# Patient Record
Sex: FEMALE | Race: WHITE | NOT HISPANIC OR LATINO | Employment: OTHER | URBAN - METROPOLITAN AREA
[De-identification: names, ages, dates, MRNs, and addresses within clinical notes are randomized per-mention and may not be internally consistent; named-entity substitution may affect disease eponyms.]

---

## 2017-08-29 ENCOUNTER — OFFICE VISIT (OUTPATIENT)
Dept: DERMATOLOGY | Facility: CLINIC | Age: 77
End: 2017-08-29
Payer: MEDICARE

## 2017-08-29 VITALS — WEIGHT: 90 LBS | HEIGHT: 62 IN | BODY MASS INDEX: 16.56 KG/M2

## 2017-08-29 DIAGNOSIS — L57.0 ACTINIC KERATOSES: ICD-10-CM

## 2017-08-29 DIAGNOSIS — L81.4 SOLAR LENTIGO: ICD-10-CM

## 2017-08-29 DIAGNOSIS — H01.139 EYELID DERMATITIS, ECZEMATOUS, UNSPECIFIED LATERALITY: ICD-10-CM

## 2017-08-29 DIAGNOSIS — D48.9 NEOPLASM OF UNCERTAIN BEHAVIOR: Primary | ICD-10-CM

## 2017-08-29 DIAGNOSIS — L82.1 SEBORRHEIC KERATOSES: ICD-10-CM

## 2017-08-29 PROCEDURE — 17000 DESTRUCT PREMALG LESION: CPT | Mod: S$GLB,,, | Performed by: DERMATOLOGY

## 2017-08-29 PROCEDURE — 88305 TISSUE EXAM BY PATHOLOGIST: CPT | Performed by: PATHOLOGY

## 2017-08-29 PROCEDURE — 1159F MED LIST DOCD IN RCRD: CPT | Mod: S$GLB,,, | Performed by: DERMATOLOGY

## 2017-08-29 PROCEDURE — 3008F BODY MASS INDEX DOCD: CPT | Mod: S$GLB,,, | Performed by: DERMATOLOGY

## 2017-08-29 PROCEDURE — 99999 PR PBB SHADOW E&M-NEW PATIENT-LVL III: CPT | Mod: PBBFAC,,, | Performed by: DERMATOLOGY

## 2017-08-29 PROCEDURE — 1126F AMNT PAIN NOTED NONE PRSNT: CPT | Mod: S$GLB,,, | Performed by: DERMATOLOGY

## 2017-08-29 PROCEDURE — 99201 PR OFFICE/OUTPT VISIT,NEW,LEVL I: CPT | Mod: 25,S$GLB,, | Performed by: DERMATOLOGY

## 2017-08-29 PROCEDURE — 11100 PR BIOPSY OF SKIN LESION: CPT | Mod: 59,S$GLB,, | Performed by: DERMATOLOGY

## 2017-08-29 RX ORDER — AMLODIPINE AND BENAZEPRIL HYDROCHLORIDE 5; 10 MG/1; MG/1
1 CAPSULE ORAL DAILY
Status: ON HOLD | COMMUNITY
Start: 2017-08-16 | End: 2022-04-06 | Stop reason: SDUPTHER

## 2017-08-29 RX ORDER — OXYCODONE AND ACETAMINOPHEN 10; 325 MG/1; MG/1
1 TABLET ORAL EVERY 6 HOURS PRN
Status: ON HOLD | COMMUNITY
Start: 2017-08-04 | End: 2022-11-21 | Stop reason: HOSPADM

## 2017-08-29 RX ORDER — METOPROLOL SUCCINATE 50 MG/1
50 TABLET, EXTENDED RELEASE ORAL 2 TIMES DAILY
COMMUNITY
Start: 2017-08-21

## 2017-08-29 RX ORDER — SIMVASTATIN 10 MG/1
20 TABLET, FILM COATED ORAL NIGHTLY
COMMUNITY
Start: 2017-07-06

## 2017-08-29 NOTE — PROGRESS NOTES
Subjective:       Patient ID:  Carmen Wilkinson is a 77 y.o. female who presents for   Chief Complaint   Patient presents with    Lesion     R cheek    Dry Skin     both eyebrows     Initial visit  Lesion on R cheek treated with cryo in the past, comes and goes with pink and scaliness, not healing x 4 years  H/o PDT to B arms  No h/o skin cancer  Denies intense sun exposure          Lesion  - Initial  Affected locations: right cheek  Duration: 4 years  Signs / symptoms: crusting, dryness and scaling  Severity: mild  Timing: constant  Aggravated by: nothing  Treatments tried: Crytotherapy in the past.    Dry Skin  - Initial  Affected locations: L and R eyebrows.  Duration: 4 months  Signs / symptoms: dryness (flaky)  Severity: mild  Timing: constant  Aggravated by: nothing  Relieving factors/Treatments tried: nothing        Review of Systems   Constitutional: Negative for fever, chills, weight loss, weight gain, fatigue, night sweats and malaise.   Skin: Positive for dry skin, daily sunscreen use (makeup), activity-related sunscreen use and wears hat.   Hematologic/Lymphatic: Bruises/bleeds easily (brusies easily).        Objective:    Physical Exam   Constitutional: She appears well-developed and well-nourished. No distress.   Neurological: She is alert and oriented to person, place, and time. She is not disoriented.   Psychiatric: She has a normal mood and affect.   Skin:   Areas Examined (abnormalities noted in diagram):   Head / Face Inspection Performed                       Diagram Legend     Erythematous scaling macule/papule c/w actinic keratosis       Vascular papule c/w angioma      Pigmented verrucoid papule/plaque c/w seborrheic keratosis      Yellow umbilicated papule c/w sebaceous hyperplasia      Irregularly shaped tan macule c/w lentigo     1-2 mm smooth white papules consistent with Milia      Movable subcutaneous cyst with punctum c/w epidermal inclusion cyst      Subcutaneous movable cyst  c/w pilar cyst      Firm pink to brown papule c/w dermatofibroma      Pedunculated fleshy papule(s) c/w skin tag(s)      Evenly pigmented macule c/w junctional nevus     Mildly variegated pigmented, slightly irregular-bordered macule c/w mildly atypical nevus      Flesh colored to evenly pigmented papule c/w intradermal nevus       Pink pearly papule/plaque c/w basal cell carcinoma      Erythematous hyperkeratotic cursted plaque c/w SCC      Surgical scar with no sign of skin cancer recurrence      Open and closed comedones      Inflammatory papules and pustules      Verrucoid papule consistent consistent with wart     Erythematous eczematous patches and plaques     Dystrophic onycholytic nail with subungual debris c/w onychomycosis     Umbilicated papule    Erythematous-base heme-crusted tan verrucoid plaque consistent with inflamed seborrheic keratosis     Erythematous Silvery Scaling Plaque c/w Psoriasis     See annotation          Assessment / Plan:      Pathology Orders:      Normal Orders This Visit    Tissue Specimen To Pathology, Dermatology     Questions:    Directional Terms:  Other(comment)    Clinical information:  Hyperkeratotic pink plaque, SCCIS vs other    Specific Site:  R medial cheek        Neoplasm of uncertain behavior  -     Tissue Specimen To Pathology, Dermatology  Shave biopsy procedure note:    Shave biopsy performed after verbal consent including risk of infection, scar, recurrence, need for additional treatment of site. Area prepped with alcohol, anesthetized with approximately 1.0cc of 1% lidocaine with epinephrine. Lesional tissue shaved with razor blade. Hemostasis achieved with application of aluminum chloride followed by hyfrecation. No complications. Dressing applied. Wound care explained.    Actinic keratoses  Cryosurgery Procedure Note    Verbal consent from the patient is obtained and the patient is aware of the precancerous quality and need for treatment of these lesions. Liquid  nitrogen cryosurgery is applied to the 1 actinic keratoses, as detailed in the physical exam, to produce a freeze injury. The patient is aware that blisters may form and is instructed on wound care with gentle cleansing and use of vaseline ointment to keep moist until healed. The patient is supplied a handout on cryosurgery and is instructed to call if lesions do not completely resolve.    Solar lentigo  This is a benign hyperpigmented sun induced lesion. Daily sun protection will reduce the number of new lesions. Treatment of these benign lesions are considered cosmetic.    Eyelid dermatitis, eczematous, unspecified laterality, mild, medial, upper lids  Hydrocortisone 1% cream OTC BID short term  Frequent moisturizer (vanicream lite lotion)  Denies use of eye makeup    Seborrheic keratoses  These are benign inherited growths without a malignant potential. Reassurance given to patient. No treatment is necessary.     Patient instructed in importance in daily sun protection of at least spf 30. Sun avoidance and topical protection discussed.   Recommend Elta MD (physician office) COTZ sensitive (available online) for daily use on face and neck.  Patient encouraged to wear hat for all outdoor exposure.   Also discussed sun protective clothing.             Return in about 6 months (around 2/28/2018).

## 2017-08-29 NOTE — PATIENT INSTRUCTIONS
Shave Biopsy Wound Care    Your doctor has performed a shave biopsy today.  A band aid and vaseline ointment has been placed over the site.  This should remain in place for 24 hours.  It is recommended that you keep the area dry for the first 24 hours.  After 24 hours, you may remove the band aid and wash the area with warm soap and water and apply Vaseline jelly.  Many patients prefer to use Neosporin or Bacitracin ointment.  This is acceptable; however, know that you can develop an allergy to this medication even if you have used it safely for years.  It is important to keep the area moist.  Letting it dry out and get air slows healing time, and will worsen the scar.  Band aid is optional after first 24 hours.      If you notice increasing redness, tenderness, pain, or yellow drainage at the biopsy site, please notify your doctor.  These are signs of an infection.    If your biopsy site is bleeding, apply firm pressure for 15 minutes straight.  Repeat for another 15 minutes, if it is still bleeding.   If the surgical site continues to bleed, then please contact your doctor.       [facility]  [facility]  [id number] [place] [id number]  Dept: [phone]

## 2017-09-05 DIAGNOSIS — D04.30 SQUAMOUS CELL CARCINOMA IN SITU OF SKIN OF FACE: Primary | ICD-10-CM

## 2017-09-05 RX ORDER — IMIQUIMOD 12.5 MG/.25G
CREAM TOPICAL
Qty: 24 PACKET | Refills: 0 | Status: SHIPPED | OUTPATIENT
Start: 2017-09-05 | End: 2018-04-27

## 2017-09-11 ENCOUNTER — TELEPHONE (OUTPATIENT)
Dept: DERMATOLOGY | Facility: CLINIC | Age: 77
End: 2017-09-11

## 2017-09-11 NOTE — TELEPHONE ENCOUNTER
----- Message from Keren Solorzano sent at 9/11/2017 12:48 PM CDT -----  Contact: Patient  Patient called to speak with Marian about instructions on a cream. She can be contacted at [phone].    Thanks,  Keren

## 2017-10-11 ENCOUNTER — TELEPHONE (OUTPATIENT)
Dept: DERMATOLOGY | Facility: CLINIC | Age: 77
End: 2017-10-11

## 2017-10-11 NOTE — TELEPHONE ENCOUNTER
----- Message from She Bliss sent at 10/9/2017  2:19 PM CDT -----  Contact: patient  Patient call to advise that face is peeling. Need to know what to do? Please call back at [phone]. Thanks,

## 2017-10-11 NOTE — TELEPHONE ENCOUNTER
Spoke to pt. Pt started imquimod cream 5 weeks ago on right cheek. Pt states her cheek is red but not painful at all, states her skin is peeling all over her body. Informed pt the cream should only be effective locally. Advised to moisturize head to toe. Verbalized understanding. Scheduled for f/u 11/13/17.

## 2017-11-13 ENCOUNTER — OFFICE VISIT (OUTPATIENT)
Dept: DERMATOLOGY | Facility: CLINIC | Age: 77
End: 2017-11-13
Payer: MEDICARE

## 2017-11-13 VITALS — BODY MASS INDEX: 16.56 KG/M2 | WEIGHT: 90 LBS | HEIGHT: 62 IN

## 2017-11-13 DIAGNOSIS — D04.30 SQUAMOUS CELL CARCINOMA IN SITU OF SKIN OF FACE: Primary | ICD-10-CM

## 2017-11-13 PROCEDURE — 99999 PR PBB SHADOW E&M-EST. PATIENT-LVL II: CPT | Mod: PBBFAC,,, | Performed by: DERMATOLOGY

## 2017-11-13 PROCEDURE — 99213 OFFICE O/P EST LOW 20 MIN: CPT | Mod: S$GLB,,, | Performed by: DERMATOLOGY

## 2017-11-13 NOTE — PROGRESS NOTES
Subjective:       Patient ID:  Carmen Wilkinson is a 77 y.o. female who presents for   Chief Complaint   Patient presents with    Squamous Cell Carcinoma     SSIC, efudex follow up      Patient last seen 8/29/17 with new SCCIS R cheek  Last applied 3 weeks ago  Robust reaction at the site, stayed pink since then    Notes Recorded by Neda Flores MD on 9/5/2017 at 8:09 AM CDT  Sent imiquimod instead of efudex; feel higher cure rate. Please call patient to explain packets management   I recommend treating with imiquimod 5 times per week for 4-6 weeks with follow up at 3 months to ensure clinical resolution.   The Medication comes in little packets, cut a small opening to the top of the packet and squeeze one drop on the red scaly area at bedtime. Place packet in a ziplock bag and store in refrigerator for next day use. Wash hands thoroughly and avoid transfer to your eyes. Expect redness and irritation at the site, starting on day 2-3 and worsening as you continue to treat. A robust reaction predicts a good outcome. However if the reaction is too severe, you may take a few days off.   Medication ordered. Please call us if you have any question.  Thank you!   Dr Flores      From IV 8/29: Lesion on R cheek treated with cryo in the past, comes and goes with pink and scaliness, not healing x 4 years  H/o PDT to B arms  No h/o skin cancer  Denies intense sun exposure      Squamous Cell Carcinoma  - Follow-up  Diagnosis: Squamous cell carcinoma in situ.  Symptom course: improving  Currently using: aldara cream.  Affected locations: right cheek  Signs / symptoms: redness  Severity: mild        Review of Systems   Constitutional: Negative for fever, chills, weight loss, weight gain, fatigue, night sweats and malaise.   Skin: Positive for daily sunscreen use, activity-related sunscreen use and wears hat.   Hematologic/Lymphatic: Bruises/bleeds easily.        Objective:    Physical Exam   Skin:                  Diagram Legend     Erythematous scaling macule/papule c/w actinic keratosis       Vascular papule c/w angioma      Pigmented verrucoid papule/plaque c/w seborrheic keratosis      Yellow umbilicated papule c/w sebaceous hyperplasia      Irregularly shaped tan macule c/w lentigo     1-2 mm smooth white papules consistent with Milia      Movable subcutaneous cyst with punctum c/w epidermal inclusion cyst      Subcutaneous movable cyst c/w pilar cyst      Firm pink to brown papule c/w dermatofibroma      Pedunculated fleshy papule(s) c/w skin tag(s)      Evenly pigmented macule c/w junctional nevus     Mildly variegated pigmented, slightly irregular-bordered macule c/w mildly atypical nevus      Flesh colored to evenly pigmented papule c/w intradermal nevus       Pink pearly papule/plaque c/w basal cell carcinoma      Erythematous hyperkeratotic cursted plaque c/w SCC      Surgical scar with no sign of skin cancer recurrence      Open and closed comedones      Inflammatory papules and pustules      Verrucoid papule consistent consistent with wart     Erythematous eczematous patches and plaques     Dystrophic onycholytic nail with subungual debris c/w onychomycosis     Umbilicated papule    Erythematous-base heme-crusted tan verrucoid plaque consistent with inflamed seborrheic keratosis     Erythematous Silvery Scaling Plaque c/w Psoriasis     See annotation              Assessment / Plan:        Squamous cell carcinoma in situ of skin of face    bx 9/29/17,   S/p imiquimod 5times weekly x 6 weeks, residual erythema, atropic scar at biopsy site  No deep component appreciated  Discussed repeat imiquimod course vs Mohs referral  Will reach out to Dr Suarez for input           Return in about 2 months (around 1/13/2018).

## 2017-11-13 NOTE — Clinical Note
Delroy Farrar, would you please provide guidance? This patient had large SCCIS R medial cheek, cannot ruleout deeper component.Seen today in FU after 6 weeks imiquimod 5xweekly, robust reaction, completed 3 weeks ago. Marked residual erythema, no deep component appreciated. Discussed repeat imiquimod with FU biopsy vs referral to you for evaluation/Mohs. WOuld you please look at pics of pre and post and give me your take? Thank you Neda

## 2017-11-17 ENCOUNTER — TELEPHONE (OUTPATIENT)
Dept: DERMATOLOGY | Facility: CLINIC | Age: 77
End: 2017-11-17

## 2017-11-17 NOTE — TELEPHONE ENCOUNTER
Spoke to pt. Informed Dr Suarez advised to start 2nd round of imiquimod treatment and f/u in 1 month. Verbalized understanding. Scheduled f/u 1/26/2018.

## 2018-01-26 ENCOUNTER — OFFICE VISIT (OUTPATIENT)
Dept: DERMATOLOGY | Facility: CLINIC | Age: 78
End: 2018-01-26
Payer: MEDICARE

## 2018-01-26 VITALS — BODY MASS INDEX: 16.56 KG/M2 | HEIGHT: 62 IN | WEIGHT: 90 LBS

## 2018-01-26 DIAGNOSIS — L81.4 SOLAR LENTIGO: ICD-10-CM

## 2018-01-26 DIAGNOSIS — D04.9 SQUAMOUS CELL CARCINOMA IN SITU (SCCIS) OF SKIN: Primary | ICD-10-CM

## 2018-01-26 PROCEDURE — 99213 OFFICE O/P EST LOW 20 MIN: CPT | Mod: S$GLB,,, | Performed by: DERMATOLOGY

## 2018-01-26 PROCEDURE — 99999 PR PBB SHADOW E&M-EST. PATIENT-LVL II: CPT | Mod: PBBFAC,,, | Performed by: DERMATOLOGY

## 2018-01-26 NOTE — PROGRESS NOTES
Subjective:       Patient ID:  Carmen Wilkinson is a 78 y.o. female who presents for   Chief Complaint   Patient presents with    Spot     follow up post aldara use right cheek, stopped use 12-29-17.      Patient with SCCIS R medial cheek, bx 8/2017  discussed Mohs vs topical tx  Treated with imiquimod x 2 courses, last 11/2- to 12/29 2018  No pain, no scaling      Spot  - Follow-up  Symptom course: improving  Affected locations: currently clear  Signs / symptoms: asymptomatic  Severity: mild        Review of Systems   Constitutional: Positive for weight loss (addressed by PCP (work up negative, including CXR), smoker). Negative for chills and night sweats.   Skin: Positive for daily sunscreen use and activity-related sunscreen use. Negative for wears hat.        Objective:    Physical Exam   Skin:   Areas Examined (abnormalities noted in diagram):   Head / Face Inspection Performed  RUE Inspected  LUE Inspection Performed  Nails and Digits Inspection Performed              Diagram Legend     Erythematous scaling macule/papule c/w actinic keratosis       Vascular papule c/w angioma      Pigmented verrucoid papule/plaque c/w seborrheic keratosis      Yellow umbilicated papule c/w sebaceous hyperplasia      Irregularly shaped tan macule c/w lentigo     1-2 mm smooth white papules consistent with Milia      Movable subcutaneous cyst with punctum c/w epidermal inclusion cyst      Subcutaneous movable cyst c/w pilar cyst      Firm pink to brown papule c/w dermatofibroma      Pedunculated fleshy papule(s) c/w skin tag(s)      Evenly pigmented macule c/w junctional nevus     Mildly variegated pigmented, slightly irregular-bordered macule c/w mildly atypical nevus      Flesh colored to evenly pigmented papule c/w intradermal nevus       Pink pearly papule/plaque c/w basal cell carcinoma      Erythematous hyperkeratotic cursted plaque c/w SCC      Surgical scar with no sign of skin cancer recurrence      Open and  closed comedones      Inflammatory papules and pustules      Verrucoid papule consistent consistent with wart     Erythematous eczematous patches and plaques     Dystrophic onycholytic nail with subungual debris c/w onychomycosis     Umbilicated papule    Erythematous-base heme-crusted tan verrucoid plaque consistent with inflamed seborrheic keratosis     Erythematous Silvery Scaling Plaque c/w Psoriasis     See annotation      Assessment / Plan:        Squamous cell carcinoma in situ (SCCIS) of skin    R medial cheek, s/p imiquimod x total of 2 months treatment  Mild residual erythema, no tenderness, scaling, subcutaneous component      Solar lentigo  This is a benign hyperpigmented sun induced lesion. Daily sun protection will reduce the number of new lesions. Treatment of these benign lesions are considered cosmetic.    Patient instructed in importance in daily sun protection of at least spf 30. Sun avoidance and topical protection discussed.   Recommend Elta MD (physician office) COTZ sensitive (available online) for daily use on face and neck.  Patient encouraged to wear hat for all outdoor exposure.   Also discussed sun protective clothing.             Follow-up in about 3 months (around 4/26/2018).

## 2018-04-27 ENCOUNTER — OFFICE VISIT (OUTPATIENT)
Dept: DERMATOLOGY | Facility: CLINIC | Age: 78
End: 2018-04-27
Payer: MEDICARE

## 2018-04-27 DIAGNOSIS — D18.01 CHERRY ANGIOMA: ICD-10-CM

## 2018-04-27 DIAGNOSIS — L82.1 SEBORRHEIC KERATOSES: ICD-10-CM

## 2018-04-27 DIAGNOSIS — L81.4 SOLAR LENTIGO: ICD-10-CM

## 2018-04-27 DIAGNOSIS — Z85.828 HISTORY OF SKIN CANCER: ICD-10-CM

## 2018-04-27 DIAGNOSIS — L57.0 ACTINIC KERATOSES: Primary | ICD-10-CM

## 2018-04-27 PROCEDURE — 99999 PR PBB SHADOW E&M-EST. PATIENT-LVL III: CPT | Mod: PBBFAC,,, | Performed by: DERMATOLOGY

## 2018-04-27 PROCEDURE — 17003 DESTRUCT PREMALG LES 2-14: CPT | Mod: S$GLB,,, | Performed by: DERMATOLOGY

## 2018-04-27 PROCEDURE — 17000 DESTRUCT PREMALG LESION: CPT | Mod: S$GLB,,, | Performed by: DERMATOLOGY

## 2018-04-27 PROCEDURE — 99213 OFFICE O/P EST LOW 20 MIN: CPT | Mod: 25,S$GLB,, | Performed by: DERMATOLOGY

## 2018-04-27 NOTE — PROGRESS NOTES
Subjective:       Patient ID:  Carmen Wilkinson is a 78 y.o. female who presents for   Chief Complaint   Patient presents with    Skin Check     TBSE    Spot     L cheek     Patient last seen 01/2018    H/o  SCCIS R medial cheek, bx 8/2017  discussed Mohs vs topical tx  Treated with imiquimod x 2 courses, last 11/2- to 12/29 2018  No pain, no scaling, clinically resolved at last visit  This is a high risk patient here to check for the development of new lesions.            Spot  - Initial  Affected locations: left cheek  Duration: months.  Signs / symptoms: dryness  Severity: mild  Timing: constant  Aggravated by: nothing  Relieving factors/Treatments tried: nothing        Review of Systems   Constitutional: Negative for fever, chills, weight loss, weight gain, fatigue, night sweats and malaise.   Skin: Positive for daily sunscreen use, activity-related sunscreen use and wears hat.   Hematologic/Lymphatic: Does not bruise/bleed easily.        Objective:    Physical Exam   Constitutional: She appears well-developed and well-nourished. No distress.   Neurological: She is alert and oriented to person, place, and time. She is not disoriented.   Psychiatric: She has a normal mood and affect.   Skin:   Areas Examined (abnormalities noted in diagram):   Scalp / Hair Palpated and Inspected  Head / Face Inspection Performed  Neck Inspection Performed  Chest / Axilla Inspection Performed  Abdomen Inspection Performed  Genitals / Buttocks / Groin Inspection Performed  Back Inspection Performed  RUE Inspected  LUE Inspection Performed  RLE Inspected  LLE Inspection Performed  Nails and Digits Inspection Performed                       Diagram Legend     Erythematous scaling macule/papule c/w actinic keratosis       Vascular papule c/w angioma      Pigmented verrucoid papule/plaque c/w seborrheic keratosis      Yellow umbilicated papule c/w sebaceous hyperplasia      Irregularly shaped tan macule c/w lentigo     1-2 mm  smooth white papules consistent with Milia      Movable subcutaneous cyst with punctum c/w epidermal inclusion cyst      Subcutaneous movable cyst c/w pilar cyst      Firm pink to brown papule c/w dermatofibroma      Pedunculated fleshy papule(s) c/w skin tag(s)      Evenly pigmented macule c/w junctional nevus     Mildly variegated pigmented, slightly irregular-bordered macule c/w mildly atypical nevus      Flesh colored to evenly pigmented papule c/w intradermal nevus       Pink pearly papule/plaque c/w basal cell carcinoma      Erythematous hyperkeratotic cursted plaque c/w SCC      Surgical scar with no sign of skin cancer recurrence      Open and closed comedones      Inflammatory papules and pustules      Verrucoid papule consistent consistent with wart     Erythematous eczematous patches and plaques     Dystrophic onycholytic nail with subungual debris c/w onychomycosis     Umbilicated papule    Erythematous-base heme-crusted tan verrucoid plaque consistent with inflamed seborrheic keratosis     Erythematous Silvery Scaling Plaque c/w Psoriasis     See annotation      Assessment / Plan:        Actinic keratoses  Cryosurgery Procedure Note    Verbal consent from the patient is obtained and the patient is aware of the precancerous quality and need for treatment of these lesions. Liquid nitrogen cryosurgery is applied to the 2 actinic keratoses, as detailed in the physical exam, to produce a freeze injury. The patient is aware that blisters may form and is instructed on wound care with gentle cleansing and use of vaseline ointment to keep moist until healed. The patient is supplied a handout on cryosurgery and is instructed to call if lesions do not completely resolve.    History of skin cancer  Area of previous NMSC (R medial cheek) examined. Site well healed with no signs of recurrence.  Total body skin examination performed today including at least 12 points as noted in physical examination. No lesions  suspicious for malignancy noted.    Seborrheic keratoses  These are benign inherited growths without a malignant potential. Reassurance given to patient. No treatment is necessary.     Solar lentigo  This is a benign hyperpigmented sun induced lesion. Daily sun protection will reduce the number of new lesions. Treatment of these benign lesions are considered cosmetic.    Cherry angioma  This is a benign vascular lesion. Reassurance given. No treatment required.     Patient instructed in importance in daily sun protection of at least spf 30. Sun avoidance and topical protection discussed.   Recommend Elta MD (physician office) COTZ sensitive (available online) for daily use on face and neck.  Patient encouraged to wear hat for all outdoor exposure.   Also discussed sun protective clothing.               No Follow-up on file.

## 2018-04-27 NOTE — PATIENT INSTRUCTIONS

## 2018-10-26 ENCOUNTER — OFFICE VISIT (OUTPATIENT)
Dept: DERMATOLOGY | Facility: CLINIC | Age: 78
End: 2018-10-26
Payer: MEDICARE

## 2018-10-26 DIAGNOSIS — D18.01 CHERRY ANGIOMA: ICD-10-CM

## 2018-10-26 DIAGNOSIS — Z85.828 HISTORY OF SKIN CANCER: Primary | ICD-10-CM

## 2018-10-26 DIAGNOSIS — L82.1 SEBORRHEIC KERATOSES: ICD-10-CM

## 2018-10-26 DIAGNOSIS — L81.4 SOLAR LENTIGO: ICD-10-CM

## 2018-10-26 PROCEDURE — 1101F PT FALLS ASSESS-DOCD LE1/YR: CPT | Mod: CPTII,,, | Performed by: DERMATOLOGY

## 2018-10-26 PROCEDURE — 99212 OFFICE O/P EST SF 10 MIN: CPT | Mod: PBBFAC,PO | Performed by: DERMATOLOGY

## 2018-10-26 PROCEDURE — 99999 PR PBB SHADOW E&M-EST. PATIENT-LVL II: CPT | Mod: PBBFAC,,, | Performed by: DERMATOLOGY

## 2018-10-26 PROCEDURE — 99213 OFFICE O/P EST LOW 20 MIN: CPT | Mod: S$PBB,,, | Performed by: DERMATOLOGY

## 2018-10-26 RX ORDER — ASPIRIN 81 MG/1
81 TABLET ORAL DAILY
COMMUNITY

## 2018-10-26 NOTE — PROGRESS NOTES
Subjective:       Patient ID:  Carmen Wilkinson is a 78 y.o. female who presents for   Chief Complaint   Patient presents with    Skin Check     UBSE     Patient last seen 04/2018    H/o  SCCIS R medial cheek, bx 8/2017  discussed Mohs vs topical tx  Treated with imiquimod x 2 courses, last 11/2- to 12/29 2018  No pain, no scaling, clinically resolved at last visit  This is a high risk patient here to check for the development of new lesions.          Review of Systems   Constitutional: Negative for fever, chills and fatigue.   Skin: Positive for activity-related sunscreen use and wears hat. Negative for daily sunscreen use.   Hematologic/Lymphatic: Bruises/bleeds easily.        Objective:    Physical Exam   Constitutional: She appears well-developed and well-nourished. No distress.   Neurological: She is alert and oriented to person, place, and time. She is not disoriented.   Psychiatric: She has a normal mood and affect.   Skin:   Areas Examined (abnormalities noted in diagram):   Scalp / Hair Palpated and Inspected  Head / Face Inspection Performed  Neck Inspection Performed  Chest / Axilla Inspection Performed  Abdomen Inspection Performed  Back Inspection Performed  RUE Inspected  LUE Inspection Performed  Nails and Digits Inspection Performed                       Diagram Legend     Erythematous scaling macule/papule c/w actinic keratosis       Vascular papule c/w angioma      Pigmented verrucoid papule/plaque c/w seborrheic keratosis      Yellow umbilicated papule c/w sebaceous hyperplasia      Irregularly shaped tan macule c/w lentigo     1-2 mm smooth white papules consistent with Milia      Movable subcutaneous cyst with punctum c/w epidermal inclusion cyst      Subcutaneous movable cyst c/w pilar cyst      Firm pink to brown papule c/w dermatofibroma      Pedunculated fleshy papule(s) c/w skin tag(s)      Evenly pigmented macule c/w junctional nevus     Mildly variegated pigmented, slightly  irregular-bordered macule c/w mildly atypical nevus      Flesh colored to evenly pigmented papule c/w intradermal nevus       Pink pearly papule/plaque c/w basal cell carcinoma      Erythematous hyperkeratotic cursted plaque c/w SCC      Surgical scar with no sign of skin cancer recurrence      Open and closed comedones      Inflammatory papules and pustules      Verrucoid papule consistent consistent with wart     Erythematous eczematous patches and plaques     Dystrophic onycholytic nail with subungual debris c/w onychomycosis     Umbilicated papule    Erythematous-base heme-crusted tan verrucoid plaque consistent with inflamed seborrheic keratosis     Erythematous Silvery Scaling Plaque c/w Psoriasis     See annotation      Assessment / Plan:        History of skin cancer  Area of previous NMSC (R medial cheek SCCIS) examined. Site well healed with no signs of recurrence.  Upper body skin examination performed today including at least 9 points as noted in physical examination. No lesions suspicious for malignancy noted.    Seborrheic keratoses  These are benign inherited growths without a malignant potential. Reassurance given to patient. No treatment is necessary.    - stable and chronic    Solar lentigo  This is a benign hyperpigmented sun induced lesion. Daily sun protection will reduce the number of new lesions. Treatment of these benign lesions are considered cosmetic.   - stable and chronic    Cherry angioma  This is a benign vascular lesion. Reassurance given. No treatment required.    - stable and chronic    Patient instructed in importance in daily sun protection of at least spf 30. Mineral sunscreen ingredients preferred (Zinc +/- Titanium).   Recommend Elta MD for daily use on face and neck.  Patient encouraged to wear hat for all outdoor exposure.   Also discussed sun avoidance and use of protective clothing.               Follow-up in about 1 year (around 10/26/2019).

## 2019-10-28 ENCOUNTER — OFFICE VISIT (OUTPATIENT)
Dept: DERMATOLOGY | Facility: CLINIC | Age: 79
End: 2019-10-28
Payer: MEDICARE

## 2019-10-28 VITALS — BODY MASS INDEX: 16.56 KG/M2 | WEIGHT: 90 LBS | HEIGHT: 62 IN

## 2019-10-28 DIAGNOSIS — D18.01 CHERRY ANGIOMA: ICD-10-CM

## 2019-10-28 DIAGNOSIS — L81.4 SOLAR LENTIGO: ICD-10-CM

## 2019-10-28 DIAGNOSIS — Z85.828 HISTORY OF NONMELANOMA SKIN CANCER: Primary | ICD-10-CM

## 2019-10-28 DIAGNOSIS — L82.1 SEBORRHEIC KERATOSES: ICD-10-CM

## 2019-10-28 PROCEDURE — 99214 OFFICE O/P EST MOD 30 MIN: CPT | Mod: S$GLB,,, | Performed by: DERMATOLOGY

## 2019-10-28 PROCEDURE — 99214 PR OFFICE/OUTPT VISIT, EST, LEVL IV, 30-39 MIN: ICD-10-PCS | Mod: S$GLB,,, | Performed by: DERMATOLOGY

## 2019-10-28 PROCEDURE — 99999 PR PBB SHADOW E&M-EST. PATIENT-LVL II: CPT | Mod: PBBFAC,,, | Performed by: DERMATOLOGY

## 2019-10-28 PROCEDURE — 99999 PR PBB SHADOW E&M-EST. PATIENT-LVL II: ICD-10-PCS | Mod: PBBFAC,,, | Performed by: DERMATOLOGY

## 2019-10-28 PROCEDURE — 1101F PT FALLS ASSESS-DOCD LE1/YR: CPT | Mod: CPTII,S$GLB,, | Performed by: DERMATOLOGY

## 2019-10-28 PROCEDURE — 1101F PR PT FALLS ASSESS DOC 0-1 FALLS W/OUT INJ PAST YR: ICD-10-PCS | Mod: CPTII,S$GLB,, | Performed by: DERMATOLOGY

## 2019-10-28 RX ORDER — HYDROCODONE BITARTRATE AND ACETAMINOPHEN 10; 325 MG/1; MG/1
TABLET ORAL
COMMUNITY
End: 2021-06-29

## 2019-10-28 NOTE — PROGRESS NOTES
Subjective:       Patient ID:  Carmen Wilkinson is a 79 y.o. female who presents for   Chief Complaint   Patient presents with    Skin Check     TBSE    Spot     Upper back, 1 year, rough, no tx     Pt last seen 10-26-18 for UBSE, benign lesions    Here today for TBSE and spot to upper back for 1 year.  Rough, no tx.    H/o  SCCIS R medial cheek, bx 8/2017; discussed Mohs vs topical tx Treated with imiquimod x 2 courses, last 11/2- to 12/29 2018    Interim nerve stim device for back pain        Review of Systems   Constitutional: Negative for fever, chills and fatigue.   Skin: Positive for daily sunscreen use. Negative for activity-related sunscreen use and wears hat.   Hematologic/Lymphatic: Does not bruise/bleed easily.        Objective:    Physical Exam   Constitutional: She appears well-developed and well-nourished. No distress.   Neurological: She is alert and oriented to person, place, and time. She is not disoriented.   Psychiatric: She has a normal mood and affect.   Skin:   Areas Examined (abnormalities noted in diagram):   Scalp / Hair Palpated and Inspected  Head / Face Inspection Performed  Neck Inspection Performed  Chest / Axilla Inspection Performed  Abdomen Inspection Performed  Back Inspection Performed  RUE Inspected  LUE Inspection Performed  Nails and Digits Inspection Performed                       Diagram Legend     Erythematous scaling macule/papule c/w actinic keratosis       Vascular papule c/w angioma      Pigmented verrucoid papule/plaque c/w seborrheic keratosis      Yellow umbilicated papule c/w sebaceous hyperplasia      Irregularly shaped tan macule c/w lentigo     1-2 mm smooth white papules consistent with Milia      Movable subcutaneous cyst with punctum c/w epidermal inclusion cyst      Subcutaneous movable cyst c/w pilar cyst      Firm pink to brown papule c/w dermatofibroma      Pedunculated fleshy papule(s) c/w skin tag(s)      Evenly pigmented macule c/w junctional  nevus     Mildly variegated pigmented, slightly irregular-bordered macule c/w mildly atypical nevus      Flesh colored to evenly pigmented papule c/w intradermal nevus       Pink pearly papule/plaque c/w basal cell carcinoma      Erythematous hyperkeratotic cursted plaque c/w SCC      Surgical scar with no sign of skin cancer recurrence      Open and closed comedones      Inflammatory papules and pustules      Verrucoid papule consistent consistent with wart     Erythematous eczematous patches and plaques     Dystrophic onycholytic nail with subungual debris c/w onychomycosis     Umbilicated papule    Erythematous-base heme-crusted tan verrucoid plaque consistent with inflamed seborrheic keratosis     Erythematous Silvery Scaling Plaque c/w Psoriasis     See annotation      Assessment / Plan:        History of nonmelanoma skin cancer  Area of previous SCCIS (R cheek) examined. Site well healed with no signs of recurrence.    Total body skin examination performed today including at least 12 points as noted in physical examination. No lesions suspicious for malignancy noted.    Seborrheic keratoses  These are benign inherited growths without a malignant potential. Reassurance given to patient. No treatment is necessary.     Solar lentigo  This is a benign hyperpigmented sun induced lesion. Daily sun protection will reduce the number of new lesions. Treatment of these benign lesions are considered cosmetic.    Cherry angioma  This is a benign vascular lesion. Reassurance given. No treatment required.     Patient instructed in importance in daily sun protection of at least spf 30. Mineral sunscreen ingredients preferred (Zinc +/- Titanium).   Recommend Elta MD for daily use on face and neck.  Patient encouraged to wear hat for all outdoor exposure.   Also discussed sun avoidance and use of protective clothing.             Follow up in about 1 year (around 10/28/2020), or if symptoms worsen or fail to improve.

## 2020-03-16 DIAGNOSIS — M54.16 RADICULOPATHY OF LUMBAR REGION: Primary | ICD-10-CM

## 2020-05-27 ENCOUNTER — HOSPITAL ENCOUNTER (OUTPATIENT)
Dept: RADIOLOGY | Facility: HOSPITAL | Age: 80
Discharge: HOME | End: 2020-05-27
Attending: PHYSICAL MEDICINE & REHABILITATION
Payer: MEDICARE

## 2020-05-27 DIAGNOSIS — M54.16 RADICULOPATHY OF LUMBAR REGION: ICD-10-CM

## 2020-05-27 PROCEDURE — 72131 CT LUMBAR SPINE W/O DYE: CPT | Mod: TC,PO

## 2020-10-05 DIAGNOSIS — R10.31 RLQ ABDOMINAL PAIN: Primary | ICD-10-CM

## 2020-10-05 DIAGNOSIS — J44.9 CHRONIC OBSTRUCTIVE PULMONARY DISEASE, UNSPECIFIED COPD TYPE: ICD-10-CM

## 2020-10-14 ENCOUNTER — HOSPITAL ENCOUNTER (OUTPATIENT)
Dept: RADIOLOGY | Facility: HOSPITAL | Age: 80
Discharge: HOME | End: 2020-10-14
Attending: NURSE PRACTITIONER
Payer: MEDICARE

## 2020-10-14 DIAGNOSIS — J44.9 CHRONIC OBSTRUCTIVE PULMONARY DISEASE, UNSPECIFIED COPD TYPE: ICD-10-CM

## 2020-10-14 DIAGNOSIS — R10.31 RLQ ABDOMINAL PAIN: ICD-10-CM

## 2020-10-14 LAB
CREAT SERPL-MCNC: 0.9 MG/DL (ref 0.5–1.4)
SAMPLE: NORMAL

## 2020-10-14 PROCEDURE — 71046 X-RAY EXAM CHEST 2 VIEWS: CPT | Mod: TC,PO

## 2020-10-14 PROCEDURE — 74177 CT ABD & PELVIS W/CONTRAST: CPT | Mod: TC,PO

## 2020-10-14 PROCEDURE — 25500020 PHARM REV CODE 255: Mod: PO | Performed by: NURSE PRACTITIONER

## 2020-10-14 RX ADMIN — IOHEXOL 100 ML: 350 INJECTION, SOLUTION INTRAVENOUS at 01:10

## 2021-01-06 DIAGNOSIS — K55.1 MESENTERIC ARTERY STENOSIS: Primary | ICD-10-CM

## 2021-01-07 ENCOUNTER — HOSPITAL ENCOUNTER (OUTPATIENT)
Dept: PREADMISSION TESTING | Facility: HOSPITAL | Age: 81
Discharge: HOME | End: 2021-01-07
Attending: SURGERY
Payer: MEDICARE

## 2021-01-07 ENCOUNTER — HOSPITAL ENCOUNTER (OUTPATIENT)
Dept: RADIOLOGY | Facility: HOSPITAL | Age: 81
Discharge: HOME | End: 2021-01-07
Attending: SURGERY
Payer: MEDICARE

## 2021-01-07 DIAGNOSIS — Z01.810 PRE-PROCEDURAL CARDIOVASCULAR EXAMINATION: Primary | ICD-10-CM

## 2021-01-07 DIAGNOSIS — K55.1 MESENTERIC ARTERY STENOSIS: ICD-10-CM

## 2021-01-07 LAB
ANION GAP SERPL CALC-SCNC: 8 MMOL/L (ref 8–16)
APTT PPP: 35.1 SEC (ref 23.6–33.3)
BASOPHILS # BLD AUTO: 0.04 K/UL (ref 0–0.2)
BASOPHILS NFR BLD: 0.4 % (ref 0–1.9)
BUN SERPL-MCNC: 39 MG/DL (ref 8–23)
CALCIUM SERPL-MCNC: 9.2 MG/DL (ref 8.7–10.5)
CHLORIDE SERPL-SCNC: 107 MMOL/L (ref 95–110)
CO2 SERPL-SCNC: 23 MMOL/L (ref 23–29)
CREAT SERPL-MCNC: 1.1 MG/DL (ref 0.5–1.4)
DIFFERENTIAL METHOD: ABNORMAL
EOSINOPHIL # BLD AUTO: 0.1 K/UL (ref 0–0.5)
EOSINOPHIL NFR BLD: 1 % (ref 0–8)
ERYTHROCYTE [DISTWIDTH] IN BLOOD BY AUTOMATED COUNT: 12.4 % (ref 11.5–14.5)
EST. GFR  (AFRICAN AMERICAN): 54.8 ML/MIN/1.73 M^2
EST. GFR  (NON AFRICAN AMERICAN): 47.5 ML/MIN/1.73 M^2
GLUCOSE SERPL-MCNC: 89 MG/DL (ref 70–110)
HCT VFR BLD AUTO: 34.6 % (ref 37–48.5)
HGB BLD-MCNC: 11.5 G/DL (ref 12–16)
IMM GRANULOCYTES # BLD AUTO: 0.03 K/UL (ref 0–0.04)
IMM GRANULOCYTES NFR BLD AUTO: 0.3 % (ref 0–0.5)
INR PPP: 1.2
LYMPHOCYTES # BLD AUTO: 2.2 K/UL (ref 1–4.8)
LYMPHOCYTES NFR BLD: 24.2 % (ref 18–48)
MCH RBC QN AUTO: 31.7 PG (ref 27–31)
MCHC RBC AUTO-ENTMCNC: 33.2 G/DL (ref 32–36)
MCV RBC AUTO: 95 FL (ref 82–98)
MONOCYTES # BLD AUTO: 0.8 K/UL (ref 0.3–1)
MONOCYTES NFR BLD: 8.9 % (ref 4–15)
NEUTROPHILS # BLD AUTO: 6 K/UL (ref 1.8–7.7)
NEUTROPHILS NFR BLD: 65.2 % (ref 38–73)
NRBC BLD-RTO: 0 /100 WBC
PLATELET # BLD AUTO: 300 K/UL (ref 150–350)
PMV BLD AUTO: 8.5 FL (ref 9.2–12.9)
POTASSIUM SERPL-SCNC: 4.3 MMOL/L (ref 3.5–5.1)
PROTHROMBIN TIME: 14.7 SEC (ref 10.6–14.8)
RBC # BLD AUTO: 3.63 M/UL (ref 4–5.4)
SODIUM SERPL-SCNC: 138 MMOL/L (ref 136–145)
WBC # BLD AUTO: 9.23 K/UL (ref 3.9–12.7)

## 2021-01-07 PROCEDURE — 85025 COMPLETE CBC W/AUTO DIFF WBC: CPT

## 2021-01-07 PROCEDURE — 93005 ELECTROCARDIOGRAM TRACING: CPT | Performed by: INTERNAL MEDICINE

## 2021-01-07 PROCEDURE — 93010 EKG 12-LEAD: ICD-10-PCS | Mod: ,,, | Performed by: INTERNAL MEDICINE

## 2021-01-07 PROCEDURE — 85610 PROTHROMBIN TIME: CPT

## 2021-01-07 PROCEDURE — 93010 ELECTROCARDIOGRAM REPORT: CPT | Mod: ,,, | Performed by: INTERNAL MEDICINE

## 2021-01-07 PROCEDURE — 36415 COLL VENOUS BLD VENIPUNCTURE: CPT

## 2021-01-07 PROCEDURE — 85730 THROMBOPLASTIN TIME PARTIAL: CPT

## 2021-01-07 PROCEDURE — 80048 BASIC METABOLIC PNL TOTAL CA: CPT

## 2021-01-07 PROCEDURE — 71046 X-RAY EXAM CHEST 2 VIEWS: CPT | Mod: TC

## 2021-01-07 RX ORDER — SODIUM CHLORIDE 9 MG/ML
INJECTION, SOLUTION INTRAVENOUS CONTINUOUS
Status: CANCELLED | OUTPATIENT
Start: 2021-01-07

## 2021-01-10 ENCOUNTER — LAB VISIT (OUTPATIENT)
Dept: PRIMARY CARE CLINIC | Facility: CLINIC | Age: 81
End: 2021-01-10
Payer: MEDICARE

## 2021-01-10 DIAGNOSIS — K55.1 MESENTERIC ARTERY STENOSIS: ICD-10-CM

## 2021-01-10 PROCEDURE — U0003 INFECTIOUS AGENT DETECTION BY NUCLEIC ACID (DNA OR RNA); SEVERE ACUTE RESPIRATORY SYNDROME CORONAVIRUS 2 (SARS-COV-2) (CORONAVIRUS DISEASE [COVID-19]), AMPLIFIED PROBE TECHNIQUE, MAKING USE OF HIGH THROUGHPUT TECHNOLOGIES AS DESCRIBED BY CMS-2020-01-R: HCPCS

## 2021-01-11 LAB — SARS-COV-2 RNA RESP QL NAA+PROBE: NOT DETECTED

## 2021-01-13 ENCOUNTER — HOSPITAL ENCOUNTER (OUTPATIENT)
Facility: HOSPITAL | Age: 81
Discharge: HOME | End: 2021-01-13
Attending: SURGERY | Admitting: SURGERY
Payer: MEDICARE

## 2021-01-13 DIAGNOSIS — K55.1 MESENTERIC ARTERY STENOSIS: Primary | ICD-10-CM

## 2021-01-13 DIAGNOSIS — Z01.810 PRE-PROCEDURAL CARDIOVASCULAR EXAMINATION: ICD-10-CM

## 2021-01-13 LAB
APTT PPP: 33.5 SEC (ref 23.6–33.3)
INR PPP: 1.2
PROTHROMBIN TIME: 14.9 SEC (ref 10.6–14.8)

## 2021-01-13 PROCEDURE — 25000003 PHARM REV CODE 250: Performed by: SURGERY

## 2021-01-13 PROCEDURE — C1769 GUIDE WIRE: HCPCS | Performed by: SURGERY

## 2021-01-13 PROCEDURE — 75625 CONTRAST EXAM ABDOMINL AORTA: CPT | Mod: 59 | Performed by: SURGERY

## 2021-01-13 PROCEDURE — C1887 CATHETER, GUIDING: HCPCS | Performed by: SURGERY

## 2021-01-13 PROCEDURE — 36245 INS CATH ABD/L-EXT ART 1ST: CPT | Performed by: SURGERY

## 2021-01-13 PROCEDURE — 85730 THROMBOPLASTIN TIME PARTIAL: CPT

## 2021-01-13 PROCEDURE — 37236 OPEN/PERQ PLACE STENT 1ST: CPT | Performed by: SURGERY

## 2021-01-13 PROCEDURE — 63600175 PHARM REV CODE 636 W HCPCS: Performed by: SURGERY

## 2021-01-13 PROCEDURE — 99152 MOD SED SAME PHYS/QHP 5/>YRS: CPT | Performed by: SURGERY

## 2021-01-13 PROCEDURE — C1894 INTRO/SHEATH, NON-LASER: HCPCS | Performed by: SURGERY

## 2021-01-13 PROCEDURE — 27201423 OPTIME MED/SURG SUP & DEVICES STERILE SUPPLY: Performed by: SURGERY

## 2021-01-13 PROCEDURE — 85610 PROTHROMBIN TIME: CPT

## 2021-01-13 PROCEDURE — 27800903 OPTIME MED/SURG SUP & DEVICES OTHER IMPLANTS: Performed by: SURGERY

## 2021-01-13 PROCEDURE — 25500020 PHARM REV CODE 255: Performed by: SURGERY

## 2021-01-13 PROCEDURE — C1876 STENT, NON-COA/NON-COV W/DEL: HCPCS | Performed by: SURGERY

## 2021-01-13 DEVICE — PREMOUNTED STENT SYSTEM
Type: IMPLANTABLE DEVICE | Site: ABDOMEN | Status: FUNCTIONAL
Brand: EXPRESS® SD RENAL/BILIARY

## 2021-01-13 RX ORDER — CLOPIDOGREL BISULFATE 75 MG/1
75 TABLET ORAL DAILY
Qty: 30 TABLET | Refills: 3 | Status: SHIPPED | OUTPATIENT
Start: 2021-01-13

## 2021-01-13 RX ORDER — LIDOCAINE HYDROCHLORIDE 10 MG/ML
INJECTION, SOLUTION EPIDURAL; INFILTRATION; INTRACAUDAL; PERINEURAL
Status: DISCONTINUED | OUTPATIENT
Start: 2021-01-13 | End: 2021-01-13 | Stop reason: HOSPADM

## 2021-01-13 RX ORDER — CLOPIDOGREL BISULFATE 75 MG/1
75 TABLET ORAL DAILY
Status: DISCONTINUED | OUTPATIENT
Start: 2021-01-13 | End: 2021-01-13 | Stop reason: HOSPADM

## 2021-01-13 RX ORDER — SODIUM CHLORIDE 9 MG/ML
INJECTION, SOLUTION INTRAVENOUS CONTINUOUS
Status: DISCONTINUED | OUTPATIENT
Start: 2021-01-13 | End: 2021-01-13 | Stop reason: HOSPADM

## 2021-01-13 RX ORDER — MIDAZOLAM HYDROCHLORIDE 1 MG/ML
INJECTION INTRAMUSCULAR; INTRAVENOUS
Status: DISCONTINUED | OUTPATIENT
Start: 2021-01-13 | End: 2021-01-13 | Stop reason: HOSPADM

## 2021-01-13 RX ORDER — IODIXANOL 320 MG/ML
INJECTION, SOLUTION INTRAVASCULAR
Status: DISCONTINUED | OUTPATIENT
Start: 2021-01-13 | End: 2021-01-13 | Stop reason: HOSPADM

## 2021-01-13 RX ORDER — ACETAMINOPHEN 325 MG/1
650 TABLET ORAL EVERY 4 HOURS PRN
Status: DISCONTINUED | OUTPATIENT
Start: 2021-01-13 | End: 2021-01-13 | Stop reason: HOSPADM

## 2021-01-13 RX ORDER — ONDANSETRON 4 MG/1
8 TABLET, ORALLY DISINTEGRATING ORAL EVERY 8 HOURS PRN
Status: DISCONTINUED | OUTPATIENT
Start: 2021-01-13 | End: 2021-01-13 | Stop reason: HOSPADM

## 2021-01-13 RX ORDER — FENTANYL CITRATE 50 UG/ML
INJECTION, SOLUTION INTRAMUSCULAR; INTRAVENOUS
Status: DISCONTINUED | OUTPATIENT
Start: 2021-01-13 | End: 2021-01-13 | Stop reason: HOSPADM

## 2021-01-13 RX ADMIN — SODIUM CHLORIDE 100 ML/HR: 0.9 INJECTION, SOLUTION INTRAVENOUS at 09:01

## 2021-01-14 VITALS
RESPIRATION RATE: 17 BRPM | DIASTOLIC BLOOD PRESSURE: 52 MMHG | HEIGHT: 62 IN | WEIGHT: 85 LBS | HEART RATE: 83 BPM | SYSTOLIC BLOOD PRESSURE: 106 MMHG | OXYGEN SATURATION: 95 % | BODY MASS INDEX: 15.64 KG/M2

## 2021-05-27 DIAGNOSIS — K55.1 CHRONIC VASCULAR INSUFFICIENCY OF INTESTINE: Primary | ICD-10-CM

## 2021-06-03 ENCOUNTER — HOSPITAL ENCOUNTER (OUTPATIENT)
Dept: RADIOLOGY | Facility: HOSPITAL | Age: 81
Discharge: HOME | End: 2021-06-03
Attending: SURGERY
Payer: MEDICARE

## 2021-06-03 DIAGNOSIS — K55.1 CHRONIC VASCULAR INSUFFICIENCY OF INTESTINE: ICD-10-CM

## 2021-06-03 LAB
CREAT SERPL-MCNC: 0.8 MG/DL (ref 0.5–1.4)
SAMPLE: NORMAL

## 2021-06-03 PROCEDURE — 25500020 PHARM REV CODE 255: Mod: PO | Performed by: SURGERY

## 2021-06-03 PROCEDURE — 74175 CTA ABDOMEN W/CONTRAST: CPT | Mod: TC,PO

## 2021-06-03 PROCEDURE — 82565 ASSAY OF CREATININE: CPT | Mod: PO

## 2021-06-03 RX ADMIN — IOHEXOL 100 ML: 350 INJECTION, SOLUTION INTRAVENOUS at 02:06

## 2021-06-28 DIAGNOSIS — K55.1 MESENTERIC ARTERY STENOSIS: Primary | ICD-10-CM

## 2021-06-29 ENCOUNTER — HOSPITAL ENCOUNTER (OUTPATIENT)
Dept: PREADMISSION TESTING | Facility: HOSPITAL | Age: 81
Discharge: HOME | End: 2021-06-29
Attending: SURGERY
Payer: MEDICARE

## 2021-06-29 ENCOUNTER — HOSPITAL ENCOUNTER (OUTPATIENT)
Dept: RADIOLOGY | Facility: HOSPITAL | Age: 81
Discharge: HOME | End: 2021-06-29
Attending: SURGERY
Payer: MEDICARE

## 2021-06-29 VITALS — WEIGHT: 76.94 LBS | HEIGHT: 62 IN | BODY MASS INDEX: 14.16 KG/M2

## 2021-06-29 DIAGNOSIS — K55.1 MESENTERIC ARTERY STENOSIS: ICD-10-CM

## 2021-06-29 DIAGNOSIS — Z01.810 PRE-PROCEDURAL CARDIOVASCULAR EXAMINATION: Primary | ICD-10-CM

## 2021-06-29 LAB
ANION GAP SERPL CALC-SCNC: 11 MMOL/L (ref 8–16)
APTT PPP: 34 SEC (ref 25.6–35.8)
BASOPHILS # BLD AUTO: 0.05 K/UL (ref 0–0.2)
BASOPHILS NFR BLD: 0.6 % (ref 0–1.9)
BUN SERPL-MCNC: 25 MG/DL (ref 8–23)
CALCIUM SERPL-MCNC: 9.5 MG/DL (ref 8.7–10.5)
CHLORIDE SERPL-SCNC: 103 MMOL/L (ref 95–110)
CO2 SERPL-SCNC: 26 MMOL/L (ref 23–29)
CREAT SERPL-MCNC: 0.8 MG/DL (ref 0.5–1.4)
DIFFERENTIAL METHOD: ABNORMAL
EOSINOPHIL # BLD AUTO: 0 K/UL (ref 0–0.5)
EOSINOPHIL NFR BLD: 0.5 % (ref 0–8)
ERYTHROCYTE [DISTWIDTH] IN BLOOD BY AUTOMATED COUNT: 12.1 % (ref 11.5–14.5)
EST. GFR  (AFRICAN AMERICAN): >60 ML/MIN/1.73 M^2
EST. GFR  (NON AFRICAN AMERICAN): >60 ML/MIN/1.73 M^2
GLUCOSE SERPL-MCNC: 109 MG/DL (ref 70–110)
HCT VFR BLD AUTO: 35.5 % (ref 37–48.5)
HGB BLD-MCNC: 11.6 G/DL (ref 12–16)
IMM GRANULOCYTES # BLD AUTO: 0.02 K/UL (ref 0–0.04)
IMM GRANULOCYTES NFR BLD AUTO: 0.2 % (ref 0–0.5)
INR PPP: 1.1
LYMPHOCYTES # BLD AUTO: 1.5 K/UL (ref 1–4.8)
LYMPHOCYTES NFR BLD: 18.4 % (ref 18–48)
MCH RBC QN AUTO: 31.6 PG (ref 27–31)
MCHC RBC AUTO-ENTMCNC: 32.7 G/DL (ref 32–36)
MCV RBC AUTO: 97 FL (ref 82–98)
MONOCYTES # BLD AUTO: 0.7 K/UL (ref 0.3–1)
MONOCYTES NFR BLD: 8.5 % (ref 4–15)
NEUTROPHILS # BLD AUTO: 5.8 K/UL (ref 1.8–7.7)
NEUTROPHILS NFR BLD: 71.8 % (ref 38–73)
NRBC BLD-RTO: 0 /100 WBC
PLATELET # BLD AUTO: 329 K/UL (ref 150–450)
PMV BLD AUTO: 8.6 FL (ref 9.2–12.9)
POTASSIUM SERPL-SCNC: 4.2 MMOL/L (ref 3.5–5.1)
PROTHROMBIN TIME: 14.1 SEC (ref 11.8–14.3)
RBC # BLD AUTO: 3.67 M/UL (ref 4–5.4)
SODIUM SERPL-SCNC: 140 MMOL/L (ref 136–145)
WBC # BLD AUTO: 8.1 K/UL (ref 3.9–12.7)

## 2021-06-29 PROCEDURE — 93010 ELECTROCARDIOGRAM REPORT: CPT | Mod: ,,, | Performed by: INTERNAL MEDICINE

## 2021-06-29 PROCEDURE — 85025 COMPLETE CBC W/AUTO DIFF WBC: CPT | Performed by: SURGERY

## 2021-06-29 PROCEDURE — 93010 EKG 12-LEAD: ICD-10-PCS | Mod: ,,, | Performed by: INTERNAL MEDICINE

## 2021-06-29 PROCEDURE — 71046 X-RAY EXAM CHEST 2 VIEWS: CPT | Mod: TC

## 2021-06-29 PROCEDURE — 36415 COLL VENOUS BLD VENIPUNCTURE: CPT | Performed by: SURGERY

## 2021-06-29 PROCEDURE — 93005 ELECTROCARDIOGRAM TRACING: CPT | Performed by: INTERNAL MEDICINE

## 2021-06-29 PROCEDURE — 80048 BASIC METABOLIC PNL TOTAL CA: CPT | Performed by: SURGERY

## 2021-06-29 PROCEDURE — 85610 PROTHROMBIN TIME: CPT | Performed by: SURGERY

## 2021-06-29 PROCEDURE — 85730 THROMBOPLASTIN TIME PARTIAL: CPT | Performed by: SURGERY

## 2021-06-30 ENCOUNTER — HOSPITAL ENCOUNTER (OUTPATIENT)
Facility: HOSPITAL | Age: 81
Discharge: HOME | End: 2021-06-30
Attending: SURGERY | Admitting: SURGERY
Payer: MEDICARE

## 2021-06-30 DIAGNOSIS — K55.1 MESENTERIC ARTERY STENOSIS: Primary | ICD-10-CM

## 2021-06-30 LAB — SARS-COV-2 RDRP RESP QL NAA+PROBE: NEGATIVE

## 2021-06-30 PROCEDURE — 99153 MOD SED SAME PHYS/QHP EA: CPT | Performed by: SURGERY

## 2021-06-30 PROCEDURE — 25500020 PHARM REV CODE 255: Performed by: SURGERY

## 2021-06-30 PROCEDURE — 63600175 PHARM REV CODE 636 W HCPCS: Performed by: SURGERY

## 2021-06-30 PROCEDURE — U0002 COVID-19 LAB TEST NON-CDC: HCPCS | Performed by: SURGERY

## 2021-06-30 PROCEDURE — C1876 STENT, NON-COA/NON-COV W/DEL: HCPCS | Performed by: SURGERY

## 2021-06-30 PROCEDURE — C1769 GUIDE WIRE: HCPCS | Performed by: SURGERY

## 2021-06-30 PROCEDURE — 27201423 OPTIME MED/SURG SUP & DEVICES STERILE SUPPLY: Performed by: SURGERY

## 2021-06-30 PROCEDURE — 25000003 PHARM REV CODE 250: Performed by: SURGERY

## 2021-06-30 PROCEDURE — 36245 INS CATH ABD/L-EXT ART 1ST: CPT | Mod: RT | Performed by: SURGERY

## 2021-06-30 PROCEDURE — C1894 INTRO/SHEATH, NON-LASER: HCPCS | Performed by: SURGERY

## 2021-06-30 PROCEDURE — C1760 CLOSURE DEV, VASC: HCPCS | Performed by: SURGERY

## 2021-06-30 PROCEDURE — C1887 CATHETER, GUIDING: HCPCS | Performed by: SURGERY

## 2021-06-30 PROCEDURE — C1725 CATH, TRANSLUMIN NON-LASER: HCPCS | Performed by: SURGERY

## 2021-06-30 PROCEDURE — 99152 MOD SED SAME PHYS/QHP 5/>YRS: CPT | Performed by: SURGERY

## 2021-06-30 PROCEDURE — 37236 OPEN/PERQ PLACE STENT 1ST: CPT | Mod: RT | Performed by: SURGERY

## 2021-06-30 PROCEDURE — 27800903 OPTIME MED/SURG SUP & DEVICES OTHER IMPLANTS: Performed by: SURGERY

## 2021-06-30 DEVICE — IMPLANTABLE DEVICE: Type: IMPLANTABLE DEVICE | Site: ABDOMEN | Status: FUNCTIONAL

## 2021-06-30 DEVICE — PREMOUNTED STENT SYSTEM
Type: IMPLANTABLE DEVICE | Site: ABDOMEN | Status: FUNCTIONAL
Brand: EXPRESS® SD RENAL/BILIARY

## 2021-06-30 RX ORDER — PROTAMINE SULFATE 10 MG/ML
INJECTION, SOLUTION INTRAVENOUS
Status: DISCONTINUED | OUTPATIENT
Start: 2021-06-30 | End: 2021-06-30 | Stop reason: HOSPADM

## 2021-06-30 RX ORDER — FENTANYL CITRATE 50 UG/ML
INJECTION, SOLUTION INTRAMUSCULAR; INTRAVENOUS
Status: DISCONTINUED | OUTPATIENT
Start: 2021-06-30 | End: 2021-06-30 | Stop reason: HOSPADM

## 2021-06-30 RX ORDER — CLOPIDOGREL BISULFATE 75 MG/1
75 TABLET ORAL DAILY
Status: DISCONTINUED | OUTPATIENT
Start: 2021-06-30 | End: 2021-06-30 | Stop reason: HOSPADM

## 2021-06-30 RX ORDER — HEPARIN SODIUM 10000 [USP'U]/ML
INJECTION, SOLUTION INTRAVENOUS; SUBCUTANEOUS
Status: DISCONTINUED | OUTPATIENT
Start: 2021-06-30 | End: 2021-06-30 | Stop reason: HOSPADM

## 2021-06-30 RX ORDER — SODIUM CHLORIDE 9 MG/ML
INJECTION, SOLUTION INTRAVENOUS CONTINUOUS
Status: DISCONTINUED | OUTPATIENT
Start: 2021-06-30 | End: 2021-06-30 | Stop reason: HOSPADM

## 2021-06-30 RX ORDER — ACETAMINOPHEN 325 MG/1
650 TABLET ORAL EVERY 4 HOURS PRN
Status: DISCONTINUED | OUTPATIENT
Start: 2021-06-30 | End: 2021-06-30 | Stop reason: HOSPADM

## 2021-06-30 RX ORDER — IODIXANOL 320 MG/ML
INJECTION, SOLUTION INTRAVASCULAR
Status: DISCONTINUED | OUTPATIENT
Start: 2021-06-30 | End: 2021-06-30 | Stop reason: HOSPADM

## 2021-06-30 RX ORDER — LIDOCAINE HYDROCHLORIDE 10 MG/ML
INJECTION, SOLUTION EPIDURAL; INFILTRATION; INTRACAUDAL; PERINEURAL
Status: DISCONTINUED | OUTPATIENT
Start: 2021-06-30 | End: 2021-06-30 | Stop reason: HOSPADM

## 2021-06-30 RX ORDER — MIDAZOLAM HYDROCHLORIDE 1 MG/ML
INJECTION INTRAMUSCULAR; INTRAVENOUS
Status: DISCONTINUED | OUTPATIENT
Start: 2021-06-30 | End: 2021-06-30 | Stop reason: HOSPADM

## 2021-06-30 RX ORDER — CLOPIDOGREL BISULFATE 75 MG/1
75 TABLET ORAL DAILY
Qty: 90 TABLET | Refills: 3 | Status: ON HOLD | OUTPATIENT
Start: 2021-06-30 | End: 2022-04-06 | Stop reason: HOSPADM

## 2021-06-30 RX ORDER — ONDANSETRON 4 MG/1
8 TABLET, ORALLY DISINTEGRATING ORAL EVERY 8 HOURS PRN
Status: DISCONTINUED | OUTPATIENT
Start: 2021-06-30 | End: 2021-06-30 | Stop reason: HOSPADM

## 2021-06-30 RX ADMIN — SODIUM CHLORIDE: 0.9 INJECTION, SOLUTION INTRAVENOUS at 08:06

## 2021-07-01 VITALS
WEIGHT: 77 LBS | OXYGEN SATURATION: 96 % | SYSTOLIC BLOOD PRESSURE: 125 MMHG | RESPIRATION RATE: 15 BRPM | BODY MASS INDEX: 14.17 KG/M2 | HEIGHT: 62 IN | TEMPERATURE: 98 F | HEART RATE: 85 BPM | DIASTOLIC BLOOD PRESSURE: 58 MMHG

## 2021-11-12 DIAGNOSIS — K55.1 MESENTERIC ARTERY STENOSIS: Primary | ICD-10-CM

## 2021-11-24 ENCOUNTER — HOSPITAL ENCOUNTER (OUTPATIENT)
Dept: RADIOLOGY | Facility: HOSPITAL | Age: 81
Discharge: HOME | End: 2021-11-24
Attending: SURGERY
Payer: MEDICARE

## 2021-11-24 DIAGNOSIS — K55.1 MESENTERIC ARTERY STENOSIS: ICD-10-CM

## 2021-11-24 LAB
CREAT SERPL-MCNC: 0.8 MG/DL (ref 0.5–1.4)
SAMPLE: NORMAL

## 2021-11-24 PROCEDURE — 25500020 PHARM REV CODE 255: Mod: PO | Performed by: SURGERY

## 2021-11-24 PROCEDURE — 82565 ASSAY OF CREATININE: CPT | Mod: PO

## 2021-11-24 PROCEDURE — 74175 CTA ABDOMEN W/CONTRAST: CPT | Mod: TC,PO

## 2021-11-24 RX ADMIN — IOHEXOL 100 ML: 350 INJECTION, SOLUTION INTRAVENOUS at 03:11

## 2022-04-01 ENCOUNTER — HOSPITAL ENCOUNTER (INPATIENT)
Facility: HOSPITAL | Age: 82
LOS: 5 days | Discharge: HOME | DRG: 378 | End: 2022-04-06
Attending: EMERGENCY MEDICINE | Admitting: INTERNAL MEDICINE
Payer: MEDICARE

## 2022-04-01 DIAGNOSIS — R06.02 SOBOE (SHORTNESS OF BREATH ON EXERTION): ICD-10-CM

## 2022-04-01 DIAGNOSIS — R06.09 DYSPNEA ON EXERTION: ICD-10-CM

## 2022-04-01 DIAGNOSIS — R06.02 SHORTNESS OF BREATH: ICD-10-CM

## 2022-04-01 DIAGNOSIS — R06.00 DYSPNEA, UNSPECIFIED TYPE: ICD-10-CM

## 2022-04-01 DIAGNOSIS — J44.1 COPD EXACERBATION: ICD-10-CM

## 2022-04-01 DIAGNOSIS — D50.9 IRON DEFICIENCY ANEMIA, UNSPECIFIED IRON DEFICIENCY ANEMIA TYPE: Primary | ICD-10-CM

## 2022-04-01 PROBLEM — D64.9 ANEMIA, UNSPECIFIED: Status: ACTIVE | Noted: 2022-04-01

## 2022-04-01 PROBLEM — E86.0 DEHYDRATION: Status: ACTIVE | Noted: 2022-04-01

## 2022-04-01 PROBLEM — E44.0 MALNUTRITION OF MODERATE DEGREE: Status: ACTIVE | Noted: 2022-04-01

## 2022-04-01 LAB
ALBUMIN SERPL BCP-MCNC: 3.9 G/DL (ref 3.5–5.2)
ALP SERPL-CCNC: 73 U/L (ref 55–135)
ALT SERPL W/O P-5'-P-CCNC: 18 U/L (ref 10–44)
ANION GAP SERPL CALC-SCNC: 14 MMOL/L (ref 8–16)
AST SERPL-CCNC: 22 U/L (ref 10–40)
BACTERIA #/AREA URNS HPF: NEGATIVE /HPF
BASOPHILS # BLD AUTO: 0.05 K/UL (ref 0–0.2)
BASOPHILS NFR BLD: 0.4 % (ref 0–1.9)
BILIRUB SERPL-MCNC: 0.5 MG/DL (ref 0.1–1)
BILIRUB UR QL STRIP: NEGATIVE
BNP SERPL-MCNC: 44 PG/ML (ref 0–99)
BUN SERPL-MCNC: 58 MG/DL (ref 8–23)
CALCIUM SERPL-MCNC: 10.2 MG/DL (ref 8.7–10.5)
CHLORIDE SERPL-SCNC: 101 MMOL/L (ref 95–110)
CLARITY UR: CLEAR
CO2 SERPL-SCNC: 24 MMOL/L (ref 23–29)
COLOR UR: YELLOW
CREAT SERPL-MCNC: 0.6 MG/DL (ref 0.5–1.4)
DIFFERENTIAL METHOD: ABNORMAL
EOSINOPHIL # BLD AUTO: 0 K/UL (ref 0–0.5)
EOSINOPHIL NFR BLD: 0.2 % (ref 0–8)
ERYTHROCYTE [DISTWIDTH] IN BLOOD BY AUTOMATED COUNT: 12.5 % (ref 11.5–14.5)
EST. GFR  (AFRICAN AMERICAN): >60 ML/MIN/1.73 M^2
EST. GFR  (NON AFRICAN AMERICAN): >60 ML/MIN/1.73 M^2
GLUCOSE SERPL-MCNC: 159 MG/DL (ref 70–110)
GLUCOSE UR QL STRIP: NEGATIVE
HCT VFR BLD AUTO: 30.4 % (ref 37–48.5)
HGB BLD-MCNC: 9.9 G/DL (ref 12–16)
HGB UR QL STRIP: NEGATIVE
HYALINE CASTS #/AREA URNS LPF: 0 /LPF
IMM GRANULOCYTES # BLD AUTO: 0.05 K/UL (ref 0–0.04)
IMM GRANULOCYTES NFR BLD AUTO: 0.4 % (ref 0–0.5)
INFLUENZA A, MOLECULAR: NEGATIVE
INFLUENZA B, MOLECULAR: NEGATIVE
KETONES UR QL STRIP: NEGATIVE
LACTATE SERPL-SCNC: 2.2 MMOL/L (ref 0.5–1.9)
LEUKOCYTE ESTERASE UR QL STRIP: ABNORMAL
LIPASE SERPL-CCNC: 48 U/L (ref 4–60)
LYMPHOCYTES # BLD AUTO: 1.7 K/UL (ref 1–4.8)
LYMPHOCYTES NFR BLD: 13.2 % (ref 18–48)
MCH RBC QN AUTO: 30.6 PG (ref 27–31)
MCHC RBC AUTO-ENTMCNC: 32.6 G/DL (ref 32–36)
MCV RBC AUTO: 94 FL (ref 82–98)
MICROSCOPIC COMMENT: ABNORMAL
MONOCYTES # BLD AUTO: 0.7 K/UL (ref 0.3–1)
MONOCYTES NFR BLD: 5.6 % (ref 4–15)
NEUTROPHILS # BLD AUTO: 10.5 K/UL (ref 1.8–7.7)
NEUTROPHILS NFR BLD: 80.2 % (ref 38–73)
NITRITE UR QL STRIP: NEGATIVE
NRBC BLD-RTO: 0 /100 WBC
PH UR STRIP: 6 [PH] (ref 5–8)
PLATELET # BLD AUTO: 358 K/UL (ref 150–450)
PMV BLD AUTO: 8.7 FL (ref 9.2–12.9)
POTASSIUM SERPL-SCNC: 4.1 MMOL/L (ref 3.5–5.1)
PROT SERPL-MCNC: 7.1 G/DL (ref 6–8.4)
PROT UR QL STRIP: NEGATIVE
RBC # BLD AUTO: 3.24 M/UL (ref 4–5.4)
RBC #/AREA URNS HPF: 5 /HPF (ref 0–4)
SARS-COV-2 RDRP RESP QL NAA+PROBE: NEGATIVE
SODIUM SERPL-SCNC: 139 MMOL/L (ref 136–145)
SP GR UR STRIP: >1.03 (ref 1–1.03)
SPECIMEN SOURCE: NORMAL
SQUAMOUS #/AREA URNS HPF: 0 /HPF
TROPONIN I SERPL DL<=0.01 NG/ML-MCNC: <0.03 NG/ML
TSH SERPL DL<=0.005 MIU/L-ACNC: 2.29 UIU/ML (ref 0.34–5.6)
URN SPEC COLLECT METH UR: ABNORMAL
UROBILINOGEN UR STRIP-ACNC: NEGATIVE EU/DL
WBC # BLD AUTO: 13.08 K/UL (ref 3.9–12.7)
WBC #/AREA URNS HPF: 3 /HPF (ref 0–5)

## 2022-04-01 PROCEDURE — 96360 HYDRATION IV INFUSION INIT: CPT

## 2022-04-01 PROCEDURE — 83690 ASSAY OF LIPASE: CPT | Performed by: EMERGENCY MEDICINE

## 2022-04-01 PROCEDURE — 93005 ELECTROCARDIOGRAM TRACING: CPT | Performed by: GENERAL PRACTICE

## 2022-04-01 PROCEDURE — 85025 COMPLETE CBC W/AUTO DIFF WBC: CPT | Performed by: EMERGENCY MEDICINE

## 2022-04-01 PROCEDURE — 80053 COMPREHEN METABOLIC PANEL: CPT | Performed by: EMERGENCY MEDICINE

## 2022-04-01 PROCEDURE — U0002 COVID-19 LAB TEST NON-CDC: HCPCS | Performed by: EMERGENCY MEDICINE

## 2022-04-01 PROCEDURE — 84443 ASSAY THYROID STIM HORMONE: CPT | Performed by: STUDENT IN AN ORGANIZED HEALTH CARE EDUCATION/TRAINING PROGRAM

## 2022-04-01 PROCEDURE — 84484 ASSAY OF TROPONIN QUANT: CPT | Performed by: EMERGENCY MEDICINE

## 2022-04-01 PROCEDURE — 21400001 HC TELEMETRY ROOM

## 2022-04-01 PROCEDURE — 99285 EMERGENCY DEPT VISIT HI MDM: CPT | Mod: 25

## 2022-04-01 PROCEDURE — 93010 EKG 12-LEAD: ICD-10-PCS | Mod: ,,, | Performed by: GENERAL PRACTICE

## 2022-04-01 PROCEDURE — 83605 ASSAY OF LACTIC ACID: CPT | Performed by: STUDENT IN AN ORGANIZED HEALTH CARE EDUCATION/TRAINING PROGRAM

## 2022-04-01 PROCEDURE — 81001 URINALYSIS AUTO W/SCOPE: CPT | Performed by: STUDENT IN AN ORGANIZED HEALTH CARE EDUCATION/TRAINING PROGRAM

## 2022-04-01 PROCEDURE — 93010 ELECTROCARDIOGRAM REPORT: CPT | Mod: ,,, | Performed by: GENERAL PRACTICE

## 2022-04-01 PROCEDURE — 25000242 PHARM REV CODE 250 ALT 637 W/ HCPCS: Performed by: STUDENT IN AN ORGANIZED HEALTH CARE EDUCATION/TRAINING PROGRAM

## 2022-04-01 PROCEDURE — 96361 HYDRATE IV INFUSION ADD-ON: CPT

## 2022-04-01 PROCEDURE — 25500020 PHARM REV CODE 255: Performed by: EMERGENCY MEDICINE

## 2022-04-01 PROCEDURE — 83880 ASSAY OF NATRIURETIC PEPTIDE: CPT | Performed by: EMERGENCY MEDICINE

## 2022-04-01 PROCEDURE — 63600175 PHARM REV CODE 636 W HCPCS: Performed by: STUDENT IN AN ORGANIZED HEALTH CARE EDUCATION/TRAINING PROGRAM

## 2022-04-01 PROCEDURE — 87502 INFLUENZA DNA AMP PROBE: CPT | Performed by: STUDENT IN AN ORGANIZED HEALTH CARE EDUCATION/TRAINING PROGRAM

## 2022-04-01 PROCEDURE — 36415 COLL VENOUS BLD VENIPUNCTURE: CPT | Performed by: EMERGENCY MEDICINE

## 2022-04-01 RX ORDER — IPRATROPIUM BROMIDE AND ALBUTEROL SULFATE 2.5; .5 MG/3ML; MG/3ML
3 SOLUTION RESPIRATORY (INHALATION) EVERY 6 HOURS
Status: DISCONTINUED | OUTPATIENT
Start: 2022-04-02 | End: 2022-04-04

## 2022-04-01 RX ORDER — SODIUM CHLORIDE, SODIUM LACTATE, POTASSIUM CHLORIDE, CALCIUM CHLORIDE 600; 310; 30; 20 MG/100ML; MG/100ML; MG/100ML; MG/100ML
INJECTION, SOLUTION INTRAVENOUS CONTINUOUS
Status: ACTIVE | OUTPATIENT
Start: 2022-04-02 | End: 2022-04-03

## 2022-04-01 RX ORDER — ACETAMINOPHEN 325 MG/1
650 TABLET ORAL EVERY 8 HOURS PRN
Status: DISCONTINUED | OUTPATIENT
Start: 2022-04-02 | End: 2022-04-06 | Stop reason: HOSPADM

## 2022-04-01 RX ORDER — ASPIRIN 81 MG/1
81 TABLET ORAL DAILY
Status: DISCONTINUED | OUTPATIENT
Start: 2022-04-02 | End: 2022-04-03

## 2022-04-01 RX ORDER — IPRATROPIUM BROMIDE AND ALBUTEROL SULFATE 2.5; .5 MG/3ML; MG/3ML
3 SOLUTION RESPIRATORY (INHALATION) ONCE
Status: COMPLETED | OUTPATIENT
Start: 2022-04-01 | End: 2022-04-01

## 2022-04-01 RX ORDER — ATORVASTATIN CALCIUM 20 MG/1
20 TABLET, FILM COATED ORAL DAILY
Status: DISCONTINUED | OUTPATIENT
Start: 2022-04-02 | End: 2022-04-06 | Stop reason: HOSPADM

## 2022-04-01 RX ORDER — OXYCODONE AND ACETAMINOPHEN 10; 325 MG/1; MG/1
1 TABLET ORAL EVERY 6 HOURS PRN
Status: DISCONTINUED | OUTPATIENT
Start: 2022-04-02 | End: 2022-04-06 | Stop reason: HOSPADM

## 2022-04-01 RX ORDER — FAMOTIDINE 20 MG/1
20 TABLET, FILM COATED ORAL DAILY
Status: DISCONTINUED | OUTPATIENT
Start: 2022-04-02 | End: 2022-04-03

## 2022-04-01 RX ORDER — TALC
6 POWDER (GRAM) TOPICAL NIGHTLY PRN
Status: DISCONTINUED | OUTPATIENT
Start: 2022-04-02 | End: 2022-04-06 | Stop reason: HOSPADM

## 2022-04-01 RX ORDER — SODIUM CHLORIDE 0.9 % (FLUSH) 0.9 %
10 SYRINGE (ML) INJECTION
Status: DISCONTINUED | OUTPATIENT
Start: 2022-04-02 | End: 2022-04-06 | Stop reason: HOSPADM

## 2022-04-01 RX ORDER — IBUPROFEN 200 MG
1 TABLET ORAL DAILY
Status: DISCONTINUED | OUTPATIENT
Start: 2022-04-01 | End: 2022-04-02

## 2022-04-01 RX ORDER — CLOPIDOGREL BISULFATE 75 MG/1
75 TABLET ORAL DAILY
Status: DISCONTINUED | OUTPATIENT
Start: 2022-04-02 | End: 2022-04-03

## 2022-04-01 RX ORDER — ONDANSETRON 2 MG/ML
4 INJECTION INTRAMUSCULAR; INTRAVENOUS EVERY 8 HOURS PRN
Status: DISCONTINUED | OUTPATIENT
Start: 2022-04-02 | End: 2022-04-06 | Stop reason: HOSPADM

## 2022-04-01 RX ADMIN — SODIUM CHLORIDE, POTASSIUM CHLORIDE, SODIUM LACTATE AND CALCIUM CHLORIDE 1000 ML: 600; 310; 30; 20 INJECTION, SOLUTION INTRAVENOUS at 11:04

## 2022-04-01 RX ADMIN — IPRATROPIUM BROMIDE AND ALBUTEROL SULFATE 3 ML: .5; 3 SOLUTION RESPIRATORY (INHALATION) at 09:04

## 2022-04-01 RX ADMIN — IOHEXOL 100 ML: 350 INJECTION, SOLUTION INTRAVENOUS at 07:04

## 2022-04-01 RX ADMIN — SODIUM CHLORIDE, POTASSIUM CHLORIDE, SODIUM LACTATE AND CALCIUM CHLORIDE 500 ML: 600; 310; 30; 20 INJECTION, SOLUTION INTRAVENOUS at 08:04

## 2022-04-02 ENCOUNTER — CLINICAL SUPPORT (OUTPATIENT)
Dept: CARDIOLOGY | Facility: HOSPITAL | Age: 82
DRG: 378 | End: 2022-04-02
Attending: EMERGENCY MEDICINE
Payer: MEDICARE

## 2022-04-02 VITALS — BODY MASS INDEX: 14.17 KG/M2 | WEIGHT: 77 LBS | HEIGHT: 62 IN

## 2022-04-02 LAB
ALBUMIN SERPL BCP-MCNC: 3.2 G/DL (ref 3.5–5.2)
ALP SERPL-CCNC: 61 U/L (ref 55–135)
ALT SERPL W/O P-5'-P-CCNC: 15 U/L (ref 10–44)
ANION GAP SERPL CALC-SCNC: 9 MMOL/L (ref 8–16)
AST SERPL-CCNC: 21 U/L (ref 10–40)
BASOPHILS # BLD AUTO: 0.04 K/UL (ref 0–0.2)
BASOPHILS NFR BLD: 0.4 % (ref 0–1.9)
BILIRUB SERPL-MCNC: 0.5 MG/DL (ref 0.1–1)
BUN SERPL-MCNC: 43 MG/DL (ref 8–23)
CALCIUM SERPL-MCNC: 9 MG/DL (ref 8.7–10.5)
CHLORIDE SERPL-SCNC: 105 MMOL/L (ref 95–110)
CO2 SERPL-SCNC: 26 MMOL/L (ref 23–29)
CREAT SERPL-MCNC: 0.6 MG/DL (ref 0.5–1.4)
DIFFERENTIAL METHOD: ABNORMAL
EOSINOPHIL # BLD AUTO: 0 K/UL (ref 0–0.5)
EOSINOPHIL NFR BLD: 0.1 % (ref 0–8)
ERYTHROCYTE [DISTWIDTH] IN BLOOD BY AUTOMATED COUNT: 12.8 % (ref 11.5–14.5)
EST. GFR  (AFRICAN AMERICAN): >60 ML/MIN/1.73 M^2
EST. GFR  (NON AFRICAN AMERICAN): >60 ML/MIN/1.73 M^2
FERRITIN SERPL-MCNC: 25 NG/ML (ref 20–300)
GLUCOSE SERPL-MCNC: 106 MG/DL (ref 70–110)
HCT VFR BLD AUTO: 24.7 % (ref 37–48.5)
HGB BLD-MCNC: 7.8 G/DL (ref 12–16)
IMM GRANULOCYTES # BLD AUTO: 0.03 K/UL (ref 0–0.04)
IMM GRANULOCYTES NFR BLD AUTO: 0.3 % (ref 0–0.5)
IRON SERPL-MCNC: 48 UG/DL (ref 30–160)
LACTATE SERPL-SCNC: 1.8 MMOL/L (ref 0.5–1.9)
LYMPHOCYTES # BLD AUTO: 1.7 K/UL (ref 1–4.8)
LYMPHOCYTES NFR BLD: 16.6 % (ref 18–48)
MCH RBC QN AUTO: 30.1 PG (ref 27–31)
MCHC RBC AUTO-ENTMCNC: 31.6 G/DL (ref 32–36)
MCV RBC AUTO: 95 FL (ref 82–98)
MONOCYTES # BLD AUTO: 0.8 K/UL (ref 0.3–1)
MONOCYTES NFR BLD: 7.4 % (ref 4–15)
NEUTROPHILS # BLD AUTO: 7.6 K/UL (ref 1.8–7.7)
NEUTROPHILS NFR BLD: 75.2 % (ref 38–73)
NRBC BLD-RTO: 0 /100 WBC
PLATELET # BLD AUTO: 274 K/UL (ref 150–450)
PMV BLD AUTO: 9 FL (ref 9.2–12.9)
POTASSIUM SERPL-SCNC: 3.9 MMOL/L (ref 3.5–5.1)
PROCALCITONIN SERPL IA-MCNC: 0.09 NG/ML (ref 0–0.5)
PROT SERPL-MCNC: 5.6 G/DL (ref 6–8.4)
RBC # BLD AUTO: 2.59 M/UL (ref 4–5.4)
RETICS/RBC NFR AUTO: 1.8 % (ref 0.5–2.5)
SATURATED IRON: 13 % (ref 20–50)
SODIUM SERPL-SCNC: 140 MMOL/L (ref 136–145)
TOTAL IRON BINDING CAPACITY: 371 UG/DL (ref 250–450)
TRANSFERRIN SERPL-MCNC: 265 MG/DL (ref 200–375)
TROPONIN I SERPL DL<=0.01 NG/ML-MCNC: 0.03 NG/ML
TROPONIN I SERPL DL<=0.01 NG/ML-MCNC: 0.04 NG/ML
VIT B12 SERPL-MCNC: 614 PG/ML (ref 210–950)
WBC # BLD AUTO: 10.15 K/UL (ref 3.9–12.7)

## 2022-04-02 PROCEDURE — 85045 AUTOMATED RETICULOCYTE COUNT: CPT | Performed by: NURSE PRACTITIONER

## 2022-04-02 PROCEDURE — 25000003 PHARM REV CODE 250: Performed by: INTERNAL MEDICINE

## 2022-04-02 PROCEDURE — 25000242 PHARM REV CODE 250 ALT 637 W/ HCPCS: Performed by: NURSE PRACTITIONER

## 2022-04-02 PROCEDURE — S4991 NICOTINE PATCH NONLEGEND: HCPCS | Performed by: NURSE PRACTITIONER

## 2022-04-02 PROCEDURE — 93306 TTE W/DOPPLER COMPLETE: CPT | Mod: 26,,, | Performed by: INTERNAL MEDICINE

## 2022-04-02 PROCEDURE — 94761 N-INVAS EAR/PLS OXIMETRY MLT: CPT

## 2022-04-02 PROCEDURE — 82607 VITAMIN B-12: CPT | Performed by: NURSE PRACTITIONER

## 2022-04-02 PROCEDURE — S4991 NICOTINE PATCH NONLEGEND: HCPCS | Performed by: INTERNAL MEDICINE

## 2022-04-02 PROCEDURE — 99900031 HC PATIENT EDUCATION (STAT)

## 2022-04-02 PROCEDURE — 84466 ASSAY OF TRANSFERRIN: CPT | Performed by: NURSE PRACTITIONER

## 2022-04-02 PROCEDURE — 84145 PROCALCITONIN (PCT): CPT | Performed by: NURSE PRACTITIONER

## 2022-04-02 PROCEDURE — 85025 COMPLETE CBC W/AUTO DIFF WBC: CPT | Performed by: NURSE PRACTITIONER

## 2022-04-02 PROCEDURE — 25000003 PHARM REV CODE 250: Performed by: NURSE PRACTITIONER

## 2022-04-02 PROCEDURE — 94640 AIRWAY INHALATION TREATMENT: CPT

## 2022-04-02 PROCEDURE — 99223 PR INITIAL HOSPITAL CARE,LEVL III: ICD-10-PCS | Mod: ,,, | Performed by: INTERNAL MEDICINE

## 2022-04-02 PROCEDURE — 36415 COLL VENOUS BLD VENIPUNCTURE: CPT | Performed by: NURSE PRACTITIONER

## 2022-04-02 PROCEDURE — 93306 ECHO (CUPID ONLY): ICD-10-PCS | Mod: 26,,, | Performed by: INTERNAL MEDICINE

## 2022-04-02 PROCEDURE — 80053 COMPREHEN METABOLIC PANEL: CPT | Performed by: NURSE PRACTITIONER

## 2022-04-02 PROCEDURE — 99223 1ST HOSP IP/OBS HIGH 75: CPT | Mod: ,,, | Performed by: INTERNAL MEDICINE

## 2022-04-02 PROCEDURE — 97116 GAIT TRAINING THERAPY: CPT

## 2022-04-02 PROCEDURE — 27000221 HC OXYGEN, UP TO 24 HOURS

## 2022-04-02 PROCEDURE — 84484 ASSAY OF TROPONIN QUANT: CPT | Performed by: NURSE PRACTITIONER

## 2022-04-02 PROCEDURE — 84484 ASSAY OF TROPONIN QUANT: CPT | Mod: 91 | Performed by: NURSE PRACTITIONER

## 2022-04-02 PROCEDURE — 97161 PT EVAL LOW COMPLEX 20 MIN: CPT

## 2022-04-02 PROCEDURE — 63600175 PHARM REV CODE 636 W HCPCS: Performed by: NURSE PRACTITIONER

## 2022-04-02 PROCEDURE — 99900035 HC TECH TIME PER 15 MIN (STAT)

## 2022-04-02 PROCEDURE — 93306 TTE W/DOPPLER COMPLETE: CPT

## 2022-04-02 PROCEDURE — 94799 UNLISTED PULMONARY SVC/PX: CPT

## 2022-04-02 PROCEDURE — 21400001 HC TELEMETRY ROOM

## 2022-04-02 PROCEDURE — 83605 ASSAY OF LACTIC ACID: CPT | Performed by: NURSE PRACTITIONER

## 2022-04-02 PROCEDURE — 82728 ASSAY OF FERRITIN: CPT | Performed by: NURSE PRACTITIONER

## 2022-04-02 RX ORDER — FLUTICASONE FUROATE AND VILANTEROL 100; 25 UG/1; UG/1
1 POWDER RESPIRATORY (INHALATION) DAILY
Status: DISCONTINUED | OUTPATIENT
Start: 2022-04-02 | End: 2022-04-06 | Stop reason: HOSPADM

## 2022-04-02 RX ORDER — IBUPROFEN 200 MG
1 TABLET ORAL DAILY
Status: DISCONTINUED | OUTPATIENT
Start: 2022-04-02 | End: 2022-04-06 | Stop reason: HOSPADM

## 2022-04-02 RX ADMIN — OXYCODONE HYDROCHLORIDE AND ACETAMINOPHEN 1 TABLET: 10; 325 TABLET ORAL at 12:04

## 2022-04-02 RX ADMIN — OXYCODONE HYDROCHLORIDE AND ACETAMINOPHEN 1 TABLET: 10; 325 TABLET ORAL at 01:04

## 2022-04-02 RX ADMIN — NICOTINE 1 PATCH: 14 PATCH, EXTENDED RELEASE TRANSDERMAL at 03:04

## 2022-04-02 RX ADMIN — ATORVASTATIN CALCIUM 20 MG: 20 TABLET, FILM COATED ORAL at 09:04

## 2022-04-02 RX ADMIN — IPRATROPIUM BROMIDE AND ALBUTEROL SULFATE 3 ML: .5; 3 SOLUTION RESPIRATORY (INHALATION) at 01:04

## 2022-04-02 RX ADMIN — OXYCODONE HYDROCHLORIDE AND ACETAMINOPHEN 1 TABLET: 10; 325 TABLET ORAL at 07:04

## 2022-04-02 RX ADMIN — SODIUM CHLORIDE, SODIUM LACTATE, POTASSIUM CHLORIDE, AND CALCIUM CHLORIDE: .6; .31; .03; .02 INJECTION, SOLUTION INTRAVENOUS at 04:04

## 2022-04-02 RX ADMIN — NICOTINE 1 PATCH: 21 PATCH, EXTENDED RELEASE TRANSDERMAL at 12:04

## 2022-04-02 RX ADMIN — ASPIRIN 81 MG: 81 TABLET, DELAYED RELEASE ORAL at 09:04

## 2022-04-02 RX ADMIN — NICOTINE 1 PATCH: 21 PATCH, EXTENDED RELEASE TRANSDERMAL at 09:04

## 2022-04-02 RX ADMIN — CLOPIDOGREL BISULFATE 75 MG: 75 TABLET, FILM COATED ORAL at 09:04

## 2022-04-02 RX ADMIN — IPRATROPIUM BROMIDE AND ALBUTEROL SULFATE 3 ML: .5; 3 SOLUTION RESPIRATORY (INHALATION) at 07:04

## 2022-04-02 RX ADMIN — FAMOTIDINE 20 MG: 20 TABLET, FILM COATED ORAL at 09:04

## 2022-04-02 NOTE — PT/OT/SLP EVAL
Physical Therapy Evaluation    Patient Name:  Carmen Wilkinson   MRN:  [id number]    Recommendations:     Discharge Recommendations:  home   Discharge Equipment Recommendations: shower chair   Barriers to discharge: None    Assessment:     Carmen Wilkinson is a 82 y.o. female admitted with a medical diagnosis of Dyspnea.  She presents with the following impairments/functional limitations:  weakness, impaired endurance, gait instability, impaired cardiopulmonary response to activity.    Rehab Prognosis: Good; patient would benefit from acute skilled PT services to address these deficits and reach maximum level of function.    Recent Surgery: * No surgery found *      Plan:     During this hospitalization, patient to be seen 5 x/week to address the identified rehab impairments via gait training, therapeutic activities, therapeutic exercises, neuromuscular re-education and progress toward the following goals:    · Plan of Care Expires:  05/07/22    Subjective     Chief Complaint: SOB /c activity  Patient/Family Comments/goals: d/c home  Pain/Comfort:  · Pain Rating 1: 0/10    Patients cultural, spiritual, Shinto conflicts given the current situation: no    Living Environment:  Pt lives /c Dtr in a [facility]  Prior to admission, patients level of function was (I)<>(S), and she admits to needing more help showering lately and getting tired easily.  Equipment used at home: walker, rolling.  DME owned (not currently used): none .  Upon discharge, patient will have assistance from Dtr.    Objective:     Communicated with nurse prior to session.  Patient found supine with oxygen, PureWick, peripheral IV, telemetry  upon PT entry to room.    General Precautions: Standard, fall   Orthopedic Precautions:    Braces:    Respiratory Status: Nasal cannula, flow 2 L/min; RA during activity    Exams:  · Cognitive Exam:  Patient is oriented to Person, Place, Time and Situation  · Gross Motor Coordination:   WFL  · Sensation:    · -       Intact  · RLE ROM: WFL  · RLE Strength: WFL  · LLE ROM: WFL  · LLE Strength: WFL    Functional Mobility:  · Bed Mobility:     · Rolling Right: supervision  · Supine to Sit: supervision  · Sit to Supine: supervision  · Transfers:     · Sit to Stand:  stand by assistance with rolling walker  · Bed to Chair: stand by assistance with  rolling walker  using  Stand Pivot  · Gait: contact guard assistance with rolling walker 75ft  · Balance: standing suported dynamic: fair+    Therapeutic Activities and Exercises:   PT monitored pt /s supplemental O2 for 2min prior to attempting gait; pt did not experience SPO2 drop on RA at rest or during activity, but did experience significant incr in HR up to 132bpm. Pt's SPO2 remained at 95% or better for the entire assessment on RA.     AM-PAC 6 CLICK MOBILITY  Total Score:20     Patient left supine with call button in reach and Dtr present.    GOALS:   Multidisciplinary Problems     Physical Therapy Goals        Problem: Physical Therapy    Goal Priority Disciplines Outcome Goal Variances Interventions   Physical Therapy Goal     PT, PT/OT      Description: Goals to be met by: 22    Patient will increase functional independence with mobility by performin. Sit to stand transfer with Modified Beltrami  2. Bed to chair transfer with Modified Beltrami using Rolling Walker  3. Gait  x 150 feet with Modified Beltrami using Rolling Walker.                      History:     Past Medical History:   Diagnosis Date    Back pain     Brain aneurysm     Carotid stenosis     Cataract     Diverticulitis     Emphysema lung     Feeling anxious     Hyperlipidemia     Hypertension     Macular degeneration     PVD (peripheral vascular disease)     Squamous cell carcinoma 2017    right medical cheek, SSIS, efudex tx       Past Surgical History:   Procedure Laterality Date    ABDOMINAL SURGERY      ARTERIOGRAM, MESENTERIC N/A  6/30/2021    Procedure: ARTERIOGRAM, MESENTERIC;  Surgeon: Krish Machado MD;  Location: [facility] CATH/EP LAB;  Service: General;  Laterality: N/A;    BACK SURGERY      CATARACT EXTRACTION, BILATERAL      CREATION, BYPASS, ARTERIAL, AORTA TO FEMORAL N/A 1/13/2021    Procedure: ARTERIOGRAM, MESENTERIC;  Surgeon: Krish Machado MD;  Location: [facility] CATH/EP LAB;  Service: General;  Laterality: N/A;    HYSTERECTOMY      IMPLANTATION OF SPINAL CORD STIMULATOR IN CERVICAL SPINE         Time Tracking:     PT Received On: 04/02/22  PT Start Time: 0957     PT Stop Time: 1022  PT Total Time (min): 25 min     Billable Minutes: Evaluation 15 and Gait Training 10      04/02/2022

## 2022-04-02 NOTE — PLAN OF CARE
Problem: Skin Injury Risk Increased  Goal: Skin Health and Integrity  Outcome: Ongoing, Progressing     Problem: Adult Inpatient Plan of Care  Goal: Plan of Care Review  Outcome: Ongoing, Progressing  Goal: Patient-Specific Goal (Individualized)  Outcome: Ongoing, Progressing  Goal: Absence of Hospital-Acquired Illness or Injury  Outcome: Ongoing, Progressing  Goal: Optimal Comfort and Wellbeing  Outcome: Ongoing, Progressing  Goal: Readiness for Transition of Care  Outcome: Ongoing, Progressing     Problem: Malnutrition  Goal: Improved Nutritional Intake  Outcome: Ongoing, Progressing

## 2022-04-02 NOTE — ED PROVIDER NOTES
Encounter Date: 4/1/2022       History     Chief Complaint   Patient presents with    Shortness of Breath     On exertion     HPI     82-year-old female with past medical history of emphysema, hypertension hyperlipidemia, peripheral vascular disease, and mesenteric artery stenosis status post stenting, presenting with shortness of breath. Her shortness of breath is worse on exertion. The patient does not use any inhalers or oxygen at home for her emphysema.  She has a chronic cough which is unchanged today.  She has no chest pain, diaphoresis or palpitations.  She is noted to have a heart rate of 137. She has no history of arrhythmia or fast heart rate.  She states she has had worsening abdominal symptoms lately, similar to her symptoms when she was 1st diagnosed with mesenteric artery stenosis.  She states she gets full very easily and has a discomfort in her stomach after eating.  Currently she has no abdominal pain.  But she states she has decreased appetite overall.  No fevers or chills.      Review of patient's allergies indicates:  No Known Allergies  Past Medical History:   Diagnosis Date    Back pain     Brain aneurysm     Carotid stenosis     Cataract     Diverticulitis     Emphysema lung     Feeling anxious     Hyperlipidemia     Hypertension     Macular degeneration     PVD (peripheral vascular disease)     Squamous cell carcinoma 08/29/2017    right medical cheek, SSIS, efudex tx     Past Surgical History:   Procedure Laterality Date    ABDOMINAL SURGERY      ARTERIOGRAM, MESENTERIC N/A 6/30/2021    Procedure: ARTERIOGRAM, MESENTERIC;  Surgeon: Krish Machado MD;  Location: [facility] CATH/EP LAB;  Service: General;  Laterality: N/A;    BACK SURGERY      CATARACT EXTRACTION, BILATERAL      CREATION, BYPASS, ARTERIAL, AORTA TO FEMORAL N/A 1/13/2021    Procedure: ARTERIOGRAM, MESENTERIC;  Surgeon: Krish Machado MD;  Location: [facility] CATH/EP LAB;  Service: General;  Laterality: N/A;     HYSTERECTOMY      IMPLANTATION OF SPINAL CORD STIMULATOR IN CERVICAL SPINE       Family History   Problem Relation Age of Onset    Melanoma Neg Hx     Psoriasis Neg Hx     Eczema Neg Hx     Lupus Neg Hx      Social History     Tobacco Use    Smoking status: Current Every Day Smoker     Packs/day: 0.50     Types: Cigarettes    Smokeless tobacco: Never Used   Substance Use Topics    Alcohol use: No    Drug use: Not Currently     Review of Systems   Constitutional: Negative for chills and fever.   HENT: Negative for congestion and sore throat.    Eyes: Negative for pain and redness.   Respiratory: Positive for cough and shortness of breath.    Cardiovascular: Negative for chest pain.   Gastrointestinal: Positive for abdominal pain. Negative for nausea.   Genitourinary: Negative for dysuria.   Musculoskeletal: Negative for back pain and neck pain.   Skin: Negative for rash.   Neurological: Negative for weakness and headaches.   Psychiatric/Behavioral: Negative for agitation and confusion.       Physical Exam     Initial Vitals [04/01/22 1813]   BP Pulse Resp Temp SpO2   (!) 170/80 (!) 134 (!) 22 97.4 °F (36.3 °C) 97 %      MAP       --         Physical Exam    Nursing note and vitals reviewed.  Constitutional: She is not diaphoretic.   Cachectic, tremulous   HENT:   Head: Normocephalic and atraumatic.   Mouth/Throat: Oropharynx is clear and moist.   Eyes: Conjunctivae are normal. Pupils are equal, round, and reactive to light. No scleral icterus.   Neck: Neck supple. No thyromegaly present. No tracheal deviation present.   Normal range of motion.  Cardiovascular: Intact distal pulses.   Tachycardic, regular   Pulmonary/Chest: Breath sounds normal. She has no wheezes. She has no rhonchi. She has no rales.   Abdominal: Abdomen is soft. She exhibits no distension. There is no abdominal tenderness. There is no rebound.   Musculoskeletal:         General: No edema. Normal range of motion.      Cervical back: Normal  range of motion and neck supple.     Neurological: She is alert and oriented to person, place, and time. GCS score is 15. GCS eye subscore is 4. GCS verbal subscore is 5. GCS motor subscore is 6.   Skin: Skin is warm and dry.   Psychiatric: She has a normal mood and affect. Thought content normal.         ED Course   Procedures  Labs Reviewed   CBC W/ AUTO DIFFERENTIAL - Abnormal; Notable for the following components:       Result Value    WBC 13.08 (*)     RBC 3.24 (*)     Hemoglobin 9.9 (*)     Hematocrit 30.4 (*)     MPV 8.7 (*)     Gran # (ANC) 10.5 (*)     Immature Grans (Abs) 0.05 (*)     Gran % 80.2 (*)     Lymph % 13.2 (*)     All other components within normal limits   COMPREHENSIVE METABOLIC PANEL - Abnormal; Notable for the following components:    Glucose 159 (*)     BUN 58 (*)     All other components within normal limits   LACTIC ACID, PLASMA - Abnormal; Notable for the following components:    Lactate (Lactic Acid) 2.2 (*)     All other components within normal limits    Narrative:      lactic acid critical result(s) repeated. Called and verbal readback   obtained from gabe moreira rn er  by [facility] 04/01/2022 21:15   URINALYSIS - Abnormal; Notable for the following components:    Specific Gravity, UA >1.030 (*)     Leukocytes, UA 1+ (*)     All other components within normal limits   URINALYSIS MICROSCOPIC - Abnormal; Notable for the following components:    RBC, UA 5 (*)     All other components within normal limits   SARS-COV-2 RNA AMPLIFICATION, QUAL   TROPONIN I   B-TYPE NATRIURETIC PEPTIDE   LIPASE   LIPASE   INFLUENZA A AND B ANTIGEN    Narrative:     Specimen Source->Nasopharyngeal Swab   TSH   LACTIC ACID, PLASMA   OCCULT BLOOD X 1, STOOL        ECG Results          EKG 12-lead (In process)  Result time 04/02/22 05:03:10    In process by Interface, Lab In [facility] (04/02/22 05:03:10)                 Narrative:    Test Reason : R06.02,    Vent. Rate : 131 BPM     Atrial Rate : 131 BPM     P-R Int  : 152 ms          QRS Dur : 064 ms      QT Int : 298 ms       P-R-T Axes : 084 066 077 degrees     QTc Int : 440 ms    Sinus tachycardia with Premature supraventricular complexes  Nonspecific ST abnormality  Abnormal ECG  When compared with ECG of 29-JUN-2021 12:47,  Premature supraventricular complexes are now Present  Criteria for Septal infarct are no longer Present  T wave inversion no longer evident in Anterior leads    Referred By: [email or web address]   SELF           Confirmed By:                             Imaging Results          CTA Chest Abdomen Pelvis (Final result)  Result time 04/01/22 20:39:35    Final result by Ten Moralez MD (04/01/22 20:39:35)                 Narrative:    EXAM:  CT Angiography Chest, Abdomen and Pelvis With Intravenous Contrast    CLINICAL HISTORY:  The patient is 82 years old and is Female; evaluate mesenteric    TECHNIQUE:  Axial computed tomographic angiography images of the chest, abdomen and pelvis with intravenous contrast.  Sagittal and coronal reformatted images were created and reviewed.  This CT exam was performed using one or more of the following dose reduction techniques:  automated exposure control, adjustment of the mA and/or kV according to patient size, and/or use of iterative reconstruction technique.  MIP reconstructed images were created and reviewed.    COMPARISON:  November 24, 2021 CTA abdomen.    FINDINGS:    VASCULATURE:  AORTA:  Left vertebral artery originates from the aortic arch.  The ascending aorta measures up to 3.1 cm. No aortic dissection. Moderate atherosclerotic calcifications.  PULMONARY ARTERIES:  No pulmonary embolism.  GREAT VESSELS OF AORTIC ARCH:  The left subclavian artery originates from the common carotid artery.  No dissection.  No arterial occlusion or significant stenosis.  CELIAC TRUNK AND MESENTERIC ARTERIES:  Patent celiac artery stent.  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  The superior mesenteric artery  occlusion spans approximately 2.3 cm.  RENAL ARTERIES:  No acute findings.  No occlusion or significant stenosis.  ILIAC ARTERIES:  No acute findings.  No occlusion or significant stenosis.  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    CHEST:  LUNGS:  Moderate centrilobular emphysema.  PLEURAL SPACE:  Unremarkable.  No significant effusion.  No pneumothorax.  HEART:  Unremarkable.  No cardiomegaly.  No significant pericardial effusion.    ABDOMEN:  LIVER:  Unremarkable.  No mass.  GALLBLADDER AND BILE DUCTS:  Unremarkable.  No calcified stones.  No ductal dilation.  PANCREAS:  Unremarkable.  No ductal dilation.  No mass.  SPLEEN:  Unremarkable.  No splenomegaly.  ADRENALS:  Unremarkable.  No mass.  KIDNEYS AND URETERS:  Unremarkable.  No hydronephrosis.  No solid mass.  STOMACH AND BOWEL:  Unremarkable.  No obstruction.  No mucosal thickening.    PELVIS:  APPENDIX:  No findings to suggest acute appendicitis.  BLADDER:  Unremarkable.  No mass.  REPRODUCTIVE:  Hysterectomy.    CHEST, ABDOMEN and PELVIS:  INTRAPERITONEAL SPACE:  Unremarkable.  No significant fluid collection.  No free air.  BONES/JOINTS:  No acute fracture.  No dislocation.  SOFT TISSUES:  Bilateral breast implants.  Spinal pack in the left posterior soft tissues.  LYMPH NODES:  Unremarkable.  No enlarged lymph nodes.    IMPRESSION:  1.  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  2.  Patent stent in the proximal celiac artery.  3. 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  3.  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  4.  Moderate centrilobular emphysema.    Electronically signed by:  Ten Moralez MD  4/1/2022 8:39 PM CDT Workstation: [id number]                             X-Ray Chest 1 View (Final result)  Result time 04/01/22 19:00:41     Final result by Henry Rowan Jr., MD (04/01/22 19:00:41)                 Narrative:    Examination: Single view the chest.    CLINICAL HISTORY: Shortness of breath.    TECHNIQUE: AP view the chest.    COMPARISON: 629/2021.    FINDINGS: Lungs are hyperinflated second long-standing COPD with elongation central mediastinum. There are similar device tracking towards the T5 vertebral body. Laminar calcification about the aortic knob. No significant pleural or pericardial effusion. No pneumothorax. Arthritic changes about the glenohumeral joint compartments. Bilateral breast prosthesis.    IMPRESSION:  1. Ministration long-standing COPD and.  2. No acute process.    Electronically signed by:  Henry Rowan MD  4/1/2022 7:00 PM CDT Workstation: [id number]                               Medications   aspirin EC tablet 81 mg (81 mg Oral Given 4/2/22 0950)   clopidogreL tablet 75 mg (75 mg Oral Given 4/2/22 0950)   oxyCODONE-acetaminophen  mg per tablet 1 tablet (1 tablet Oral Given 4/2/22 1327)   atorvastatin tablet 20 mg (20 mg Oral Given 4/2/22 0950)   sodium chloride 0.9% flush 10 mL (has no administration in time range)   melatonin tablet 6 mg (has no administration in time range)   famotidine tablet 20 mg (20 mg Oral Given 4/2/22 0950)   ondansetron injection 4 mg (4 mg Intravenous Not Given 4/2/22 0027)   acetaminophen tablet 650 mg (has no administration in time range)   albuterol-ipratropium 2.5 mg-0.5 mg/3 mL nebulizer solution 3 mL (3 mLs Nebulization Given 4/2/22 1348)   lactated ringers infusion ( Intravenous New Bag 4/2/22 2108)   nicotine 14 mg/24 hr 1 patch (has no administration in time range)   fluticasone furoate-vilanteroL 100-25 mcg/dose diskus inhaler 1 puff (has no administration in time range)   tiotropium bromide 2.5 mcg/actuation inhaler 2 puff (has no administration in time range)   lactated ringers bolus 500 mL (0 mLs Intravenous Stopped 4/1/22 9822)   iohexoL (OMNIPAQUE 350) injection  100 mL (100 mLs Intravenous Given 4/1/22 1946)   albuterol-ipratropium 2.5 mg-0.5 mg/3 mL nebulizer solution 3 mL (3 mLs Nebulization Given 4/1/22 2135)   lactated ringers bolus 1,000 mL (0 mLs Intravenous Stopped 4/2/22 0001)     Medical Decision Making:   Initial Assessment:   82-year-old female presents with shortness of breath.  She has a heart rate of 137, sinus tach on EKG.  No ST elevations or depressions.  No chest pain.  Hyperinflated lungs on chest x-ray.  No focal consolidations.    Patient states she has had poor p.o. intake.  She has known mesenteric artery stenosis, status post stenting.  She states she has had recurrence of her symptoms lately and has not had an appetite.  No vomiting.  No current abdominal pain.  No blood in stool.     Will obtain CTA chest abdomen pelvis to evaluate for pulmonary embolus as well as new or recurrent mesenteric ischemia. Viral swabs pending.     Meanwhile, we will treat her shortness breath with DuoNebs.  Small fluid bolus for presumed dehydration.    Sara Gomes MD  [facility] Emergency Medicine Residency PGY-4  04/01/2022 8:37 PM     UPDATE:   CTA with chronic findings to celiac and mesenteric arteries.  CTA with no pulmonary embolism.    Pt remains tachycardic, improved from 130 to 115 with 500 cc bolus.     Elevated BUN, likely dehydration from poor PO intake. Will continue hydration, duonebs, admit to hospitalist.     Sara Gomes MD  [facility] Emergency Medicine Residency PGY-4  04/02/2022 11:05 PM               Attending Attestation:   Physician Attestation Statement for Resident:  As the supervising MD   Physician Attestation Statement: I have personally seen and examined this patient.   I agree with the above history. -:   As the supervising MD I agree with the above PE.    As the supervising MD I agree with the above treatment, course, plan, and disposition.  I have reviewed and agree with the residents interpretation of the following: lab data, CT scans, x-rays and  EKG.                         Clinical Impression:   Final diagnoses:  [R06.02] Shortness of breath          ED Disposition Condition    Admit               Sara Gomes MD  04/01/22 [id number]       Shruthi Stauffer MD  04/02/22 8823

## 2022-04-02 NOTE — CARE UPDATE
04/02/22 1429   Home Oxygen Qualification   $ Home O2 Qualification Tech time 15 minutes   Room Air SpO2 At Rest 100 %   Room Air SpO2 During Ambulation (!) 79 %   SpO2 During Ambulation on O2 (!) 84 %   Heart Rate on O2 140 bpm   Ambulation O2 LPM 6 LPM   SpO2 Post Ambulation 100 %   Post Ambulation Heart Rate 122 bpm   Post Ambulation O2 LPM 2 LPM   Home O2 Eval Comments Patient gets SOB after 2 mins and sat drop fast even on O2 of 6L. Only way to bring sats back up is rest

## 2022-04-02 NOTE — CONSULTS
Pulmonary/Critical Care Consult      PATIENT NAME: Carmen Wilkinson  MRN: [id number]  TODAY'S DATE: 2022  12:10 PM  ADMIT DATE: 2022  AGE: 82 y.o. : 1940    CONSULT REQUESTED BY: Rea Ritchie MD    REASON FOR CONSULT:   COPD exacerbation    HPI:  Patient is an 82-year-old smoker who presents with a COPD exacerbation.  She smokes a pack a day.  She uses no bronchodilators.  She has pulmonary cachexia.  She has been feeling more short of breath for the last few weeks.    REVIEW OF SYSTEMS  GENERAL: Feeling better today  EYES: Vision is good.  ENT: No sinusitis or pharyngitis.   HEART: No chest pain or palpitations.  LUNGS:  She has dyspnea with exertion.  She coughs and produces mucus mostly in the morning.  GI:  She has early satiety.  : No dysuria, hesitancy, or nocturia.  SKIN: No lesions or rashes.  MUSCULOSKELETAL: No joint pain or myalgias.  NEURO: No headaches or neuropathy.  LYMPH: No edema or adenopathy.  PSYCH: No anxiety or depression.  ENDO:  She cannot gain weight.    ALLERGIES  Review of patient's allergies indicates:  No Known Allergies    INPATIENT SCHEDULED MEDICATIONS   albuterol-ipratropium  3 mL Nebulization Q6H    aspirin  81 mg Oral Daily    atorvastatin  20 mg Oral Daily    clopidogreL  75 mg Oral Daily    famotidine  20 mg Oral Daily    nicotine  1 patch Transdermal Daily      lactated ringers 100 mL/hr at 22 0438       MEDICAL AND SURGICAL HISTORY  Past Medical History:   Diagnosis Date    Back pain     Brain aneurysm     Carotid stenosis     Cataract     Diverticulitis     Emphysema lung     Feeling anxious     Hyperlipidemia     Hypertension     Macular degeneration     PVD (peripheral vascular disease)     Squamous cell carcinoma 2017    right medical cheek, SSIS, efudex tx     Past Surgical History:   Procedure Laterality Date    ABDOMINAL SURGERY      ARTERIOGRAM, MESENTERIC N/A 2021    Procedure: ARTERIOGRAM, MESENTERIC;   Surgeon: Krish Machado MD;  Location: [facility] CATH/EP LAB;  Service: General;  Laterality: N/A;    BACK SURGERY      CATARACT EXTRACTION, BILATERAL      CREATION, BYPASS, ARTERIAL, AORTA TO FEMORAL N/A 1/13/2021    Procedure: ARTERIOGRAM, MESENTERIC;  Surgeon: Krish Machado MD;  Location: [facility] CATH/EP LAB;  Service: General;  Laterality: N/A;    HYSTERECTOMY      IMPLANTATION OF SPINAL CORD STIMULATOR IN CERVICAL SPINE         ALCOHOL, TOBACCO AND DRUG USE  Social History     Tobacco Use   Smoking Status Current Every Day Smoker    Packs/day: 0.50    Types: Cigarettes   Smokeless Tobacco Never Used     Social History     Substance and Sexual Activity   Alcohol Use No     Social History     Substance and Sexual Activity   Drug Use Not Currently       FAMILY HISTORY  Family History   Problem Relation Age of Onset    Melanoma Neg Hx     Psoriasis Neg Hx     Eczema Neg Hx     Lupus Neg Hx        VITAL SIGNS (MOST RECENT)  Temp: 99.5 °F (37.5 °C) (04/02/22 1131)  Pulse: 110 (04/02/22 1131)  Resp: 18 (04/02/22 1131)  BP: 130/60 (04/02/22 1131)  SpO2: 97 % (04/02/22 1131)    INTAKE AND OUTPUT (LAST 24 HOURS):    Intake/Output Summary (Last 24 hours) at 4/2/2022 1210  Last data filed at 4/2/2022 1133  Gross per 24 hour   Intake 220 ml   Output 400 ml   Net -180 ml       WEIGHT  Wt Readings from Last 1 Encounters:   04/02/22 35.2 kg (77 lb 9.6 oz)       PHYSICAL EXAM  GENERAL: Older, exceedingly thin, patient in no distress.  HEENT: Pupils equal and reactive. Extraocular movements intact. Nose intact. Pharynx moist.  NECK: Supple.   HEART: Regular rate and rhythm. No murmur or gallop auscultated.  LUNGS: Clear to auscultation and percussion. Lung excursion symmetrical. No change in fremitus. No adventitial noises.  ABDOMEN: Bowel sounds present. Non-tender, no masses palpated.  : Normal anatomy.  EXTREMITIES: Normal muscle tone and joint movement, no cyanosis or clubbing.   LYMPHATICS: No adenopathy palpated,  no edema.  SKIN: Dry, intact, no lesions.   NEURO: Cranial nerves II-XII intact. Motor strength 5/5 bilaterally, upper and lower extremities.  PSYCH: Appropriate affect    ACUTE PHASE REACTANT (LAST 24 HOURS)  Recent Labs   Lab 04/02/22 0232   FERRITIN 25       CBC LAST (LAST 24 HOURS)  Recent Labs   Lab 04/02/22 0232   WBC 10.15   RBC 2.59*   HGB 7.8*   HCT 24.7*   MCV 95   MCH 30.1   MCHC 31.6*   RDW 12.8      MPV 9.0*   GRAN 75.2*  7.6   LYMPH 16.6*  1.7   MONO 7.4  0.8   BASO 0.04   NRBC 0       CHEMISTRY LAST (LAST 24 HOURS)  Recent Labs   Lab 04/02/22 0232      K 3.9      CO2 26   ANIONGAP 9   BUN 43*   CREATININE 0.6      CALCIUM 9.0   ALBUMIN 3.2*   PROT 5.6*   ALKPHOS 61   ALT 15   AST 21   BILITOT 0.5       COAGULATION LAST (LAST 24 HOURS)  No results for input(s): LABPT, INR, APTT in the last 24 hours.    CARDIAC PROFILE (LAST 24 HOURS)  Recent Labs   Lab 04/01/22  1846 04/02/22  0232 04/02/22  0412   BNP 44  --   --    TROPONINI <0.030   < > 0.038    < > = values in this interval not displayed.       LAST 7 DAYS MICROBIOLOGY   Microbiology Results (last 7 days)     ** No results found for the last 168 hours. **          MOST RECENT IMAGING  CTA Chest Abdomen Pelvis  EXAM:  CT Angiography Chest, Abdomen and Pelvis With Intravenous Contrast    CLINICAL HISTORY:  The patient is 82 years old and is Female; evaluate mesenteric    TECHNIQUE:  Axial computed tomographic angiography images of the chest, abdomen and pelvis with intravenous contrast.  Sagittal and coronal reformatted images were created and reviewed.  This CT exam was performed using one or more of the following dose reduction techniques:  automated exposure control, adjustment of the mA and/or kV according to patient size, and/or use of iterative reconstruction technique.  MIP reconstructed images were created and reviewed.    COMPARISON:  November 24, 2021 CTA abdomen.    FINDINGS:    VASCULATURE:  AORTA:  Left  vertebral artery originates from the aortic arch.  The ascending aorta measures up to 3.1 cm. No aortic dissection. Moderate atherosclerotic calcifications.  PULMONARY ARTERIES:  No pulmonary embolism.  GREAT VESSELS OF AORTIC ARCH:  The left subclavian artery originates from the common carotid artery.  No dissection.  No arterial occlusion or significant stenosis.  CELIAC TRUNK AND MESENTERIC ARTERIES:  Patent celiac artery stent.  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  The superior mesenteric artery occlusion spans approximately 2.3 cm.  RENAL ARTERIES:  No acute findings.  No occlusion or significant stenosis.  ILIAC ARTERIES:  No acute findings.  No occlusion or significant stenosis.  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    CHEST:  LUNGS:  Moderate centrilobular emphysema.  PLEURAL SPACE:  Unremarkable.  No significant effusion.  No pneumothorax.  HEART:  Unremarkable.  No cardiomegaly.  No significant pericardial effusion.    ABDOMEN:  LIVER:  Unremarkable.  No mass.  GALLBLADDER AND BILE DUCTS:  Unremarkable.  No calcified stones.  No ductal dilation.  PANCREAS:  Unremarkable.  No ductal dilation.  No mass.  SPLEEN:  Unremarkable.  No splenomegaly.  ADRENALS:  Unremarkable.  No mass.  KIDNEYS AND URETERS:  Unremarkable.  No hydronephrosis.  No solid mass.  STOMACH AND BOWEL:  Unremarkable.  No obstruction.  No mucosal thickening.    PELVIS:  APPENDIX:  No findings to suggest acute appendicitis.  BLADDER:  Unremarkable.  No mass.  REPRODUCTIVE:  Hysterectomy.    CHEST, ABDOMEN and PELVIS:  INTRAPERITONEAL SPACE:  Unremarkable.  No significant fluid collection.  No free air.  BONES/JOINTS:  No acute fracture.  No dislocation.  SOFT TISSUES:  Bilateral breast implants.  Spinal pack in the left posterior soft tissues.  LYMPH NODES:  Unremarkable.  No enlarged lymph  nodes.    IMPRESSION:  1.  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  2.  Patent stent in the proximal celiac artery.  3. 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  3.  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  4.  Moderate centrilobular emphysema.    Electronically signed by:  Ten Moralez MD  4/1/2022 8:39 PM CDT Workstation: [id number]  X-Ray Chest 1 View  Examination: Single view the chest.    CLINICAL HISTORY: Shortness of breath.    TECHNIQUE: AP view the chest.    COMPARISON: 629/2021.    FINDINGS: Lungs are hyperinflated second long-standing COPD with elongation central mediastinum. There are similar device tracking towards the T5 vertebral body. Laminar calcification about the aortic knob. No significant pleural or pericardial effusion. No pneumothorax. Arthritic changes about the glenohumeral joint compartments. Bilateral breast prosthesis.    IMPRESSION:  1. Ministration long-standing COPD and.  2. No acute process.    Electronically signed by:  Henry Rowan MD  4/1/2022 7:00 PM CDT Workstation: [id number]      CURRENT VISIT EKG  Results for orders placed or performed during the hospital encounter of 04/01/22   EKG 12-lead    Narrative    Test Reason : R06.02,    Vent. Rate : 131 BPM     Atrial Rate : 131 BPM     P-R Int : 152 ms          QRS Dur : 064 ms      QT Int : 298 ms       P-R-T Axes : 084 066 077 degrees     QTc Int : 440 ms    Sinus tachycardia with Premature supraventricular complexes  Nonspecific ST abnormality  Abnormal ECG  When compared with ECG of 29-JUN-2021 12:47,  Premature supraventricular complexes are now Present  Criteria for Septal infarct are no longer Present  T wave inversion no longer evident in Anterior leads    Referred By: [email or web address]   SELF           Confirmed By:        IMPRESSION AND PLAN  COPD exacerbation  verses gradual step-down in lung function with continued tobacco  abuse and aging  Significant anemia which will also make a patient dyspneic with exertion  Tobacco abuse  Mild hypoalbuminemia  Pulmonary cachexia    Would use Breo and Spiriva in the hospital and then trilogy on discharge  The patient may follow-up in the office.  She may discharge this evening or tomorrow depending on her level of discomfort  She does need a workup for her anemia    Whitney Molina MD  [facility]  Department of Pulmonology  Date of Service: 04/02/2022  12:10 PM

## 2022-04-02 NOTE — PLAN OF CARE
Problem: Malnutrition  Goal: Improved Nutritional Intake  Intervention: Promote and Optimize Oral Intake  Flowsheets (Taken 4/2/2022 1410)  Oral Nutrition Promotion: (added ensure enlive milkshake, pudding) calorie-dense liquids provided

## 2022-04-02 NOTE — PLAN OF CARE
04/02/22 1124   Medicare Message   Important Message from Medicare regarding Discharge Appeal Rights Given to patient/caregiver;Explained to patient/caregiver;Signed/date by patient/caregiver   Date IMM was signed 04/02/22   Time IMM was signed 1128

## 2022-04-02 NOTE — H&P
[facility] Medicine History & Physical Examination   Patient Name: Carmen Wilkinson  MRN: [id number]  Patient Class: Emergency   Admission Date: 4/1/2022  6:11 PM  Length of Stay: 0  Attending Physician:   Primary Care Provider: Abiodun Will MD  Face-to-Face encounter date: 04/01/2022  Code Status: full code  MPOA:  Chief Complaint: Shortness of Breath (On exertion)        Patient information was obtained from patient, past medical records and ER records.   HISTORY OF PRESENT ILLNESS:   Carmen Wilkinson is a 82 y.o. old female who  has a past medical history of Back pain, Brain aneurysm, Carotid stenosis, Cataract, Diverticulitis, Emphysema lung, Feeling anxious, Hyperlipidemia, Hypertension, Macular degeneration, PVD (peripheral vascular disease), and Squamous cell carcinoma (08/29/2017).. The patient presented to [facility] on 4/1/2022 with a primary complaint of Shortness of Breath (On exertion)  .     82-year-old female presents to emergency room with exertional dyspnea    Past medical history significant for COPD/emphysema patient is not on oxygen no other she have metered-dose inhalers, hypertension hyperlipidemia severe peripheral vascular disease, mesenteric artery stenosis status post stenting, chronic back pain, brain aneurysm and cataracts    The patient states for the past 3-4 days she has been having increased shortness of breath with dyspnea on exertion.  She denied actual chest pain lightheadedness dizziness or syncope.  She denied any black or bloody stools no hematemesis or hemoptysis.    The patient states she smokes about a pack of cigarettes a day and has been diagnosed with emphysema but was never placed on oxygen nor does she have metered-dose inhalers.  She does not follow-up pulmonologist.    The patient states she has been having chronic weight loss but states her weight have been stable lately states that she gets full easy in does not  have appetite.  She describes her symptoms as moderate to severe with no alleviating or exacerbating factors    In the emergency room a CTA of the chest was performed with no PE there was no significant findings in the abdomen.  The patient was found to have a drop in her H&H from her last evaluation and she was found to have elevated BUN.  She was given a saline bolus with improvement in her heart rate she was tachycardic cardiac at 131. on arrival  REVIEW OF SYSTEMS:   10 Point Review of System was performed and was found to be negative except for that mentioned already in the HPI and   Review of Systems (Negative unless checked off)  ROS        PAST MEDICAL HISTORY:     Past Medical History:   Diagnosis Date    Back pain     Brain aneurysm     Carotid stenosis     Cataract     Diverticulitis     Emphysema lung     Feeling anxious     Hyperlipidemia     Hypertension     Macular degeneration     PVD (peripheral vascular disease)     Squamous cell carcinoma 08/29/2017    right medical cheek, SSIS, efudex tx       PAST SURGICAL HISTORY:     Past Surgical History:   Procedure Laterality Date    ABDOMINAL SURGERY      ARTERIOGRAM, MESENTERIC N/A 6/30/2021    Procedure: ARTERIOGRAM, MESENTERIC;  Surgeon: Krish Machado MD;  Location: [facility] CATH/EP LAB;  Service: General;  Laterality: N/A;    BACK SURGERY      CATARACT EXTRACTION, BILATERAL      CREATION, BYPASS, ARTERIAL, AORTA TO FEMORAL N/A 1/13/2021    Procedure: ARTERIOGRAM, MESENTERIC;  Surgeon: Krish Machado MD;  Location: [facility] CATH/EP LAB;  Service: General;  Laterality: N/A;    HYSTERECTOMY      IMPLANTATION OF SPINAL CORD STIMULATOR IN CERVICAL SPINE         ALLERGIES:   Patient has no known allergies.    FAMILY HISTORY:     Family History   Problem Relation Age of Onset    Melanoma Neg Hx     Psoriasis Neg Hx     Eczema Neg Hx     Lupus Neg Hx        SOCIAL HISTORY:     Social History     Tobacco Use    Smoking status: Current Every  "Day Smoker     Packs/day: 0.50     Types: Cigarettes    Smokeless tobacco: Never Used   Substance Use Topics    Alcohol use: No        Social History     Substance and Sexual Activity   Sexual Activity Not on file        HOME MEDICATIONS:     Prior to Admission medications    Medication Sig Start Date End Date Taking? Authorizing Provider   aspirin (ECOTRIN) 81 MG EC tablet Take 81 mg by mouth once daily.   Yes Historical Provider   clopidogreL (PLAVIX) 75 mg tablet Take 1 tablet (75 mg total) by mouth once daily. 6/30/21  Yes Krish Machado MD   folic acid/multivit-min/lutein (CENTRUM SILVER ORAL) Take 1 capsule by mouth once daily.   Yes Historical Provider   KRILL OIL ORAL Take 1,000 mg by mouth once daily.    Yes Historical Provider   L. gasseri-B. bifidum-B longum 1.5 billion cell Cap Take 1 tablet by mouth once daily.    Yes Historical Provider   oxycodone-acetaminophen (PERCOCET)  mg per tablet Take 1 tablet by mouth every 6 (six) hours as needed for Pain. 8/4/17  Yes Historical Provider   simvastatin (ZOCOR) 10 MG tablet Take 10 mg by mouth once daily.  7/6/17  Yes Historical Provider   vit A/vit C/vit E/zinc/copper (PRESERVISION AREDS ORAL) Take 1 capsule by mouth 2 (two) times a day.   Yes Historical Provider   amlodipine-benazepril 5-10 mg (LOTREL) 5-10 mg per capsule Take 1 capsule by mouth once daily.  8/16/17   Historical Provider   clopidogreL (PLAVIX) 75 mg tablet Take 1 tablet (75 mg total) by mouth once daily. 1/13/21   Krish Machado MD   metoprolol succinate (TOPROL-XL) 50 MG 24 hr tablet 50 mg 2 (two) times daily.  8/21/17   Historical Provider         PHYSICAL EXAM:   BP (!) 152/72   Pulse (!) 115   Temp 97.4 °F (36.3 °C) (Oral)   Resp 19   Ht 5' 2" (1.575 m)   Wt 31.8 kg (70 lb)   LMP  (LMP Unknown)   SpO2 100%   BMI 12.80 kg/m²   Vitals Reviewed  General appearance:  Frail and ill-appearing female in no apparent distress.  Skin: No Rash.  Pale  Neuro: Motor and sensory " exams grossly intact. Good tone. Power in all 4 extremities 5/5.   HENT: Atraumatic head. Moist mucous membranes of oral cavity.  Eyes: Normal extraocular movements.   Neck: Supple. No evidence of lymphadenopathy. No thyroidomegaly.  Lungs:  Rhonchi to bases bilaterally. No wheezing present.   Heart: Regular rate and rhythm. S1 and S2 present with positive murmurs/gallop/rub. No pedal edema. No JVD present.   Abdomen: Soft, non-distended, non-tender. No rebound tenderness/guarding. No masses or organomegaly. Bowel sounds are normal. Bladder is not palpable.   Extremities: No cyanosis, clubbing, or edema.  Psych/mental status: Alert and oriented. Cooperative. Responds appropriately to questions.   EMERGENCY DEPARTMENT LABS AND IMAGING:   Following labs were Reviewed   Recent Labs   Lab 04/01/22 1846   WBC 13.08*   HGB 9.9*   HCT 30.4*      CALCIUM 10.2   ALBUMIN 3.9   PROT 7.1      K 4.1   CO2 24      BUN 58*   CREATININE 0.6   ALKPHOS 73   ALT 18   AST 22   BILITOT 0.5         BMP:   Recent Labs   Lab 04/01/22 1846   *      K 4.1      CO2 24   BUN 58*   CREATININE 0.6   CALCIUM 10.2   , CMP   Recent Labs   Lab 04/01/22 1846      K 4.1      CO2 24   *   BUN 58*   CREATININE 0.6   CALCIUM 10.2   PROT 7.1   ALBUMIN 3.9   BILITOT 0.5   ALKPHOS 73   AST 22   ALT 18   ANIONGAP 14   ESTGFRAFRICA >60.0   EGFRNONAA >60.0   , CBC   Recent Labs   Lab 04/01/22 1846   WBC 13.08*   HGB 9.9*   HCT 30.4*      , INR   Lab Results   Component Value Date    INR 1.1 06/29/2021    INR 1.2 01/13/2021    INR 1.2 01/07/2021   , Lipid Panel No results found for: CHOL, HDL, LDLCALC, TRIG, CHOLHDL, Troponin   Recent Labs   Lab 04/01/22 1846   TROPONINI <0.030   , A1C: No results for input(s): HGBA1C in the last 4320 hours. and All labs within the past 24 hours have been reviewed  Microbiology Results (last 7 days)     ** No results found for the last 168 hours. **        CTA  Chest Abdomen Pelvis   Final Result      X-Ray Chest 1 View   Final Result        X-Ray Chest 1 View    Result Date: 4/1/2022  Examination: Single view the chest. CLINICAL HISTORY: Shortness of breath. TECHNIQUE: AP view the chest. COMPARISON: 629/2021. FINDINGS: Lungs are hyperinflated second long-standing COPD with elongation central mediastinum. There are similar device tracking towards the T5 vertebral body. Laminar calcification about the aortic knob. No significant pleural or pericardial effusion. No pneumothorax. Arthritic changes about the glenohumeral joint compartments. Bilateral breast prosthesis. IMPRESSION: 1. Ministration long-standing COPD and. 2. No acute process. Electronically signed by:  Henry Rowan MD  4/1/2022 7:00 PM CDT Workstation: [id number]    CTA Chest Abdomen Pelvis    Result Date: 4/1/2022  EXAM: CT Angiography Chest, Abdomen and Pelvis With Intravenous Contrast CLINICAL HISTORY: The patient is 82 years old and is Female; evaluate mesenteric TECHNIQUE: Axial computed tomographic angiography images of the chest, abdomen and pelvis with intravenous contrast.  Sagittal and coronal reformatted images were created and reviewed.  This CT exam was performed using one or more of the following dose reduction techniques:  automated exposure control, adjustment of the mA and/or kV according to patient size, and/or use of iterative reconstruction technique. MIP reconstructed images were created and reviewed. COMPARISON: November 24, 2021 CTA abdomen. FINDINGS: VASCULATURE: AORTA:  Left vertebral artery originates from the aortic arch.  The ascending aorta measures up to 3.1 cm. No aortic dissection. Moderate atherosclerotic calcifications. PULMONARY ARTERIES:  No pulmonary embolism. GREAT VESSELS OF AORTIC ARCH:  The left subclavian artery originates from the common carotid artery.  No dissection.  No arterial occlusion or significant stenosis. CELIAC TRUNK AND MESENTERIC ARTERIES:  Patent  celiac artery stent.  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  The superior mesenteric artery occlusion spans approximately 2.3 cm. RENAL ARTERIES:  No acute findings.  No occlusion or significant stenosis. ILIAC ARTERIES:  No acute findings.  No occlusion or significant stenosis. 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 CHEST: LUNGS:  Moderate centrilobular emphysema. PLEURAL SPACE:  Unremarkable.  No significant effusion.  No pneumothorax. HEART:  Unremarkable.  No cardiomegaly.  No significant pericardial effusion. ABDOMEN: LIVER:  Unremarkable.  No mass. GALLBLADDER AND BILE DUCTS:  Unremarkable.  No calcified stones.  No ductal dilation. PANCREAS:  Unremarkable.  No ductal dilation.  No mass. SPLEEN:  Unremarkable.  No splenomegaly. ADRENALS:  Unremarkable.  No mass. KIDNEYS AND URETERS:  Unremarkable.  No hydronephrosis.  No solid mass. STOMACH AND BOWEL:  Unremarkable.  No obstruction.  No mucosal thickening. PELVIS: APPENDIX:  No findings to suggest acute appendicitis. BLADDER:  Unremarkable.  No mass. REPRODUCTIVE:  Hysterectomy. CHEST, ABDOMEN and PELVIS: INTRAPERITONEAL SPACE:  Unremarkable.  No significant fluid collection.  No free air. BONES/JOINTS:  No acute fracture.  No dislocation. SOFT TISSUES:  Bilateral breast implants.  Spinal pack in the left posterior soft tissues. LYMPH NODES:  Unremarkable.  No enlarged lymph nodes. IMPRESSION: 1.  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 2.  Patent stent in the proximal celiac artery. 3. 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 3.  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 4.  Moderate centrilobular emphysema. Electronically signed by:  Ten Moralez MD  4/1/2022 8:39 PM CDT Workstation: [id number]    I personally reviewed and  agree with the radiologist's findings    12 lead EKG reveals a sinus tachycardia 130 is a normal axis this good R-wave progression this no significant ST or T-wave abnormalities there is some ST flattening rate 131  milliseconds  ASSESSMENT & PLAN:   Carmen Wilkinson is a 82 y.o. female admitted for    1.  Dyspnea  -most likely multifactorial from COPD, anemia and deconditioning  -will initiate DuoNeb treatment  -CTA negative for PE  -will need to follow up with Pulmonary on outpatient basis inpatient if not improving  -cardiac workup  -continuous pulse oximetry reading  -monitor H&H  -supplemental O2 as needed    2.  Dehydration  -IV hydration  -accurate intake and output    3.  COPD/emphysema  -DuoNeb treatments  -may need evaluation for home oxygen  -supplemental O2 as needed    4.  Probable moderate protein/caloric malnutrition  -weighs 70 lb  -nutrition consult    5.  Essential hypertension  -continue home medications to manage    6.  Tobacco abuse  -cessation discussed and encouraged  -patient requesting Nicoderm patch    7.  History of mesenteric artery stenosis status post stenting  -CT with no acute findings today  -continue Plavix    8. Anemia  -iron studies  - stool for blood  - monitor      DVT Prophylaxis: will be placed on SCDs for DVT prophylaxis and will be advised to be as mobile as possible and sit in a chair as tolerated.   _____________________________________________________________  Face-to-Face encounter date: 04/01/2022  Encounter included review of the medical records, interviewing and examining the patient face-to-face, discussion with family and other health care providers including emergency medicine physician, admission orders, interpreting lab/test results and formulating a plan of care.   Medical Decision Making during this encounter was  [_] Low Complexity  [_] Moderate Complexity  [x] High  Complexity  _________________________________________________________________________________    INPATIENT LIST OF MEDICATIONS     Current Facility-Administered Medications:     lactated ringers bolus 1,000 mL, 1,000 mL, Intravenous, Once, Sara Gomes MD, Last Rate: 999 mL/hr at 04/01/22 2301, 1,000 mL at 04/01/22 2301    Current Outpatient Medications:     aspirin (ECOTRIN) 81 MG EC tablet, Take 81 mg by mouth once daily., Disp: , Rfl:     clopidogreL (PLAVIX) 75 mg tablet, Take 1 tablet (75 mg total) by mouth once daily., Disp: 90 tablet, Rfl: 3    folic acid/multivit-min/lutein (CENTRUM SILVER ORAL), Take 1 capsule by mouth once daily., Disp: , Rfl:     KRILL OIL ORAL, Take 1,000 mg by mouth once daily. , Disp: , Rfl:     L. gasseri-B. bifidum-B longum 1.5 billion cell Cap, Take 1 tablet by mouth once daily. , Disp: , Rfl:     oxycodone-acetaminophen (PERCOCET)  mg per tablet, Take 1 tablet by mouth every 6 (six) hours as needed for Pain., Disp: , Rfl:     simvastatin (ZOCOR) 10 MG tablet, Take 10 mg by mouth once daily. , Disp: , Rfl:     vit A/vit C/vit E/zinc/copper (PRESERVISION AREDS ORAL), Take 1 capsule by mouth 2 (two) times a day., Disp: , Rfl:     amlodipine-benazepril 5-10 mg (LOTREL) 5-10 mg per capsule, Take 1 capsule by mouth once daily. , Disp: , Rfl:     clopidogreL (PLAVIX) 75 mg tablet, Take 1 tablet (75 mg total) by mouth once daily., Disp: 30 tablet, Rfl: 3    metoprolol succinate (TOPROL-XL) 50 MG 24 hr tablet, 50 mg 2 (two) times daily. , Disp: , Rfl:       Scheduled Meds:   lactated ringers  1,000 mL Intravenous Once     Continuous Infusions:  PRN Meds:.      Tatiana Frias  [facility] Hospitalist NP  04/01/2022

## 2022-04-02 NOTE — CARE UPDATE
04/02/22 0720   Patient Assessment/Suction   Level of Consciousness (AVPU) alert   Respiratory Effort Normal;Unlabored   Expansion/Accessory Muscles/Retractions no use of accessory muscles   All Lung Fields Breath Sounds diminished   Rhythm/Pattern, Respiratory unlabored;pattern regular   Cough Frequency no cough   PRE-TX-O2   O2 Device (Oxygen Therapy) nasal cannula   $ Is the patient on Low Flow Oxygen? Yes   Flow (L/min) 2   SpO2 98 %   Pulse Oximetry Type Intermittent   $ Pulse Oximetry - Multiple Charge Pulse Oximetry - Multiple   Pulse 98   Resp 18   Aerosol Therapy   $ Aerosol Therapy Charges Aerosol Treatment   Daily Review of Necessity (SVN) completed   Respiratory Treatment Status (SVN) given   Treatment Route (SVN) mask;oxygen   Patient Position (SVN) HOB elevated   Post Treatment Assessment (SVN) breath sounds unchanged;vital signs unchanged   Signs of Intolerance (SVN) none   Equipment Change   $ RT Equipment Treatment nebuilzer   Education   $ Education Bronchodilator;15 min   Respiratory Evaluation   $ Care Plan Tech Time 15 min   $ Eval/Re-eval Charges Evaluation

## 2022-04-02 NOTE — PROGRESS NOTES
[facility]    Progress Note    Patient Name: Carmen Wilkinson  MRN: [id number]  Patient Class: IP- Inpatient   Admission Date: 4/1/2022  6:11 PM  Length of Stay: 1  Attending Physician: Rea Ritchie MD  Primary Care Provider: Abiodun Will MD  Face-to-Face encounter date: 04/02/2022  Code status:  Chief Complaint: Shortness of Breath (On exertion)        Subjective:    HPI: Per Rodriguez NP  82-year-old female presents to emergency room with exertional dyspnea     Past medical history significant for COPD/emphysema patient is not on oxygen no other she have metered-dose inhalers, hypertension hyperlipidemia severe peripheral vascular disease, mesenteric artery stenosis status post stenting, chronic back pain, brain aneurysm and cataracts     The patient states for the past 3-4 days she has been having increased shortness of breath with dyspnea on exertion.  She denied actual chest pain lightheadedness dizziness or syncope.  She denied any black or bloody stools no hematemesis or hemoptysis.     The patient states she smokes about a pack of cigarettes a day and has been diagnosed with emphysema but was never placed on oxygen nor does she have metered-dose inhalers.  She does not follow-up pulmonologist.     The patient states she has been having chronic weight loss but states her weight have been stable lately states that she gets full easy in does not have appetite.  She describes her symptoms as moderate to severe with no alleviating or exacerbating factors     In the emergency room a CTA of the chest was performed with no PE there was no significant findings in the abdomen.  The patient was found to have a drop in her H&H from her last evaluation and she was found to have elevated BUN.  She was given a saline bolus with improvement in her heart rate she was tachycardic cardiac at 131. on arrival    Interval History:   4/2: Patient is doing well and currently having a echo  done.  Home O2 evaluation ordered and consult to pulmonologist for untreated emphysema.  CT scan shows occlusion stenosis of SMA and moderate to severe stenosis of left vertebral artery.  Patient states that her vascular surgeon is aware of this and he has currently put 2 stents in the past.  She said she is scheduled for repeat ultrasound and follow-up within a month.  Anemia noted on blood work, iron studies within normal limit. Family present at bedside.No concerns/issues overnight reported by the patient or the nursing staff.    Review of Systems All other Review of Systems were found to be negative expect for that mentioned already in HPI.     Objective:     Vitals:    04/02/22 0720 04/02/22 0751 04/02/22 1131 04/02/22 1327   BP:  128/60 130/60    BP Location:  Left arm Left arm    Patient Position:  Lying Lying    Pulse: 98 100 110    Resp: 18 18 18 18   Temp:  98.4 °F (36.9 °C) 99.5 °F (37.5 °C)    TempSrc:  Oral Oral    SpO2: 98% 99% 97%    Weight:       Height:            Vitals reviewed.  Constitutional: No distress.   HENT: NC  Head: Atraumatic.   Cardiovascular: Normal rate, regular rhythm and normal heart sounds.   Pulmonary/Chest: Effort normal. No wheezes.  On nasal oxygen  Abdominal: Soft. Bowel sounds are normal. No distension and no mass. No tenderness  Neurological: Alert.   Skin: Skin is warm and dry.   Psych: Appropriate mood and affect    Following labs were Reviewed   CBC:  Recent Labs   Lab 04/02/22  0232   WBC 10.15   HGB 7.8*   HCT 24.7*        CMP:  Recent Labs   Lab 04/02/22  0232   CALCIUM 9.0   ALBUMIN 3.2*   PROT 5.6*      K 3.9   CO2 26      BUN 43*   CREATININE 0.6   ALKPHOS 61   ALT 15   AST 21   BILITOT 0.5       Micro Results  Microbiology Results (last 7 days)     ** No results found for the last 168 hours. **           Radiology Reports  X-Ray Chest 1 View    Result Date: 4/1/2022  Examination: Single view the chest. CLINICAL HISTORY: Shortness of breath.  TECHNIQUE: AP view the chest. COMPARISON: 629/2021. FINDINGS: Lungs are hyperinflated second long-standing COPD with elongation central mediastinum. There are similar device tracking towards the T5 vertebral body. Laminar calcification about the aortic knob. No significant pleural or pericardial effusion. No pneumothorax. Arthritic changes about the glenohumeral joint compartments. Bilateral breast prosthesis. IMPRESSION: 1. Ministration long-standing COPD and. 2. No acute process. Electronically signed by:  Henry Rowan MD  4/1/2022 7:00 PM CDT Workstation: [id number]    CTA Chest Abdomen Pelvis    Result Date: 4/1/2022  EXAM: CT Angiography Chest, Abdomen and Pelvis With Intravenous Contrast CLINICAL HISTORY: The patient is 82 years old and is Female; evaluate mesenteric TECHNIQUE: Axial computed tomographic angiography images of the chest, abdomen and pelvis with intravenous contrast.  Sagittal and coronal reformatted images were created and reviewed.  This CT exam was performed using one or more of the following dose reduction techniques:  automated exposure control, adjustment of the mA and/or kV according to patient size, and/or use of iterative reconstruction technique. MIP reconstructed images were created and reviewed. COMPARISON: November 24, 2021 CTA abdomen. FINDINGS: VASCULATURE: AORTA:  Left vertebral artery originates from the aortic arch.  The ascending aorta measures up to 3.1 cm. No aortic dissection. Moderate atherosclerotic calcifications. PULMONARY ARTERIES:  No pulmonary embolism. GREAT VESSELS OF AORTIC ARCH:  The left subclavian artery originates from the common carotid artery.  No dissection.  No arterial occlusion or significant stenosis. CELIAC TRUNK AND MESENTERIC ARTERIES:  Patent celiac artery stent.  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  The superior mesenteric artery occlusion spans approximately 2.3 cm. RENAL ARTERIES:  No acute findings.  No occlusion or  significant stenosis. ILIAC ARTERIES:  No acute findings.  No occlusion or significant stenosis. 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 CHEST: LUNGS:  Moderate centrilobular emphysema. PLEURAL SPACE:  Unremarkable.  No significant effusion.  No pneumothorax. HEART:  Unremarkable.  No cardiomegaly.  No significant pericardial effusion. ABDOMEN: LIVER:  Unremarkable.  No mass. GALLBLADDER AND BILE DUCTS:  Unremarkable.  No calcified stones.  No ductal dilation. PANCREAS:  Unremarkable.  No ductal dilation.  No mass. SPLEEN:  Unremarkable.  No splenomegaly. ADRENALS:  Unremarkable.  No mass. KIDNEYS AND URETERS:  Unremarkable.  No hydronephrosis.  No solid mass. STOMACH AND BOWEL:  Unremarkable.  No obstruction.  No mucosal thickening. PELVIS: APPENDIX:  No findings to suggest acute appendicitis. BLADDER:  Unremarkable.  No mass. REPRODUCTIVE:  Hysterectomy. CHEST, ABDOMEN and PELVIS: INTRAPERITONEAL SPACE:  Unremarkable.  No significant fluid collection.  No free air. BONES/JOINTS:  No acute fracture.  No dislocation. SOFT TISSUES:  Bilateral breast implants.  Spinal pack in the left posterior soft tissues. LYMPH NODES:  Unremarkable.  No enlarged lymph nodes. IMPRESSION: 1.  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 2.  Patent stent in the proximal celiac artery. 3. 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 3.  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 4.  Moderate centrilobular emphysema. Electronically signed by:  Ten Moralez MD  4/1/2022 8:39 PM CDT Workstation: [id number]       Meds  Scheduled Meds:   albuterol-ipratropium  3 mL Nebulization Q6H    aspirin  81 mg Oral Daily    atorvastatin  20 mg Oral Daily    clopidogreL  75 mg Oral Daily    famotidine  20 mg Oral Daily    fluticasone  furoate-vilanteroL  1 puff Inhalation Daily    nicotine  1 patch Transdermal Daily    [START ON 4/3/2022] tiotropium bromide  2 puff Inhalation Daily     Continuous Infusions:   lactated ringers 100 mL/hr at 04/02/22 0438     PRN Meds:.acetaminophen, melatonin, ondansetron, oxyCODONE-acetaminophen, sodium chloride 0.9%.    Assessment & Plan:     Active Hospital Problems    Diagnosis    *Dyspnea secondary to emphysema  - pulmonology consulted  - continue breathing treatment  - home O2 evaluation      Anemia, unspecified  - iron and vitamin B12 within normal limit  - folate levels      Mesenteric artery stenosis  - vascular surgeon aware  - patient is currently being worked up outpatient           Discharge Planning:   Is the patient medically ready for discharge?: no    Reason for patient still in hospital (select all that apply): Patient trending condition and Treatment    Above encounter included review of the medical records, interviewing and examining the patient face-to-face, discussion with family and other health care providers, ordering and interpreting lab/test results and formulating a plan of care.     Medical Decision Making:      [_] Low Complexity  [_] Moderate Complexity  [x] High Complexity      Rea Ritchie MD  Department of Hospital Medicine   [facility]

## 2022-04-02 NOTE — PT/OT/SLP PROGRESS
Occupational Therapy      Patient Name:  Carmen Fletcher [facility]   MRN:  [id number]    Patient not seen today; spoke with patient and family member at bedside.  They report no concerns regarding pt's ability to complete ADLs and declined the need for OT evaluation today.  Will d/c orders.    4/2/2022

## 2022-04-02 NOTE — CARE UPDATE
04/02/22 1619   Home Oxygen Qualification   $ Home O2 Qualification Tech time 15 minutes   SpO2 During Ambulation on O2 (!) 67 %   Heart Rate on O2 135 bpm   Ambulation O2 LPM 10 LPM   SpO2 Post Ambulation 99 %   Post Ambulation Heart Rate 118 bpm   Post Ambulation O2 LPM 2 LPM   Home O2 Eval Comments (S)  Patient on excertion immediately felt SOB starting at 6L and increased to 10 L sats quickly dropped to 67% after a minute and patient had to stop test

## 2022-04-02 NOTE — CONSULTS
"[facility]  Adult Nutrition   Consult Note (Initial Assessment)     SUMMARY     Recommendations  Recommendation/Intervention:   1) Added Ensure Enlive milkshake.   2) Added food preferences to computrition.   3) Consider liberalizing diet to help improve po intake per patient and daughter's request.   4) RD will contiue to monitor.    Goals: Po intake >75% of meals and oral supplement.  Nutrition Goal Status: new  Communication of RD Recs: reviewed with physician    Dietitian Rounds Brief  Patient reports fair po intake, tolerating diet well but used to eating salt and dislikes cardiac diet. Food preferences taken. Patient happy to try milkshake and pudding. Patient reports weight some what stable over the past 6 months and started losing weight 3 years ago. UBW: 105#.     Diet order:   Current Diet Order: Cardiac      % Intake of Estimated Energy Needs: 50 - 75 %  % Meal Intake: 50 - 75 %    Energy Calories Required: meeting needs  Protein Required: meeting needs  Fluid Required: meeting needs  Tolerance: tolerating    Anthropometrics  Temp: 99.5 °F (37.5 °C)  Height Method: Stated  Height: 5' 2" (157.5 cm)  Height (inches): 62 in  Weight Method: Standard Scale  Weight: 35.2 kg (77 lb 9.6 oz)  Weight (lb): 77.6 lb  Ideal Body Weight (IBW), Female: 110 lb  % Ideal Body Weight, Female (lb): 70.55 %  BMI (Calculated): 14.2  BMI Grade: less than 16 protein-energy malnutrition grade III       Estimated/Assessed Needs  Weight Used For Calorie Calculations: 35 kg (77 lb 2.6 oz)  Energy Calorie Requirements (kcal): 0771-3304 (35-40)  Energy Need Method: Kcal/kg  RDA Method (mL): 1225      Protein: 42-53gm (1.2-1.5gm/kg)    Reason for Assessment  Reason For Assessment: consult  Diagnosis: other (see comments) (Dyspnea)  Relevant Medical History: emphysema, hypertension hyperlipidemia, peripheral vascular disease, and mesenteric artery stenosis status post stentin    Nutrition/Diet History  Patient Reported " Diet/Restrictions/Preferences: general  Spiritual, Cultural Beliefs, Buddhist Practices, Values that Affect Care: no  Food Allergies: NKFA  Factors Affecting Nutritional Intake: decreased appetite    Nutrition Risk Screen  Nutrition Risk Screen: no indicators present     MST Score: 0  Have you recently lost weight without trying?: No  Weight loss score: 0  Have you been eating poorly because of a decreased appetite?: No  Appetite score: 0       Weight History:  Wt Readings from Last 5 Encounters:   04/02/22 35.2 kg (77 lb 9.6 oz)   04/02/22 34.9 kg (77 lb)   06/30/21 34.9 kg (77 lb)   06/29/21 34.9 kg (76 lb 15.1 oz)   01/13/21 38.6 kg (85 lb)        Lab/Procedures/Meds: Pertinent Labs/Meds Reviewed    Medications:Pertinent Medications Reviewed  Scheduled Meds:   albuterol-ipratropium  3 mL Nebulization Q6H    aspirin  81 mg Oral Daily    atorvastatin  20 mg Oral Daily    clopidogreL  75 mg Oral Daily    famotidine  20 mg Oral Daily    fluticasone furoate-vilanteroL  1 puff Inhalation Daily    nicotine  1 patch Transdermal Daily    [START ON 4/3/2022] tiotropium bromide  2 puff Inhalation Daily     Continuous Infusions:   lactated ringers 100 mL/hr at 04/02/22 0438     PRN Meds:.acetaminophen, melatonin, ondansetron, oxyCODONE-acetaminophen, sodium chloride 0.9%    Labs: Pertinent Labs Reviewed  Clinical Chemistry:  Recent Labs   Lab 04/01/22  1904 04/02/22  0232   NA  --  140   K  --  3.9   CL  --  105   CO2  --  26   GLU  --  106   BUN  --  43*   CREATININE  --  0.6   CALCIUM  --  9.0   PROT  --  5.6*   ALBUMIN  --  3.2*   BILITOT  --  0.5   ALKPHOS  --  61   AST  --  21   ALT  --  15   ANIONGAP  --  9   ESTGFRAFRICA  --  >60.0   EGFRNONAA  --  >60.0   LIPASE 48  --      CBC:   Recent Labs   Lab 04/02/22  0232   WBC 10.15   RBC 2.59*   HGB 7.8*   HCT 24.7*      MCV 95   MCH 30.1   MCHC 31.6*     Cardiac Profile:  Recent Labs   Lab 04/01/22  1846 04/02/22  0232 04/02/22  0412   BNP 44  --   --     TROPONINI <0.030 0.031 0.038     Thyroid & Parathyroid:  Recent Labs   Lab 04/01/22  2154   TSH 2.290       Monitor and Evaluation  Food and Nutrient Intake: energy intake  Food and Nutrient Adminstration: diet order  Physical Activity and Function: nutrition-related ADLs and IADLs  Anthropometric Measurements: weight change  Biochemical Data, Medical Tests and Procedures: electrolyte and renal panel, gastrointestinal profile, glucose/endocrine profile  Nutrition-Focused Physical Findings: overall appearance     Nutrition Risk  Level of Risk/Frequency of Follow-up: high     Nutrition Follow-Up  RD Follow-up?: Yes      Cherrie Albert RD 04/02/2022 2:05 PM

## 2022-04-02 NOTE — PLAN OF CARE
[facility]  Initial Discharge Assessment       Primary Care Provider: Abiodun Will MD    Admission Diagnosis: Shortness of breath [R06.02]    Admission Date: 4/1/2022  Expected Discharge Date:     Discharge Barriers Identified: None     Assessment completed at bedside with patient and Mariajose Lockwood (Daughter) [phone]. Patient intends to return home once discharged. AD/POA not discussed. No case management needs identified at this time.    Payor: [employer] MANAGED MEDICARE / Plan: W-21 HEALTH / Product Type: Medicare Advantage /     Extended Emergency Contact Information  Primary Emergency Contact: Bautista Jhaveri  Address: [address] [place] [address] [facility]  Home Phone: [phone]  Mobile Phone: [phone]  Relation: Significant other  Preferred language: English   needed? No  Secondary Emergency Contact: KevinmirnaMariajose ochoa  Mobile Phone: [phone]  Relation: Daughter  Preferred language: English   needed? No    Discharge Plan A: Home  Discharge Plan B: Home with family      [id number] - [place], [place] - [address] AT [facility] [address]  [address]  [facility] [address]  Phone: [phone] Fax: [phone]      Initial Assessment (most recent)     Adult Discharge Assessment - 04/02/22 1121        Relationship/Environment    Name(s) of Who Lives With Patient Bautista Jhaveri (significant other)

## 2022-04-03 ENCOUNTER — ANESTHESIA EVENT (OUTPATIENT)
Dept: SURGERY | Facility: HOSPITAL | Age: 82
DRG: 378 | End: 2022-04-03
Payer: MEDICARE

## 2022-04-03 LAB
ABO + RH BLD: NORMAL
ALBUMIN SERPL BCP-MCNC: 2.8 G/DL (ref 3.5–5.2)
ALP SERPL-CCNC: 47 U/L (ref 55–135)
ALT SERPL W/O P-5'-P-CCNC: 15 U/L (ref 10–44)
ANION GAP SERPL CALC-SCNC: 9 MMOL/L (ref 8–16)
AORTIC ROOT ANNULUS: 2.95 CM
AORTIC VALVE CUSP SEPERATION: 1.32 CM
AST SERPL-CCNC: 19 U/L (ref 10–40)
AV INDEX (PROSTH): 0.7
AV MEAN GRADIENT: 7 MMHG
AV PEAK GRADIENT: 10 MMHG
AV VALVE AREA: 2.21 CM2
AV VELOCITY RATIO: 62.68
BASOPHILS # BLD AUTO: 0.02 K/UL (ref 0–0.2)
BASOPHILS NFR BLD: 0.2 % (ref 0–1.9)
BILIRUB SERPL-MCNC: 0.5 MG/DL (ref 0.1–1)
BLD GP AB SCN CELLS X3 SERPL QL: NORMAL
BLD PROD TYP BPU: NORMAL
BLD PROD TYP BPU: NORMAL
BLOOD UNIT EXPIRATION DATE: NORMAL
BLOOD UNIT EXPIRATION DATE: NORMAL
BLOOD UNIT TYPE CODE: 1700
BLOOD UNIT TYPE CODE: 1700
BLOOD UNIT TYPE: NORMAL
BLOOD UNIT TYPE: NORMAL
BSA FOR ECHO PROCEDURE: 1.24 M2
BUN SERPL-MCNC: 36 MG/DL (ref 8–23)
CALCIUM SERPL-MCNC: 8.8 MG/DL (ref 8.7–10.5)
CHLORIDE SERPL-SCNC: 106 MMOL/L (ref 95–110)
CO2 SERPL-SCNC: 26 MMOL/L (ref 23–29)
CODING SYSTEM: NORMAL
CODING SYSTEM: NORMAL
CREAT SERPL-MCNC: 0.5 MG/DL (ref 0.5–1.4)
CV ECHO LV RWT: 0.48 CM
DIFFERENTIAL METHOD: ABNORMAL
DISPENSE STATUS: NORMAL
DISPENSE STATUS: NORMAL
DOP CALC AO PEAK VEL: 1.56 M/S
DOP CALC AO VTI: 28.76 CM
DOP CALC LVOT AREA: 3.2 CM2
DOP CALC LVOT DIAMETER: 2.01 CM
DOP CALC LVOT PEAK VEL: 97.78 M/S
DOP CALC LVOT STROKE VOLUME: 63.68 CM3
DOP CALCLVOT PEAK VEL VTI: 20.08 CM
E WAVE DECELERATION TIME: 308.92 MSEC
E/A RATIO: 0.62
E/E' RATIO: 22 M/S
ECHO LV POSTERIOR WALL: 0.63 CM (ref 0.6–1.1)
EJECTION FRACTION: 75 %
EOSINOPHIL # BLD AUTO: 0.1 K/UL (ref 0–0.5)
EOSINOPHIL NFR BLD: 0.6 % (ref 0–8)
ERYTHROCYTE [DISTWIDTH] IN BLOOD BY AUTOMATED COUNT: 13 % (ref 11.5–14.5)
EST. GFR  (AFRICAN AMERICAN): >60 ML/MIN/1.73 M^2
EST. GFR  (NON AFRICAN AMERICAN): >60 ML/MIN/1.73 M^2
FOLATE SERPL-MCNC: >24.8 NG/ML (ref 4–24)
FRACTIONAL SHORTENING: 44 % (ref 28–44)
GLUCOSE SERPL-MCNC: 95 MG/DL (ref 70–110)
HCT VFR BLD AUTO: 17.1 % (ref 37–48.5)
HCT VFR BLD AUTO: 18.2 % (ref 37–48.5)
HCT VFR BLD AUTO: 31.4 % (ref 37–48.5)
HGB BLD-MCNC: 10.7 G/DL (ref 12–16)
HGB BLD-MCNC: 5.3 G/DL (ref 12–16)
HGB BLD-MCNC: 5.8 G/DL (ref 12–16)
IMM GRANULOCYTES # BLD AUTO: 0.05 K/UL (ref 0–0.04)
IMM GRANULOCYTES NFR BLD AUTO: 0.5 % (ref 0–0.5)
INTERVENTRICULAR SEPTUM: 0.63 CM (ref 0.6–1.1)
IVRT: 116.03 MSEC
LEFT INTERNAL DIMENSION IN SYSTOLE: 1.48 CM (ref 2.1–4)
LEFT VENTRICLE MASS INDEX: 27 G/M2
LEFT VENTRICULAR INTERNAL DIMENSION IN DIASTOLE: 2.64 CM (ref 3.5–6)
LEFT VENTRICULAR MASS: 34.64 G
LV LATERAL E/E' RATIO: 22 M/S
LV SEPTAL E/E' RATIO: 22 M/S
LYMPHOCYTES # BLD AUTO: 2 K/UL (ref 1–4.8)
LYMPHOCYTES NFR BLD: 21 % (ref 18–48)
MAGNESIUM SERPL-MCNC: 1.6 MG/DL (ref 1.6–2.6)
MCH RBC QN AUTO: 30.5 PG (ref 27–31)
MCHC RBC AUTO-ENTMCNC: 31 G/DL (ref 32–36)
MCV RBC AUTO: 98 FL (ref 82–98)
MONOCYTES # BLD AUTO: 0.6 K/UL (ref 0.3–1)
MONOCYTES NFR BLD: 6.7 % (ref 4–15)
MV PEAK A VEL: 1.77 M/S
MV PEAK E VEL: 1.1 M/S
NEUTROPHILS # BLD AUTO: 6.6 K/UL (ref 1.8–7.7)
NEUTROPHILS NFR BLD: 71 % (ref 38–73)
NRBC BLD-RTO: 0 /100 WBC
NUM UNITS TRANS PACKED RBC: NORMAL
NUM UNITS TRANS PACKED RBC: NORMAL
OB PNL STL: POSITIVE
PISA TR MAX VEL: 2.47 M/S
PLATELET # BLD AUTO: 198 K/UL (ref 150–450)
PMV BLD AUTO: 9.1 FL (ref 9.2–12.9)
POTASSIUM SERPL-SCNC: 4.1 MMOL/L (ref 3.5–5.1)
PROT SERPL-MCNC: 5.1 G/DL (ref 6–8.4)
PULM VEIN S/D RATIO: 0.73
PV PEAK D VEL: 55.24 M/S
PV PEAK S VEL: 40.58 M/S
RBC # BLD AUTO: 1.74 M/UL (ref 4–5.4)
RIGHT VENTRICULAR END-DIASTOLIC DIMENSION: 252 CM
SODIUM SERPL-SCNC: 141 MMOL/L (ref 136–145)
TDI LATERAL: 0.05 M/S
TDI SEPTAL: 0.05 M/S
TDI: 0.05 M/S
TR MAX PG: 24 MMHG
WBC # BLD AUTO: 9.3 K/UL (ref 3.9–12.7)

## 2022-04-03 PROCEDURE — 27000221 HC OXYGEN, UP TO 24 HOURS

## 2022-04-03 PROCEDURE — 99900035 HC TECH TIME PER 15 MIN (STAT)

## 2022-04-03 PROCEDURE — S4991 NICOTINE PATCH NONLEGEND: HCPCS | Performed by: INTERNAL MEDICINE

## 2022-04-03 PROCEDURE — 21400001 HC TELEMETRY ROOM

## 2022-04-03 PROCEDURE — 83735 ASSAY OF MAGNESIUM: CPT | Performed by: STUDENT IN AN ORGANIZED HEALTH CARE EDUCATION/TRAINING PROGRAM

## 2022-04-03 PROCEDURE — 63600175 PHARM REV CODE 636 W HCPCS: Performed by: STUDENT IN AN ORGANIZED HEALTH CARE EDUCATION/TRAINING PROGRAM

## 2022-04-03 PROCEDURE — 25000003 PHARM REV CODE 250: Performed by: INTERNAL MEDICINE

## 2022-04-03 PROCEDURE — 36415 COLL VENOUS BLD VENIPUNCTURE: CPT | Performed by: STUDENT IN AN ORGANIZED HEALTH CARE EDUCATION/TRAINING PROGRAM

## 2022-04-03 PROCEDURE — 85018 HEMOGLOBIN: CPT | Mod: 91 | Performed by: STUDENT IN AN ORGANIZED HEALTH CARE EDUCATION/TRAINING PROGRAM

## 2022-04-03 PROCEDURE — 82272 OCCULT BLD FECES 1-3 TESTS: CPT | Performed by: NURSE PRACTITIONER

## 2022-04-03 PROCEDURE — 99231 SBSQ HOSP IP/OBS SF/LOW 25: CPT | Mod: ,,, | Performed by: INTERNAL MEDICINE

## 2022-04-03 PROCEDURE — 94640 AIRWAY INHALATION TREATMENT: CPT

## 2022-04-03 PROCEDURE — 25000003 PHARM REV CODE 250: Performed by: STUDENT IN AN ORGANIZED HEALTH CARE EDUCATION/TRAINING PROGRAM

## 2022-04-03 PROCEDURE — 80053 COMPREHEN METABOLIC PANEL: CPT | Performed by: NURSE PRACTITIONER

## 2022-04-03 PROCEDURE — 86901 BLOOD TYPING SEROLOGIC RH(D): CPT | Performed by: STUDENT IN AN ORGANIZED HEALTH CARE EDUCATION/TRAINING PROGRAM

## 2022-04-03 PROCEDURE — 85025 COMPLETE CBC W/AUTO DIFF WBC: CPT | Performed by: NURSE PRACTITIONER

## 2022-04-03 PROCEDURE — 94799 UNLISTED PULMONARY SVC/PX: CPT

## 2022-04-03 PROCEDURE — 85014 HEMATOCRIT: CPT | Performed by: STUDENT IN AN ORGANIZED HEALTH CARE EDUCATION/TRAINING PROGRAM

## 2022-04-03 PROCEDURE — 99231 PR SUBSEQUENT HOSPITAL CARE,LEVL I: ICD-10-PCS | Mod: ,,, | Performed by: INTERNAL MEDICINE

## 2022-04-03 PROCEDURE — 86920 COMPATIBILITY TEST SPIN: CPT | Performed by: STUDENT IN AN ORGANIZED HEALTH CARE EDUCATION/TRAINING PROGRAM

## 2022-04-03 PROCEDURE — P9017 PLASMA 1 DONOR FRZ W/IN 8 HR: HCPCS

## 2022-04-03 PROCEDURE — C9113 INJ PANTOPRAZOLE SODIUM, VIA: HCPCS | Performed by: STUDENT IN AN ORGANIZED HEALTH CARE EDUCATION/TRAINING PROGRAM

## 2022-04-03 PROCEDURE — 25000003 PHARM REV CODE 250: Performed by: NURSE PRACTITIONER

## 2022-04-03 PROCEDURE — 99900031 HC PATIENT EDUCATION (STAT)

## 2022-04-03 PROCEDURE — 25000242 PHARM REV CODE 250 ALT 637 W/ HCPCS: Performed by: NURSE PRACTITIONER

## 2022-04-03 PROCEDURE — 25000242 PHARM REV CODE 250 ALT 637 W/ HCPCS: Performed by: INTERNAL MEDICINE

## 2022-04-03 PROCEDURE — 86900 BLOOD TYPING SEROLOGIC ABO: CPT | Performed by: STUDENT IN AN ORGANIZED HEALTH CARE EDUCATION/TRAINING PROGRAM

## 2022-04-03 PROCEDURE — P9016 RBC LEUKOCYTES REDUCED: HCPCS | Performed by: STUDENT IN AN ORGANIZED HEALTH CARE EDUCATION/TRAINING PROGRAM

## 2022-04-03 PROCEDURE — 36415 COLL VENOUS BLD VENIPUNCTURE: CPT | Performed by: NURSE PRACTITIONER

## 2022-04-03 PROCEDURE — 82746 ASSAY OF FOLIC ACID SERUM: CPT | Performed by: STUDENT IN AN ORGANIZED HEALTH CARE EDUCATION/TRAINING PROGRAM

## 2022-04-03 PROCEDURE — 94761 N-INVAS EAR/PLS OXIMETRY MLT: CPT

## 2022-04-03 RX ORDER — POLYETHYLENE GLYCOL 3350 17 G/17G
340 POWDER, FOR SOLUTION ORAL ONCE
Status: COMPLETED | OUTPATIENT
Start: 2022-04-03 | End: 2022-04-03

## 2022-04-03 RX ORDER — SODIUM CHLORIDE 9 MG/ML
INJECTION, SOLUTION INTRAVENOUS CONTINUOUS
Status: DISCONTINUED | OUTPATIENT
Start: 2022-04-03 | End: 2022-04-06

## 2022-04-03 RX ORDER — HYDRALAZINE HYDROCHLORIDE 20 MG/ML
10 INJECTION INTRAMUSCULAR; INTRAVENOUS ONCE
Status: COMPLETED | OUTPATIENT
Start: 2022-04-03 | End: 2022-04-03

## 2022-04-03 RX ORDER — PANTOPRAZOLE SODIUM 40 MG/10ML
40 INJECTION, POWDER, LYOPHILIZED, FOR SOLUTION INTRAVENOUS 2 TIMES DAILY
Status: DISCONTINUED | OUTPATIENT
Start: 2022-04-03 | End: 2022-04-05

## 2022-04-03 RX ORDER — HYDROCODONE BITARTRATE AND ACETAMINOPHEN 500; 5 MG/1; MG/1
TABLET ORAL
Status: DISCONTINUED | OUTPATIENT
Start: 2022-04-03 | End: 2022-04-06 | Stop reason: HOSPADM

## 2022-04-03 RX ADMIN — PANTOPRAZOLE SODIUM 40 MG: 40 INJECTION, POWDER, LYOPHILIZED, FOR SOLUTION INTRAVENOUS at 09:04

## 2022-04-03 RX ADMIN — ATORVASTATIN CALCIUM 20 MG: 20 TABLET, FILM COATED ORAL at 09:04

## 2022-04-03 RX ADMIN — IPRATROPIUM BROMIDE AND ALBUTEROL SULFATE 3 ML: .5; 3 SOLUTION RESPIRATORY (INHALATION) at 06:04

## 2022-04-03 RX ADMIN — SODIUM CHLORIDE: 0.9 INJECTION, SOLUTION INTRAVENOUS at 09:04

## 2022-04-03 RX ADMIN — TIOTROPIUM BROMIDE INHALATION SPRAY 2 PUFF: 3.12 SPRAY, METERED RESPIRATORY (INHALATION) at 06:04

## 2022-04-03 RX ADMIN — HYDRALAZINE HYDROCHLORIDE 10 MG: 20 INJECTION INTRAMUSCULAR; INTRAVENOUS at 05:04

## 2022-04-03 RX ADMIN — OXYCODONE HYDROCHLORIDE AND ACETAMINOPHEN 1 TABLET: 10; 325 TABLET ORAL at 01:04

## 2022-04-03 RX ADMIN — POLYETHYLENE GLYCOL 3350 340 G: 17 POWDER, FOR SOLUTION ORAL at 04:04

## 2022-04-03 RX ADMIN — IPRATROPIUM BROMIDE AND ALBUTEROL SULFATE 3 ML: .5; 3 SOLUTION RESPIRATORY (INHALATION) at 12:04

## 2022-04-03 RX ADMIN — OXYCODONE HYDROCHLORIDE AND ACETAMINOPHEN 1 TABLET: 10; 325 TABLET ORAL at 09:04

## 2022-04-03 RX ADMIN — IPRATROPIUM BROMIDE AND ALBUTEROL SULFATE 3 ML: .5; 3 SOLUTION RESPIRATORY (INHALATION) at 02:04

## 2022-04-03 RX ADMIN — OXYCODONE HYDROCHLORIDE AND ACETAMINOPHEN 1 TABLET: 10; 325 TABLET ORAL at 10:04

## 2022-04-03 RX ADMIN — FLUTICASONE FUROATE AND VILANTEROL TRIFENATATE 1 PUFF: 100; 25 POWDER RESPIRATORY (INHALATION) at 06:04

## 2022-04-03 RX ADMIN — NICOTINE 1 PATCH: 14 PATCH, EXTENDED RELEASE TRANSDERMAL at 09:04

## 2022-04-03 RX ADMIN — OXYCODONE HYDROCHLORIDE AND ACETAMINOPHEN 1 TABLET: 10; 325 TABLET ORAL at 04:04

## 2022-04-03 RX ADMIN — IPRATROPIUM BROMIDE AND ALBUTEROL SULFATE 3 ML: .5; 3 SOLUTION RESPIRATORY (INHALATION) at 07:04

## 2022-04-03 RX ADMIN — ASPIRIN 81 MG: 81 TABLET, DELAYED RELEASE ORAL at 09:04

## 2022-04-03 NOTE — PLAN OF CARE
Problem: Adult Inpatient Plan of Care  Goal: Plan of Care Review  Outcome: Ongoing, Progressing  Goal: Patient-Specific Goal (Individualized)  Outcome: Ongoing, Progressing  Goal: Absence of Hospital-Acquired Illness or Injury  Outcome: Ongoing, Progressing  Goal: Optimal Comfort and Wellbeing  Outcome: Ongoing, Progressing  Goal: Readiness for Transition of Care  Outcome: Ongoing, Progressing     Problem: Skin Injury Risk Increased  Goal: Skin Health and Integrity  Outcome: Ongoing, Progressing     Problem: Malnutrition  Goal: Improved Nutritional Intake  Outcome: Ongoing, Progressing

## 2022-04-03 NOTE — PROGRESS NOTES
Pulmonary/Critical Care Consult      PATIENT NAME: Carmen Wilkinson  MRN: [id number]  TODAY'S DATE: 2022  12:10 PM  ADMIT DATE: 2022  AGE: 82 y.o. : 1940    CONSULT REQUESTED BY: Rea Ritchie MD    REASON FOR CONSULT:   COPD exacerbation    HPI:  Patient is an 82-year-old smoker who presents with a COPD exacerbation.  She smokes a pack a day.  She uses no bronchodilators.  She has pulmonary cachexia.  She has been feeling more short of breath for the last few weeks.    4/3 the patient is receiving a unit of blood.  She states she got very short of breath when she got up to move around.  She continues with good breath sounds and no wheezing.    REVIEW OF SYSTEMS  GENERAL: Feeling better today  EYES: Vision is good.  ENT: No sinusitis or pharyngitis.   HEART: No chest pain or palpitations.  LUNGS:  She has dyspnea with exertion.  She coughs and produces mucus mostly in the morning.  GI:  She has early satiety.  : No dysuria, hesitancy, or nocturia.  SKIN: No lesions or rashes.  MUSCULOSKELETAL: No joint pain or myalgias.  NEURO: No headaches or neuropathy.  LYMPH: No edema or adenopathy.  PSYCH: No anxiety or depression.  ENDO:  She cannot gain weight.    ALLERGIES  Review of patient's allergies indicates:  No Known Allergies    INPATIENT SCHEDULED MEDICATIONS   albuterol-ipratropium  3 mL Nebulization Q6H    aspirin  81 mg Oral Daily    atorvastatin  20 mg Oral Daily    clopidogreL  75 mg Oral Daily    fluticasone furoate-vilanteroL  1 puff Inhalation Daily    nicotine  1 patch Transdermal Daily    pantoprazole  40 mg Intravenous BID    tiotropium bromide  2 puff Inhalation Daily      sodium chloride 0.9% 75 mL/hr at 22 0940       MEDICAL AND SURGICAL HISTORY  Past Medical History:   Diagnosis Date    Back pain     Brain aneurysm     Carotid stenosis     Cataract     Diverticulitis     Emphysema lung     Feeling anxious     Hyperlipidemia     Hypertension      Macular degeneration     PVD (peripheral vascular disease)     Squamous cell carcinoma 08/29/2017    right medical cheek, SSIS, efudex tx     Past Surgical History:   Procedure Laterality Date    ABDOMINAL SURGERY      ARTERIOGRAM, MESENTERIC N/A 6/30/2021    Procedure: ARTERIOGRAM, MESENTERIC;  Surgeon: Krish Machado MD;  Location: [facility] CATH/EP LAB;  Service: General;  Laterality: N/A;    BACK SURGERY      CATARACT EXTRACTION, BILATERAL      CREATION, BYPASS, ARTERIAL, AORTA TO FEMORAL N/A 1/13/2021    Procedure: ARTERIOGRAM, MESENTERIC;  Surgeon: Krish Mcahado MD;  Location: [facility] CATH/EP LAB;  Service: General;  Laterality: N/A;    HYSTERECTOMY      IMPLANTATION OF SPINAL CORD STIMULATOR IN CERVICAL SPINE         ALCOHOL, TOBACCO AND DRUG USE  Social History     Tobacco Use   Smoking Status Current Every Day Smoker    Packs/day: 0.50    Types: Cigarettes   Smokeless Tobacco Never Used     Social History     Substance and Sexual Activity   Alcohol Use No     Social History     Substance and Sexual Activity   Drug Use Not Currently       FAMILY HISTORY  Family History   Problem Relation Age of Onset    Melanoma Neg Hx     Psoriasis Neg Hx     Eczema Neg Hx     Lupus Neg Hx        VITAL SIGNS (MOST RECENT)  Temp: 98.2 °F (36.8 °C) (04/03/22 1046)  Pulse: (!) 112 (04/03/22 1046)  Resp: 16 (04/03/22 1046)  BP: (!) 112/59 (04/03/22 1046)  SpO2: 99 % (04/03/22 1046)    INTAKE AND OUTPUT (LAST 24 HOURS):    Intake/Output Summary (Last 24 hours) at 4/3/2022 1059  Last data filed at 4/3/2022 1024  Gross per 24 hour   Intake 460 ml   Output 1500 ml   Net -1040 ml       WEIGHT  Wt Readings from Last 1 Encounters:   04/03/22 38.4 kg (84 lb 10.5 oz)       PHYSICAL EXAM  GENERAL: Older, exceedingly thin, patient in no distress.  HEENT: Pupils equal and reactive. Extraocular movements intact. Nose intact. Pharynx moist.  NECK: Supple.   HEART: Regular rate and rhythm. No murmur or gallop auscultated.  LUNGS:  Clear to auscultation and percussion. Lung excursion symmetrical. No change in fremitus. No adventitial noises.  ABDOMEN: Bowel sounds present. Non-tender, no masses palpated.  : Normal anatomy.  EXTREMITIES: Normal muscle tone and joint movement, no cyanosis or clubbing.   LYMPHATICS: No adenopathy palpated, no edema.  SKIN: Dry, intact, no lesions.   NEURO: Cranial nerves II-XII intact. Motor strength 5/5 bilaterally, upper and lower extremities.  PSYCH: Appropriate affect    ACUTE PHASE REACTANT (LAST 24 HOURS)  No results for input(s): FERRITIN, CRP, LDH, DDIMER in the last 24 hours.    CBC LAST (LAST 24 HOURS)  Recent Labs   Lab 04/03/22  0707 04/03/22  0928   WBC 9.30  --    RBC 1.74*  --    HGB 5.3* 5.8*   HCT 17.1* 18.2*   MCV 98  --    MCH 30.5  --    MCHC 31.0*  --    RDW 13.0  --      --    MPV 9.1*  --    GRAN 71.0  6.6  --    LYMPH 21.0  2.0  --    MONO 6.7  0.6  --    BASO 0.02  --    NRBC 0  --    October 2020, hemoglobin was 12.5    CHEMISTRY LAST (LAST 24 HOURS)  Recent Labs   Lab 04/03/22  0707      K 4.1      CO2 26   ANIONGAP 9   BUN 36*   CREATININE 0.5   GLU 95   CALCIUM 8.8   MG 1.6   ALBUMIN 2.8*   PROT 5.1*   ALKPHOS 47*   ALT 15   AST 19   BILITOT 0.5       COAGULATION LAST (LAST 24 HOURS)  No results for input(s): LABPT, INR, APTT in the last 24 hours.    CARDIAC PROFILE (LAST 24 HOURS)  Recent Labs   Lab 04/01/22  1846 04/02/22  0232 04/02/22  0412   BNP 44  --   --    TROPONINI <0.030   < > 0.038    < > = values in this interval not displayed.       LAST 7 DAYS MICROBIOLOGY   Microbiology Results (last 7 days)     ** No results found for the last 168 hours. **          MOST RECENT IMAGING  CTA Chest Abdomen Pelvis  EXAM:  CT Angiography Chest, Abdomen and Pelvis With Intravenous Contrast    CLINICAL HISTORY:  The patient is 82 years old and is Female; evaluate mesenteric    TECHNIQUE:  Axial computed tomographic angiography images of the chest, abdomen and pelvis  with intravenous contrast.  Sagittal and coronal reformatted images were created and reviewed.  This CT exam was performed using one or more of the following dose reduction techniques:  automated exposure control, adjustment of the mA and/or kV according to patient size, and/or use of iterative reconstruction technique.  MIP reconstructed images were created and reviewed.    COMPARISON:  November 24, 2021 CTA abdomen.    FINDINGS:    VASCULATURE:  AORTA:  Left vertebral artery originates from the aortic arch.  The ascending aorta measures up to 3.1 cm. No aortic dissection. Moderate atherosclerotic calcifications.  PULMONARY ARTERIES:  No pulmonary embolism.  GREAT VESSELS OF AORTIC ARCH:  The left subclavian artery originates from the common carotid artery.  No dissection.  No arterial occlusion or significant stenosis.  CELIAC TRUNK AND MESENTERIC ARTERIES:  Patent celiac artery stent.  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  The superior mesenteric artery occlusion spans approximately 2.3 cm.  RENAL ARTERIES:  No acute findings.  No occlusion or significant stenosis.  ILIAC ARTERIES:  No acute findings.  No occlusion or significant stenosis.  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    CHEST:  LUNGS:  Moderate centrilobular emphysema.  PLEURAL SPACE:  Unremarkable.  No significant effusion.  No pneumothorax.  HEART:  Unremarkable.  No cardiomegaly.  No significant pericardial effusion.    ABDOMEN:  LIVER:  Unremarkable.  No mass.  GALLBLADDER AND BILE DUCTS:  Unremarkable.  No calcified stones.  No ductal dilation.  PANCREAS:  Unremarkable.  No ductal dilation.  No mass.  SPLEEN:  Unremarkable.  No splenomegaly.  ADRENALS:  Unremarkable.  No mass.  KIDNEYS AND URETERS:  Unremarkable.  No hydronephrosis.  No solid mass.  STOMACH AND BOWEL:  Unremarkable.  No obstruction.  No mucosal  thickening.    PELVIS:  APPENDIX:  No findings to suggest acute appendicitis.  BLADDER:  Unremarkable.  No mass.  REPRODUCTIVE:  Hysterectomy.    CHEST, ABDOMEN and PELVIS:  INTRAPERITONEAL SPACE:  Unremarkable.  No significant fluid collection.  No free air.  BONES/JOINTS:  No acute fracture.  No dislocation.  SOFT TISSUES:  Bilateral breast implants.  Spinal pack in the left posterior soft tissues.  LYMPH NODES:  Unremarkable.  No enlarged lymph nodes.    IMPRESSION:  1.  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  2.  Patent stent in the proximal celiac artery.  3. 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  3.  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  4.  Moderate centrilobular emphysema.    Electronically signed by:  Ten Moralez MD  4/1/2022 8:39 PM CDT Workstation: [id number]  X-Ray Chest 1 View  Examination: Single view the chest.    CLINICAL HISTORY: Shortness of breath.    TECHNIQUE: AP view the chest.    COMPARISON: 629/2021.    FINDINGS: Lungs are hyperinflated second long-standing COPD with elongation central mediastinum. There are similar device tracking towards the T5 vertebral body. Laminar calcification about the aortic knob. No significant pleural or pericardial effusion. No pneumothorax. Arthritic changes about the glenohumeral joint compartments. Bilateral breast prosthesis.    IMPRESSION:  1. Ministration long-standing COPD and.  2. No acute process.    Electronically signed by:  Henry Rowan MD  4/1/2022 7:00 PM CDT Workstation: [id number]      CURRENT VISIT EKG  Results for orders placed or performed during the hospital encounter of 04/01/22   EKG 12-lead    Narrative    Test Reason : R06.02,    Vent. Rate : 131 BPM     Atrial Rate : 131 BPM     P-R Int : 152 ms          QRS Dur : 064 ms      QT Int : 298 ms       P-R-T Axes : 084 066 077 degrees     QTc Int : 440 ms    Sinus tachycardia with  Premature supraventricular complexes  Nonspecific ST abnormality  Abnormal ECG  When compared with ECG of 29-JUN-2021 12:47,  Premature supraventricular complexes are now Present  Criteria for Septal infarct are no longer Present  T wave inversion no longer evident in Anterior leads    Referred By: [email or web address]   SELF           Confirmed By:        IMPRESSION AND PLAN  COPD with significant emphysema  Significant anemia, acutely worsening  Tobacco abuse  Mild hypoalbuminemia  Pulmonary cachexia    Would use Breo and Spiriva in the hospital and then Trelegy on discharge  GI consulted for acute and continuing drops in hemoglobin  Tobacco cessation encouraged    Whitney Molina MD  [facility]  Department of Pulmonology  Date of Service: 04/03/2022  12:10 PM

## 2022-04-03 NOTE — CONSULTS
GASTROENTEROLOGY INPATIENT CONSULT NOTE  Patient Name: Carmen Adhikarilug  Patient MRN: [id number]  Patient : 1940    Admit Date: 2022  Service date: 4/3/2022    Reason for Consult: acute / chronic anemia    PCP: Abiodun Will MD    Chief Complaint   Patient presents with    Shortness of Breath     On exertion       HPI: Patient is a 82 y.o. female with PMHx HTN, HLD, PVD, ongoing tobacco, diverticulitis, tx'd H. Pylori, chronic anemia, SMA stenosis s/p PCI  (ASA/plavix) presents for evaluation of weakness/shortness of breath. Acute / chronic, intermittent, progressive over few days. No signs of GIB.     CHART REVIEW:  Hg 5.3  CTA Chest/Abd  - diffuse vacular disease; emphysema; Nml bowels, GB, panc, biliary.   CTA Abd  - occluded celiac; patent SMA stent; diffuse vacular disease  SMA stent   EGD  - duodenal AVM s/p ablation; HP + gastritis tx'd w/ pylera  Colon  - Normal  CT 10/'20 - liver cyst; nml GB / panc; stable borderline biliary dilation; suspected blockage of SMA / CLAU    Past Medical History:  Past Medical History:   Diagnosis Date    Back pain     Brain aneurysm     Carotid stenosis     Cataract     Diverticulitis     Emphysema lung     Feeling anxious     Hyperlipidemia     Hypertension     Macular degeneration     PVD (peripheral vascular disease)     Squamous cell carcinoma 2017    right medical cheek, SSIS, efudex tx        Past Surgical History:  Past Surgical History:   Procedure Laterality Date    ABDOMINAL SURGERY      ARTERIOGRAM, MESENTERIC N/A 2021    Procedure: ARTERIOGRAM, MESENTERIC;  Surgeon: Krish Machado MD;  Location: [facility] CATH/EP LAB;  Service: General;  Laterality: N/A;    BACK SURGERY      CATARACT EXTRACTION, BILATERAL      CREATION, BYPASS, ARTERIAL, AORTA TO FEMORAL N/A 2021    Procedure: ARTERIOGRAM, MESENTERIC;  Surgeon: Krish Machado MD;  Location: [facility] CATH/EP LAB;  Service: General;   Laterality: N/A;    HYSTERECTOMY      IMPLANTATION OF SPINAL CORD STIMULATOR IN CERVICAL SPINE          Home Medications:  Medications Prior to Admission   Medication Sig Dispense Refill Last Dose    aspirin (ECOTRIN) 81 MG EC tablet Take 81 mg by mouth once daily.   4/1/2022 at 09:00    clopidogreL (PLAVIX) 75 mg tablet Take 1 tablet (75 mg total) by mouth once daily. 90 tablet 3 4/1/2022 at 09:00    folic acid/multivit-min/lutein (CENTRUM SILVER ORAL) Take 1 capsule by mouth once daily.   4/1/2022 at 09:00    KRILL OIL ORAL Take 1,000 mg by mouth once daily.    4/1/2022 at 09:00    L. gasseri-B. bifidum-B longum 1.5 billion cell Cap Take 1 tablet by mouth once daily.    4/1/2022 at 09:00    oxycodone-acetaminophen (PERCOCET)  mg per tablet Take 1 tablet by mouth every 6 (six) hours as needed for Pain.   4/1/2022 at 15:00    simvastatin (ZOCOR) 10 MG tablet Take 10 mg by mouth once daily.    4/1/2022 at 09:00    vit A/vit C/vit E/zinc/copper (PRESERVISION AREDS ORAL) Take 1 capsule by mouth 2 (two) times a day.   4/1/2022 at 09:00    amlodipine-benazepril 5-10 mg (LOTREL) 5-10 mg per capsule Take 1 capsule by mouth once daily.        clopidogreL (PLAVIX) 75 mg tablet Take 1 tablet (75 mg total) by mouth once daily. 30 tablet 3     metoprolol succinate (TOPROL-XL) 50 MG 24 hr tablet 50 mg 2 (two) times daily.           Inpatient Medications:   albuterol-ipratropium  3 mL Nebulization Q6H    aspirin  81 mg Oral Daily    atorvastatin  20 mg Oral Daily    clopidogreL  75 mg Oral Daily    fluticasone furoate-vilanteroL  1 puff Inhalation Daily    nicotine  1 patch Transdermal Daily    pantoprazole  40 mg Intravenous BID    tiotropium bromide  2 puff Inhalation Daily     sodium chloride, acetaminophen, melatonin, ondansetron, oxyCODONE-acetaminophen, sodium chloride 0.9%    Review of patient's allergies indicates:  No Known Allergies    Social History:   Social History     Occupational History  "   Not on file   Tobacco Use    Smoking status: Current Every Day Smoker     Packs/day: 0.50     Types: Cigarettes    Smokeless tobacco: Never Used   Substance and Sexual Activity    Alcohol use: No    Drug use: Not Currently    Sexual activity: Not on file       Family History:   Family History   Problem Relation Age of Onset    Melanoma Neg Hx     Psoriasis Neg Hx     Eczema Neg Hx     Lupus Neg Hx        Review of Systems:  A 10 point review of systems was performed and was normal, except as mentioned in the HPI, including constitutional, HEENT, heme, lymph, cardiovascular, respiratory, gastrointestinal, genitourinary, neurologic, endocrine, psychiatric and musculoskeletal.      OBJECTIVE:    Physical Exam:  24 Hour Vital Sign Ranges: Temp:  [98.1 °F (36.7 °C)-99.6 °F (37.6 °C)] 98.8 °F (37.1 °C)  Pulse:  [101-122] 109  Resp:  [16-20] 16  SpO2:  [90 %-100 %] 97 %  BP: ()/(51-61) 133/61  Most recent vitals: /61   Pulse 109   Temp 98.8 °F (37.1 °C) (Oral)   Resp 16   Ht 5' 2" (1.575 m)   Wt 38.4 kg (84 lb 10.5 oz)   LMP  (LMP Unknown)   SpO2 97%   Breastfeeding No   BMI 15.48 kg/m²    GEN: chronic ill appearing thin WF  HEENT: PERRL, sclera anicteric, oral mucosa pink and moist without lesion  NECK: trachea midline; Good ROM  CV: regular rate and rhythm, no murmurs or gallops  RESP: min coarse w/ wheeze but moving air  ABD: soft, non-tender, non-distended, normal bowel sounds  EXT: no swelling or edema, 1+ pulses distally  SKIN: no rashes or jaundice  PSYCH: normal affect    Labs:   Recent Labs     04/01/22  1846 04/02/22  0232 04/03/22  0707   WBC 13.08* 10.15 9.30   MCV 94 95 98    274 198     Recent Labs     04/01/22  1846 04/02/22  0232 04/03/22  0707    140 141   K 4.1 3.9 4.1    105 106   CO2 24 26 26   BUN 58* 43* 36*   * 106 95     No results for input(s): ALB in the last 72 hours.    Invalid input(s): ALKP, SGOT, SGPT, TBIL, DBIL, TPRO  No results " for input(s): PT, INR, PTT in the last 72 hours.      Radiology Review:  CTA Chest Abdomen Pelvis   Final Result      X-Ray Chest 1 View   Final Result            IMPRESSION / RECOMMENDATIONS:  82 y.o. female with PMHx HTN, HLD, PVD, ongoing tobacco, diverticulitis, tx'd H. Pylori, chronic anemia, SMA stenosis s/p PCI 1/'21 (ASA/plavix) presents for evaluation of weakness/shortness of breath w/ progressive anemia w/o any siigns of GIB. Push / Colon to evaluate given acute drop but unclear if any GI loss given normocytic and no signs of bleeding. RIsks, benefits, alternatives discussed in detail regarding upcoming procedures and sedation and possible complications. All questions answered and will proceed with procedure as planned.     -Push / Colon tomorrow w/ limited interventions due to DAPT  -VCE as outpatient if negative    Thank you for this consult.    Ramirez JUDGE Dauterive III  4/3/2022  11:15 AM

## 2022-04-03 NOTE — CARE UPDATE
04/02/22 1932   Patient Assessment/Suction   Level of Consciousness (AVPU) alert   Respiratory Effort Normal   Expansion/Accessory Muscles/Retractions no use of accessory muscles   All Lung Fields Breath Sounds diminished   Rhythm/Pattern, Respiratory unlabored   PRE-TX-O2   O2 Device (Oxygen Therapy) High Flow nasal Cannula   $ Is the patient on Low Flow Oxygen? Yes   Flow (L/min) 3  (INCREASED TO 5L)   SpO2 (!) 90 %   Pulse Oximetry Type Intermittent   $ Pulse Oximetry - Multiple Charge Pulse Oximetry - Multiple   Pulse 107   Resp 18   Aerosol Therapy   $ Aerosol Therapy Charges Aerosol Treatment   Daily Review of Necessity (SVN) completed   Respiratory Treatment Status (SVN) given   Treatment Route (SVN) mask   Patient Position (SVN) semi-Yee's   Post Treatment Assessment (SVN) breath sounds unchanged;vital signs unchanged   Signs of Intolerance (SVN) none   Education   $ Education 15 min   Respiratory Evaluation   $ Care Plan Tech Time 15 min

## 2022-04-03 NOTE — PROGRESS NOTES
[facility] Medicine    Progress Note    Patient Name: Carmen Wilkinson  MRN: [id number]  Patient Class: IP- Inpatient   Admission Date: 4/1/2022  6:11 PM  Length of Stay: 2  Attending Physician: Rea Ritchie MD  Primary Care Provider: Abiodun Will MD  Face-to-Face encounter date: 04/03/2022  Code status:  Chief Complaint: Shortness of Breath (On exertion)        Subjective:    HPI: Per Rodriguez NP  82-year-old female presents to emergency room with exertional dyspnea     Past medical history significant for COPD/emphysema patient is not on oxygen no other she have metered-dose inhalers, hypertension hyperlipidemia severe peripheral vascular disease, mesenteric artery stenosis status post stenting, chronic back pain, brain aneurysm and cataracts     The patient states for the past 3-4 days she has been having increased shortness of breath with dyspnea on exertion.  She denied actual chest pain lightheadedness dizziness or syncope.  She denied any black or bloody stools no hematemesis or hemoptysis.     The patient states she smokes about a pack of cigarettes a day and has been diagnosed with emphysema but was never placed on oxygen nor does she have metered-dose inhalers.  She does not follow-up pulmonologist.     The patient states she has been having chronic weight loss but states her weight have been stable lately states that she gets full easy in does not have appetite.  She describes her symptoms as moderate to severe with no alleviating or exacerbating factors     In the emergency room a CTA of the chest was performed with no PE there was no significant findings in the abdomen.  The patient was found to have a drop in her H&H from her last evaluation and she was found to have elevated BUN.  She was given a saline bolus with improvement in her heart rate she was tachycardic cardiac at 131. on arrival    Interval History:   4/2: Patient is doing well and currently having a echo  done.  Home O2 evaluation ordered and consult to pulmonologist for untreated emphysema.  CT scan shows occlusion stenosis of SMA and moderate to severe stenosis of left vertebral artery.  Patient states that her vascular surgeon is aware of this and he has currently put 2 stents in the past.  She said she is scheduled for repeat ultrasound and follow-up within a month.  Anemia noted on blood work, iron studies within normal limit. Family present at bedside.No concerns/issues overnight reported by the patient or the nursing staff.    4/3:  Patient states that she feels tired today, significant drop in H&H from 7.8/20 4.7 to 5.3/17.1.  Repeat H&H confirm low levels, would transfuse 2 PRBC.  Occult studies positive, will consult Gastroenterology.  Iron studies, folate and vitamin B12 within normal limit.  Home O2 evaluation done yesterday showed that patient requires home oxygen which will be ordered.  Hold DAPT  pending colonoscopy tomorrow.    Review of Systems All other Review of Systems were found to be negative expect for that mentioned already in HPI.     Objective:     Vitals:    04/03/22 1430 04/03/22 1445 04/03/22 1458 04/03/22 1509   BP: 125/62 128/64 130/64 138/66   BP Location:       Patient Position:       Pulse: 106 101 108 104   Resp: 18 18 16 16   Temp: 98.5 °F (36.9 °C) 98.6 °F (37 °C)  99.1 °F (37.3 °C)   TempSrc: Oral Oral Oral Oral   SpO2: 99% 100% 100% 100%   Weight:       Height:            Vitals reviewed.  Constitutional: No distress.   HENT: NC  Head: Atraumatic.   Cardiovascular: Normal rate, regular rhythm and normal heart sounds.   Pulmonary/Chest: Effort normal. No wheezes.  On nasal oxygen  Abdominal: Soft. Bowel sounds are normal. No distension and no mass. No tenderness  Neurological: Alert.   Skin: Skin is warm and dry.   Psych: Appropriate mood and affect    Following labs were Reviewed   CBC:  Recent Labs   Lab 04/03/22  0707 04/03/22  0928   WBC 9.30  --    HGB 5.3* 5.8*   HCT 17.1*  18.2*     --      CMP:  Recent Labs   Lab 04/03/22  0707   CALCIUM 8.8   ALBUMIN 2.8*   PROT 5.1*      K 4.1   CO2 26      BUN 36*   CREATININE 0.5   ALKPHOS 47*   ALT 15   AST 19   BILITOT 0.5       Micro Results  Microbiology Results (last 7 days)     ** No results found for the last 168 hours. **           Radiology Reports  X-Ray Chest 1 View    Result Date: 4/1/2022  Examination: Single view the chest. CLINICAL HISTORY: Shortness of breath. TECHNIQUE: AP view the chest. COMPARISON: 629/2021. FINDINGS: Lungs are hyperinflated second long-standing COPD with elongation central mediastinum. There are similar device tracking towards the T5 vertebral body. Laminar calcification about the aortic knob. No significant pleural or pericardial effusion. No pneumothorax. Arthritic changes about the glenohumeral joint compartments. Bilateral breast prosthesis. IMPRESSION: 1. Ministration long-standing COPD and. 2. No acute process. Electronically signed by:  Henry Rowan MD  4/1/2022 7:00 PM CDT Workstation: [id number]    CTA Chest Abdomen Pelvis    Result Date: 4/1/2022  EXAM: CT Angiography Chest, Abdomen and Pelvis With Intravenous Contrast CLINICAL HISTORY: The patient is 82 years old and is Female; evaluate mesenteric TECHNIQUE: Axial computed tomographic angiography images of the chest, abdomen and pelvis with intravenous contrast.  Sagittal and coronal reformatted images were created and reviewed.  This CT exam was performed using one or more of the following dose reduction techniques:  automated exposure control, adjustment of the mA and/or kV according to patient size, and/or use of iterative reconstruction technique. MIP reconstructed images were created and reviewed. COMPARISON: November 24, 2021 CTA abdomen. FINDINGS: VASCULATURE: AORTA:  Left vertebral artery originates from the aortic arch.  The ascending aorta measures up to 3.1 cm. No aortic dissection. Moderate atherosclerotic  calcifications. PULMONARY ARTERIES:  No pulmonary embolism. GREAT VESSELS OF AORTIC ARCH:  The left subclavian artery originates from the common carotid artery.  No dissection.  No arterial occlusion or significant stenosis. CELIAC TRUNK AND MESENTERIC ARTERIES:  Patent celiac artery stent.  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  The superior mesenteric artery occlusion spans approximately 2.3 cm. RENAL ARTERIES:  No acute findings.  No occlusion or significant stenosis. ILIAC ARTERIES:  No acute findings.  No occlusion or significant stenosis. 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 CHEST: LUNGS:  Moderate centrilobular emphysema. PLEURAL SPACE:  Unremarkable.  No significant effusion.  No pneumothorax. HEART:  Unremarkable.  No cardiomegaly.  No significant pericardial effusion. ABDOMEN: LIVER:  Unremarkable.  No mass. GALLBLADDER AND BILE DUCTS:  Unremarkable.  No calcified stones.  No ductal dilation. PANCREAS:  Unremarkable.  No ductal dilation.  No mass. SPLEEN:  Unremarkable.  No splenomegaly. ADRENALS:  Unremarkable.  No mass. KIDNEYS AND URETERS:  Unremarkable.  No hydronephrosis.  No solid mass. STOMACH AND BOWEL:  Unremarkable.  No obstruction.  No mucosal thickening. PELVIS: APPENDIX:  No findings to suggest acute appendicitis. BLADDER:  Unremarkable.  No mass. REPRODUCTIVE:  Hysterectomy. CHEST, ABDOMEN and PELVIS: INTRAPERITONEAL SPACE:  Unremarkable.  No significant fluid collection.  No free air. BONES/JOINTS:  No acute fracture.  No dislocation. SOFT TISSUES:  Bilateral breast implants.  Spinal pack in the left posterior soft tissues. LYMPH NODES:  Unremarkable.  No enlarged lymph nodes. IMPRESSION: 1.  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 2.  Patent stent in the proximal celiac artery. 3. 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 3.  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 4.  Moderate centrilobular emphysema. Electronically signed by:  Ten Moralez MD  4/1/2022 8:39 PM CDT Workstation: [id number]       Meds  Scheduled Meds:   albuterol-ipratropium  3 mL Nebulization Q6H    aspirin  81 mg Oral Daily    atorvastatin  20 mg Oral Daily    clopidogreL  75 mg Oral Daily    fluticasone furoate-vilanteroL  1 puff Inhalation Daily    nicotine  1 patch Transdermal Daily    pantoprazole  40 mg Intravenous BID    polyethylene glycol  340 g Oral Once    tiotropium bromide  2 puff Inhalation Daily     Continuous Infusions:   sodium chloride 0.9% 75 mL/hr at 04/03/22 0940     PRN Meds:.sodium chloride, acetaminophen, melatonin, ondansetron, oxyCODONE-acetaminophen, sodium chloride 0.9%.    Assessment & Plan:     Active Hospital Problems    Diagnosis    *Dyspnea secondary to emphysema  - pulmonology consulted  - continue breathing treatment  - home O2 oxygen ordered      Anemia, unspecified  - significant drop in H&H to 5. 3/17.1  Occult positive  Consult to gastro  Hold aspirin and Plavix  - iron and vitamin B12 within normal limit  - folate levels      Mesenteric artery stenosis  - vascular surgeon aware  - patient is currently being worked up outpatient           Discharge Planning:   Is the patient medically ready for discharge?: no    Reason for patient still in hospital (select all that apply): Patient trending condition and Treatment    Above encounter included review of the medical records, interviewing and examining the patient face-to-face, discussion with family and other health care providers, ordering and interpreting lab/test results and formulating a plan of care.     Medical Decision Making:      [_] Low Complexity  [_] Moderate Complexity  [x] High Complexity      Rea Ritchie MD  Department of Hospital Medicine   [facility]

## 2022-04-03 NOTE — ANESTHESIA PREPROCEDURE EVALUATION
04/03/2022  Carmen Wilkinson is a 82 y.o., female.      Pre-op Assessment    I have reviewed the Patient Summary Reports.     I have reviewed the Nursing Notes.    I have reviewed the Medications.     Review of Systems  Anesthesia Hx:  Denies Family Hx of Anesthesia complications.   Denies Personal Hx of Anesthesia complications.   Social:  Smoker, No Alcohol Use    Hematology/Oncology:     Oncology Normal    -- Anemia: Hematology Comments: Being transfused.  Recent Plavix for multiple arterial stents.    EENT/Dental:   Full upper dentures   Cardiovascular:   Hypertension PVD hyperlipidemia Carotid and vertebral artery stenosis.   Pulmonary:   COPD ( no history of home oxygen use but Dr. Molina says that now she will.) Shortness of breath    Renal/:  Renal/ Normal     Musculoskeletal:   Cervical spinal cord stimulator in place but no longer functioning. Patient denies neck pain. Spine Disorders: (Spinal cord stimulator) lumbar Chronic Pain and Degenerative disease    Neurological:  Neurology Normal    Endocrine:  Endocrine Normal    Psych:  Psychiatric Normal           Physical Exam  General: Cooperative, Alert, Oriented and Cachexia    Airway:  Mallampati: II   Mouth Opening: Normal  TM Distance: > 6 cm  Tongue: Normal  Neck ROM: Normal ROM    Dental:  Intact    Chest/Lungs:  Clear to auscultation, Normal Respiratory Rate    Heart:  Rate: Normal  Rhythm: Regular Rhythm  Sounds: Normal        Anesthesia Plan  Type of Anesthesia, risks & benefits discussed:    Anesthesia Type: MAC  Intra-op Monitoring Plan: Standard ASA Monitors  Informed Consent: Informed consent signed with the Patient and all parties understand the risks and agree with anesthesia plan.  All questions answered.   ASA Score: 3  Anesthesia Plan Notes: MAC with propofol.  POM     Ready For Surgery From Anesthesia Perspective.      .

## 2022-04-03 NOTE — CARE UPDATE
04/03/22 0659   Patient Assessment/Suction   Level of Consciousness (AVPU) alert   Respiratory Effort Normal;Unlabored   Expansion/Accessory Muscles/Retractions no use of accessory muscles   All Lung Fields Breath Sounds clear;diminished   PRE-TX-O2   O2 Device (Oxygen Therapy) High Flow nasal Cannula   $ Is the patient on Low Flow Oxygen? Yes   Flow (L/min) 4   SpO2 95 %   Pulse Oximetry Type Intermittent   $ Pulse Oximetry - Multiple Charge Pulse Oximetry - Multiple   Pulse (!) 111   Resp 20   Aerosol Therapy   $ Aerosol Therapy Charges Aerosol Treatment   Daily Review of Necessity (SVN) completed   Respiratory Treatment Status (SVN) given   Treatment Route (SVN) mask;oxygen   Patient Position (SVN) HOB elevated   Post Treatment Assessment (SVN) breath sounds unchanged;vital signs unchanged   Signs of Intolerance (SVN) none   Inhaler   $ Inhaler Charges MDI (Metered Dose Inahler) Treatment  (breo)   Daily Review of Necessity (Inhaler) completed   Respiratory Treatment Status (Inhaler) given;mouth rinsed post treatment   Treatment Route (Inhaler) mouthpiece   Patient Position (Inhaler) HOB elevated   Post Treatment Assessment (Inhaler) breath sounds unchanged;vital signs unchanged   Signs of Intolerance (Inhaler) none   Education   $ Education Bronchodilator;15 min   Respiratory Evaluation   $ Care Plan Tech Time 15 min   $ Eval/Re-eval Charges Re-evaluation

## 2022-04-04 ENCOUNTER — ANESTHESIA (OUTPATIENT)
Dept: SURGERY | Facility: HOSPITAL | Age: 82
DRG: 378 | End: 2022-04-04
Payer: MEDICARE

## 2022-04-04 PROBLEM — J43.9 EMPHYSEMA LUNG: Status: ACTIVE | Noted: 2022-04-04

## 2022-04-04 PROBLEM — K92.2 GI BLEED: Status: ACTIVE | Noted: 2022-04-04

## 2022-04-04 LAB
ALBUMIN SERPL BCP-MCNC: 3 G/DL (ref 3.5–5.2)
ALP SERPL-CCNC: 51 U/L (ref 55–135)
ALT SERPL W/O P-5'-P-CCNC: 17 U/L (ref 10–44)
ANION GAP SERPL CALC-SCNC: 8 MMOL/L (ref 8–16)
AST SERPL-CCNC: 23 U/L (ref 10–40)
BASOPHILS # BLD AUTO: 0.04 K/UL (ref 0–0.2)
BASOPHILS NFR BLD: 0.5 % (ref 0–1.9)
BILIRUB SERPL-MCNC: 0.7 MG/DL (ref 0.1–1)
BUN SERPL-MCNC: 18 MG/DL (ref 8–23)
CALCIUM SERPL-MCNC: 8.7 MG/DL (ref 8.7–10.5)
CHLORIDE SERPL-SCNC: 107 MMOL/L (ref 95–110)
CO2 SERPL-SCNC: 26 MMOL/L (ref 23–29)
CREAT SERPL-MCNC: 0.5 MG/DL (ref 0.5–1.4)
DIFFERENTIAL METHOD: ABNORMAL
EOSINOPHIL # BLD AUTO: 0.1 K/UL (ref 0–0.5)
EOSINOPHIL NFR BLD: 1.2 % (ref 0–8)
ERYTHROCYTE [DISTWIDTH] IN BLOOD BY AUTOMATED COUNT: 14.6 % (ref 11.5–14.5)
EST. GFR  (AFRICAN AMERICAN): >60 ML/MIN/1.73 M^2
EST. GFR  (NON AFRICAN AMERICAN): >60 ML/MIN/1.73 M^2
GLUCOSE SERPL-MCNC: 94 MG/DL (ref 70–110)
HCT VFR BLD AUTO: 27.1 % (ref 37–48.5)
HCT VFR BLD AUTO: 28.7 % (ref 37–48.5)
HGB BLD-MCNC: 9.2 G/DL (ref 12–16)
HGB BLD-MCNC: 9.4 G/DL (ref 12–16)
IMM GRANULOCYTES # BLD AUTO: 0.03 K/UL (ref 0–0.04)
IMM GRANULOCYTES NFR BLD AUTO: 0.4 % (ref 0–0.5)
LYMPHOCYTES # BLD AUTO: 1.2 K/UL (ref 1–4.8)
LYMPHOCYTES NFR BLD: 14.6 % (ref 18–48)
MCH RBC QN AUTO: 30.4 PG (ref 27–31)
MCHC RBC AUTO-ENTMCNC: 33.9 G/DL (ref 32–36)
MCV RBC AUTO: 89 FL (ref 82–98)
MONOCYTES # BLD AUTO: 0.7 K/UL (ref 0.3–1)
MONOCYTES NFR BLD: 8.4 % (ref 4–15)
NEUTROPHILS # BLD AUTO: 6.3 K/UL (ref 1.8–7.7)
NEUTROPHILS NFR BLD: 74.9 % (ref 38–73)
NRBC BLD-RTO: 0 /100 WBC
PLATELET # BLD AUTO: 186 K/UL (ref 150–450)
PMV BLD AUTO: 8.8 FL (ref 9.2–12.9)
POTASSIUM SERPL-SCNC: 3.9 MMOL/L (ref 3.5–5.1)
PROT SERPL-MCNC: 5.3 G/DL (ref 6–8.4)
RBC # BLD AUTO: 3.03 M/UL (ref 4–5.4)
SODIUM SERPL-SCNC: 141 MMOL/L (ref 136–145)
WBC # BLD AUTO: 8.41 K/UL (ref 3.9–12.7)

## 2022-04-04 PROCEDURE — 25000003 PHARM REV CODE 250: Performed by: NURSE PRACTITIONER

## 2022-04-04 PROCEDURE — 94618 PULMONARY STRESS TESTING: CPT

## 2022-04-04 PROCEDURE — 85018 HEMOGLOBIN: CPT | Performed by: STUDENT IN AN ORGANIZED HEALTH CARE EDUCATION/TRAINING PROGRAM

## 2022-04-04 PROCEDURE — 85025 COMPLETE CBC W/AUTO DIFF WBC: CPT | Performed by: NURSE PRACTITIONER

## 2022-04-04 PROCEDURE — 37000008 HC ANESTHESIA 1ST 15 MINUTES: Performed by: INTERNAL MEDICINE

## 2022-04-04 PROCEDURE — S4991 NICOTINE PATCH NONLEGEND: HCPCS | Performed by: INTERNAL MEDICINE

## 2022-04-04 PROCEDURE — 27000671 HC TUBING MICROBORE EXT: Performed by: ANESTHESIOLOGY

## 2022-04-04 PROCEDURE — C9113 INJ PANTOPRAZOLE SODIUM, VIA: HCPCS | Performed by: STUDENT IN AN ORGANIZED HEALTH CARE EDUCATION/TRAINING PROGRAM

## 2022-04-04 PROCEDURE — 27000221 HC OXYGEN, UP TO 24 HOURS

## 2022-04-04 PROCEDURE — 36415 COLL VENOUS BLD VENIPUNCTURE: CPT | Performed by: NURSE PRACTITIONER

## 2022-04-04 PROCEDURE — 27200043 HC FORCEPS, BIOPSY: Performed by: INTERNAL MEDICINE

## 2022-04-04 PROCEDURE — 99900031 HC PATIENT EDUCATION (STAT)

## 2022-04-04 PROCEDURE — 99232 PR SUBSEQUENT HOSPITAL CARE,LEVL II: ICD-10-PCS | Mod: ,,, | Performed by: INTERNAL MEDICINE

## 2022-04-04 PROCEDURE — 88341 IMHCHEM/IMCYTCHM EA ADD ANTB: CPT | Mod: TC,59

## 2022-04-04 PROCEDURE — 94799 UNLISTED PULMONARY SVC/PX: CPT

## 2022-04-04 PROCEDURE — 25000003 PHARM REV CODE 250: Performed by: INTERNAL MEDICINE

## 2022-04-04 PROCEDURE — 94761 N-INVAS EAR/PLS OXIMETRY MLT: CPT

## 2022-04-04 PROCEDURE — 99232 SBSQ HOSP IP/OBS MODERATE 35: CPT | Mod: ,,, | Performed by: INTERNAL MEDICINE

## 2022-04-04 PROCEDURE — 97530 THERAPEUTIC ACTIVITIES: CPT

## 2022-04-04 PROCEDURE — 97116 GAIT TRAINING THERAPY: CPT

## 2022-04-04 PROCEDURE — 85014 HEMATOCRIT: CPT | Performed by: STUDENT IN AN ORGANIZED HEALTH CARE EDUCATION/TRAINING PROGRAM

## 2022-04-04 PROCEDURE — 36415 COLL VENOUS BLD VENIPUNCTURE: CPT | Performed by: STUDENT IN AN ORGANIZED HEALTH CARE EDUCATION/TRAINING PROGRAM

## 2022-04-04 PROCEDURE — 25000242 PHARM REV CODE 250 ALT 637 W/ HCPCS: Performed by: NURSE PRACTITIONER

## 2022-04-04 PROCEDURE — 25000003 PHARM REV CODE 250: Performed by: NURSE ANESTHETIST, CERTIFIED REGISTERED

## 2022-04-04 PROCEDURE — 88305 TISSUE EXAM BY PATHOLOGIST: CPT | Mod: TC,59

## 2022-04-04 PROCEDURE — 63600175 PHARM REV CODE 636 W HCPCS: Performed by: STUDENT IN AN ORGANIZED HEALTH CARE EDUCATION/TRAINING PROGRAM

## 2022-04-04 PROCEDURE — 63600175 PHARM REV CODE 636 W HCPCS: Performed by: NURSE ANESTHETIST, CERTIFIED REGISTERED

## 2022-04-04 PROCEDURE — 21400001 HC TELEMETRY ROOM

## 2022-04-04 PROCEDURE — 99900035 HC TECH TIME PER 15 MIN (STAT)

## 2022-04-04 PROCEDURE — 94640 AIRWAY INHALATION TREATMENT: CPT

## 2022-04-04 PROCEDURE — 45378 DIAGNOSTIC COLONOSCOPY: CPT | Performed by: INTERNAL MEDICINE

## 2022-04-04 PROCEDURE — 37000009 HC ANESTHESIA EA ADD 15 MINS: Performed by: INTERNAL MEDICINE

## 2022-04-04 PROCEDURE — 44361 SMALL BOWEL ENDOSCOPY/BIOPSY: CPT | Performed by: INTERNAL MEDICINE

## 2022-04-04 PROCEDURE — 80053 COMPREHEN METABOLIC PANEL: CPT | Performed by: NURSE PRACTITIONER

## 2022-04-04 RX ORDER — PHENYLEPHRINE HYDROCHLORIDE 10 MG/ML
INJECTION INTRAVENOUS
Status: DISCONTINUED | OUTPATIENT
Start: 2022-04-04 | End: 2022-04-04

## 2022-04-04 RX ORDER — SODIUM CHLORIDE 9 MG/ML
INJECTION, SOLUTION INTRAVENOUS CONTINUOUS PRN
Status: DISCONTINUED | OUTPATIENT
Start: 2022-04-04 | End: 2022-04-04

## 2022-04-04 RX ORDER — PROPOFOL 10 MG/ML
VIAL (ML) INTRAVENOUS
Status: DISCONTINUED | OUTPATIENT
Start: 2022-04-04 | End: 2022-04-04

## 2022-04-04 RX ADMIN — PROPOFOL 40 MG: 10 INJECTION, EMULSION INTRAVENOUS at 07:04

## 2022-04-04 RX ADMIN — PROPOFOL 70 MG: 10 INJECTION, EMULSION INTRAVENOUS at 07:04

## 2022-04-04 RX ADMIN — SODIUM CHLORIDE: 0.9 INJECTION, SOLUTION INTRAVENOUS at 07:04

## 2022-04-04 RX ADMIN — PROPOFOL 30 MG: 10 INJECTION, EMULSION INTRAVENOUS at 08:04

## 2022-04-04 RX ADMIN — FLUTICASONE FUROATE AND VILANTEROL TRIFENATATE 1 PUFF: 100; 25 POWDER RESPIRATORY (INHALATION) at 07:04

## 2022-04-04 RX ADMIN — SIMETHICONE 40 MG: 20 SUSPENSION/ DROPS ORAL at 07:04

## 2022-04-04 RX ADMIN — PROPOFOL 30 MG: 10 INJECTION, EMULSION INTRAVENOUS at 07:04

## 2022-04-04 RX ADMIN — IPRATROPIUM BROMIDE AND ALBUTEROL SULFATE 3 ML: .5; 3 SOLUTION RESPIRATORY (INHALATION) at 01:04

## 2022-04-04 RX ADMIN — IPRATROPIUM BROMIDE AND ALBUTEROL SULFATE 3 ML: .5; 3 SOLUTION RESPIRATORY (INHALATION) at 07:04

## 2022-04-04 RX ADMIN — PANTOPRAZOLE SODIUM 40 MG: 40 INJECTION, POWDER, LYOPHILIZED, FOR SOLUTION INTRAVENOUS at 09:04

## 2022-04-04 RX ADMIN — OXYCODONE HYDROCHLORIDE AND ACETAMINOPHEN 1 TABLET: 10; 325 TABLET ORAL at 07:04

## 2022-04-04 RX ADMIN — PHENYLEPHRINE HYDROCHLORIDE 200 MCG: 10 INJECTION INTRAVENOUS at 08:04

## 2022-04-04 RX ADMIN — TIOTROPIUM BROMIDE INHALATION SPRAY 2 PUFF: 3.12 SPRAY, METERED RESPIRATORY (INHALATION) at 07:04

## 2022-04-04 RX ADMIN — ATORVASTATIN CALCIUM 20 MG: 20 TABLET, FILM COATED ORAL at 09:04

## 2022-04-04 RX ADMIN — PANTOPRAZOLE SODIUM 40 MG: 40 INJECTION, POWDER, LYOPHILIZED, FOR SOLUTION INTRAVENOUS at 08:04

## 2022-04-04 RX ADMIN — PHENYLEPHRINE HYDROCHLORIDE 200 MCG: 10 INJECTION INTRAVENOUS at 07:04

## 2022-04-04 RX ADMIN — OXYCODONE HYDROCHLORIDE AND ACETAMINOPHEN 1 TABLET: 10; 325 TABLET ORAL at 09:04

## 2022-04-04 RX ADMIN — NICOTINE 1 PATCH: 14 PATCH, EXTENDED RELEASE TRANSDERMAL at 09:04

## 2022-04-04 NOTE — CARE UPDATE
04/04/22 1300   Home Oxygen Qualification   $ Home O2 Qualification Pulmonary Stress Test/6 min walk;Tech time 15 minutes   Room Air SpO2 At Rest 95 %   Room Air SpO2 During Ambulation 94 %   Home O2 Eval Comments   (pt doesn't need home O2)

## 2022-04-04 NOTE — ASSESSMENT & PLAN NOTE
Patient had scope today, awaiting results  H&H holding steady, would repeat H&H  Currently holding aspirin and Plavix  Continue IV Protonix b.i.d.  Gastroenterology on board

## 2022-04-04 NOTE — CARE UPDATE
04/03/22 1926   Patient Assessment/Suction   Level of Consciousness (AVPU) alert   Respiratory Effort Normal;Unlabored   Expansion/Accessory Muscles/Retractions no use of accessory muscles   All Lung Fields Breath Sounds diminished   Rhythm/Pattern, Respiratory unlabored;pattern regular   Cough Frequency no cough   PRE-TX-O2   O2 Device (Oxygen Therapy) nasal cannula   $ Is the patient on Low Flow Oxygen? Yes   Flow (L/min) 1   SpO2 98 %   Pulse Oximetry Type Intermittent   $ Pulse Oximetry - Multiple Charge Pulse Oximetry - Multiple   Pulse 75   Resp (!) 21   Aerosol Therapy   $ Aerosol Therapy Charges Aerosol Treatment   Daily Review of Necessity (SVN) completed   Respiratory Treatment Status (SVN) given   Treatment Route (SVN) mask;oxygen   Patient Position (SVN) sitting on edge of bed   Post Treatment Assessment (SVN) breath sounds unchanged   Signs of Intolerance (SVN) none   Education   $ Education Bronchodilator;15 min   Respiratory Evaluation   $ Care Plan Tech Time 15 min   $ Eval/Re-eval Charges Re-evaluation

## 2022-04-04 NOTE — PROGRESS NOTES
[facility] Medicine  Progress Note    Patient Name: Carmen Sparrowg  MRN: [id number]  Patient Class: IP- Inpatient   Admission Date: 4/1/2022  Length of Stay: 3 days  Attending Physician: Rea Ritchie MD  Primary Care Provider: Abiodun Will MD        Subjective:     Principal Problem:Dyspnea        HPI:  Nirmal Frias NP  82-year-old female presents to emergency room with exertional dyspnea     Past medical history significant for COPD/emphysema patient is not on oxygen no other she have metered-dose inhalers, hypertension hyperlipidemia severe peripheral vascular disease, mesenteric artery stenosis status post stenting, chronic back pain, brain aneurysm and cataracts     The patient states for the past 3-4 days she has been having increased shortness of breath with dyspnea on exertion.  She denied actual chest pain lightheadedness dizziness or syncope.  She denied any black or bloody stools no hematemesis or hemoptysis.     The patient states she smokes about a pack of cigarettes a day and has been diagnosed with emphysema but was never placed on oxygen nor does she have metered-dose inhalers.  She does not follow-up pulmonologist.     The patient states she has been having chronic weight loss but states her weight have been stable lately states that she gets full easy in does not have appetite.  She describes her symptoms as moderate to severe with no alleviating or exacerbating factors     In the emergency room a CTA of the chest was performed with no PE there was no significant findings in the abdomen.  The patient was found to have a drop in her H&H from her last evaluation and she was found to have elevated BUN.  She was given a saline bolus with improvement in her heart rate she was tachycardic cardiac at 131. on arrival      Overview/Hospital Course:  4/2: Patient is doing well and currently having a echo done.  Home O2 evaluation ordered and consult to pulmonologist for  untreated emphysema.  CT scan shows occlusion stenosis of SMA and moderate to severe stenosis of left vertebral artery.  Patient states that her vascular surgeon is aware of this and he has currently put 2 stents in the past.  She said she is scheduled for repeat ultrasound and follow-up within a month.  Anemia noted on blood work, iron studies within normal limit. Family present at bedside.No concerns/issues overnight reported by the patient or the nursing staff.     4/3:  Patient states that she feels tired today, significant drop in H&H from 7.8/20 4.7 to 5.3/17.1.  Repeat H&H confirm low levels, would transfuse 2 PRBC.  Occult studies positive, will consult Gastroenterology.  Iron studies, folate and vitamin B12 within normal limit.  Home O2 evaluation done yesterday showed that patient requires home oxygen which will be ordered.  Hold DAPT  pending colonoscopy tomorrow.      Interval History:   4/4:  Patient looks and feels significantly better today.  She is satting normal on room air, would repeat home O2 evaluation.  Patient was scoped earlier today and daughter tells me in ulcer was seen.  Still pending scope details recommendations.  Patient currently on IV Protonix.  H&H steady at 9.2/27.1 following transfusion of 2 PRBC yesterday.  However it dropped from 10.7-9.2 prior to scope being done.  Repeat H&H H later in the afternoon to ensure no further drops.      Review of Systems   Gastrointestinal:  Positive for blood in stool.   All other systems reviewed and are negative.  Objective:     Vital Signs (Most Recent):  Temp: 98.6 °F (37 °C) (04/04/22 1100)  Pulse: 106 (04/04/22 1100)  Resp: 20 (04/04/22 1100)  BP: (!) 110/56 (04/04/22 1100)  SpO2: 96 % (04/04/22 1100)   Vital Signs (24h Range):  Temp:  [97.9 °F (36.6 °C)-99.1 °F (37.3 °C)] 98.6 °F (37 °C)  Pulse:  [] 106  Resp:  [14-21] 20  SpO2:  [96 %-100 %] 96 %  BP: (110-179)/(56-84) 110/56     Weight: 35.5 kg (78 lb 4.2 oz)  Body mass index is  14.31 kg/m².    Intake/Output Summary (Last 24 hours) at 4/4/2022 1303  Last data filed at 4/4/2022 0820  Gross per 24 hour   Intake 3280 ml   Output 1350 ml   Net 1930 ml      Physical Exam  Constitutional:       Appearance: Normal appearance.   HENT:      Head: Normocephalic.   Eyes:      Pupils: Pupils are equal, round, and reactive to light.   Cardiovascular:      Rate and Rhythm: Normal rate and regular rhythm.      Pulses: Normal pulses.   Pulmonary:      Effort: Pulmonary effort is normal.      Breath sounds: Normal breath sounds.   Abdominal:      Palpations: Abdomen is soft.   Musculoskeletal:         General: Normal range of motion.      Cervical back: Normal range of motion.   Neurological:      General: No focal deficit present.      Mental Status: She is alert. Mental status is at baseline.   Psychiatric:         Mood and Affect: Mood normal.       Significant Labs: All pertinent labs within the past 24 hours have been reviewed.  CBC:   Recent Labs   Lab 04/03/22  0707 04/03/22  0928 04/03/22  1818 04/04/22  0512   WBC 9.30  --   --  8.41   HGB 5.3* 5.8* 10.7* 9.2*   HCT 17.1* 18.2* 31.4* 27.1*     --   --  186     CMP:   Recent Labs   Lab 04/03/22  0707 04/04/22  0512    141   K 4.1 3.9    107   CO2 26 26   GLU 95 94   BUN 36* 18   CREATININE 0.5 0.5   CALCIUM 8.8 8.7   PROT 5.1* 5.3*   ALBUMIN 2.8* 3.0*   BILITOT 0.5 0.7   ALKPHOS 47* 51*   AST 19 23   ALT 15 17   ANIONGAP 9 8   EGFRNONAA >60.0 >60.0     Coagulation: No results for input(s): PT, INR, APTT in the last 48 hours.  Magnesium:   Recent Labs   Lab 04/03/22  0707   MG 1.6       Significant Imaging: I have reviewed all pertinent imaging results/findings within the past 24 hours.      Assessment/Plan:      GI bleed  Patient had scope today, awaiting results  H&H holding steady, would repeat H&H  Currently holding aspirin and Plavix  Continue IV Protonix b.i.d.  Gastroenterology on board      Emphysema lung  Pulmonology on  board  Continue breathing treatment  Repeat home O2 evaluation  Continue inhalers      Mesenteric artery stenosis  Vascular surgeon aware as patient states she is being worked up outpatient        VTE Risk Mitigation (From admission, onward)         Ordered     IP VTE LOW RISK PATIENT  Once         04/01/22 2344     Place sequential compression device  Until discontinued         04/01/22 2344                Discharge Planning   RAY: 4/5/2022     Code Status: Full Code   Is the patient medically ready for discharge?: No    Reason for patient still in hospital (select all that apply): Consult recommendations  Discharge Plan A: Home                  Rea Ritchie MD  Department of Hospital Medicine   [facility]

## 2022-04-04 NOTE — SUBJECTIVE & OBJECTIVE
Interval History:   4/4:  Patient looks and feels significantly better today.  She is satting normal on room air, would repeat home O2 evaluation.  Patient was scoped earlier today and daughter tells me in ulcer was seen.  Still pending scope details recommendations.  Patient currently on IV Protonix.  H&H steady at 9.2/27.1 following transfusion of 2 PRBC yesterday.  However it dropped from 10.7-9.2 prior to scope being done.  Repeat H&H H later in the afternoon to ensure no further drops.      Review of Systems   Gastrointestinal:  Positive for blood in stool.   All other systems reviewed and are negative.  Objective:     Vital Signs (Most Recent):  Temp: 98.6 °F (37 °C) (04/04/22 1100)  Pulse: 106 (04/04/22 1100)  Resp: 20 (04/04/22 1100)  BP: (!) 110/56 (04/04/22 1100)  SpO2: 96 % (04/04/22 1100)   Vital Signs (24h Range):  Temp:  [97.9 °F (36.6 °C)-99.1 °F (37.3 °C)] 98.6 °F (37 °C)  Pulse:  [] 106  Resp:  [14-21] 20  SpO2:  [96 %-100 %] 96 %  BP: (110-179)/(56-84) 110/56     Weight: 35.5 kg (78 lb 4.2 oz)  Body mass index is 14.31 kg/m².    Intake/Output Summary (Last 24 hours) at 4/4/2022 1303  Last data filed at 4/4/2022 0820  Gross per 24 hour   Intake 3280 ml   Output 1350 ml   Net 1930 ml      Physical Exam  Constitutional:       Appearance: Normal appearance.   HENT:      Head: Normocephalic.   Eyes:      Pupils: Pupils are equal, round, and reactive to light.   Cardiovascular:      Rate and Rhythm: Normal rate and regular rhythm.      Pulses: Normal pulses.   Pulmonary:      Effort: Pulmonary effort is normal.      Breath sounds: Normal breath sounds.   Abdominal:      Palpations: Abdomen is soft.   Musculoskeletal:         General: Normal range of motion.      Cervical back: Normal range of motion.   Neurological:      General: No focal deficit present.      Mental Status: She is alert. Mental status is at baseline.   Psychiatric:         Mood and Affect: Mood normal.       Significant Labs: All  pertinent labs within the past 24 hours have been reviewed.  CBC:   Recent Labs   Lab 04/03/22  0707 04/03/22  0928 04/03/22  1818 04/04/22  0512   WBC 9.30  --   --  8.41   HGB 5.3* 5.8* 10.7* 9.2*   HCT 17.1* 18.2* 31.4* 27.1*     --   --  186     CMP:   Recent Labs   Lab 04/03/22  0707 04/04/22  0512    141   K 4.1 3.9    107   CO2 26 26   GLU 95 94   BUN 36* 18   CREATININE 0.5 0.5   CALCIUM 8.8 8.7   PROT 5.1* 5.3*   ALBUMIN 2.8* 3.0*   BILITOT 0.5 0.7   ALKPHOS 47* 51*   AST 19 23   ALT 15 17   ANIONGAP 9 8   EGFRNONAA >60.0 >60.0     Coagulation: No results for input(s): PT, INR, APTT in the last 48 hours.  Magnesium:   Recent Labs   Lab 04/03/22  0707   MG 1.6       Significant Imaging: I have reviewed all pertinent imaging results/findings within the past 24 hours.

## 2022-04-04 NOTE — HPI
Nirmal Frias NP  82-year-old female presents to emergency room with exertional dyspnea     Past medical history significant for COPD/emphysema patient is not on oxygen no other she have metered-dose inhalers, hypertension hyperlipidemia severe peripheral vascular disease, mesenteric artery stenosis status post stenting, chronic back pain, brain aneurysm and cataracts     The patient states for the past 3-4 days she has been having increased shortness of breath with dyspnea on exertion.  She denied actual chest pain lightheadedness dizziness or syncope.  She denied any black or bloody stools no hematemesis or hemoptysis.     The patient states she smokes about a pack of cigarettes a day and has been diagnosed with emphysema but was never placed on oxygen nor does she have metered-dose inhalers.  She does not follow-up pulmonologist.     The patient states she has been having chronic weight loss but states her weight have been stable lately states that she gets full easy in does not have appetite.  She describes her symptoms as moderate to severe with no alleviating or exacerbating factors     In the emergency room a CTA of the chest was performed with no PE there was no significant findings in the abdomen.  The patient was found to have a drop in her H&H from her last evaluation and she was found to have elevated BUN.  She was given a saline bolus with improvement in her heart rate she was tachycardic cardiac at 131. on arrival

## 2022-04-04 NOTE — PROGRESS NOTES
Pulmonary/Critical Care Consult      PATIENT NAME: Carmen Wilkinson  MRN: [id number]  TODAY'S DATE: 2022  12:10 PM  ADMIT DATE: 2022  AGE: 82 y.o. : 1940    CONSULT REQUESTED BY: Rea Ritchie MD    REASON FOR CONSULT:   COPD exacerbation    HPI:  Patient is an 82-year-old smoker who presents with a COPD exacerbation.  She smokes a pack a day.  She uses no bronchodilators.  She has pulmonary cachexia.  She has been feeling more short of breath for the last few weeks.    4/3 the patient is receiving a unit of blood.  She states she got very short of breath when she got up to move around.  She continues with good breath sounds and no wheezing.     the patient is feeling back to herself.  She is no longer dyspneic with exertion.  She had an ulcer found in her stomach.  She has had problems with H pylori in the past.    REVIEW OF SYSTEMS  GENERAL: Feeling back to her usual self.  EYES: Vision is good.  ENT: No sinusitis or pharyngitis.   HEART: No chest pain or palpitations.  LUNGS:   She coughs and produces mucus mostly in the morning.  GI:  She has early satiety.  : No dysuria, hesitancy, or nocturia.  SKIN: No lesions or rashes.  MUSCULOSKELETAL: No joint pain or myalgias.  NEURO: No headaches or neuropathy.  LYMPH: No edema or adenopathy.  PSYCH: No anxiety or depression.  ENDO:  She cannot gain weight.    No change in the patient's Past Medical History, Past Surgical History, Social History or Family History since admission.    VITAL SIGNS (MOST RECENT)  Temp: 98.6 °F (37 °C) (22 1100)  Pulse: 106 (22 1100)  Resp: 20 (22 1100)  BP: (!) 110/56 (22 1100)  SpO2: 96 % (22 1100)    INTAKE AND OUTPUT (LAST 24 HOURS):    Intake/Output Summary (Last 24 hours) at 2022 1344  Last data filed at 2022 0820  Gross per 24 hour   Intake 3280 ml   Output 1350 ml   Net 1930 ml       WEIGHT  Wt Readings from Last 1 Encounters:   04/04/22 35.5 kg (78 lb 4.2 oz)        PHYSICAL EXAM  GENERAL: Older, exceedingly thin, patient in no distress.  HEENT: Pupils equal and reactive. Extraocular movements intact. Nose intact. Pharynx moist.  NECK: Supple.   HEART: Regular rate and rhythm. No murmur or gallop auscultated.  LUNGS: Clear to auscultation and percussion. Lung excursion symmetrical. No change in fremitus. No adventitial noises.  ABDOMEN: Bowel sounds present. Non-tender, no masses palpated.  : Normal anatomy.  EXTREMITIES: Normal muscle tone and joint movement, no cyanosis or clubbing.   LYMPHATICS: No adenopathy palpated, no edema.  SKIN: Dry, intact, no lesions.   NEURO: Cranial nerves II-XII intact. Motor strength 5/5 bilaterally, upper and lower extremities.  PSYCH: Appropriate affect    CBC LAST (LAST 24 HOURS)  Recent Labs   Lab 04/04/22  0512   WBC 8.41   RBC 3.03*   HGB 9.2*   HCT 27.1*   MCV 89   MCH 30.4   MCHC 33.9   RDW 14.6*      MPV 8.8*   GRAN 74.9*  6.3   LYMPH 14.6*  1.2   MONO 8.4  0.7   BASO 0.04   NRBC 0   October 2020, hemoglobin was 12.5    CHEMISTRY LAST (LAST 24 HOURS)  Recent Labs   Lab 04/04/22  0512      K 3.9      CO2 26   ANIONGAP 8   BUN 18   CREATININE 0.5   GLU 94   CALCIUM 8.7   ALBUMIN 3.0*   PROT 5.3*   ALKPHOS 51*   ALT 17   AST 23   BILITOT 0.7       CARDIAC PROFILE (LAST 24 HOURS)  Recent Labs   Lab 04/01/22  1846 04/02/22  0232 04/02/22  0412   BNP 44  --   --    TROPONINI <0.030   < > 0.038    < > = values in this interval not displayed.       LAST 7 DAYS MICROBIOLOGY   Microbiology Results (last 7 days)     ** No results found for the last 168 hours. **          MOST RECENT IMAGING  Echo Saline Bubble? No  · The left ventricle is small with concentric remodeling and hyperdynamic   systolic function.  · The estimated ejection fraction is 75%.  · Indeterminate left ventricular diastolic function.  · Mild right ventricular enlargement with normal right ventricular   systolic function.  · Mild to moderate  tricuspid regurgitation.  · Mild-to-moderate aortic regurgitation.         CURRENT VISIT EKG  Results for orders placed or performed during the hospital encounter of 04/01/22   EKG 12-lead    Narrative    Test Reason : R06.02,    Vent. Rate : 131 BPM     Atrial Rate : 131 BPM     P-R Int : 152 ms          QRS Dur : 064 ms      QT Int : 298 ms       P-R-T Axes : 084 066 077 degrees     QTc Int : 440 ms    Sinus tachycardia with Premature supraventricular complexes  Nonspecific ST abnormality  Abnormal ECG  When compared with ECG of 29-JUN-2021 12:47,  Premature supraventricular complexes are now Present  Criteria for Septal infarct are no longer Present  T wave inversion no longer evident in Anterior leads    Referred By: [email or web address]   SELF           Confirmed By:        IMPRESSION AND PLAN  COPD with significant emphysema  Significant anemia, status post transfusion  Gastric ulcer  Tobacco abuse  Mild hypoalbuminemia  Pulmonary cachexia    No objection to discharge when hemoglobin stabilizes  Would discharge on simply Spiriva.  We can follow her up in the office in obtain PFTs and see if she truly needs a bronchodilator.  She has significant emphysema but no obvious reactive airways.  Tobacco cessation encouraged    Whitney Molina MD  [facility]  Department of Pulmonology  Date of Service: 04/04/2022  12:10 PM

## 2022-04-04 NOTE — TRANSFER OF CARE
"Anesthesia Transfer of Care Note    Patient: Carmen Wilkinson    Procedure(s) Performed: Procedure(s) (LRB):  COLONOSCOPY (N/A)  ENTEROSCOPY, PROXIMAL (N/A)    Patient location: GI    Anesthesia Type: MAC    Transport from OR: Transported from OR on 2-3 L/min O2 by NC with adequate spontaneous ventilation    Post pain: adequate analgesia    Post assessment: no apparent anesthetic complications    Post vital signs: stable    Level of consciousness: awake and alert    Nausea/Vomiting: no nausea/vomiting    Complications: none    Transfer of care protocol was followed      Last vitals:   Visit Vitals  /65 (BP Location: Right arm, Patient Position: Sitting)   Pulse 105   Temp 36.9 °C (98.4 °F) (Oral)   Resp 15   Ht 5' 2" (1.575 m)   Wt 35.5 kg (78 lb 4.2 oz)   LMP  (LMP Unknown)   SpO2 99%   Breastfeeding No   BMI 14.31 kg/m²     "

## 2022-04-04 NOTE — HOSPITAL COURSE
4/2: Patient is doing well and currently having a echo done.  Home O2 evaluation ordered and consult to pulmonologist for untreated emphysema.  CT scan shows occlusion stenosis of SMA and moderate to severe stenosis of left vertebral artery.  Patient states that her vascular surgeon is aware of this and he has currently put 2 stents in the past.  She said she is scheduled for repeat ultrasound and follow-up within a month.  Anemia noted on blood work, iron studies within normal limit. Family present at bedside.No concerns/issues overnight reported by the patient or the nursing staff.     4/3:  Patient states that she feels tired today, significant drop in H&H from 7.8/20 4.7 to 5.3/17.1.  Repeat H&H confirm low levels, would transfuse 2 PRBC.  Occult studies positive, will consult Gastroenterology.  Iron studies, folate and vitamin B12 within normal limit.  Home O2 evaluation done yesterday showed that patient requires home oxygen which will be ordered.  Hold DAPT  pending colonoscopy tomorrow.    4/5:  Feeling well today.  EGD with created ulcer in the fundus yesterday.  Hemoglobin slightly lower today.  No further signs of melena or hematochezia.  Continuing Protonix GTT for at least 24 hours.  Will follow up biopsy results.    4/6:  Patient was stable hemoglobin today.  He will transition off of PPI GTT and will start back on aspirin and Plavix after discussion with Dr. Devi.  She is to be discharged on Protonix with close follow-up with GI team as an outpatient.  She will need a repeat EGD to ensure healing of her ulceration.   Patient  is aware

## 2022-04-04 NOTE — ANESTHESIA POSTPROCEDURE EVALUATION
Anesthesia Post Evaluation    Patient: Carmen Fletcher Pflug    Procedure(s) Performed: Procedure(s) (LRB):  COLONOSCOPY (N/A)  ENTEROSCOPY, PROXIMAL (N/A)    Final Anesthesia Type: MAC      Patient location: Holding Area.  Patient participation: Yes- Able to Participate  Level of consciousness: awake and alert  Post-procedure vital signs: reviewed and stable  Pain management: adequate  Airway patency: patent    PONV status at discharge: No PONV  Anesthetic complications: no      Cardiovascular status: blood pressure returned to baseline and stable  Respiratory status: unassisted and room air  Hydration status: euvolemic  Follow-up not needed.          Vitals Value Taken Time   /54 04/04/22 0841   Temp 37C 04/04/22 0841   Pulse 96 04/04/22 0841   Resp 17 04/04/22 0841   SpO2 100% 04/04/22 0841         Event Time   Out of Recovery 04/04/2022 08:37:25         Pain/Wili Score: Pain Rating Prior to Med Admin: 7 (back) (4/3/2022 10:11 PM)  Pain Rating Post Med Admin: 0 (4/3/2022 11:11 PM)  Wili Score: 10 (4/4/2022  8:24 AM)

## 2022-04-04 NOTE — PROVATION PATIENT INSTRUCTIONS
Discharge Summary/Instructions after an Endoscopic Procedure  Patient Name: Carmen Wilkinson  Patient MRN: [id number]  Patient YOB: 1940 Monday, April 4, 2022  Lino Devi MD  RESTRICTIONS:  During your procedure today, you received medications for sedation.  These   medications may affect your judgment, balance and coordination.  Therefore,   for 24 hours, you have the following restrictions:   - DO NOT drive a car, operate machinery, make legal/financial decisions,   sign important papers or drink alcohol.    ACTIVITY:  Today: no heavy lifting, straining or running due to procedural   sedation/anesthesia.  The following day: return to full activity including work.  DIET:  Eat and drink normally unless instructed otherwise.     TREATMENT FOR COMMON SIDE EFFECTS:  - Mild abdominal pain, nausea, belching, bloating or excessive gas:  rest,   eat lightly and use a heating pad.  - Sore Throat: treat with throat lozenges and/or gargle with warm salt   water.  - Because air was used during the procedure, expelling large amounts of air   from your rectum or belching is normal.  - If a bowel prep was taken, you may not have a bowel movement for 1-3 days.    This is normal.  SYMPTOMS TO WATCH FOR AND REPORT TO YOUR PHYSICIAN:  1. Abdominal pain or bloating, other than gas cramps.  2. Chest pain.  3. Back pain.  4. Signs of infection such as: chills or fever occurring within 24 hours   after the procedure.  5. Rectal bleeding, which would show as bright red, maroon, or black stools.   (A tablespoon of blood from the rectum is not serious, especially if   hemorrhoids are present.)  6. Vomiting.  7. Weakness or dizziness.  GO DIRECTLY TO THE NEAREST EMERGENCY ROOM IF YOU HAVE ANY OF THE FOLLOWING:      Difficulty breathing              Chills and/or fever over 101 F   Persistent vomiting and/or vomiting blood   Severe abdominal pain   Severe chest pain   Black, tarry stools   Bleeding- more than one tablespoon   Any  other symptom or condition that you feel may need urgent attention  Your doctor recommends these additional instructions:  If any biopsies were taken, your doctors clinic will contact you in 1 to 2   weeks with any results.  - Patient has a contact number available for emergencies.  The signs and   symptoms of potential delayed complications were discussed with the   patient.  Return to normal activities tomorrow.  Written discharge   instructions were provided to the patient.   - See EGD note  - Resume previous diet.   - Continue present medications.   - Return [facility] to [facility] for [facility] care.   - Repeat colonoscopy is not recommended for surveillance.  For questions, problems or results please call your physician - Lino Devi MD at Work:  [phone].  [facility], EMERGENCY ROOM PHONE NUMBER: [phone]  IF A COMPLICATION OR EMERGENCY SITUATION ARISES AND YOU ARE UNABLE TO REACH   YOUR PHYSICIAN - GO DIRECTLY TO THE EMERGENCY ROOM.  MD Lino Allen MD  4/4/2022 4:10:21 PM  This report has been verified and signed electronically.  Dear patient,  As a result of recent federal legislation (The Federal Cures Act), you may   receive lab or pathology results from your procedure in your [facility]   account before your physician is able to contact you. Your physician or   their representative will relay the results to you with their   recommendations at their soonest availability.  Thank you,  PROVATION

## 2022-04-04 NOTE — PLAN OF CARE
Important Message from Medicare was sign, explained and given to patient/caregiver on 04/04/2022 at 2:25pm     addressed any questions or concerns.    Important Message from Medicare document will be scanned into patient's medical record

## 2022-04-04 NOTE — NURSING
Patient transferred off unit to AM procedure. Patient was prepped for colonoscopy per night shift and remained NPO. No c/o pain or discomfort at this time.

## 2022-04-04 NOTE — PT/OT/SLP PROGRESS
Physical Therapy Treatment    Patient Name:  Carmen Wilkinson   MRN:  [id number]    Recommendations:     Discharge Recommendations:  home   Discharge Equipment Recommendations: shower chair   Barriers to discharge: None    Assessment:     Carmen Wilkinson is a 82 y.o. female admitted with a medical diagnosis of Dyspnea.  She presents with the following impairments/functional limitations:  weakness, impaired endurance, impaired functional mobilty, gait instability, impaired balance, impaired cardiopulmonary response to activity Pt found supine in bed in NAD with daughter at bedside and agreeable to working with PT. Pt A & O x  3.  Pt tolerated session fair with c/o SOBOE after gait x40' without AD and required CGA for safety and balance for safe mobilization during session today. Pt would benefit from acute PT during hospitalization to increase strength, endurance and safety with mobility and would benefit from gait training, therex, and therapeutic activity prior to discharge home.  .    Rehab Prognosis: Good; patient would benefit from acute skilled PT services to address these deficits and reach maximum level of function.    Recent Surgery: Procedure(s) (LRB):  COLONOSCOPY (N/A)  ENTEROSCOPY, PROXIMAL (N/A) Day of Surgery    Plan:     During this hospitalization, patient to be seen 5 x/week to address the identified rehab impairments via gait training, therapeutic activities, therapeutic exercises and progress toward the following goals:    · Plan of Care Expires:  05/07/22    Subjective     Chief Complaint: tired  Patient/Family Comments/goals: go home and restore PLOF  Pain/Comfort:  · Pain Rating 1: 0/10      Objective:     Communicated with RN prior to session.  Patient found supine with peripheral IV, telemetry upon PT entry to room.     General Precautions: Standard, fall   Orthopedic Precautions:    Braces:    Respiratory Status: Room air     Functional Mobility:  · Bed Mobility:     · Supine to  Sit: supervision  · Transfers:     · Sit to Stand:  supervision with no AD  · Gait: 2x40' without AD CGA for safety and reaches for railing along wall for stability.        AM-PAC 6 CLICK MOBILITY          Therapeutic Activities and Exercises:   Pt was educated on the following: call light use, importance of OOB activity and functional mobility to negate the negative effects of prolonged bed rest during this hospitalization, safe transfers/ambulation and discharge planning recommendations/options.      Patient left sitting edge of bed with all lines intact, call button in reach, RN notified and daughter present..    GOALS:   Multidisciplinary Problems     Physical Therapy Goals        Problem: Physical Therapy    Goal Priority Disciplines Outcome Goal Variances Interventions   Physical Therapy Goal     PT, PT/OT      Description: Goals to be met by: 22    Patient will increase functional independence with mobility by performin. Sit to stand transfer with Modified [place]  2. Bed to chair transfer with Modified [place] using Rolling Walker  3. Gait  x 150 feet with Modified [place] using Rolling Walker.                      Time Tracking:     PT Received On: 22  PT Start Time: 1359     PT Stop Time: 1422  PT Total Time (min): 23 min     Billable Minutes: Gait Training 13 and Therapeutic Activity 10    Treatment Type: Treatment  PT/PTA: PT     PTA Visit Number: 0     2022

## 2022-04-04 NOTE — PROVATION PATIENT INSTRUCTIONS
Discharge Summary/Instructions after an Endoscopic Procedure  Patient Name: Carmen Wilkinson  Patient MRN: [id number]  Patient YOB: 1940 Monday, April 4, 2022  Lino Devi MD  RESTRICTIONS:  During your procedure today, you received medications for sedation.  These   medications may affect your judgment, balance and coordination.  Therefore,   for 24 hours, you have the following restrictions:   - DO NOT drive a car, operate machinery, make legal/financial decisions,   sign important papers or drink alcohol.    ACTIVITY:  Today: no heavy lifting, straining or running due to procedural   sedation/anesthesia.  The following day: return to full activity including work.  DIET:  Eat and drink normally unless instructed otherwise.     TREATMENT FOR COMMON SIDE EFFECTS:  - Mild abdominal pain, nausea, belching, bloating or excessive gas:  rest,   eat lightly and use a heating pad.  - Sore Throat: treat with throat lozenges and/or gargle with warm salt   water.  - Because air was used during the procedure, expelling large amounts of air   from your rectum or belching is normal.  - If a bowel prep was taken, you may not have a bowel movement for 1-3 days.    This is normal.  SYMPTOMS TO WATCH FOR AND REPORT TO YOUR PHYSICIAN:  1. Abdominal pain or bloating, other than gas cramps.  2. Chest pain.  3. Back pain.  4. Signs of infection such as: chills or fever occurring within 24 hours   after the procedure.  5. Rectal bleeding, which would show as bright red, maroon, or black stools.   (A tablespoon of blood from the rectum is not serious, especially if   hemorrhoids are present.)  6. Vomiting.  7. Weakness or dizziness.  GO DIRECTLY TO THE NEAREST EMERGENCY ROOM IF YOU HAVE ANY OF THE FOLLOWING:      Difficulty breathing              Chills and/or fever over 101 F   Persistent vomiting and/or vomiting blood   Severe abdominal pain   Severe chest pain   Black, tarry stools   Bleeding- more than one tablespoon   Any  other symptom or condition that you feel may need urgent attention  Your doctor recommends these additional instructions:  If any biopsies were taken, your doctors clinic will contact you in 1 to 2   weeks with any results.  - Return patient to [facility] for ongoing care.  For questions, problems or results please call your physician - Lino Devi MD at Work:  [phone].  [facility], EMERGENCY ROOM PHONE NUMBER: [phone]  IF A COMPLICATION OR EMERGENCY SITUATION ARISES AND YOU ARE UNABLE TO REACH   YOUR PHYSICIAN - GO DIRECTLY TO THE EMERGENCY ROOM.  MD Lino Allen MD  4/4/2022 4:13:25 PM  This report has been verified and signed electronically.  Dear patient,  As a result of recent federal legislation (The Federal Cures Act), you may   receive lab or pathology results from your procedure in your [facility]   account before your physician is able to contact you. Your physician or   their representative will relay the results to you with their   recommendations at their soonest availability.  Thank you,  PROVATION

## 2022-04-04 NOTE — PLAN OF CARE
Update 0855: informed by Lilia that they will be unable to fill order due to patient's high oxygen demand.  Referral sent to [facility] via [employer] for vendor assignment.     Home oxygen order noted.  Request to pull from E room sent to Lilia with [facility] via Secure Chat.        04/04/22 0850   Post-Acute Status   [facility] Sent

## 2022-04-04 NOTE — ASSESSMENT & PLAN NOTE
Pulmonology on board  Continue breathing treatment  Repeat home O2 evaluation  Continue inhalers

## 2022-04-05 LAB
ALBUMIN SERPL BCP-MCNC: 2.7 G/DL (ref 3.5–5.2)
ALP SERPL-CCNC: 51 U/L (ref 55–135)
ALT SERPL W/O P-5'-P-CCNC: 17 U/L (ref 10–44)
ANION GAP SERPL CALC-SCNC: 7 MMOL/L (ref 8–16)
AST SERPL-CCNC: 22 U/L (ref 10–40)
BASOPHILS # BLD AUTO: 0.03 K/UL (ref 0–0.2)
BASOPHILS NFR BLD: 0.4 % (ref 0–1.9)
BILIRUB SERPL-MCNC: 0.8 MG/DL (ref 0.1–1)
BUN SERPL-MCNC: 19 MG/DL (ref 8–23)
CALCIUM SERPL-MCNC: 8.4 MG/DL (ref 8.7–10.5)
CHLORIDE SERPL-SCNC: 105 MMOL/L (ref 95–110)
CO2 SERPL-SCNC: 26 MMOL/L (ref 23–29)
CREAT SERPL-MCNC: 0.5 MG/DL (ref 0.5–1.4)
DIFFERENTIAL METHOD: ABNORMAL
EOSINOPHIL # BLD AUTO: 0.1 K/UL (ref 0–0.5)
EOSINOPHIL NFR BLD: 1.4 % (ref 0–8)
ERYTHROCYTE [DISTWIDTH] IN BLOOD BY AUTOMATED COUNT: 14.6 % (ref 11.5–14.5)
EST. GFR  (AFRICAN AMERICAN): >60 ML/MIN/1.73 M^2
EST. GFR  (NON AFRICAN AMERICAN): >60 ML/MIN/1.73 M^2
GLUCOSE SERPL-MCNC: 87 MG/DL (ref 70–110)
HCT VFR BLD AUTO: 26.7 % (ref 37–48.5)
HGB BLD-MCNC: 8.7 G/DL (ref 12–16)
IMM GRANULOCYTES # BLD AUTO: 0.03 K/UL (ref 0–0.04)
IMM GRANULOCYTES NFR BLD AUTO: 0.4 % (ref 0–0.5)
LYMPHOCYTES # BLD AUTO: 1.2 K/UL (ref 1–4.8)
LYMPHOCYTES NFR BLD: 14.7 % (ref 18–48)
MAGNESIUM SERPL-MCNC: 1.5 MG/DL (ref 1.6–2.6)
MCH RBC QN AUTO: 30.6 PG (ref 27–31)
MCHC RBC AUTO-ENTMCNC: 32.6 G/DL (ref 32–36)
MCV RBC AUTO: 94 FL (ref 82–98)
MONOCYTES # BLD AUTO: 0.8 K/UL (ref 0.3–1)
MONOCYTES NFR BLD: 9.5 % (ref 4–15)
NEUTROPHILS # BLD AUTO: 6 K/UL (ref 1.8–7.7)
NEUTROPHILS NFR BLD: 73.6 % (ref 38–73)
NRBC BLD-RTO: 0 /100 WBC
PLATELET # BLD AUTO: 194 K/UL (ref 150–450)
PMV BLD AUTO: 9 FL (ref 9.2–12.9)
POTASSIUM SERPL-SCNC: 3.8 MMOL/L (ref 3.5–5.1)
PROT SERPL-MCNC: 5.1 G/DL (ref 6–8.4)
RBC # BLD AUTO: 2.84 M/UL (ref 4–5.4)
SODIUM SERPL-SCNC: 138 MMOL/L (ref 136–145)
WBC # BLD AUTO: 8.09 K/UL (ref 3.9–12.7)

## 2022-04-05 PROCEDURE — 25000003 PHARM REV CODE 250: Performed by: STUDENT IN AN ORGANIZED HEALTH CARE EDUCATION/TRAINING PROGRAM

## 2022-04-05 PROCEDURE — 21400001 HC TELEMETRY ROOM

## 2022-04-05 PROCEDURE — 94640 AIRWAY INHALATION TREATMENT: CPT

## 2022-04-05 PROCEDURE — 85025 COMPLETE CBC W/AUTO DIFF WBC: CPT | Performed by: NURSE PRACTITIONER

## 2022-04-05 PROCEDURE — S4991 NICOTINE PATCH NONLEGEND: HCPCS | Performed by: INTERNAL MEDICINE

## 2022-04-05 PROCEDURE — 36415 COLL VENOUS BLD VENIPUNCTURE: CPT | Performed by: NURSE PRACTITIONER

## 2022-04-05 PROCEDURE — C9113 INJ PANTOPRAZOLE SODIUM, VIA: HCPCS | Performed by: STUDENT IN AN ORGANIZED HEALTH CARE EDUCATION/TRAINING PROGRAM

## 2022-04-05 PROCEDURE — 63600175 PHARM REV CODE 636 W HCPCS: Performed by: STUDENT IN AN ORGANIZED HEALTH CARE EDUCATION/TRAINING PROGRAM

## 2022-04-05 PROCEDURE — 99231 SBSQ HOSP IP/OBS SF/LOW 25: CPT | Mod: ,,, | Performed by: INTERNAL MEDICINE

## 2022-04-05 PROCEDURE — 25000003 PHARM REV CODE 250: Performed by: NURSE PRACTITIONER

## 2022-04-05 PROCEDURE — 25000003 PHARM REV CODE 250: Performed by: INTERNAL MEDICINE

## 2022-04-05 PROCEDURE — 99231 PR SUBSEQUENT HOSPITAL CARE,LEVL I: ICD-10-PCS | Mod: ,,, | Performed by: INTERNAL MEDICINE

## 2022-04-05 PROCEDURE — 83735 ASSAY OF MAGNESIUM: CPT | Performed by: STUDENT IN AN ORGANIZED HEALTH CARE EDUCATION/TRAINING PROGRAM

## 2022-04-05 PROCEDURE — 99900035 HC TECH TIME PER 15 MIN (STAT)

## 2022-04-05 PROCEDURE — 94761 N-INVAS EAR/PLS OXIMETRY MLT: CPT

## 2022-04-05 PROCEDURE — 80053 COMPREHEN METABOLIC PANEL: CPT | Performed by: NURSE PRACTITIONER

## 2022-04-05 PROCEDURE — 99900031 HC PATIENT EDUCATION (STAT)

## 2022-04-05 RX ORDER — MAGNESIUM SULFATE HEPTAHYDRATE 40 MG/ML
2 INJECTION, SOLUTION INTRAVENOUS ONCE
Status: COMPLETED | OUTPATIENT
Start: 2022-04-05 | End: 2022-04-05

## 2022-04-05 RX ADMIN — PANTOPRAZOLE SODIUM 8 MG/HR: 40 INJECTION, POWDER, LYOPHILIZED, FOR SOLUTION INTRAVENOUS at 10:04

## 2022-04-05 RX ADMIN — NICOTINE 1 PATCH: 14 PATCH, EXTENDED RELEASE TRANSDERMAL at 08:04

## 2022-04-05 RX ADMIN — OXYCODONE HYDROCHLORIDE AND ACETAMINOPHEN 1 TABLET: 10; 325 TABLET ORAL at 08:04

## 2022-04-05 RX ADMIN — FLUTICASONE FUROATE AND VILANTEROL TRIFENATATE 1 PUFF: 100; 25 POWDER RESPIRATORY (INHALATION) at 08:04

## 2022-04-05 RX ADMIN — PANTOPRAZOLE SODIUM 8 MG/HR: 40 INJECTION, POWDER, LYOPHILIZED, FOR SOLUTION INTRAVENOUS at 12:04

## 2022-04-05 RX ADMIN — TIOTROPIUM BROMIDE INHALATION SPRAY 2 PUFF: 3.12 SPRAY, METERED RESPIRATORY (INHALATION) at 08:04

## 2022-04-05 RX ADMIN — ATORVASTATIN CALCIUM 20 MG: 20 TABLET, FILM COATED ORAL at 08:04

## 2022-04-05 RX ADMIN — PANTOPRAZOLE SODIUM 40 MG: 40 INJECTION, POWDER, LYOPHILIZED, FOR SOLUTION INTRAVENOUS at 08:04

## 2022-04-05 RX ADMIN — MAGNESIUM SULFATE IN WATER 2 G: 40 INJECTION, SOLUTION INTRAVENOUS at 09:04

## 2022-04-05 RX ADMIN — SODIUM CHLORIDE: 0.9 INJECTION, SOLUTION INTRAVENOUS at 12:04

## 2022-04-05 RX ADMIN — OXYCODONE HYDROCHLORIDE AND ACETAMINOPHEN 1 TABLET: 10; 325 TABLET ORAL at 06:04

## 2022-04-05 NOTE — PLAN OF CARE
04/04/22 2110   Patient Assessment/Suction   Level of Consciousness (AVPU) alert   Respiratory Effort Normal;Unlabored   Expansion/Accessory Muscles/Retractions no use of accessory muscles   All Lung Fields Breath Sounds Anterior:;diminished   Rhythm/Pattern, Respiratory unlabored   PRE-TX-O2   O2 Device (Oxygen Therapy) room air   SpO2 96 %   Pulse Oximetry Type Intermittent   $ Pulse Oximetry - Multiple Charge Pulse Oximetry - Multiple   Pulse 100   Resp 18   Education   $ Education DME Oxygen;15 min   Respiratory Evaluation   $ Care Plan Tech Time 15 min

## 2022-04-05 NOTE — PROGRESS NOTES
[facility]  Progress Note    Patient Name: Carmen Sparrowg  MRN: [id number]  Patient Class: IP- Inpatient   Admission Date: 4/1/2022  Length of Stay: 4 days  Attending Physician: Loyd Raymundo MD  Primary Care Provider: Abiodun Will MD        Subjective:     Principal Problem:GI bleed        HPI:  Nirmal Frias NP  82-year-old female presents to emergency room with exertional dyspnea     Past medical history significant for COPD/emphysema patient is not on oxygen no other she have metered-dose inhalers, hypertension hyperlipidemia severe peripheral vascular disease, mesenteric artery stenosis status post stenting, chronic back pain, brain aneurysm and cataracts     The patient states for the past 3-4 days she has been having increased shortness of breath with dyspnea on exertion.  She denied actual chest pain lightheadedness dizziness or syncope.  She denied any black or bloody stools no hematemesis or hemoptysis.     The patient states she smokes about a pack of cigarettes a day and has been diagnosed with emphysema but was never placed on oxygen nor does she have metered-dose inhalers.  She does not follow-up pulmonologist.     The patient states she has been having chronic weight loss but states her weight have been stable lately states that she gets full easy in does not have appetite.  She describes her symptoms as moderate to severe with no alleviating or exacerbating factors     In the emergency room a CTA of the chest was performed with no PE there was no significant findings in the abdomen.  The patient was found to have a drop in her H&H from her last evaluation and she was found to have elevated BUN.  She was given a saline bolus with improvement in her heart rate she was tachycardic cardiac at 131. on arrival      Overview/Hospital Course:  4/2: Patient is doing well and currently having a echo done.  Home O2 evaluation ordered and consult to pulmonologist for  untreated emphysema.  CT scan shows occlusion stenosis of SMA and moderate to severe stenosis of left vertebral artery.  Patient states that her vascular surgeon is aware of this and he has currently put 2 stents in the past.  She said she is scheduled for repeat ultrasound and follow-up within a month.  Anemia noted on blood work, iron studies within normal limit. Family present at bedside.No concerns/issues overnight reported by the patient or the nursing staff.     4/3:  Patient states that she feels tired today, significant drop in H&H from 7.8/20 4.7 to 5.3/17.1.  Repeat H&H confirm low levels, would transfuse 2 PRBC.  Occult studies positive, will consult Gastroenterology.  Iron studies, folate and vitamin B12 within normal limit.  Home O2 evaluation done yesterday showed that patient requires home oxygen which will be ordered.  Hold DAPT  pending colonoscopy tomorrow.    4/4:  Feeling well today.  EGD with created ulcer in the fundus yesterday.  Hemoglobin slightly lower today.  No further signs of melena or hematochezia.  Continuing Protonix GTT for at least 24 hours.  Will follow up biopsy results.      Interval History:  No acute events overnight, no fever no chills no nausea no vomiting.  No melena or hematochezia.  Abdominal pain is minimal.  Feeling well overall.    Review of Systems   All other systems reviewed and are negative.  Objective:     Vital Signs (Most Recent):  Temp: 98.7 °F (37.1 °C) (04/05/22 0728)  Pulse: 108 (04/05/22 0813)  Resp: 16 (04/05/22 0813)  BP: (!) 127/58 (04/05/22 0728)  SpO2: (!) 93 % (04/05/22 0813)   Vital Signs (24h Range):  Temp:  [98.1 °F (36.7 °C)-98.7 °F (37.1 °C)] 98.7 °F (37.1 °C)  Pulse:  [] 108  Resp:  [16-20] 16  SpO2:  [91 %-96 %] 93 %  BP: (127-159)/(58-76) 127/58     Weight: 38.5 kg (84 lb 14 oz)  Body mass index is 15.52 kg/m².    Intake/Output Summary (Last 24 hours) at 4/5/2022 1115  Last data filed at 4/5/2022 0401  Gross per 24 hour   Intake 390 ml    Output --   Net 390 ml      Physical Exam  Constitutional:       Appearance: Normal appearance. She is normal weight.   HENT:      Head: Normocephalic and atraumatic.      Nose: Nose normal.      Mouth/Throat:      Mouth: Mucous membranes are moist.   Eyes:      Conjunctiva/sclera: Conjunctivae normal.      Pupils: Pupils are equal, round, and reactive to light.   Cardiovascular:      Rate and Rhythm: Normal rate and regular rhythm.      Pulses: Normal pulses.      Heart sounds: Normal heart sounds. No murmur heard.    No friction rub. No gallop.   Pulmonary:      Effort: Pulmonary effort is normal.      Breath sounds: Normal breath sounds. No wheezing or rales.   Abdominal:      General: Abdomen is flat. Bowel sounds are normal. There is no distension.      Palpations: Abdomen is soft.      Tenderness: There is no abdominal tenderness. There is no guarding.   Musculoskeletal:         General: No swelling. Normal range of motion.      Cervical back: Normal range of motion and neck supple.   Skin:     General: Skin is warm and dry.   Neurological:      General: No focal deficit present.      Mental Status: She is alert.   Psychiatric:         Mood and Affect: Mood normal.         Thought Content: Thought content normal.         Judgment: Judgment normal.       Significant Labs: All pertinent labs within the past 24 hours have been reviewed.    Significant Imaging: I have reviewed all pertinent imaging results/findings within the past 24 hours.      Assessment/Plan:      * GI bleed  Patient had EGD showing crated ulcer in the fundus  Recommendation for Protonix GTT  Will switch to Protonix b.i.d. tomorrow if hemoglobin remains stable.  Inter discussed with GI safety resuming aspirin and Plavix.  H&H holding steady, would repeat H&H in the morning  Gastroenterology on board  Need outpatient follow-up for repeat EGD to ensure healing.  Ulcer biopsies pending.  Will treat H pylori if positive.      Mesenteric artery  stenosis  Vascular surgeon aware as patient states she is being worked up outpatient  Was on aspirin Plavix and discuss with GI the safety of resuming this.      Emphysema lung  Pulmonology on board  Continue breathing treatment  Repeat home O2 evaluation  Continue inhalers        VTE Risk Mitigation (From admission, onward)         Ordered     IP VTE LOW RISK PATIENT  Once         04/01/22 2344     Place sequential compression device  Until discontinued         04/01/22 2344                Discharge Planning   RAY: 4/5/2022     Code Status: Full Code   Is the patient medically ready for discharge?: No    Reason for patient still in hospital (select all that apply): Treatment  Discharge Plan A: Home                  Loyd Raymundo MD  Department of Hospital Medicine   [facility]

## 2022-04-05 NOTE — SUBJECTIVE & OBJECTIVE
Interval History:  No acute events overnight, no fever no chills no nausea no vomiting.  No melena or hematochezia.  Abdominal pain is minimal.  Feeling well overall.    Review of Systems   All other systems reviewed and are negative.  Objective:     Vital Signs (Most Recent):  Temp: 98.7 °F (37.1 °C) (04/05/22 0728)  Pulse: 108 (04/05/22 0813)  Resp: 16 (04/05/22 0813)  BP: (!) 127/58 (04/05/22 0728)  SpO2: (!) 93 % (04/05/22 0813)   Vital Signs (24h Range):  Temp:  [98.1 °F (36.7 °C)-98.7 °F (37.1 °C)] 98.7 °F (37.1 °C)  Pulse:  [] 108  Resp:  [16-20] 16  SpO2:  [91 %-96 %] 93 %  BP: (127-159)/(58-76) 127/58     Weight: 38.5 kg (84 lb 14 oz)  Body mass index is 15.52 kg/m².    Intake/Output Summary (Last 24 hours) at 4/5/2022 1115  Last data filed at 4/5/2022 0401  Gross per 24 hour   Intake 390 ml   Output --   Net 390 ml      Physical Exam  Constitutional:       Appearance: Normal appearance. She is normal weight.   HENT:      Head: Normocephalic and atraumatic.      Nose: Nose normal.      Mouth/Throat:      Mouth: Mucous membranes are moist.   Eyes:      Conjunctiva/sclera: Conjunctivae normal.      Pupils: Pupils are equal, round, and reactive to light.   Cardiovascular:      Rate and Rhythm: Normal rate and regular rhythm.      Pulses: Normal pulses.      Heart sounds: Normal heart sounds. No murmur heard.    No friction rub. No gallop.   Pulmonary:      Effort: Pulmonary effort is normal.      Breath sounds: Normal breath sounds. No wheezing or rales.   Abdominal:      General: Abdomen is flat. Bowel sounds are normal. There is no distension.      Palpations: Abdomen is soft.      Tenderness: There is no abdominal tenderness. There is no guarding.   Musculoskeletal:         General: No swelling. Normal range of motion.      Cervical back: Normal range of motion and neck supple.   Skin:     General: Skin is warm and dry.   Neurological:      General: No focal deficit present.      Mental Status: She is  alert.   Psychiatric:         Mood and Affect: Mood normal.         Thought Content: Thought content normal.         Judgment: Judgment normal.       Significant Labs: All pertinent labs within the past 24 hours have been reviewed.    Significant Imaging: I have reviewed all pertinent imaging results/findings within the past 24 hours.

## 2022-04-05 NOTE — ASSESSMENT & PLAN NOTE
Vascular surgeon aware as patient states she is being worked up outpatient  Was on aspirin Plavix and discuss with GI the safety of resuming this.

## 2022-04-05 NOTE — PROGRESS NOTES
Pulmonary/Critical Care Progress Note      PATIENT NAME: Carmen Wilkinson  MRN: [id number]  TODAY'S DATE: 2022  12:10 PM  ADMIT DATE: 2022  AGE: 82 y.o. : 1940      HPI:  Patient is an 82-year-old smoker who presents with a COPD exacerbation.  She smokes a pack a day.  She uses no bronchodilators.  She has pulmonary cachexia.  She has been feeling more short of breath for the last few weeks.    4/3 the patient is receiving a unit of blood.  She states she got very short of breath when she got up to move around.  She continues with good breath sounds and no wheezing.     the patient is feeling back to herself.  She is no longer dyspneic with exertion.  She had an ulcer found in her stomach.  She has had problems with H pylori in the past.     the patient is breathing well.  She is without complaints.  She is oxygenating well on room air.    REVIEW OF SYSTEMS  GENERAL: Feeling back to her usual self.  EYES: Vision is good.  ENT: No sinusitis or pharyngitis.   HEART: No chest pain or palpitations.  LUNGS:   She coughs and produces mucus mostly in the morning.  GI:  She has early satiety.  : No dysuria, hesitancy, or nocturia.  SKIN: No lesions or rashes.  MUSCULOSKELETAL: No joint pain or myalgias.  NEURO: No headaches or neuropathy.  LYMPH: No edema or adenopathy.  PSYCH: No anxiety or depression.  ENDO:  She cannot gain weight.    No change in the patient's Past Medical History, Past Surgical History, Social History or Family History since admission.    VITAL SIGNS (MOST RECENT)  Temp: 98.3 °F (36.8 °C) (22 1208)  Pulse: 90 (22 1208)  Resp: 20 (22 1208)  BP: (!) 119/58 (22 1208)  SpO2: (!) 93 % (22 1208)    INTAKE AND OUTPUT (LAST 24 HOURS):    Intake/Output Summary (Last 24 hours) at 2022 1251  Last data filed at 2022 0401  Gross per 24 hour   Intake 390 ml   Output --   Net 390 ml       WEIGHT  Wt Readings from Last 1 Encounters:   22 38.5 kg  (84 lb 14 oz)       PHYSICAL EXAM  GENERAL: Older, exceedingly thin, patient in no distress.  HEENT: Pupils equal and reactive. Extraocular movements intact. Nose intact. Pharynx moist.  NECK: Supple.   HEART: Regular rate and rhythm. No murmur or gallop auscultated.  LUNGS: Clear to auscultation and percussion. Lung excursion symmetrical. No change in fremitus. No adventitial noises.  ABDOMEN: Bowel sounds present. Non-tender, no masses palpated.  : Normal anatomy.  EXTREMITIES: Normal muscle tone and joint movement, no cyanosis or clubbing.   LYMPHATICS: No adenopathy palpated, no edema.  SKIN: Dry, intact, no lesions.   NEURO: Cranial nerves II-XII intact. Motor strength 5/5 bilaterally, upper and lower extremities.  PSYCH: Appropriate affect    CBC LAST (LAST 24 HOURS)  Recent Labs   Lab 04/05/22  0434   WBC 8.09   RBC 2.84*   HGB 8.7*   HCT 26.7*   MCV 94   MCH 30.6   MCHC 32.6   RDW 14.6*      MPV 9.0*   GRAN 73.6*  6.0   LYMPH 14.7*  1.2   MONO 9.5  0.8   BASO 0.03   NRBC 0       CHEMISTRY LAST (LAST 24 HOURS)  Recent Labs   Lab 04/05/22  0434      K 3.8      CO2 26   ANIONGAP 7*   BUN 19   CREATININE 0.5   GLU 87   CALCIUM 8.4*   MG 1.5*   ALBUMIN 2.7*   PROT 5.1*   ALKPHOS 51*   ALT 17   AST 22   BILITOT 0.8       CARDIAC PROFILE (LAST 24 HOURS)  Recent Labs   Lab 04/01/22  1846 04/02/22  0232 04/02/22  0412   BNP 44  --   --    TROPONINI <0.030   < > 0.038    < > = values in this interval not displayed.       LAST 7 DAYS MICROBIOLOGY   Microbiology Results (last 7 days)     ** No results found for the last 168 hours. **          MOST RECENT IMAGING  Echo Saline Bubble? No  · The left ventricle is small with concentric remodeling and hyperdynamic   systolic function.  · The estimated ejection fraction is 75%.  · Indeterminate left ventricular diastolic function.  · Mild right ventricular enlargement with normal right ventricular   systolic function.  · Mild to moderate tricuspid  regurgitation.  · Mild-to-moderate aortic regurgitation.         CURRENT VISIT EKG  Results for orders placed or performed during the hospital encounter of 04/01/22   EKG 12-lead    Narrative    Test Reason : R06.02,    Vent. Rate : 131 BPM     Atrial Rate : 131 BPM     P-R Int : 152 ms          QRS Dur : 064 ms      QT Int : 298 ms       P-R-T Axes : 084 066 077 degrees     QTc Int : 440 ms    Sinus tachycardia with Premature supraventricular complexes  Nonspecific ST abnormality  Abnormal ECG  When compared with ECG of 29-JUN-2021 12:47,  Premature supraventricular complexes are now Present  Criteria for Septal infarct are no longer Present  T wave inversion no longer evident in Anterior leads    Referred By: [email or web address]   SELF           Confirmed By:        IMPRESSION AND PLAN  COPD with significant emphysema  Significant anemia, status post transfusion  Gastric ulcer  Tobacco abuse  Mild hypoalbuminemia  Pulmonary cachexia    No objection to discharge when hemoglobin stabilizes  Would discharge on simply Spiriva.  We can follow her up in the office in obtain PFTs and see if she truly needs a bronchodilator.  She has significant emphysema but no obvious reactive airways.  Tobacco cessation encouraged    Whitney Molina MD  [facility]  Department of Pulmonology  Date of Service: 04/05/2022  12:10 PM

## 2022-04-05 NOTE — ASSESSMENT & PLAN NOTE
Patient had EGD showing crated ulcer in the fundus  Recommendation for Protonix GTT  Will switch to Protonix b.i.d. tomorrow if hemoglobin remains stable.  Inter discussed with GI safety resuming aspirin and Plavix.  H&H holding steady, would repeat H&H in the morning  Gastroenterology on board  Need outpatient follow-up for repeat EGD to ensure healing.  Ulcer biopsies pending.  Will treat H pylori if positive.

## 2022-04-05 NOTE — PROGRESS NOTES
[facility]  Adult Nutrition   Progress Note (Follow-Up)    SUMMARY      Recommendations:   1. RD discontinued Ensure Enlive due to patient not liking it.  2. RD added Unjury milkshake with 2 ice cream and 1 benecalorie.     Goals:   Goals: Po intake >75% of meals and oral supplement.    Dietitian Rounds Brief  F/U Nutrition Note: Spoke with patient today and she tells me she doesn't like the Ensure Enlive milkshake so we have substituted an Unjury milkshake for this with 2 ice cream and 1 benecalorie. Will check with her tomorrow to see if she likes it. LBM was 4/3/2022.    Diet order: Cardiac    % Intake of Estimated Energy Needs: 50 - 75 %  % Meal Intake: 50 - 75 %    Estimated/Assessed Needs  Weight Used For Calorie Calculations: 35 kg (77 lb 2.6 oz)  Energy Calorie Requirements (kcal): 8690-7910 (35-40)  Energy Need Method: Kcal/kg    RDA Method (mL): 1225    Weight History:  Wt Readings from Last 5 Encounters:   04/05/22 38.5 kg (84 lb 14 oz)   04/02/22 34.9 kg (77 lb)   06/30/21 34.9 kg (77 lb)   06/29/21 34.9 kg (76 lb 15.1 oz)   01/13/21 38.6 kg (85 lb)        Medications:Pertinent Medications Reviewed    Scheduled Meds:   atorvastatin  20 mg Oral Daily    fluticasone furoate-vilanteroL  1 puff Inhalation Daily    nicotine  1 patch Transdermal Daily    tiotropium bromide  2 puff Inhalation Daily     Continuous Infusions:   sodium chloride 0.9% 50 mL/hr at 04/05/22 0903    pantoprozole (PROTONIX) IV infusion 8 mg/hr (04/05/22 1234)     PRN Meds:.sodium chloride, acetaminophen, melatonin, ondansetron, oxyCODONE-acetaminophen, sodium chloride 0.9%    Labs: Pertinent Labs Reviewed    Clinical Chemistry:  Recent Labs   Lab 04/01/22  1904 04/02/22  0232 04/05/22  0434   NA  --    < > 138   K  --    < > 3.8   CL  --    < > 105   CO2  --    < > 26   GLU  --    < > 87   BUN  --    < > 19   CREATININE  --    < > 0.5   CALCIUM  --    < > 8.4*   PROT  --    < > 5.1*   ALBUMIN  --    < > 2.7*    BILITOT  --    < > 0.8   ALKPHOS  --    < > 51*   AST  --    < > 22   ALT  --    < > 17   ANIONGAP  --    < > 7*   ESTGFRAFRICA  --    < > >60.0   EGFRNONAA  --    < > >60.0   MG  --    < > 1.5*   LIPASE 48  --   --     < > = values in this interval not displayed.     CBC:   Recent Labs   Lab 04/05/22  0434   WBC 8.09   RBC 2.84*   HGB 8.7*   HCT 26.7*      MCV 94   MCH 30.6   MCHC 32.6     Cardiac Profile:  Recent Labs   Lab 04/01/22  1846 04/02/22  0232 04/02/22  0412   BNP 44  --   --    TROPONINI <0.030 0.031 0.038     Thyroid & Parathyroid:  Recent Labs   Lab 04/01/22  2154   TSH 2.290       Nutrition Risk  Level of Risk/Frequency of Follow-up: moderate    Barbara Ferraro, [email or web address] 04/05/2022 4:09 PM

## 2022-04-05 NOTE — CARE UPDATE
04/05/22 0813   Patient Assessment/Suction   Level of Consciousness (AVPU) alert   All Lung Fields Breath Sounds clear;diminished   PRE-TX-O2   O2 Device (Oxygen Therapy) room air   SpO2 (!) 93 %   Pulse Oximetry Type Intermittent   $ Pulse Oximetry - Multiple Charge Pulse Oximetry - Multiple   Pulse 108   Resp 16   Inhaler   $ Inhaler Charges MDI (Metered Dose Inahler) Treatment   Daily Review of Necessity (Inhaler) completed   Respiratory Treatment Status (Inhaler) given   Treatment Route (Inhaler) mouthpiece   Patient Position (Inhaler) sitting on edge of bed   Post Treatment Assessment (Inhaler) breath sounds unchanged   Signs of Intolerance (Inhaler) none   Breath Sounds Post-Respiratory Treatment   Post-treatment Heart Rate (beats/min) 109   Post-treatment Resp Rate (breaths/min) 16   Education   $ Education Bronchodilator;15 min   Respiratory Evaluation   $ Care Plan Tech Time 15 min

## 2022-04-06 VITALS
BODY MASS INDEX: 15.62 KG/M2 | RESPIRATION RATE: 18 BRPM | HEART RATE: 96 BPM | WEIGHT: 84.88 LBS | HEIGHT: 62 IN | OXYGEN SATURATION: 93 % | TEMPERATURE: 100 F | SYSTOLIC BLOOD PRESSURE: 124 MMHG | DIASTOLIC BLOOD PRESSURE: 58 MMHG

## 2022-04-06 LAB
ALBUMIN SERPL BCP-MCNC: 2.7 G/DL (ref 3.5–5.2)
ALP SERPL-CCNC: 55 U/L (ref 55–135)
ALT SERPL W/O P-5'-P-CCNC: 16 U/L (ref 10–44)
ANION GAP SERPL CALC-SCNC: 8 MMOL/L (ref 8–16)
AST SERPL-CCNC: 24 U/L (ref 10–40)
BASOPHILS # BLD AUTO: 0.04 K/UL (ref 0–0.2)
BASOPHILS NFR BLD: 0.5 % (ref 0–1.9)
BILIRUB SERPL-MCNC: 0.7 MG/DL (ref 0.1–1)
BUN SERPL-MCNC: 17 MG/DL (ref 8–23)
CALCIUM SERPL-MCNC: 8.4 MG/DL (ref 8.7–10.5)
CHLORIDE SERPL-SCNC: 106 MMOL/L (ref 95–110)
CO2 SERPL-SCNC: 25 MMOL/L (ref 23–29)
CREAT SERPL-MCNC: 0.5 MG/DL (ref 0.5–1.4)
DIFFERENTIAL METHOD: ABNORMAL
EOSINOPHIL # BLD AUTO: 0 K/UL (ref 0–0.5)
EOSINOPHIL NFR BLD: 0.5 % (ref 0–8)
ERYTHROCYTE [DISTWIDTH] IN BLOOD BY AUTOMATED COUNT: 14.4 % (ref 11.5–14.5)
EST. GFR  (AFRICAN AMERICAN): >60 ML/MIN/1.73 M^2
EST. GFR  (NON AFRICAN AMERICAN): >60 ML/MIN/1.73 M^2
GLUCOSE SERPL-MCNC: 91 MG/DL (ref 70–110)
HCT VFR BLD AUTO: 30.4 % (ref 37–48.5)
HGB BLD-MCNC: 9.8 G/DL (ref 12–16)
IMM GRANULOCYTES # BLD AUTO: 0.03 K/UL (ref 0–0.04)
IMM GRANULOCYTES NFR BLD AUTO: 0.4 % (ref 0–0.5)
LYMPHOCYTES # BLD AUTO: 0.7 K/UL (ref 1–4.8)
LYMPHOCYTES NFR BLD: 8.5 % (ref 18–48)
MCH RBC QN AUTO: 30.5 PG (ref 27–31)
MCHC RBC AUTO-ENTMCNC: 32.2 G/DL (ref 32–36)
MCV RBC AUTO: 95 FL (ref 82–98)
MONOCYTES # BLD AUTO: 0.7 K/UL (ref 0.3–1)
MONOCYTES NFR BLD: 8.5 % (ref 4–15)
NEUTROPHILS # BLD AUTO: 7 K/UL (ref 1.8–7.7)
NEUTROPHILS NFR BLD: 81.6 % (ref 38–73)
NRBC BLD-RTO: 0 /100 WBC
PLATELET # BLD AUTO: 228 K/UL (ref 150–450)
PMV BLD AUTO: 8.9 FL (ref 9.2–12.9)
POTASSIUM SERPL-SCNC: 3.8 MMOL/L (ref 3.5–5.1)
PROT SERPL-MCNC: 5.2 G/DL (ref 6–8.4)
RBC # BLD AUTO: 3.21 M/UL (ref 4–5.4)
SODIUM SERPL-SCNC: 139 MMOL/L (ref 136–145)
WBC # BLD AUTO: 8.54 K/UL (ref 3.9–12.7)

## 2022-04-06 PROCEDURE — 85025 COMPLETE CBC W/AUTO DIFF WBC: CPT | Performed by: NURSE PRACTITIONER

## 2022-04-06 PROCEDURE — 63600175 PHARM REV CODE 636 W HCPCS: Performed by: STUDENT IN AN ORGANIZED HEALTH CARE EDUCATION/TRAINING PROGRAM

## 2022-04-06 PROCEDURE — 97116 GAIT TRAINING THERAPY: CPT

## 2022-04-06 PROCEDURE — 25000003 PHARM REV CODE 250: Performed by: NURSE PRACTITIONER

## 2022-04-06 PROCEDURE — C9113 INJ PANTOPRAZOLE SODIUM, VIA: HCPCS | Performed by: STUDENT IN AN ORGANIZED HEALTH CARE EDUCATION/TRAINING PROGRAM

## 2022-04-06 PROCEDURE — 94760 N-INVAS EAR/PLS OXIMETRY 1: CPT

## 2022-04-06 PROCEDURE — 36415 COLL VENOUS BLD VENIPUNCTURE: CPT | Performed by: NURSE PRACTITIONER

## 2022-04-06 PROCEDURE — 25000003 PHARM REV CODE 250: Performed by: INTERNAL MEDICINE

## 2022-04-06 PROCEDURE — 80053 COMPREHEN METABOLIC PANEL: CPT | Performed by: NURSE PRACTITIONER

## 2022-04-06 PROCEDURE — S4991 NICOTINE PATCH NONLEGEND: HCPCS | Performed by: INTERNAL MEDICINE

## 2022-04-06 PROCEDURE — 94640 AIRWAY INHALATION TREATMENT: CPT

## 2022-04-06 PROCEDURE — 25000003 PHARM REV CODE 250: Performed by: STUDENT IN AN ORGANIZED HEALTH CARE EDUCATION/TRAINING PROGRAM

## 2022-04-06 RX ORDER — PANTOPRAZOLE SODIUM 40 MG/1
40 TABLET, DELAYED RELEASE ORAL 2 TIMES DAILY
Qty: 180 TABLET | Refills: 0 | Status: SHIPPED | OUTPATIENT
Start: 2022-04-06 | End: 2022-09-06

## 2022-04-06 RX ORDER — AMLODIPINE AND BENAZEPRIL HYDROCHLORIDE 5; 10 MG/1; MG/1
CAPSULE ORAL
Status: ON HOLD
Start: 2022-04-06 | End: 2022-11-21 | Stop reason: HOSPADM

## 2022-04-06 RX ADMIN — OXYCODONE HYDROCHLORIDE AND ACETAMINOPHEN 1 TABLET: 10; 325 TABLET ORAL at 11:04

## 2022-04-06 RX ADMIN — PANTOPRAZOLE SODIUM 8 MG/HR: 40 INJECTION, POWDER, LYOPHILIZED, FOR SOLUTION INTRAVENOUS at 02:04

## 2022-04-06 RX ADMIN — PANTOPRAZOLE SODIUM 8 MG/HR: 40 INJECTION, POWDER, LYOPHILIZED, FOR SOLUTION INTRAVENOUS at 12:04

## 2022-04-06 RX ADMIN — TIOTROPIUM BROMIDE INHALATION SPRAY 2 PUFF: 3.12 SPRAY, METERED RESPIRATORY (INHALATION) at 08:04

## 2022-04-06 RX ADMIN — OXYCODONE HYDROCHLORIDE AND ACETAMINOPHEN 1 TABLET: 10; 325 TABLET ORAL at 02:04

## 2022-04-06 RX ADMIN — NICOTINE 1 PATCH: 14 PATCH, EXTENDED RELEASE TRANSDERMAL at 09:04

## 2022-04-06 RX ADMIN — FLUTICASONE FUROATE AND VILANTEROL TRIFENATATE 1 PUFF: 100; 25 POWDER RESPIRATORY (INHALATION) at 08:04

## 2022-04-06 RX ADMIN — ATORVASTATIN CALCIUM 20 MG: 20 TABLET, FILM COATED ORAL at 09:04

## 2022-04-06 NOTE — DISCHARGE SUMMARY
[facility] Medicine  Discharge Summary      Patient Name: Carmen Wilkinson  MRN: [id number]  Patient Class: IP- Inpatient  Admission Date: 4/1/2022  Hospital Length of Stay: 5 days  Discharge Date and Time:  04/06/2022 6:53 PM  Attending Physician: No att. providers found   Discharging Provider: Loyd Raymundo MD  Primary Care Provider: Abiodun Will MD      HPI:   Nirmal Frias NP  82-year-old female presents to emergency room with exertional dyspnea     Past medical history significant for COPD/emphysema patient is not on oxygen no other she have metered-dose inhalers, hypertension hyperlipidemia severe peripheral vascular disease, mesenteric artery stenosis status post stenting, chronic back pain, brain aneurysm and cataracts     The patient states for the past 3-4 days she has been having increased shortness of breath with dyspnea on exertion.  She denied actual chest pain lightheadedness dizziness or syncope.  She denied any black or bloody stools no hematemesis or hemoptysis.     The patient states she smokes about a pack of cigarettes a day and has been diagnosed with emphysema but was never placed on oxygen nor does she have metered-dose inhalers.  She does not follow-up pulmonologist.     The patient states she has been having chronic weight loss but states her weight have been stable lately states that she gets full easy in does not have appetite.  She describes her symptoms as moderate to severe with no alleviating or exacerbating factors     In the emergency room a CTA of the chest was performed with no PE there was no significant findings in the abdomen.  The patient was found to have a drop in her H&H from her last evaluation and she was found to have elevated BUN.  She was given a saline bolus with improvement in her heart rate she was tachycardic cardiac at 131. on arrival      Procedure(s) (LRB):  COLONOSCOPY (N/A)  ENTEROSCOPY, PROXIMAL (N/A)      Hospital Course:    4/2: Patient is doing well and currently having a echo done.  Home O2 evaluation ordered and consult to pulmonologist for untreated emphysema.  CT scan shows occlusion stenosis of SMA and moderate to severe stenosis of left vertebral artery.  Patient states that her vascular surgeon is aware of this and he has currently put 2 stents in the past.  She said she is scheduled for repeat ultrasound and follow-up within a month.  Anemia noted on blood work, iron studies within normal limit. Family present at bedside.No concerns/issues overnight reported by the patient or the nursing staff.     4/3:  Patient states that she feels tired today, significant drop in H&H from 7.8/20 4.7 to 5.3/17.1.  Repeat H&H confirm low levels, would transfuse 2 PRBC.  Occult studies positive, will consult Gastroenterology.  Iron studies, folate and vitamin B12 within normal limit.  Home O2 evaluation done yesterday showed that patient requires home oxygen which will be ordered.  Hold DAPT  pending colonoscopy tomorrow.    4/5:  Feeling well today.  EGD with created ulcer in the fundus yesterday.  Hemoglobin slightly lower today.  No further signs of melena or hematochezia.  Continuing Protonix GTT for at least 24 hours.  Will follow up biopsy results.    4/6:  Patient was stable hemoglobin today.  He will transition off of PPI GTT and will start back on aspirin and Plavix after discussion with Dr. Devi.  She is to be discharged on Protonix with close follow-up with GI team as an outpatient.  She will need a repeat EGD to ensure healing of her ulceration.       Goals of Care Treatment Preferences:  Code Status: Full Code      Consults:   Consults (From admission, onward)        Status Ordering Provider     Inpatient consult to Registered Dietitian/Nutritionist  Once        Provider:  (Not yet assigned)    Completed ELMER KNAPP     Inpatient consult to Gastroenterology  Once        Provider:  Ramirez Bravo III, MD     Completed BETO MG     Inpatient consult to Gastroenterology  Once        Provider:  Ramirez Bravo III, MD    Acknowledged BETO MG     Inpatient consult to Pulmonology  Once        Provider:  Whitney Molina MD    Completed BETO MG     Inpatient consult to Registered Dietitian/Nutritionist  Once        Provider:  (Not yet assigned)    Completed NING FERREIRA     Inpatient consult to Hospitalist  Once        Provider:  Yordan Beltre MD    Acknowledged CHANI ALVARENGA          * GI bleed  Patient presented with markedly low hemoglobin requiring blood transfusion.  She had GI bleeding.  She was treated with IV Protonix.  She had EGD showing crated ulcer in the fundus of her stomach.  She was able to switch from Protonix drip to oral Protonix and is safe to discharge home.  She was on aspirin and Plavix for vascular disease and is safe to resume after discussion with Dr. Devi.  Have cautioned her to monitor herself for signs of GI bleeding.  She will follow-up with the GI team and 2-4 weeks for the clinic and also will likely need repeat EGD to ensure healing of her ulceration.  Path pending at time of discharge.          Mesenteric artery stenosis  Okay to resume aspirin Plavix per GI team.  She will continue follow-up with vascular surgery for this in outpatient.      Emphysema lung  She did not require oxygen at discharge.  She will continue her regular regimen at discharge and follow-up with her primary care physician for ongoing management.        Final Active Diagnoses:    Diagnosis Date Noted POA    PRINCIPAL PROBLEM:  GI bleed [K92.2] 04/04/2022 Yes    Mesenteric artery stenosis [K55.1] 01/13/2021 Yes    Emphysema lung [J43.9] 04/04/2022 Yes    Dyspnea [R06.00] 04/01/2022 Yes    Dehydration [E86.0] 04/01/2022 Yes    COPD exacerbation [J44.1] 04/01/2022 Yes    Malnutrition of moderate degree [E44.0] 04/01/2022 Yes    Anemia, unspecified [D64.9] 04/01/2022 Yes     "  Problems Resolved During this Admission:       Discharged Condition: good    Disposition: Home or Self Care    Follow Up:   Follow-up Information     Ramirez Bravo III, MD. Schedule an appointment as soon as possible for a visit in 3 week(s).    Specialty: Gastroenterology  Contact information:  [address] [facility] [address]  [phone]             Abiodun Will MD. Schedule an appointment as soon as possible for a visit in 1 week(s).    Specialty: Family Medicine  Contact information:  [address] [facility] [address]  [phone]                       Patient Instructions:      OXYGEN FOR HOME USE     Order Specific Question Answer Comments   Liter Flow 10    Duration With activity    Qualifying Test Performed at: Activity    Oxygen saturation at rest 99    Oxygen saturation with activity 67    Oxygen saturation with activity on oxygen 99    Portable mode: continuous    Route nasal cannula    Device: home concentrator with portable concentrator    Length of need (in months): 99 mos    Patient condition with qualifying saturation COPD    Height: 5' 2" (1.575 m)    Weight: 38.4 kg (84 lb 10.5 oz)    Alternative treatment measures have been tried or considered and deemed clinically ineffective. Yes      Diet Adult Regular     No dressing needed     Activity as tolerated       Significant Diagnostic Studies: Labs:   BMP:   Recent Labs   Lab 04/05/22  0434 04/06/22  0424   GLU 87 91    139   K 3.8 3.8    106   CO2 26 25   BUN 19 17   CREATININE 0.5 0.5   CALCIUM 8.4* 8.4*   MG 1.5*  --    , CBC   Recent Labs   Lab 04/05/22  0434 04/06/22  1231   WBC 8.09 8.54   HGB 8.7* 9.8*   HCT 26.7* 30.4*    228    and All labs within the past 24 hours have been reviewed    Pending Diagnostic Studies:     None         Medications:  Reconciled Home Medications:      Medication List      START taking these medications    pantoprazole 40 MG tablet  Commonly known as: PROTONIX  Take 1 tablet " (40 mg total) by mouth 2 (two) times daily.        CHANGE how you take these medications    amlodipine-benazepril 5-10 mg 5-10 mg per capsule  Commonly known as: LOTREL  Hold until follow up with your pcp  What changed:   · how much to take  · how to take this  · when to take this  · additional instructions     clopidogreL 75 mg tablet  Commonly known as: PLAVIX  Take 1 tablet (75 mg total) by mouth once daily.  What changed: Another medication with the same name was removed. Continue taking this medication, and follow the directions you see here.        CONTINUE taking these medications    aspirin 81 MG EC tablet  Commonly known as: ECOTRIN  Take 81 mg by mouth once daily.     CENTRUM SILVER ORAL  Take 1 capsule by mouth once daily.     KRILL OIL ORAL  Take 1,000 mg by mouth once daily.     L. gasseri-B. bifidum-B longum 1.5 billion cell Cap  Take 1 tablet by mouth once daily.     metoprolol succinate 50 MG 24 hr tablet  Commonly known as: TOPROL-XL  50 mg 2 (two) times daily.     oxyCODONE-acetaminophen  mg per tablet  Commonly known as: PERCOCET  Take 1 tablet by mouth every 6 (six) hours as needed for Pain.     PRESERVISION AREDS ORAL  Take 1 capsule by mouth 2 (two) times a day.     simvastatin 10 MG tablet  Commonly known as: ZOCOR  Take 10 mg by mouth once daily.            Indwelling Lines/Drains at time of discharge:   Lines/Drains/Airways     None                 Time spent on the discharge of patient: 45 minutes         Loyd Raymundo MD  Department of Hospital Medicine  [facility]

## 2022-04-06 NOTE — PLAN OF CARE
Problem: Adult Inpatient Plan of Care  Goal: Plan of Care Review  Outcome: Met  Goal: Patient-Specific Goal (Individualized)  Outcome: Met  Goal: Absence of Hospital-Acquired Illness or Injury  Outcome: Met  Goal: Optimal Comfort and Wellbeing  Outcome: Met  Goal: Readiness for Transition of Care  Outcome: Met     Problem: Skin Injury Risk Increased  Goal: Skin Health and Integrity  Outcome: Met     Problem: Malnutrition  Goal: Improved Nutritional Intake  Outcome: Met

## 2022-04-06 NOTE — PLAN OF CARE
Per physician documentation, patient status improved and no longer requires home oxygen. No further Case   Patient provided with contact information for [facility]/ [place] on Aging and Senior Resources Guide for the local area.   Management needs identified at this time.        04/06/22 1122   Final Note   Assessment Type Final Discharge Note   Anticipated Discharge Disposition Home   What phone number can be called within the next 1-3 days to see how you are doing after discharge? [phone]   Post-Acute Status   Post-Acute Authorization Other   Other Status No Post-Acute Service Needs   Discharge Delays None known at this time

## 2022-04-06 NOTE — PT/OT/SLP PROGRESS
Physical Therapy Treatment    Patient Name:  Carmen Wilkinson   MRN:  [id number]    Recommendations:     Discharge Recommendations:  home   Discharge Equipment Recommendations: shower chair   Barriers to discharge: None    Assessment:     Carmen Wilkinson is a 82 y.o. female admitted with a medical diagnosis of GI bleed.  She presents with the following impairments/functional limitations:  weakness, impaired endurance, impaired cardiopulmonary response to activity .    Rehab Prognosis: Good; patient would benefit from acute skilled PT services to address these deficits and reach maximum level of function.    Recent Surgery: Procedure(s) (LRB):  COLONOSCOPY (N/A)  ENTEROSCOPY, PROXIMAL (N/A) 2 Days Post-Op    Plan:     During this hospitalization, patient to be seen 5 x/week to address the identified rehab impairments via gait training, therapeutic activities, therapeutic exercises and progress toward the following goals:    · Plan of Care Expires:  05/07/22    Subjective     Chief Complaint: none  Patient/Family Comments/goals: return home with family  Pain/Comfort:  ·        Objective:     Communicated with nurse prior to session.  Patient found supine with telemetry upon PT entry to room.     General Precautions: Standard, fall   Orthopedic Precautions:    Braces:    Respiratory Status: Room air     Functional Mobility:  · Bed Mobility:     · Supine to Sit: stand by assistance  · Sit to Supine: stand by assistance  · Transfers:     · Sit to Stand:  stand by assistance with rolling walker  · Gait: 80 ft RW and SBA      AM-PAC 6 CLICK MOBILITY          Therapeutic Activities and Exercises:  Bed mobility, t/f , and gait with SBA. Rushed back to room to return to bed due to mild dyspnea    Patient left supine with all lines intact, call button in reach and bed alarm on..    GOALS:   Multidisciplinary Problems     Physical Therapy Goals        Problem: Physical Therapy    Goal Priority Disciplines Outcome Goal  Variances Interventions   Physical Therapy Goal     PT, PT/OT      Description: Goals to be met by: 22    Patient will increase functional independence with mobility by performin. Sit to stand transfer with Modified [place]  2. Bed to chair transfer with Modified [place] using Rolling Walker  3. Gait  x 150 feet with Modified [place] using Rolling Walker.                      Time Tracking:     PT Received On: 22  PT Start Time: 1020     PT Stop Time: 1036  PT Total Time (min): 16 min     Billable Minutes: Gait Training 16 minutes    Treatment Type: Treatment  PT/PTA: PT     PTA Visit Number: 0     2022

## 2022-04-06 NOTE — ASSESSMENT & PLAN NOTE
Patient presented with markedly low hemoglobin requiring blood transfusion.  She had GI bleeding.  She was treated with IV Protonix.  She had EGD showing crated ulcer in the fundus of her stomach.  She was able to switch from Protonix drip to oral Protonix and is safe to discharge home.  She was on aspirin and Plavix for vascular disease and is safe to resume after discussion with Dr. Devi.  Have cautioned her to monitor herself for signs of GI bleeding.  She will follow-up with the GI team and 2-4 weeks for the clinic and also will likely need repeat EGD to ensure healing of her ulceration.  Path pending at time of discharge.

## 2022-04-06 NOTE — ASSESSMENT & PLAN NOTE
Okay to resume aspirin Plavix per GI team.  She will continue follow-up with vascular surgery for this in outpatient.

## 2022-04-06 NOTE — ASSESSMENT & PLAN NOTE
She did not require oxygen at discharge.  She will continue her regular regimen at discharge and follow-up with her primary care physician for ongoing management.

## 2022-04-06 NOTE — PHYSICIAN QUERY
"PT Name: Carmen Wilkinson  MR #: [id number]    DOCUMENTATION CLARIFICATION      CDS/: Yaz Doss               Contact information: [phone]    This form is a permanent document in the medical record.      Query Date: April 6, 2022    By submitting this query, we are merely seeking further clarification of documentation. Please utilize your independent clinical judgment when addressing the question(s) below.    The Medical Record contains the following:  Indicators  Location in Medical Record   Risk factors; 81yo female presents w/ exertional dyspnea.   ED   Clinical indicators;  "Anemia"    "A/C anemia"    "feels tired today, significant drop in H&H from 7.8/20 4.7 to 5.3/17.1.  Repeat H&H confirm low levels, would transfuse 2 PRBC.  Occult studies positive, will consult Gastroenterology.  Iron studies, folate and vitamin B12 within normal limit."    H&H 10/30 (upon admit) to 5.3/17.1 with repeat 5.8/18 (04/03)  Occult blood +    "One non-bleeding cratered gastric ulcer with no stigmata of bleeding was found in the gastric fundus; biopsied; hx of H. Pylori"     H&P, Hosp and Pulm PN's 04/02-04/05    GI consult 04/03    Hospitalist PN 04/03          Labs      EGD Rpt   Interventions;  GI consult     EGD/Colonoscopy    2U PRBC administered 04/03   H&H Q AM, 8 hrs after PRBC's, 4 hrs after AM draw on 04/04     MD orders    04/04    MD orders     Dear doctor please further specify the Dx "Anemia."     [   ] Acute blood loss anemia of unknown origin     [ x  ] Acute blood loss anemia associated w/ gastric ulcer     [   ] Other anemia (please specify)                                                                                                                 "

## 2022-04-06 NOTE — NURSING
Discharge instructions reviewed with pt. Questions answered. Copy of discharge instructions given to pt. Discharge home via w/c to vehicle. Accompanied by .

## 2022-04-19 ENCOUNTER — OFFICE VISIT (OUTPATIENT)
Dept: PULMONOLOGY | Facility: CLINIC | Age: 82
End: 2022-04-19
Payer: MEDICARE

## 2022-04-19 VITALS
SYSTOLIC BLOOD PRESSURE: 120 MMHG | BODY MASS INDEX: 14.82 KG/M2 | OXYGEN SATURATION: 99 % | DIASTOLIC BLOOD PRESSURE: 70 MMHG | HEART RATE: 68 BPM | WEIGHT: 81 LBS

## 2022-04-19 DIAGNOSIS — J43.2 CENTRILOBULAR EMPHYSEMA: Primary | ICD-10-CM

## 2022-04-19 DIAGNOSIS — Z87.891 HISTORY OF TOBACCO ABUSE: ICD-10-CM

## 2022-04-19 PROCEDURE — 3074F SYST BP LT 130 MM HG: CPT | Mod: S$GLB,,, | Performed by: INTERNAL MEDICINE

## 2022-04-19 PROCEDURE — 1159F PR MEDICATION LIST DOCUMENTED IN MEDICAL RECORD: ICD-10-PCS | Mod: S$GLB,,, | Performed by: INTERNAL MEDICINE

## 2022-04-19 PROCEDURE — 3288F PR FALLS RISK ASSESSMENT DOCUMENTED: ICD-10-PCS | Mod: S$GLB,,, | Performed by: INTERNAL MEDICINE

## 2022-04-19 PROCEDURE — 99214 PR OFFICE/OUTPT VISIT, EST, LEVL IV, 30-39 MIN: ICD-10-PCS | Mod: S$GLB,,, | Performed by: INTERNAL MEDICINE

## 2022-04-19 PROCEDURE — 1111F PR DISCHARGE MEDS RECONCILED W/ CURRENT OUTPATIENT MED LIST: ICD-10-PCS | Mod: S$GLB,,, | Performed by: INTERNAL MEDICINE

## 2022-04-19 PROCEDURE — 3078F DIAST BP <80 MM HG: CPT | Mod: S$GLB,,, | Performed by: INTERNAL MEDICINE

## 2022-04-19 PROCEDURE — 1111F DSCHRG MED/CURRENT MED MERGE: CPT | Mod: S$GLB,,, | Performed by: INTERNAL MEDICINE

## 2022-04-19 PROCEDURE — 1126F PR PAIN SEVERITY QUANTIFIED, NO PAIN PRESENT: ICD-10-PCS | Mod: S$GLB,,, | Performed by: INTERNAL MEDICINE

## 2022-04-19 PROCEDURE — 99214 OFFICE O/P EST MOD 30 MIN: CPT | Mod: S$GLB,,, | Performed by: INTERNAL MEDICINE

## 2022-04-19 PROCEDURE — 1101F PR PT FALLS ASSESS DOC 0-1 FALLS W/OUT INJ PAST YR: ICD-10-PCS | Mod: S$GLB,,, | Performed by: INTERNAL MEDICINE

## 2022-04-19 PROCEDURE — 3078F PR MOST RECENT DIASTOLIC BLOOD PRESSURE < 80 MM HG: ICD-10-PCS | Mod: S$GLB,,, | Performed by: INTERNAL MEDICINE

## 2022-04-19 PROCEDURE — 3288F FALL RISK ASSESSMENT DOCD: CPT | Mod: S$GLB,,, | Performed by: INTERNAL MEDICINE

## 2022-04-19 PROCEDURE — 1159F MED LIST DOCD IN RCRD: CPT | Mod: S$GLB,,, | Performed by: INTERNAL MEDICINE

## 2022-04-19 PROCEDURE — 3074F PR MOST RECENT SYSTOLIC BLOOD PRESSURE < 130 MM HG: ICD-10-PCS | Mod: S$GLB,,, | Performed by: INTERNAL MEDICINE

## 2022-04-19 PROCEDURE — 1126F AMNT PAIN NOTED NONE PRSNT: CPT | Mod: S$GLB,,, | Performed by: INTERNAL MEDICINE

## 2022-04-19 PROCEDURE — 1101F PT FALLS ASSESS-DOCD LE1/YR: CPT | Mod: S$GLB,,, | Performed by: INTERNAL MEDICINE

## 2022-04-19 RX ORDER — ALPRAZOLAM 0.5 MG/1
0.5 TABLET ORAL 2 TIMES DAILY
Status: ON HOLD | COMMUNITY
Start: 2021-11-11 | End: 2022-11-21 | Stop reason: HOSPADM

## 2022-04-19 NOTE — PROGRESS NOTES
SUBJECTIVE:    Patient ID: Carmen Wilkinosn is a 82 y.o. female.    Chief Complaint: Hospital Follow Up (Hospital follow up shortness of breath had a bleeding ulcer saw reji 04/01/22)    HPI The patient presents with no smoking since her discharge.  She has periods where she is short of breath. She has gained 2 pounds, needs another 20 at least.  She is feeling much better.  Past Medical History:   Diagnosis Date    Back pain     Brain aneurysm     Carotid stenosis     Cataract     Diverticulitis     Emphysema lung     Feeling anxious     Hyperlipidemia     Hypertension     Macular degeneration     PVD (peripheral vascular disease)     Squamous cell carcinoma 08/29/2017    right medical cheek, SSIS, efudex tx     Past Surgical History:   Procedure Laterality Date    ABDOMINAL SURGERY      ARTERIOGRAM, MESENTERIC N/A 6/30/2021    Procedure: ARTERIOGRAM, MESENTERIC;  Surgeon: Krish Machado MD;  Location: [facility] CATH/EP LAB;  Service: General;  Laterality: N/A;    BACK SURGERY      CATARACT EXTRACTION, BILATERAL      COLONOSCOPY N/A 4/4/2022    Procedure: COLONOSCOPY;  Surgeon: Lino Devi MD;  Location: [facility] ENDO;  Service: Endoscopy;  Laterality: N/A;    CREATION, BYPASS, ARTERIAL, AORTA TO FEMORAL N/A 1/13/2021    Procedure: ARTERIOGRAM, MESENTERIC;  Surgeon: Krish Machado MD;  Location: [facility] CATH/EP LAB;  Service: General;  Laterality: N/A;    ENDOSCOPY OF PROXIMAL SMALL INTESTINE N/A 4/4/2022    Procedure: ENTEROSCOPY, PROXIMAL;  Surgeon: Lino Devi MD;  Location: [facility] ENDO;  Service: Endoscopy;  Laterality: N/A;    HYSTERECTOMY      IMPLANTATION OF SPINAL CORD STIMULATOR IN CERVICAL SPINE       Family History   Problem Relation Age of Onset    Melanoma Neg Hx     Psoriasis Neg Hx     Eczema Neg Hx     Lupus Neg Hx         Social History:   Marital Status: Single  Occupation: Data Unavailable  Alcohol History:  reports no history of alcohol use.  Tobacco History:   reports that she has been smoking cigarettes. She has been smoking about 1.00 pack per day. She has never used smokeless tobacco.  Drug History:  reports previous drug use.    Review of patient's allergies indicates:  No Known Allergies    Current Outpatient Medications   Medication Sig Dispense Refill    aspirin (ECOTRIN) 81 MG EC tablet Take 81 mg by mouth once daily.      clopidogreL (PLAVIX) 75 mg tablet Take 1 tablet (75 mg total) by mouth once daily. 30 tablet 3    folic acid/multivit-min/lutein (CENTRUM SILVER ORAL) Take 1 capsule by mouth once daily.      KRILL OIL ORAL Take 1,000 mg by mouth once daily.       L. gasseri-B. bifidum-B longum 1.5 billion cell Cap Take 1 tablet by mouth once daily.       metoprolol succinate (TOPROL-XL) 50 MG 24 hr tablet 50 mg 2 (two) times daily.       oxycodone-acetaminophen (PERCOCET)  mg per tablet Take 1 tablet by mouth every 6 (six) hours as needed for Pain.      pantoprazole (PROTONIX) 40 MG tablet Take 1 tablet (40 mg total) by mouth 2 (two) times daily. 180 tablet 0    simvastatin (ZOCOR) 10 MG tablet Take 10 mg by mouth once daily.       vit A/vit C/vit E/zinc/copper (PRESERVISION AREDS ORAL) Take 1 capsule by mouth 2 (two) times a day.      ALPRAZolam (XANAX) 0.5 MG tablet Take 0.5 mg by mouth 2 (two) times daily.      amlodipine-benazepril 5-10 mg (LOTREL) 5-10 mg per capsule Hold until follow up with your pcp (Patient not taking: Reported on 4/19/2022)       No current facility-administered medications for this visit.       Alpha-1 Antitrypsin:  Last PFT:   Last CT:  04/01/2022  .  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  2.  Patent stent in the proximal celiac artery.  3. 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  3.  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  4.  Moderate centrilobular emphysema.     Review of Systems  General: Feeling fair, sometimes  wiped out  Eyes: Vision is has macular degneration  ENT:  No sinusitis or pharyngitis.   Heart:: No chest pain or palpitations.  Lungs: No cough, sputum, or wheezing.  GI: occasional diarrhea  : No dysuria, hesitancy, or nocturia.  Musculoskeletal:back pain  Skin: No lesions or rashes. Bruises from plavix  Neuro: No headaches or neuropathy.  Lymph: No edema or adenopathy.  Psych: No anxiety or depression.  Endo: up 2 pounds    OBJECTIVE:      /70 (BP Location: Left arm, Patient Position: Sitting)   Pulse 68   Wt 36.7 kg (81 lb)   LMP  (LMP Unknown)   SpO2 99%   BMI 14.82 kg/m²     Physical Exam  GENERAL: Older, very thin patient in no distress.  HEENT: Pupils equal and reactive. Extraocular movements intact. Nose intact.      Pharynx moist.  NECK: Supple.   HEART: Regular rate and rhythm. No murmur or gallop auscultated.  LUNGS: Clear to auscultation and percussion. Lung excursion symmetrical. No change in fremitus. No adventitial noises.  ABDOMEN: Bowel sounds present. Non-tender, no masses palpated.  EXTREMITIES: Normal muscle tone and joint movement, no cyanosis or clubbing.   LYMPHATICS: No adenopathy palpated, no edema.  SKIN: Dry, intact, no lesions.   NEURO: Cranial nerves II-XII intact. Motor strength 5/5 bilaterally, upper and lower extremities.  PSYCH: Appropriate affect.    Assessment:       1. Centrilobular emphysema    2. History of tobacco abuse        Too old to follow CTs  Plan:       Centrilobular emphysema  -     Complete PFT with bronchodilator; Future    History of tobacco abuse  -     Complete PFT with bronchodilator; Future         Follow up in about 2 months (around 6/19/2022).

## 2022-05-03 ENCOUNTER — HOSPITAL ENCOUNTER (OUTPATIENT)
Dept: PULMONOLOGY | Facility: HOSPITAL | Age: 82
Discharge: HOME | End: 2022-05-03
Attending: INTERNAL MEDICINE
Payer: MEDICARE

## 2022-05-03 ENCOUNTER — TELEPHONE (OUTPATIENT)
Dept: PULMONOLOGY | Facility: CLINIC | Age: 82
End: 2022-05-03

## 2022-05-03 DIAGNOSIS — J43.2 CENTRILOBULAR EMPHYSEMA: ICD-10-CM

## 2022-05-03 DIAGNOSIS — Z87.891 HISTORY OF TOBACCO ABUSE: ICD-10-CM

## 2022-05-03 DIAGNOSIS — J44.9 CHRONIC OBSTRUCTIVE PULMONARY DISEASE, UNSPECIFIED COPD TYPE: Primary | ICD-10-CM

## 2022-05-03 PROCEDURE — 94060 EVALUATION OF WHEEZING: CPT

## 2022-05-03 PROCEDURE — 94010 BREATHING CAPACITY TEST: CPT | Mod: XB

## 2022-05-03 PROCEDURE — 94729 DIFFUSING CAPACITY: CPT

## 2022-05-03 PROCEDURE — 94727 GAS DIL/WSHOT DETER LNG VOL: CPT

## 2022-05-04 RX ORDER — FLUTICASONE FUROATE AND VILANTEROL TRIFENATATE 100; 25 UG/1; UG/1
1 POWDER RESPIRATORY (INHALATION) DAILY
Qty: 1 EACH | Refills: 11 | Status: SHIPPED | OUTPATIENT
Start: 2022-05-04 | End: 2022-06-29

## 2022-05-04 NOTE — TELEPHONE ENCOUNTER
Will restart the patient on Breo.  Reviewed how to use it and rinsing her mouth out afterwards.  She has an appointment in June.

## 2022-05-26 DIAGNOSIS — M85.88 OTHER SPECIFIED DISORDERS OF BONE DENSITY AND STRUCTURE, OTHER SITE: Primary | ICD-10-CM

## 2022-05-26 DIAGNOSIS — Z78.0 ASYMPTOMATIC MENOPAUSAL STATE: ICD-10-CM

## 2022-06-20 ENCOUNTER — HOSPITAL ENCOUNTER (OUTPATIENT)
Dept: RADIOLOGY | Facility: HOSPITAL | Age: 82
Discharge: HOME | End: 2022-06-20
Attending: NURSE PRACTITIONER
Payer: MEDICARE

## 2022-06-20 DIAGNOSIS — Z78.0 ASYMPTOMATIC MENOPAUSAL STATE: ICD-10-CM

## 2022-06-20 DIAGNOSIS — M85.88 OTHER SPECIFIED DISORDERS OF BONE DENSITY AND STRUCTURE, OTHER SITE: ICD-10-CM

## 2022-06-20 PROCEDURE — 77080 DXA BONE DENSITY AXIAL: CPT | Mod: TC,PO

## 2022-06-29 ENCOUNTER — OFFICE VISIT (OUTPATIENT)
Dept: PULMONOLOGY | Facility: CLINIC | Age: 82
End: 2022-06-29
Payer: MEDICARE

## 2022-06-29 VITALS
WEIGHT: 83 LBS | HEIGHT: 62 IN | HEART RATE: 72 BPM | BODY MASS INDEX: 15.27 KG/M2 | OXYGEN SATURATION: 93 % | DIASTOLIC BLOOD PRESSURE: 60 MMHG | SYSTOLIC BLOOD PRESSURE: 140 MMHG

## 2022-06-29 DIAGNOSIS — R49.0 HOARSENESS: ICD-10-CM

## 2022-06-29 DIAGNOSIS — J44.9 CHRONIC OBSTRUCTIVE PULMONARY DISEASE, UNSPECIFIED COPD TYPE: Primary | ICD-10-CM

## 2022-06-29 PROCEDURE — 3078F PR MOST RECENT DIASTOLIC BLOOD PRESSURE < 80 MM HG: ICD-10-PCS | Mod: CPTII,S$GLB,, | Performed by: INTERNAL MEDICINE

## 2022-06-29 PROCEDURE — 1159F PR MEDICATION LIST DOCUMENTED IN MEDICAL RECORD: ICD-10-PCS | Mod: CPTII,S$GLB,, | Performed by: INTERNAL MEDICINE

## 2022-06-29 PROCEDURE — 3078F DIAST BP <80 MM HG: CPT | Mod: CPTII,S$GLB,, | Performed by: INTERNAL MEDICINE

## 2022-06-29 PROCEDURE — 3077F SYST BP >= 140 MM HG: CPT | Mod: CPTII,S$GLB,, | Performed by: INTERNAL MEDICINE

## 2022-06-29 PROCEDURE — 99213 PR OFFICE/OUTPT VISIT, EST, LEVL III, 20-29 MIN: ICD-10-PCS | Mod: S$GLB,,, | Performed by: INTERNAL MEDICINE

## 2022-06-29 PROCEDURE — 1159F MED LIST DOCD IN RCRD: CPT | Mod: CPTII,S$GLB,, | Performed by: INTERNAL MEDICINE

## 2022-06-29 PROCEDURE — 3077F PR MOST RECENT SYSTOLIC BLOOD PRESSURE >= 140 MM HG: ICD-10-PCS | Mod: CPTII,S$GLB,, | Performed by: INTERNAL MEDICINE

## 2022-06-29 PROCEDURE — 99213 OFFICE O/P EST LOW 20 MIN: CPT | Mod: S$GLB,,, | Performed by: INTERNAL MEDICINE

## 2022-06-29 PROCEDURE — 1125F PR PAIN SEVERITY QUANTIFIED, PAIN PRESENT: ICD-10-PCS | Mod: CPTII,S$GLB,, | Performed by: INTERNAL MEDICINE

## 2022-06-29 PROCEDURE — 1125F AMNT PAIN NOTED PAIN PRSNT: CPT | Mod: CPTII,S$GLB,, | Performed by: INTERNAL MEDICINE

## 2022-06-29 RX ORDER — OMEPRAZOLE MAGNESIUM, AMOXICILLIN AND RIFABUTIN 10; 250; 12.5 MG/1; MG/1; MG/1
CAPSULE, DELAYED RELEASE ORAL
Status: ON HOLD | COMMUNITY
Start: 2022-04-29 | End: 2022-11-21 | Stop reason: HOSPADM

## 2022-06-29 RX ORDER — OXYCODONE AND ACETAMINOPHEN 10; 325 MG/1; MG/1
1 TABLET ORAL
Status: ON HOLD | COMMUNITY
Start: 2022-06-08 | End: 2022-11-21 | Stop reason: HOSPADM

## 2022-06-29 RX ORDER — CHOLECALCIFEROL (VITAMIN D3) 10(400)/ML
400 DROPS ORAL DAILY
COMMUNITY
End: 2023-10-24

## 2022-06-29 RX ORDER — FERROUS SULFATE 325(65) MG
325 TABLET, DELAYED RELEASE (ENTERIC COATED) ORAL DAILY
COMMUNITY
Start: 2022-04-26

## 2022-06-29 RX ORDER — UMECLIDINIUM BROMIDE AND VILANTEROL TRIFENATATE 62.5; 25 UG/1; UG/1
1 POWDER RESPIRATORY (INHALATION) DAILY
Qty: 1 EACH | Refills: 5 | Status: SHIPPED | OUTPATIENT
Start: 2022-06-29 | End: 2022-08-16 | Stop reason: SDUPTHER

## 2022-06-29 RX ORDER — EPINEPHRINE 0.22MG
100 AEROSOL WITH ADAPTER (ML) INHALATION DAILY
Status: ON HOLD | COMMUNITY
End: 2022-11-21 | Stop reason: HOSPADM

## 2022-06-29 NOTE — PROGRESS NOTES
SUBJECTIVE:    Patient ID: Carmen Wilkinson is a 82 y.o. female.    Chief Complaint: Emphysema    HPI The patient is here with hoarseness.  She is breathng better.  She is having hoarseness despite rinsing her mouth faithfully.  Her exercise tolerance is much better.  Her mucus production is much better.  Past Medical History:   Diagnosis Date    Back pain     Brain aneurysm     Carotid stenosis     Cataract     Diverticulitis     Emphysema lung     Feeling anxious     Hyperlipidemia     Hypertension     Macular degeneration     PVD (peripheral vascular disease)     Squamous cell carcinoma 08/29/2017    right medical cheek, SSIS, efudex tx     Past Surgical History:   Procedure Laterality Date    ABDOMINAL SURGERY      ARTERIOGRAM, MESENTERIC N/A 6/30/2021    Procedure: ARTERIOGRAM, MESENTERIC;  Surgeon: Krish Machado MD;  Location: [facility] CATH/EP LAB;  Service: General;  Laterality: N/A;    BACK SURGERY      CATARACT EXTRACTION, BILATERAL      COLONOSCOPY N/A 4/4/2022    Procedure: COLONOSCOPY;  Surgeon: Lino Devi MD;  Location: [facility] ENDO;  Service: Endoscopy;  Laterality: N/A;    CREATION, BYPASS, ARTERIAL, AORTA TO FEMORAL N/A 1/13/2021    Procedure: ARTERIOGRAM, MESENTERIC;  Surgeon: Krish Machado MD;  Location: [facility] CATH/EP LAB;  Service: General;  Laterality: N/A;    ENDOSCOPY OF PROXIMAL SMALL INTESTINE N/A 4/4/2022    Procedure: ENTEROSCOPY, PROXIMAL;  Surgeon: Lino Devi MD;  Location: [facility] ENDO;  Service: Endoscopy;  Laterality: N/A;    HYSTERECTOMY      IMPLANTATION OF SPINAL CORD STIMULATOR IN CERVICAL SPINE       Family History   Problem Relation Age of Onset    Melanoma Neg Hx     Psoriasis Neg Hx     Eczema Neg Hx     Lupus Neg Hx         Social History:   Marital Status: Single  Occupation: Data Unavailable  Alcohol History:  reports no history of alcohol use.  Tobacco History:  reports that she quit smoking about 2 months ago. Her smoking use included  cigarettes. She started smoking about 57 years ago. She smoked 1.00 pack per day. She has never used smokeless tobacco.  Drug History:  reports previous drug use.    Review of patient's allergies indicates:  No Known Allergies    Current Outpatient Medications   Medication Sig Dispense Refill    aspirin (ECOTRIN) 81 MG EC tablet Take 81 mg by mouth once daily.      cholecalciferol, vitamin D3, (VITAMIN D3) 10 mcg/mL (400 unit/mL) Drop as directed      clopidogreL (PLAVIX) 75 mg tablet Take 1 tablet (75 mg total) by mouth once daily. 30 tablet 3    coenzyme Q10 100 mg capsule as directed      ferrous sulfate 325 (65 FE) MG EC tablet 1 tablet      folic acid/multivit-min/lutein (CENTRUM SILVER ORAL) Take 1 capsule by mouth once daily.      KRILL OIL ORAL Take 1,000 mg by mouth once daily.       L. gasseri-B. bifidum-B longum 1.5 billion cell Cap Take 1 tablet by mouth once daily.       metoprolol succinate (TOPROL-XL) 50 MG 24 hr tablet 50 mg 2 (two) times daily.       oxyCODONE-acetaminophen (PERCOCET)  mg per tablet Take 1 tablet by mouth.      pantoprazole (PROTONIX) 40 MG tablet Take 1 tablet (40 mg total) by mouth 2 (two) times daily. 180 tablet 0    simvastatin (ZOCOR) 10 MG tablet Take 10 mg by mouth once daily.       TALICIA -12.5 mg CpID SMARTSI Capsule(s) By Mouth Every 8 Hours      vit A/vit C/vit E/zinc/copper (PRESERVISION AREDS ORAL) Take 1 capsule by mouth 2 (two) times a day.      ALPRAZolam (XANAX) 0.5 MG tablet Take 0.5 mg by mouth 2 (two) times daily.      amlodipine-benazepril 5-10 mg (LOTREL) 5-10 mg per capsule Hold until follow up with your pcp (Patient not taking: No sig reported)      oxycodone-acetaminophen (PERCOCET)  mg per tablet Take 1 tablet by mouth every 6 (six) hours as needed for Pain.      umeclidinium-vilanteroL (ANORO ELLIPTA) 62.5-25 mcg/actuation DsDv Inhale 1 puff into the lungs once daily. Controller 1 each 5     No current  "facility-administered medications for this visit.       Alpha-1 Antitrypsin:  Last PFT:  05/03/2022 moderate obstruction, minimal restriction, and a very severe diffusion defect with no response to bronchodilators  Last CT:  04/01/2022  .  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  2.  Patent stent in the proximal celiac artery.  3. 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  3.  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  4.  Moderate centrilobular emphysema.     Review of Systems  General: Feeling much better  Eyes: Vision is has macular degneration  ENT:  No sinusitis or pharyngitis.   Heart:: No chest pain or palpitations.  Lungs: No cough, sputum, or wheezing.  GI: occasional diarrhea  : No dysuria, hesitancy, or nocturia.  Musculoskeletal:back pain  Skin: No lesions or rashes. Bruises from plavix  Neuro: No headaches or neuropathy.  Lymph: No edema or adenopathy.  Psych: No anxiety or depression.  Endo: up 2 pounds again    OBJECTIVE:      BP (!) 140/60 (BP Location: Left arm, Patient Position: Sitting, BP Method: Small (Manual))   Pulse 72   Ht 5' 2" (1.575 m)   Wt 37.6 kg (83 lb)   LMP  (LMP Unknown)   SpO2 (!) 93%   BMI 15.18 kg/m²     Physical Exam  GENERAL: Older, very thin patient in no distress.  HEENT: Pupils equal and reactive. Extraocular movements intact. Nose intact.      Pharynx moist.  NECK: Supple.   HEART: Regular rate and rhythm. No murmur or gallop auscultated.  LUNGS: Clear to auscultation and percussion. Lung excursion symmetrical. No change in fremitus. No adventitial noises.  ABDOMEN: Bowel sounds present. Non-tender, no masses palpated.  EXTREMITIES: Normal muscle tone and joint movement, no cyanosis or clubbing.   LYMPHATICS: No adenopathy palpated, no edema.  SKIN: Dry, intact, no lesions.   NEURO: Cranial nerves II-XII intact. Motor strength 5/5 bilaterally, upper and lower " extremities.  PSYCH: Appropriate affect.    Assessment:       1. Chronic obstructive pulmonary disease, unspecified COPD type    2. Hoarseness        Too old to follow CTs  Plan:       Chronic obstructive pulmonary disease, unspecified COPD type  -     umeclidinium-vilanteroL (ANORO ELLIPTA) 62.5-25 mcg/actuation DsDv; Inhale 1 puff into the lungs once daily. Controller  Dispense: 1 each; Refill: 5    Hoarseness       Stop Breo  Start Anoro daily  Follow up in about 6 months (around 12/29/2022).

## 2022-07-13 DIAGNOSIS — K55.1: Primary | ICD-10-CM

## 2022-07-17 ENCOUNTER — HOSPITAL ENCOUNTER (EMERGENCY)
Facility: HOSPITAL | Age: 82
Discharge: HOME | End: 2022-07-17
Attending: EMERGENCY MEDICINE
Payer: MEDICARE

## 2022-07-17 VITALS
BODY MASS INDEX: 15.34 KG/M2 | HEART RATE: 73 BPM | WEIGHT: 83.38 LBS | TEMPERATURE: 98 F | OXYGEN SATURATION: 93 % | SYSTOLIC BLOOD PRESSURE: 127 MMHG | RESPIRATION RATE: 14 BRPM | HEIGHT: 62 IN | DIASTOLIC BLOOD PRESSURE: 61 MMHG

## 2022-07-17 DIAGNOSIS — R19.5 DARK STOOLS: Primary | ICD-10-CM

## 2022-07-17 LAB
ABO + RH BLD: NORMAL
ALBUMIN SERPL BCP-MCNC: 3.9 G/DL (ref 3.5–5.2)
ALP SERPL-CCNC: 95 U/L (ref 55–135)
ALT SERPL W/O P-5'-P-CCNC: 17 U/L (ref 10–44)
ANION GAP SERPL CALC-SCNC: 7 MMOL/L (ref 8–16)
APTT PPP: 35.4 SEC (ref 23.3–35.1)
AST SERPL-CCNC: 26 U/L (ref 10–40)
BASOPHILS # BLD AUTO: 0.05 K/UL (ref 0–0.2)
BASOPHILS NFR BLD: 0.6 % (ref 0–1.9)
BILIRUB SERPL-MCNC: 0.7 MG/DL (ref 0.1–1)
BLD GP AB SCN CELLS X3 SERPL QL: NORMAL
BUN SERPL-MCNC: 31 MG/DL (ref 8–23)
CALCIUM SERPL-MCNC: 9.5 MG/DL (ref 8.7–10.5)
CHLORIDE SERPL-SCNC: 100 MMOL/L (ref 95–110)
CO2 SERPL-SCNC: 27 MMOL/L (ref 23–29)
CREAT SERPL-MCNC: 0.7 MG/DL (ref 0.5–1.4)
DIFFERENTIAL METHOD: ABNORMAL
EOSINOPHIL # BLD AUTO: 0.1 K/UL (ref 0–0.5)
EOSINOPHIL NFR BLD: 0.6 % (ref 0–8)
ERYTHROCYTE [DISTWIDTH] IN BLOOD BY AUTOMATED COUNT: 13.7 % (ref 11.5–14.5)
EST. GFR  (AFRICAN AMERICAN): >60 ML/MIN/1.73 M^2
EST. GFR  (NON AFRICAN AMERICAN): >60 ML/MIN/1.73 M^2
GLUCOSE SERPL-MCNC: 103 MG/DL (ref 70–110)
HCT VFR BLD AUTO: 38.1 % (ref 37–48.5)
HGB BLD-MCNC: 12 G/DL (ref 12–16)
IMM GRANULOCYTES # BLD AUTO: 0.01 K/UL (ref 0–0.04)
IMM GRANULOCYTES NFR BLD AUTO: 0.1 % (ref 0–0.5)
INR PPP: 1.2
LYMPHOCYTES # BLD AUTO: 1.2 K/UL (ref 1–4.8)
LYMPHOCYTES NFR BLD: 14.9 % (ref 18–48)
MCH RBC QN AUTO: 29.5 PG (ref 27–31)
MCHC RBC AUTO-ENTMCNC: 31.5 G/DL (ref 32–36)
MCV RBC AUTO: 94 FL (ref 82–98)
MONOCYTES # BLD AUTO: 0.6 K/UL (ref 0.3–1)
MONOCYTES NFR BLD: 7.6 % (ref 4–15)
NEUTROPHILS # BLD AUTO: 6.1 K/UL (ref 1.8–7.7)
NEUTROPHILS NFR BLD: 76.2 % (ref 38–73)
NRBC BLD-RTO: 0 /100 WBC
OB PNL STL: NEGATIVE
PLATELET # BLD AUTO: 306 K/UL (ref 150–450)
PMV BLD AUTO: 8.8 FL (ref 9.2–12.9)
POTASSIUM SERPL-SCNC: 4.7 MMOL/L (ref 3.5–5.1)
PROT SERPL-MCNC: 7.6 G/DL (ref 6–8.4)
PROTHROMBIN TIME: 14.4 SEC (ref 11.4–13.7)
RBC # BLD AUTO: 4.07 M/UL (ref 4–5.4)
SODIUM SERPL-SCNC: 134 MMOL/L (ref 136–145)
WBC # BLD AUTO: 7.99 K/UL (ref 3.9–12.7)

## 2022-07-17 PROCEDURE — 80053 COMPREHEN METABOLIC PANEL: CPT | Performed by: EMERGENCY MEDICINE

## 2022-07-17 PROCEDURE — 99283 EMERGENCY DEPT VISIT LOW MDM: CPT

## 2022-07-17 PROCEDURE — 85730 THROMBOPLASTIN TIME PARTIAL: CPT | Performed by: EMERGENCY MEDICINE

## 2022-07-17 PROCEDURE — 85025 COMPLETE CBC W/AUTO DIFF WBC: CPT | Performed by: EMERGENCY MEDICINE

## 2022-07-17 PROCEDURE — 86850 RBC ANTIBODY SCREEN: CPT | Performed by: EMERGENCY MEDICINE

## 2022-07-17 PROCEDURE — 86900 BLOOD TYPING SEROLOGIC ABO: CPT | Performed by: EMERGENCY MEDICINE

## 2022-07-17 PROCEDURE — 85610 PROTHROMBIN TIME: CPT | Performed by: EMERGENCY MEDICINE

## 2022-07-17 PROCEDURE — 82272 OCCULT BLD FECES 1-3 TESTS: CPT | Performed by: EMERGENCY MEDICINE

## 2022-07-17 NOTE — ED PROVIDER NOTES
Encounter Date: 7/17/2022       History     Chief Complaint   Patient presents with    Rectal Bleeding     Pt c/o blood in stool this am. She states dark stool noted x 1     82-year-old female with past medical history of PVD, emphysema, hypertension, hyperlipidemia, presents emergency department with concern for GI bleeding.  Says that she had 3 bowel movements this morning and last 1 was black, dark.  She says she had a gastrointestinal bleeding 3 months ago in April.  Says that the disc (ulcer.  It is since she is on also medicine.  She is concerned that she could be bleeding again.  Occasional abdominal pain but nothing severe.  No chest pain or shortness of breath.  patient is on Plavix for PVD.        Review of patient's allergies indicates:  No Known Allergies  Past Medical History:   Diagnosis Date    Back pain     Brain aneurysm     Carotid stenosis     Cataract     Diverticulitis     Emphysema lung     Feeling anxious     Hyperlipidemia     Hypertension     Macular degeneration     PVD (peripheral vascular disease)     Squamous cell carcinoma 08/29/2017    right medical cheek, SSIS, efudex tx     Past Surgical History:   Procedure Laterality Date    ABDOMINAL SURGERY      ARTERIOGRAM, MESENTERIC N/A 6/30/2021    Procedure: ARTERIOGRAM, MESENTERIC;  Surgeon: Krish Machado MD;  Location: [facility] CATH/EP LAB;  Service: General;  Laterality: N/A;    BACK SURGERY      CATARACT EXTRACTION, BILATERAL      COLONOSCOPY N/A 4/4/2022    Procedure: COLONOSCOPY;  Surgeon: Lino Devi MD;  Location: [facility] ENDO;  Service: Endoscopy;  Laterality: N/A;    CREATION, BYPASS, ARTERIAL, AORTA TO FEMORAL N/A 1/13/2021    Procedure: ARTERIOGRAM, MESENTERIC;  Surgeon: Krish Machado MD;  Location: [facility] CATH/EP LAB;  Service: General;  Laterality: N/A;    ENDOSCOPY OF PROXIMAL SMALL INTESTINE N/A 4/4/2022    Procedure: ENTEROSCOPY, PROXIMAL;  Surgeon: Lino Devi MD;  Location: [facility] ENDO;  Service:  Endoscopy;  Laterality: N/A;    HYSTERECTOMY      IMPLANTATION OF SPINAL CORD STIMULATOR IN CERVICAL SPINE       Family History   Problem Relation Age of Onset    Melanoma Neg Hx     Psoriasis Neg Hx     Eczema Neg Hx     Lupus Neg Hx      Social History     Tobacco Use    Smoking status: Former Smoker     Packs/day: 1.00     Types: Cigarettes     Start date: 1965     Quit date: 2022     Years since quittin.2    Smokeless tobacco: Never Used   Substance Use Topics    Alcohol use: No    Drug use: Not Currently     Review of Systems   Constitutional: Negative for chills and fever.   HENT: Negative for congestion and sore throat.    Respiratory: Negative for shortness of breath.    Cardiovascular: Negative for chest pain and palpitations.   Gastrointestinal: Positive for blood in stool. Negative for diarrhea, nausea and vomiting.   Genitourinary: Negative for dysuria.   Musculoskeletal: Negative for back pain.   Skin: Negative for rash.   Neurological: Negative for syncope, weakness and headaches.   Hematological: Does not bruise/bleed easily.   All other systems reviewed and are negative.      Physical Exam     Initial Vitals [22 1208]   BP Pulse Resp Temp SpO2   (!) 196/85 86 15 97.9 °F (36.6 °C) 98 %      MAP       --         Physical Exam    Nursing note and vitals reviewed.  Constitutional: She appears well-developed. No distress.   Is frail, elderly, cachectic   HENT:   Head: Normocephalic and atraumatic.   Mouth/Throat: No oropharyngeal exudate.   Eyes: Conjunctivae and EOM are normal. Pupils are equal, round, and reactive to light.   Neck: Neck supple. No tracheal deviation present.   Normal range of motion.  Cardiovascular: Normal rate, regular rhythm, normal heart sounds and intact distal pulses.   No murmur heard.  Pulmonary/Chest: Breath sounds normal. No stridor. No respiratory distress. She has no wheezes. She has no rhonchi. She has no rales.   Abdominal: Abdomen is soft. She  exhibits no distension. There is no abdominal tenderness. There is no rebound and no guarding.   Genitourinary:    Genitourinary Comments: Rectal exam with dark stool.  chaperoned by Tiffani GEE There is no hemorrhoid.  No anal fissure     Musculoskeletal:         General: No tenderness or edema. Normal range of motion.      Cervical back: Normal range of motion and neck supple.     Lymphadenopathy:     She has no cervical adenopathy.   Neurological: She is alert and oriented to person, place, and time. She has normal strength. No cranial nerve deficit or sensory deficit. GCS score is 15. GCS eye subscore is 4. GCS verbal subscore is 5. GCS motor subscore is 6.   Skin: Skin is warm and dry. Capillary refill takes less than 2 seconds. No rash noted. No erythema. No pallor.   Psychiatric: She has a normal mood and affect.         ED Course   Procedures  Labs Reviewed   CBC W/ AUTO DIFFERENTIAL - Abnormal; Notable for the following components:       Result Value    MCHC 31.5 (*)     MPV 8.8 (*)     Gran % 76.2 (*)     Lymph % 14.9 (*)     All other components within normal limits   COMPREHENSIVE METABOLIC PANEL - Abnormal; Notable for the following components:    Sodium 134 (*)     BUN 31 (*)     Anion Gap 7 (*)     All other components within normal limits   APTT - Abnormal; Notable for the following components:    aPTT 35.4 (*)     All other components within normal limits   PROTIME-INR - Abnormal; Notable for the following components:    PT 14.4 (*)     All other components within normal limits   OCCULT BLOOD X 1, STOOL   TYPE & SCREEN           Results for orders placed or performed during the hospital encounter of 07/17/22   CBC auto differential   Result Value Ref Range    WBC 7.99 3.90 - 12.70 K/uL    RBC 4.07 4.00 - 5.40 M/uL    Hemoglobin 12.0 12.0 - 16.0 g/dL    Hematocrit 38.1 37.0 - 48.5 %    MCV 94 82 - 98 fL    MCH 29.5 27.0 - 31.0 pg    MCHC 31.5 (L) 32.0 - 36.0 g/dL    RDW 13.7 11.5 - 14.5 %     Platelets 306 150 - 450 K/uL    MPV 8.8 (L) 9.2 - 12.9 fL    Immature Granulocytes 0.1 0.0 - 0.5 %    Gran # (ANC) 6.1 1.8 - 7.7 K/uL    Immature Grans (Abs) 0.01 0.00 - 0.04 K/uL    Lymph # 1.2 1.0 - 4.8 K/uL    Mono # 0.6 0.3 - 1.0 K/uL    Eos # 0.1 0.0 - 0.5 K/uL    Baso # 0.05 0.00 - 0.20 K/uL    nRBC 0 0 /100 WBC    Gran % 76.2 (H) 38.0 - 73.0 %    Lymph % 14.9 (L) 18.0 - 48.0 %    Mono % 7.6 4.0 - 15.0 %    Eosinophil % 0.6 0.0 - 8.0 %    Basophil % 0.6 0.0 - 1.9 %    Differential Method Automated    Comprehensive metabolic panel   Result Value Ref Range    Sodium 134 (L) 136 - 145 mmol/L    Potassium 4.7 3.5 - 5.1 mmol/L    Chloride 100 95 - 110 mmol/L    CO2 27 23 - 29 mmol/L    Glucose 103 70 - 110 mg/dL    BUN 31 (H) 8 - 23 mg/dL    Creatinine 0.7 0.5 - 1.4 mg/dL    Calcium 9.5 8.7 - 10.5 mg/dL    Total Protein 7.6 6.0 - 8.4 g/dL    Albumin 3.9 3.5 - 5.2 g/dL    Total Bilirubin 0.7 0.1 - 1.0 mg/dL    Alkaline Phosphatase 95 55 - 135 U/L    AST 26 10 - 40 U/L    ALT 17 10 - 44 U/L    Anion Gap 7 (L) 8 - 16 mmol/L    eGFR if African American >60.0 >60 mL/min/1.73 m^2    eGFR if non African American >60.0 >60 mL/min/1.73 m^2   APTT   Result Value Ref Range    aPTT 35.4 (H) 23.3 - 35.1 sec   Protime-INR   Result Value Ref Range    PT 14.4 (H) 11.4 - 13.7 sec    INR 1.2    Occult blood x 1, stool    Specimen: Stool   Result Value Ref Range    Occult Blood Negative Negative   Type & Screen   Result Value Ref Range    Group & Rh B NEG     Indirect Mary NEG      No orders to display       Imaging Results    None          Medications - No data to display  Medical Decision Making:   ED Management:  82-year-old female presents emergency department with 1 episode of dark stool today.  Has not had any further dark stools in the emergency department.  Patient is on iron.  Second any Pepto-Bismol.  She has a history of GI bleeding in the past and she was concerned it could be happening again.  Patient is  hemodynamically stable.  Not hypertensive or tachycardic.  Her hemoglobin is 12.1.  She did have dark stool on exam but was heme-negative.  This could be related to the patient's iron that she is taking.  Could be related to GI bleed as well and false negative occult blood.  I offered to admitted to the hospital for observation and cetera.  Patient declined states that if she gets worse she will come back.  She will follow up with GI on an outpatient basis.    I had a detailed discussion with the patient and/or guardian regarding: The historical points, exam findings, and diagnostic results supporting the discharge diagnosis, lab results, pertinent radiology results, and the need for outpatient follow-up, for definitive care with a family practitioner and to return to the emergency department if symptoms worsen or persist or if there are any questions or concerns that arise at home. All questions have been answered in detail. Strict return to Emergency Department precautions have been provided.    This patient was seen during the context of the Covid 19 global pandemic where local, state, hospital guidelines, were followed to the best of ability given the circumstances of the pandemic.  A dictation software program was used for this note.  Please expect some simple typographical  errors in this note.              ED Course as of 07/17/22 1437   Sun Jul 17, 2022   1415 No further bleeding during ED stay.  Patient given option for admission but declines and wants go home. [JR]      ED Course User Index  [JR] Torrey Campoverde DO             Clinical Impression:   Final diagnoses:  [R19.5] Dark stools (Primary)          ED Disposition Condition    Discharge Stable        ED Prescriptions     None        Follow-up Information     Follow up With Specialties Details Why Contact Info Additional Information    Abiodun Will MD Family Medicine In 2 days  [address] [facility]  [place] [address]  [phone]       [place]  [facility] - Emergency Dept Emergency Medicine  If symptoms worsen [address]  [place] [address]  [phone] 1st floor    Lino Devi MD Gastroenterology In 3 days  [address]   [place] [address]  [phone]              Torrey Campoverde, DO  07/17/22 1423       Torrey Campoverde, DO  07/17/22 1432

## 2022-07-17 NOTE — DISCHARGE INSTRUCTIONS
RETURN TO EMERGENCY DEPARTMENT WITHOUT FAIL, IF YOUR SYMPTOMS WORSEN, IF YOU GET NEW OR DIFFERENT SYMPTOMS, IF YOU ARE UNABLE TO FOLLOW UP AS DIRECTED, OR IF YOU HAVE ANY CONCERNS OR WORRIES.    Follow up with GI on outpatient basis.

## 2022-07-17 NOTE — ED NOTES
LOC: The patient is awake, alert and aware of environment with an appropriate affect, the patient is oriented x 3 and speaking appropriately.  APPEARANCE: Patient resting comfortably and in no acute distress, patient is clean and well groomed, patient's clothing properly fastened.  SKIN: The skin is warm and dry, color consistent with ethnicity, patient has normal skin turgor and moist mucus membranes, skin intact, no breakdown or brusing noted.  MUSKULOSKELETAL: Patient moving all extremities well, no obvious swelling or deformities noted.  RESPIRATORY: Airway is open and patent, respirations are spontaneous, patient has a normal effort and rate, no accessory muscle use noted.  CARDIAC: Patient has a normal rate and rhythm, no peripheral edema noted, capillary refill < 3 seconds.  ABDOMEN: Soft and non tender to palpation, no distention noted.  NEUROLOGIC: PERRL, 3mm bilaterally, eyes open spontaneously, behavior appropriate to situation, follows commands, facial expression symmetrical, bilateral hand grasp equal and even, purposeful motor response noted, normal sensation in all extremities when touched with a finger.+  Pt reports dark tarry stools that started this morning along with generalized body weakness.

## 2022-07-19 ENCOUNTER — HOSPITAL ENCOUNTER (OUTPATIENT)
Dept: RADIOLOGY | Facility: HOSPITAL | Age: 82
Discharge: HOME | End: 2022-07-19
Attending: SURGERY
Payer: MEDICARE

## 2022-07-19 DIAGNOSIS — K55.1: ICD-10-CM

## 2022-07-19 PROCEDURE — 25500020 PHARM REV CODE 255: Performed by: SURGERY

## 2022-07-19 PROCEDURE — 74174 CTA ABD&PLVS W/CONTRAST: CPT | Mod: TC

## 2022-07-19 RX ADMIN — IOHEXOL 100 ML: 350 INJECTION, SOLUTION INTRAVENOUS at 02:07

## 2022-08-02 ENCOUNTER — HOSPITAL ENCOUNTER (EMERGENCY)
Facility: HOSPITAL | Age: 82
Discharge: HOME | End: 2022-08-02
Attending: EMERGENCY MEDICINE
Payer: MEDICARE

## 2022-08-02 VITALS
WEIGHT: 85 LBS | SYSTOLIC BLOOD PRESSURE: 167 MMHG | TEMPERATURE: 98 F | OXYGEN SATURATION: 90 % | HEART RATE: 93 BPM | DIASTOLIC BLOOD PRESSURE: 73 MMHG | BODY MASS INDEX: 15.64 KG/M2 | HEIGHT: 62 IN | RESPIRATION RATE: 20 BRPM

## 2022-08-02 DIAGNOSIS — U07.1 COVID-19 VIRUS DETECTED: ICD-10-CM

## 2022-08-02 DIAGNOSIS — R53.81 MALAISE: ICD-10-CM

## 2022-08-02 DIAGNOSIS — U07.1 COVID-19 VIRUS INFECTION: Primary | ICD-10-CM

## 2022-08-02 DIAGNOSIS — R53.83 FATIGUE: ICD-10-CM

## 2022-08-02 DIAGNOSIS — N30.00 ACUTE CYSTITIS WITHOUT HEMATURIA: ICD-10-CM

## 2022-08-02 LAB
ALBUMIN SERPL BCP-MCNC: 3.3 G/DL (ref 3.5–5.2)
ALP SERPL-CCNC: 113 U/L (ref 55–135)
ALT SERPL W/O P-5'-P-CCNC: 16 U/L (ref 10–44)
ANION GAP SERPL CALC-SCNC: 8 MMOL/L (ref 8–16)
AST SERPL-CCNC: 24 U/L (ref 10–40)
BACTERIA #/AREA URNS HPF: NEGATIVE /HPF
BASOPHILS # BLD AUTO: 0.01 K/UL (ref 0–0.2)
BASOPHILS NFR BLD: 0.1 % (ref 0–1.9)
BILIRUB SERPL-MCNC: 0.8 MG/DL (ref 0.1–1)
BILIRUB UR QL STRIP: NEGATIVE
BUN SERPL-MCNC: 23 MG/DL (ref 8–23)
CALCIUM SERPL-MCNC: 9.1 MG/DL (ref 8.7–10.5)
CHLORIDE SERPL-SCNC: 100 MMOL/L (ref 95–110)
CLARITY UR: CLEAR
CO2 SERPL-SCNC: 26 MMOL/L (ref 23–29)
COLOR UR: YELLOW
CREAT SERPL-MCNC: 0.8 MG/DL (ref 0.5–1.4)
CRP SERPL-MCNC: 5.8 MG/DL
DIFFERENTIAL METHOD: ABNORMAL
EOSINOPHIL # BLD AUTO: 0 K/UL (ref 0–0.5)
EOSINOPHIL NFR BLD: 0 % (ref 0–8)
ERYTHROCYTE [DISTWIDTH] IN BLOOD BY AUTOMATED COUNT: 13.1 % (ref 11.5–14.5)
EST. GFR  (NO RACE VARIABLE): >60 ML/MIN/1.73 M^2
FERRITIN SERPL-MCNC: 153 NG/ML (ref 20–300)
GLUCOSE SERPL-MCNC: 97 MG/DL (ref 70–110)
GLUCOSE UR QL STRIP: NEGATIVE
HCT VFR BLD AUTO: 37.7 % (ref 37–48.5)
HGB BLD-MCNC: 12.3 G/DL (ref 12–16)
HGB UR QL STRIP: NEGATIVE
HYALINE CASTS #/AREA URNS LPF: 90 /LPF
IMM GRANULOCYTES # BLD AUTO: 0.02 K/UL (ref 0–0.04)
IMM GRANULOCYTES NFR BLD AUTO: 0.2 % (ref 0–0.5)
KETONES UR QL STRIP: ABNORMAL
LEUKOCYTE ESTERASE UR QL STRIP: ABNORMAL
LYMPHOCYTES # BLD AUTO: 0.7 K/UL (ref 1–4.8)
LYMPHOCYTES NFR BLD: 8.5 % (ref 18–48)
MCH RBC QN AUTO: 29.7 PG (ref 27–31)
MCHC RBC AUTO-ENTMCNC: 32.6 G/DL (ref 32–36)
MCV RBC AUTO: 91 FL (ref 82–98)
MICROSCOPIC COMMENT: ABNORMAL
MONOCYTES # BLD AUTO: 0.8 K/UL (ref 0.3–1)
MONOCYTES NFR BLD: 9.5 % (ref 4–15)
NEUTROPHILS # BLD AUTO: 7 K/UL (ref 1.8–7.7)
NEUTROPHILS NFR BLD: 81.7 % (ref 38–73)
NITRITE UR QL STRIP: NEGATIVE
NRBC BLD-RTO: 0 /100 WBC
PH UR STRIP: 6 [PH] (ref 5–8)
PLATELET # BLD AUTO: 333 K/UL (ref 150–450)
PMV BLD AUTO: 8.8 FL (ref 9.2–12.9)
POTASSIUM SERPL-SCNC: 4.2 MMOL/L (ref 3.5–5.1)
PROCALCITONIN SERPL IA-MCNC: <0.05 NG/ML (ref 0–0.5)
PROT SERPL-MCNC: 7.9 G/DL (ref 6–8.4)
PROT UR QL STRIP: ABNORMAL
RBC # BLD AUTO: 4.14 M/UL (ref 4–5.4)
RBC #/AREA URNS HPF: 3 /HPF (ref 0–4)
SARS-COV-2 RDRP RESP QL NAA+PROBE: POSITIVE
SODIUM SERPL-SCNC: 134 MMOL/L (ref 136–145)
SP GR UR STRIP: >=1.03 (ref 1–1.03)
SQUAMOUS #/AREA URNS HPF: 15 /HPF
TROPONIN I SERPL DL<=0.01 NG/ML-MCNC: <0.03 NG/ML
URN SPEC COLLECT METH UR: ABNORMAL
UROBILINOGEN UR STRIP-ACNC: NEGATIVE EU/DL
WBC # BLD AUTO: 8.56 K/UL (ref 3.9–12.7)
WBC #/AREA URNS HPF: 25 /HPF (ref 0–5)

## 2022-08-02 PROCEDURE — 93010 ELECTROCARDIOGRAM REPORT: CPT | Mod: ,,, | Performed by: INTERNAL MEDICINE

## 2022-08-02 PROCEDURE — 96360 HYDRATION IV INFUSION INIT: CPT

## 2022-08-02 PROCEDURE — 87186 SC STD MICRODIL/AGAR DIL: CPT | Mod: 59 | Performed by: EMERGENCY MEDICINE

## 2022-08-02 PROCEDURE — 81001 URINALYSIS AUTO W/SCOPE: CPT | Performed by: EMERGENCY MEDICINE

## 2022-08-02 PROCEDURE — 25000003 PHARM REV CODE 250: Performed by: EMERGENCY MEDICINE

## 2022-08-02 PROCEDURE — 80053 COMPREHEN METABOLIC PANEL: CPT | Performed by: EMERGENCY MEDICINE

## 2022-08-02 PROCEDURE — 82728 ASSAY OF FERRITIN: CPT | Performed by: EMERGENCY MEDICINE

## 2022-08-02 PROCEDURE — 84484 ASSAY OF TROPONIN QUANT: CPT | Performed by: EMERGENCY MEDICINE

## 2022-08-02 PROCEDURE — 93005 ELECTROCARDIOGRAM TRACING: CPT | Performed by: INTERNAL MEDICINE

## 2022-08-02 PROCEDURE — 87077 CULTURE AEROBIC IDENTIFY: CPT | Performed by: EMERGENCY MEDICINE

## 2022-08-02 PROCEDURE — 87086 URINE CULTURE/COLONY COUNT: CPT | Performed by: EMERGENCY MEDICINE

## 2022-08-02 PROCEDURE — 99284 EMERGENCY DEPT VISIT MOD MDM: CPT | Mod: 25

## 2022-08-02 PROCEDURE — 93010 EKG 12-LEAD: ICD-10-PCS | Mod: ,,, | Performed by: INTERNAL MEDICINE

## 2022-08-02 PROCEDURE — 85025 COMPLETE CBC W/AUTO DIFF WBC: CPT | Performed by: EMERGENCY MEDICINE

## 2022-08-02 PROCEDURE — 86140 C-REACTIVE PROTEIN: CPT | Performed by: EMERGENCY MEDICINE

## 2022-08-02 PROCEDURE — 84145 PROCALCITONIN (PCT): CPT | Performed by: EMERGENCY MEDICINE

## 2022-08-02 PROCEDURE — U0002 COVID-19 LAB TEST NON-CDC: HCPCS | Performed by: EMERGENCY MEDICINE

## 2022-08-02 RX ORDER — OXYCODONE AND ACETAMINOPHEN 10; 325 MG/1; MG/1
1 TABLET ORAL EVERY 4 HOURS PRN
COMMUNITY
Start: 2022-07-08

## 2022-08-02 RX ORDER — CEFUROXIME AXETIL 500 MG/1
500 TABLET ORAL EVERY 12 HOURS
Qty: 10 TABLET | Refills: 0 | Status: SHIPPED | OUTPATIENT
Start: 2022-08-02 | End: 2022-08-07

## 2022-08-02 RX ADMIN — SODIUM CHLORIDE 500 ML: 9 INJECTION, SOLUTION INTRAVENOUS at 05:08

## 2022-08-02 NOTE — DISCHARGE INSTRUCTIONS
RETURN TO EMERGENCY DEPARTMENT WITHOUT FAIL, IF YOUR SYMPTOMS WORSEN, IF YOU GET NEW OR DIFFERENT SYMPTOMS, IF YOU ARE UNABLE TO FOLLOW UP AS DIRECTED, OR IF YOU HAVE ANY CONCERNS OR WORRIES.    Quarantine per CDC recommendations.  Take vitamin-C, vitamin D3, multivitamin.  Follow-up with your primary care provider.

## 2022-08-02 NOTE — ED PROVIDER NOTES
Encounter Date: 8/2/2022       History     Chief Complaint   Patient presents with    Fatigue     Has had increase in weakness for one week, has a family member with covid     82-year-old female past medical history of COPD, history of peripheral vascular disease, hypertension, hyperlipidemia, carotid stenosis, presents emergency department with fatigue, poor appetite, not feeling well.  For the last week patient has had a very poor appetite, has had fatigue and generalized weakness.  She has not wanted to eat as much as usual.  She is staying in a hotel as her house is being renovated at this point.  Her significant other who she staying with has COVID.  She normally has a poor functional status at baseline in does not get around much but now is more weak than usual.  Is very fatigued.  No fever chills.  No lack of taste or smell.  No vomiting diarrhea or any abdominal pain.        Review of patient's allergies indicates:  No Known Allergies  Past Medical History:   Diagnosis Date    Back pain     Brain aneurysm     Carotid stenosis     Cataract     Diverticulitis     Emphysema lung     Feeling anxious     Hyperlipidemia     Hypertension     Macular degeneration     PVD (peripheral vascular disease)     Squamous cell carcinoma 08/29/2017    right medical cheek, SSIS, efudex tx     Past Surgical History:   Procedure Laterality Date    ABDOMINAL SURGERY      ARTERIOGRAM, MESENTERIC N/A 6/30/2021    Procedure: ARTERIOGRAM, MESENTERIC;  Surgeon: Krish Machaod MD;  Location: [facility] CATH/EP LAB;  Service: General;  Laterality: N/A;    BACK SURGERY      CATARACT EXTRACTION, BILATERAL      COLONOSCOPY N/A 4/4/2022    Procedure: COLONOSCOPY;  Surgeon: Lino Devi MD;  Location: [facility] ENDO;  Service: Endoscopy;  Laterality: N/A;    CREATION, BYPASS, ARTERIAL, AORTA TO FEMORAL N/A 1/13/2021    Procedure: ARTERIOGRAM, MESENTERIC;  Surgeon: Krish Machado MD;  Location: [facility] CATH/EP LAB;  Service:  General;  Laterality: N/A;    ENDOSCOPY OF PROXIMAL SMALL INTESTINE N/A 2022    Procedure: ENTEROSCOPY, PROXIMAL;  Surgeon: Lino Devi MD;  Location: [facility];  Service: Endoscopy;  Laterality: N/A;    HYSTERECTOMY      IMPLANTATION OF SPINAL CORD STIMULATOR IN CERVICAL SPINE       Family History   Problem Relation Age of Onset    Melanoma Neg Hx     Psoriasis Neg Hx     Eczema Neg Hx     Lupus Neg Hx      Social History     Tobacco Use    Smoking status: Former Smoker     Packs/day: 1.00     Types: Cigarettes     Start date: 1965     Quit date: 2022     Years since quittin.3    Smokeless tobacco: Never Used   Substance Use Topics    Alcohol use: No    Drug use: Not Currently     Review of Systems   Constitutional: Positive for activity change, appetite change and fatigue. Negative for chills and fever.   HENT: Negative for sore throat.    Respiratory: Negative for shortness of breath.    Cardiovascular: Negative for chest pain and palpitations.   Gastrointestinal: Negative for abdominal pain, nausea and vomiting.   Genitourinary: Negative for dysuria and flank pain.   Musculoskeletal: Negative for back pain and neck pain.   Skin: Negative for rash.   Neurological: Negative for syncope, weakness and headaches.   Hematological: Does not bruise/bleed easily.   All other systems reviewed and are negative.      Physical Exam     Initial Vitals [22 1326]   BP Pulse Resp Temp SpO2   (!) 153/81 102 20 98 °F (36.7 °C) (!) 93 %      MAP       --         Physical Exam    Nursing note and vitals reviewed.  Constitutional: She appears well-developed. No distress.   Frail, cachectic   HENT:   Head: Normocephalic and atraumatic.   Mouth/Throat: No oropharyngeal exudate.   Eyes: Conjunctivae and EOM are normal. Pupils are equal, round, and reactive to light.   Neck: Neck supple. No tracheal deviation present.   No meningismus   Normal range of motion.  Cardiovascular: Normal rate, regular  rhythm, normal heart sounds and intact distal pulses.   No murmur heard.  Pulmonary/Chest: Breath sounds normal. No stridor. No respiratory distress. She has no wheezes. She has no rhonchi. She has no rales.   Decreased air movement diffusely   Abdominal: Abdomen is soft. She exhibits no distension. There is no abdominal tenderness. There is no rebound and no guarding.   Musculoskeletal:         General: No tenderness or edema. Normal range of motion.      Cervical back: Normal range of motion and neck supple.     Neurological: She is alert and oriented to person, place, and time. She has normal strength. No cranial nerve deficit or sensory deficit. GCS score is 15. GCS eye subscore is 4. GCS verbal subscore is 5. GCS motor subscore is 6.   Speech is normal.  No facial droop.  5/5 strength in upper and lower extremities bilaterally.  Normal finger-to-nose.  Normal heel-to-shin.   Skin: Skin is warm and dry. Capillary refill takes less than 2 seconds. No rash noted. No erythema. No pallor.   Psychiatric: She has a normal mood and affect. Thought content normal.         ED Course   Procedures  Labs Reviewed   SARS-COV-2 RNA AMPLIFICATION, QUAL - Abnormal; Notable for the following components:       Result Value    SARS-CoV-2 RNA, Amplification, Qual Positive (*)     All other components within normal limits   CBC W/ AUTO DIFFERENTIAL - Abnormal; Notable for the following components:    MPV 8.8 (*)     Lymph # 0.7 (*)     Gran % 81.7 (*)     Lymph % 8.5 (*)     All other components within normal limits   COMPREHENSIVE METABOLIC PANEL - Abnormal; Notable for the following components:    Sodium 134 (*)     Albumin 3.3 (*)     All other components within normal limits   URINALYSIS, REFLEX TO URINE CULTURE - Abnormal; Notable for the following components:    Specific Gravity, UA >=1.030 (*)     Protein, UA 1+ (*)     Ketones, UA 1+ (*)     Leukocytes, UA 1+ (*)     All other components within normal limits    Narrative:      Specimen Source->Urine   C-REACTIVE PROTEIN - Abnormal; Notable for the following components:    CRP 5.80 (*)     All other components within normal limits   URINALYSIS MICROSCOPIC - Abnormal; Notable for the following components:    WBC, UA 25 (*)     Hyaline Casts, UA 90 (*)     All other components within normal limits    Narrative:     Specimen Source->Urine   CULTURE, URINE   TROPONIN I   FERRITIN   PROCALCITONIN        ECG Results          EKG 12-lead (In process)  Result time 08/02/22 15:12:57    In process by Interface, Lab In [facility] (08/02/22 15:12:57)                 Narrative:    Test Reason : R53.81,    Vent. Rate : 087 BPM     Atrial Rate : 087 BPM     P-R Int : 150 ms          QRS Dur : 072 ms      QT Int : 346 ms       P-R-T Axes : 065 051 078 degrees     QTc Int : 416 ms    Normal sinus rhythm  Normal ECG  When compared with ECG of 01-APR-2022 18:19,  Premature supraventricular complexes are no longer Present  Vent. rate has decreased BY  44 BPM    Referred By: [email or web address]   SELF           Confirmed By:                              Results for orders placed or performed during the hospital encounter of 08/02/22   COVID-19 Rapid Screening   Result Value Ref Range    SARS-CoV-2 RNA, Amplification, Qual Positive (AA) Negative   CBC auto differential   Result Value Ref Range    WBC 8.56 3.90 - 12.70 K/uL    RBC 4.14 4.00 - 5.40 M/uL    Hemoglobin 12.3 12.0 - 16.0 g/dL    Hematocrit 37.7 37.0 - 48.5 %    MCV 91 82 - 98 fL    MCH 29.7 27.0 - 31.0 pg    MCHC 32.6 32.0 - 36.0 g/dL    RDW 13.1 11.5 - 14.5 %    Platelets 333 150 - 450 K/uL    MPV 8.8 (L) 9.2 - 12.9 fL    Immature Granulocytes 0.2 0.0 - 0.5 %    Gran # (ANC) 7.0 1.8 - 7.7 K/uL    Immature Grans (Abs) 0.02 0.00 - 0.04 K/uL    Lymph # 0.7 (L) 1.0 - 4.8 K/uL    Mono # 0.8 0.3 - 1.0 K/uL    Eos # 0.0 0.0 - 0.5 K/uL    Baso # 0.01 0.00 - 0.20 K/uL    nRBC 0 0 /100 WBC    Gran % 81.7 (H) 38.0 - 73.0 %    Lymph % 8.5 (L) 18.0 - 48.0 %    Mono % 9.5  4.0 - 15.0 %    Eosinophil % 0.0 0.0 - 8.0 %    Basophil % 0.1 0.0 - 1.9 %    Differential Method Automated    Comprehensive metabolic panel   Result Value Ref Range    Sodium 134 (L) 136 - 145 mmol/L    Potassium 4.2 3.5 - 5.1 mmol/L    Chloride 100 95 - 110 mmol/L    CO2 26 23 - 29 mmol/L    Glucose 97 70 - 110 mg/dL    BUN 23 8 - 23 mg/dL    Creatinine 0.8 0.5 - 1.4 mg/dL    Calcium 9.1 8.7 - 10.5 mg/dL    Total Protein 7.9 6.0 - 8.4 g/dL    Albumin 3.3 (L) 3.5 - 5.2 g/dL    Total Bilirubin 0.8 0.1 - 1.0 mg/dL    Alkaline Phosphatase 113 55 - 135 U/L    AST 24 10 - 40 U/L    ALT 16 10 - 44 U/L    Anion Gap 8 8 - 16 mmol/L    eGFR >60.0 >60 mL/min/1.73 m^2   Urinalysis, Reflex to Urine Culture Urine, Clean Catch    Specimen: Urine, Clean Catch   Result Value Ref Range    Specimen UA Urine, Clean Catch     Color, UA Yellow Yellow, Straw, Deepti    Appearance, UA Clear Clear    pH, UA 6.0 5.0 - 8.0    Specific Gravity, UA >=1.030 (A) 1.005 - 1.030    Protein, UA 1+ (A) Negative    Glucose, UA Negative Negative    Ketones, UA 1+ (A) Negative    Bilirubin (UA) Negative Negative    Occult Blood UA Negative Negative    Nitrite, UA Negative Negative    Urobilinogen, UA Negative Negative EU/dL    Leukocytes, UA 1+ (A) Negative   Troponin I   Result Value Ref Range    Troponin I <0.030 <=0.040 ng/mL   C-reactive protein   Result Value Ref Range    CRP 5.80 (H) <0.76 mg/dL   Ferritin   Result Value Ref Range    Ferritin 153 20.0 - 300.0 ng/mL   Urinalysis Microscopic   Result Value Ref Range    RBC, UA 3 0 - 4 /hpf    WBC, UA 25 (H) 0 - 5 /hpf    Bacteria Negative None-Occ /hpf    Squam Epithel, UA 15 /hpf    Hyaline Casts, UA 90 (A) 0-1/lpf /lpf    Microscopic Comment SEE COMMENT      X-Ray Chest 1 View   Final Result          Imaging Results          X-Ray Chest 1 View (Final result)  Result time 08/02/22 16:11:22    Final result by Henry Rowan Jr., MD (08/02/22 16:11:22)                 Narrative:    Examination:  Single view the chest.    CLINICAL HISTORY: Fatigue    COMPARISON: 4/1/2022.    FINDINGS: Heart is normal in size without evidence of significant enlargement the pulmonary vascularity is not significantly engorged. Prominent atherosclerotic plaquing about the region of the aortic knob without evidence of aneurysmal expansion with satisfactory deployment of a neurostimulator device projecting over the mid thoracic spine in optimal position.    There is no evidence of lobar or sublobar consolidation or significant pleural effusion. Blunting of the bilateral costophrenic angles. Status stent deployment within the upper abdomen is noted. The osseous structures show chronic degenerative spondylosis changes. Arthritic changes about bilateral AC articulations.    IMPRESSION:  1. Hyperinflation lungs secondary to long-standing COPD.  2. No evidence of acute cardiopulmonary findings.  3. Overall, no appreciable change upon comparison.    Electronically signed by:  Henry Rowan MD  8/2/2022 4:11 PM CDT Workstation: [id number]                               Medications   sodium chloride 0.9% bolus 500 mL (500 mLs Intravenous New Bag 8/2/22 4793)     Medical Decision Making:   ED Management:  Is a 82-year-old female presents emergency department with weakness fatigue as described above.  She is COVID positive emergency department.  She is likely a week maybe over week out from symptoms.  She is not a candidate for any PACs elevated or for any monoclonal antibody therapy.  She is not hypoxic.  O2 is 93%, in the setting of COPD.  Likely around her baseline.  Patient with questionable urinary tract infection with some white blood cells in the urine but no bacteria, there are squamous cells.  May be contaminated but given that UTI could be a compound of the patient's fatigue and generalized weakness will give 5 days of Ceftin.  Patient will have supportive care for her COVID illness.  She will take vitamin-C, vitamin D3,  multivitamin, follow-up with primary care provider on an outpatient basis.  If symptoms change or worsen she should return to the emergency department.    I had a detailed discussion with the patient and/or guardian regarding: The historical points, exam findings, and diagnostic results supporting the discharge diagnosis, lab results, pertinent radiology results, and the need for outpatient follow-up, for definitive care with a family practitioner and to return to the emergency department if symptoms worsen or persist or if there are any questions or concerns that arise at home. All questions have been answered in detail. Strict return to Emergency Department precautions have been provided.    A dictation software program was used for this note.  Please expect some simple typographical  errors in this note.              ED Course as of 08/02/22 1735   Tue Aug 02, 2022   1518 EKG 3:05 p.m. normal sinus rhythm rate of 87. No ST elevation or depression.  No STEMI.  EKG interpreted independently by me.   [JR]      ED Course User Index  [JR] Torrey Campoverde DO             Clinical Impression:   Final diagnoses:  [R53.81] Malaise  [R53.83] Fatigue  [U07.1] COVID-19 virus infection (Primary)  [N30.00] Acute cystitis without hematuria - Possible          ED Disposition Condition    Discharge Stable        ED Prescriptions     Medication Sig Dispense Start Date End Date Auth. Provider    cefUROXime (CEFTIN) 500 MG tablet Take 1 tablet (500 mg total) by mouth every 12 (twelve) hours. for 5 days 10 tablet 8/2/2022 8/7/2022 Torrey Campoverde DO        Follow-up Information     Follow up With Specialties Details Why Contact Info Additional Information    Abiodun Will MD Family Medicine In 2 days  [address] [facility] [id number]  [phone]       [facility] - Emergency Dept Emergency Medicine  If symptoms worsen [address] [facility] [id number] [phone] 1st floor           Torrey Campoverde  DO  08/02/22 1737

## 2022-08-05 LAB
BACTERIA UR CULT: ABNORMAL
BACTERIA UR CULT: ABNORMAL

## 2022-08-16 DIAGNOSIS — J44.9 CHRONIC OBSTRUCTIVE PULMONARY DISEASE, UNSPECIFIED COPD TYPE: Primary | ICD-10-CM

## 2022-08-16 DIAGNOSIS — J43.2 CENTRILOBULAR EMPHYSEMA: ICD-10-CM

## 2022-08-16 RX ORDER — UMECLIDINIUM BROMIDE AND VILANTEROL TRIFENATATE 62.5; 25 UG/1; UG/1
1 POWDER RESPIRATORY (INHALATION) DAILY
Qty: 1 EACH | Refills: 5 | Status: SHIPPED | OUTPATIENT
Start: 2022-08-16 | End: 2023-08-04

## 2022-09-06 ENCOUNTER — HOSPITAL ENCOUNTER (OUTPATIENT)
Dept: PREADMISSION TESTING | Facility: HOSPITAL | Age: 82
Discharge: HOME | End: 2022-09-06
Attending: SURGERY
Payer: MEDICARE

## 2022-09-06 VITALS — BODY MASS INDEX: 14.72 KG/M2 | WEIGHT: 80 LBS | HEIGHT: 62 IN

## 2022-09-06 DIAGNOSIS — Z01.818 PRE-OP EXAM: ICD-10-CM

## 2022-09-06 DIAGNOSIS — Z01.810 PRE-PROCEDURAL CARDIOVASCULAR EXAMINATION: Primary | ICD-10-CM

## 2022-09-06 LAB
ANION GAP SERPL CALC-SCNC: 6 MMOL/L (ref 8–16)
APTT PPP: 37 SEC (ref 23.3–35.1)
BUN SERPL-MCNC: 37 MG/DL (ref 8–23)
CALCIUM SERPL-MCNC: 9.1 MG/DL (ref 8.7–10.5)
CHLORIDE SERPL-SCNC: 105 MMOL/L (ref 95–110)
CO2 SERPL-SCNC: 26 MMOL/L (ref 23–29)
CREAT SERPL-MCNC: 0.7 MG/DL (ref 0.5–1.4)
ERYTHROCYTE [DISTWIDTH] IN BLOOD BY AUTOMATED COUNT: 15.7 % (ref 11.5–14.5)
EST. GFR  (NO RACE VARIABLE): >60 ML/MIN/1.73 M^2
GLUCOSE SERPL-MCNC: 123 MG/DL (ref 70–110)
HCT VFR BLD AUTO: 34 % (ref 37–48.5)
HGB BLD-MCNC: 10.5 G/DL (ref 12–16)
INR PPP: 1.2
MCH RBC QN AUTO: 30.2 PG (ref 27–31)
MCHC RBC AUTO-ENTMCNC: 30.9 G/DL (ref 32–36)
MCV RBC AUTO: 98 FL (ref 82–98)
PLATELET # BLD AUTO: 293 K/UL (ref 150–450)
PMV BLD AUTO: 8.9 FL (ref 9.2–12.9)
POTASSIUM SERPL-SCNC: 4.4 MMOL/L (ref 3.5–5.1)
PROTHROMBIN TIME: 14.6 SEC (ref 11.4–13.7)
RBC # BLD AUTO: 3.48 M/UL (ref 4–5.4)
SODIUM SERPL-SCNC: 137 MMOL/L (ref 136–145)
WBC # BLD AUTO: 7.87 K/UL (ref 3.9–12.7)

## 2022-09-06 PROCEDURE — 85027 COMPLETE CBC AUTOMATED: CPT | Performed by: SURGERY

## 2022-09-06 PROCEDURE — 85610 PROTHROMBIN TIME: CPT | Performed by: SURGERY

## 2022-09-06 PROCEDURE — 85730 THROMBOPLASTIN TIME PARTIAL: CPT | Performed by: SURGERY

## 2022-09-06 PROCEDURE — 36415 COLL VENOUS BLD VENIPUNCTURE: CPT | Performed by: SURGERY

## 2022-09-06 PROCEDURE — 80048 BASIC METABOLIC PNL TOTAL CA: CPT | Performed by: SURGERY

## 2022-09-06 RX ORDER — SODIUM CHLORIDE 9 MG/ML
INJECTION, SOLUTION INTRAVENOUS ONCE
Status: CANCELLED | OUTPATIENT
Start: 2022-09-06 | End: 2022-09-06

## 2022-09-07 ENCOUNTER — HOSPITAL ENCOUNTER (OUTPATIENT)
Facility: HOSPITAL | Age: 82
Discharge: HOME | End: 2022-09-07
Attending: SURGERY | Admitting: SURGERY
Payer: MEDICARE

## 2022-09-07 VITALS
DIASTOLIC BLOOD PRESSURE: 57 MMHG | HEART RATE: 64 BPM | RESPIRATION RATE: 18 BRPM | SYSTOLIC BLOOD PRESSURE: 131 MMHG | OXYGEN SATURATION: 95 %

## 2022-09-07 DIAGNOSIS — I65.23 ASYMPTOMATIC BILATERAL CAROTID ARTERY STENOSIS: ICD-10-CM

## 2022-09-07 DIAGNOSIS — K55.1 MESENTERIC ARTERY STENOSIS: Primary | ICD-10-CM

## 2022-09-07 DIAGNOSIS — Z01.818 PRE-OP EXAM: ICD-10-CM

## 2022-09-07 PROCEDURE — C1887 CATHETER, GUIDING: HCPCS | Performed by: SURGERY

## 2022-09-07 PROCEDURE — 36245 INS CATH ABD/L-EXT ART 1ST: CPT | Mod: LT | Performed by: SURGERY

## 2022-09-07 PROCEDURE — 25500020 PHARM REV CODE 255: Performed by: SURGERY

## 2022-09-07 PROCEDURE — 25000003 PHARM REV CODE 250: Performed by: SURGERY

## 2022-09-07 PROCEDURE — 99152 MOD SED SAME PHYS/QHP 5/>YRS: CPT | Performed by: SURGERY

## 2022-09-07 PROCEDURE — 99153 MOD SED SAME PHYS/QHP EA: CPT | Performed by: SURGERY

## 2022-09-07 PROCEDURE — 63600175 PHARM REV CODE 636 W HCPCS: Performed by: SURGERY

## 2022-09-07 PROCEDURE — C1769 GUIDE WIRE: HCPCS | Performed by: SURGERY

## 2022-09-07 PROCEDURE — C1894 INTRO/SHEATH, NON-LASER: HCPCS | Performed by: SURGERY

## 2022-09-07 PROCEDURE — 75710 ARTERY X-RAYS ARM/LEG: CPT | Mod: LT | Performed by: SURGERY

## 2022-09-07 RX ORDER — ACETAMINOPHEN 325 MG/1
650 TABLET ORAL EVERY 4 HOURS PRN
Status: DISCONTINUED | OUTPATIENT
Start: 2022-09-07 | End: 2022-09-07 | Stop reason: HOSPADM

## 2022-09-07 RX ORDER — SODIUM CHLORIDE 9 MG/ML
INJECTION, SOLUTION INTRAVENOUS CONTINUOUS
Status: DISCONTINUED | OUTPATIENT
Start: 2022-09-07 | End: 2022-09-07 | Stop reason: HOSPADM

## 2022-09-07 RX ORDER — ONDANSETRON 4 MG/1
8 TABLET, ORALLY DISINTEGRATING ORAL EVERY 8 HOURS PRN
Status: DISCONTINUED | OUTPATIENT
Start: 2022-09-07 | End: 2022-09-07 | Stop reason: HOSPADM

## 2022-09-07 RX ORDER — FENTANYL CITRATE 50 UG/ML
INJECTION, SOLUTION INTRAMUSCULAR; INTRAVENOUS
Status: DISCONTINUED | OUTPATIENT
Start: 2022-09-07 | End: 2022-09-07 | Stop reason: HOSPADM

## 2022-09-07 RX ORDER — IODIXANOL 320 MG/ML
INJECTION, SOLUTION INTRAVASCULAR
Status: DISCONTINUED | OUTPATIENT
Start: 2022-09-07 | End: 2022-09-07 | Stop reason: HOSPADM

## 2022-09-07 RX ORDER — SODIUM CHLORIDE 9 MG/ML
INJECTION, SOLUTION INTRAVENOUS ONCE
Status: COMPLETED | OUTPATIENT
Start: 2022-09-07 | End: 2022-09-07

## 2022-09-07 RX ORDER — LIDOCAINE HYDROCHLORIDE 10 MG/ML
INJECTION INFILTRATION; PERINEURAL
Status: DISCONTINUED | OUTPATIENT
Start: 2022-09-07 | End: 2022-09-07 | Stop reason: HOSPADM

## 2022-09-07 RX ORDER — LIDOCAINE HYDROCHLORIDE 10 MG/ML
INJECTION, SOLUTION EPIDURAL; INFILTRATION; INTRACAUDAL; PERINEURAL
Status: DISCONTINUED | OUTPATIENT
Start: 2022-09-07 | End: 2022-09-07 | Stop reason: HOSPADM

## 2022-09-07 RX ORDER — MIDAZOLAM HYDROCHLORIDE 1 MG/ML
INJECTION, SOLUTION INTRAMUSCULAR; INTRAVENOUS
Status: DISCONTINUED | OUTPATIENT
Start: 2022-09-07 | End: 2022-09-07 | Stop reason: HOSPADM

## 2022-09-07 RX ADMIN — SODIUM CHLORIDE: 0.9 INJECTION, SOLUTION INTRAVENOUS at 07:09

## 2022-09-07 NOTE — Clinical Note
An angiography was performed of the mid left subclavian artery via hand injection with 5 mL of contrast

## 2022-09-07 NOTE — Clinical Note
The site was marked. The groin and left brachial was prepped. The site was prepped with ChloraPrep. The site was clipped. The patient was draped. The patient was positioned supine. The patient was secured using safety straps and to an armboard.

## 2022-09-07 NOTE — Clinical Note
An angiography was performed of the mid suprarenal abdominal aorta  via hand injection with 5 mL of contrast Contrast estimated. Multiple images taken

## 2022-09-07 NOTE — Clinical Note
Manual pressure was applied to the left brachial artery and left brachial artery sheath insertion site.

## 2022-09-07 NOTE — Clinical Note
An angiography was performed of the mid superior mesenteric artery via hand injection with 5 mL of contrast Multiple images taken

## 2022-09-07 NOTE — Clinical Note
An angiography was performed of the mid suprarenal abdominal aorta  via hand injection with 10 mL of contrast Contrast estimated

## 2022-09-07 NOTE — Clinical Note
80 ml of contrast were injected throughout the case. 20 mL of contrast was the total wasted during the case. 100 mL was the total amount used during the case.

## 2022-09-07 NOTE — OP NOTE
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  Patent celiac stent.  Occlusion superior mesenteric artery    Description of Technical Procedures:  Abdominal aortogram.  Selective catheterization of the celiac artery and superior mesenteric artery origin.  Angiogram left upper extremity.  Access of right femoral artery and left brachial artery    Left brachial artery accessed under ultrasound guidance and 6 Yemeni sheath placed catheter advanced over the wire up to the left proximal subclavian and the wire took a unusual course and angiogram of the left upper extremity was performed which demonstrated patent subclavian artery distally there is a occlusion of the left subclavian artery at its origin and a patent bypass coming off the left carotid to the left subclavian.  At this point I then access the right femoral artery passed a guidewire and a 6 Yemeni sheath.  Pigtail catheter was advanced and lateral aortogram was performed we then selected the celiac artery and the origin of the superior mesenteric artery angiogram demonstrated patent celiac stent the superior mesenteric artery is occluded at its origin and reconstitutes distally.  I was able to get a wire to pass through the SMA occlusion.  Catheter was removed over wire and then the sheaths were both removed and pressure was held.    Significant Surgical Tasks Conducted by the  Assistant(s), if Applicable:     Estimated Blood Loss (EBL): * No values recorded between 9/7/2022 10:40 AM and 9/7/2022 11:21 AM *           Implants: * No implants in log *    Specimens:   Specimen (24h ago, onward)      None                    Condition: Good    Disposition: PACU - hemodynamically stable.    Attestation: I was present and scrubbed for the entire procedure.

## 2022-09-07 NOTE — NURSING
PT given DC orders and states understanding. IV and tele removed. PT family to drive PT home. Site WNL

## 2022-09-07 NOTE — Clinical Note
An angiography was performed of the ostial celiac artery via hand injection with 10 mL of contrast Contrast estimated

## 2022-11-18 ENCOUNTER — HOSPITAL ENCOUNTER (OUTPATIENT)
Facility: HOSPITAL | Age: 82
Discharge: HOME | End: 2022-11-21
Attending: EMERGENCY MEDICINE | Admitting: STUDENT IN AN ORGANIZED HEALTH CARE EDUCATION/TRAINING PROGRAM
Payer: MEDICARE

## 2022-11-18 DIAGNOSIS — K25.4 GASTROINTESTINAL HEMORRHAGE ASSOCIATED WITH GASTRIC ULCER: ICD-10-CM

## 2022-11-18 DIAGNOSIS — K85.90 ACUTE PANCREATITIS WITHOUT INFECTION OR NECROSIS, UNSPECIFIED PANCREATITIS TYPE: Primary | ICD-10-CM

## 2022-11-18 DIAGNOSIS — R10.9 ABDOMINAL PAIN: ICD-10-CM

## 2022-11-18 LAB
ABO + RH BLD: NORMAL
ALBUMIN SERPL BCP-MCNC: 4.3 G/DL (ref 3.5–5.2)
ALP SERPL-CCNC: 78 U/L (ref 55–135)
ALT SERPL W/O P-5'-P-CCNC: 20 U/L (ref 10–44)
AMYLASE SERPL-CCNC: 228 U/L (ref 20–110)
ANION GAP SERPL CALC-SCNC: 10 MMOL/L (ref 8–16)
APTT PPP: 35.4 SEC (ref 23.3–35.1)
AST SERPL-CCNC: 29 U/L (ref 10–40)
BACTERIA #/AREA URNS HPF: NEGATIVE /HPF
BASOPHILS # BLD AUTO: 0.06 K/UL (ref 0–0.2)
BASOPHILS NFR BLD: 0.7 % (ref 0–1.9)
BILIRUB SERPL-MCNC: 0.6 MG/DL (ref 0.1–1)
BILIRUB UR QL STRIP: NEGATIVE
BLD GP AB SCN CELLS X3 SERPL QL: NORMAL
BUN SERPL-MCNC: 22 MG/DL (ref 8–23)
CALCIUM SERPL-MCNC: 10 MG/DL (ref 8.7–10.5)
CHLORIDE SERPL-SCNC: 100 MMOL/L (ref 95–110)
CLARITY UR: CLEAR
CO2 SERPL-SCNC: 24 MMOL/L (ref 23–29)
COLOR UR: YELLOW
CREAT SERPL-MCNC: 0.7 MG/DL (ref 0.5–1.4)
CREAT SERPL-MCNC: 0.7 MG/DL (ref 0.5–1.4)
DIFFERENTIAL METHOD: ABNORMAL
EOSINOPHIL # BLD AUTO: 0 K/UL (ref 0–0.5)
EOSINOPHIL NFR BLD: 0.2 % (ref 0–8)
ERYTHROCYTE [DISTWIDTH] IN BLOOD BY AUTOMATED COUNT: 11.9 % (ref 11.5–14.5)
EST. GFR  (NO RACE VARIABLE): >60 ML/MIN/1.73 M^2
GLUCOSE SERPL-MCNC: 115 MG/DL (ref 70–110)
GLUCOSE UR QL STRIP: NEGATIVE
HCT VFR BLD AUTO: 41.6 % (ref 37–48.5)
HGB BLD-MCNC: 13.8 G/DL (ref 12–16)
HGB UR QL STRIP: NEGATIVE
HYALINE CASTS #/AREA URNS LPF: 6 /LPF
IMM GRANULOCYTES # BLD AUTO: 0.04 K/UL (ref 0–0.04)
IMM GRANULOCYTES NFR BLD AUTO: 0.5 % (ref 0–0.5)
INR PPP: 1.2
KETONES UR QL STRIP: NEGATIVE
LEUKOCYTE ESTERASE UR QL STRIP: NEGATIVE
LIPASE SERPL-CCNC: 118 U/L (ref 4–60)
LYMPHOCYTES # BLD AUTO: 1.5 K/UL (ref 1–4.8)
LYMPHOCYTES NFR BLD: 17.1 % (ref 18–48)
MAGNESIUM SERPL-MCNC: 1.8 MG/DL (ref 1.6–2.6)
MCH RBC QN AUTO: 31.6 PG (ref 27–31)
MCHC RBC AUTO-ENTMCNC: 33.2 G/DL (ref 32–36)
MCV RBC AUTO: 95 FL (ref 82–98)
MICROSCOPIC COMMENT: ABNORMAL
MONOCYTES # BLD AUTO: 0.6 K/UL (ref 0.3–1)
MONOCYTES NFR BLD: 7.3 % (ref 4–15)
NEUTROPHILS # BLD AUTO: 6.4 K/UL (ref 1.8–7.7)
NEUTROPHILS NFR BLD: 74.2 % (ref 38–73)
NITRITE UR QL STRIP: NEGATIVE
NRBC BLD-RTO: 0 /100 WBC
PH UR STRIP: 6 [PH] (ref 5–8)
PLATELET # BLD AUTO: 506 K/UL (ref 150–450)
PMV BLD AUTO: 8 FL (ref 9.2–12.9)
POTASSIUM SERPL-SCNC: 4.4 MMOL/L (ref 3.5–5.1)
PROT SERPL-MCNC: 8.7 G/DL (ref 6–8.4)
PROT UR QL STRIP: ABNORMAL
PROTHROMBIN TIME: 14.4 SEC (ref 11.4–13.7)
RBC # BLD AUTO: 4.37 M/UL (ref 4–5.4)
RBC #/AREA URNS HPF: 2 /HPF (ref 0–4)
SAMPLE: NORMAL
SODIUM SERPL-SCNC: 134 MMOL/L (ref 136–145)
SP GR UR STRIP: 1.02 (ref 1–1.03)
SQUAMOUS #/AREA URNS HPF: 2 /HPF
TROPONIN I SERPL HS-MCNC: 9.2 PG/ML (ref 0–14.9)
URN SPEC COLLECT METH UR: ABNORMAL
UROBILINOGEN UR STRIP-ACNC: NEGATIVE EU/DL
WBC # BLD AUTO: 8.58 K/UL (ref 3.9–12.7)
WBC #/AREA URNS HPF: 3 /HPF (ref 0–5)

## 2022-11-18 PROCEDURE — 93005 ELECTROCARDIOGRAM TRACING: CPT | Performed by: INTERNAL MEDICINE

## 2022-11-18 PROCEDURE — 96361 HYDRATE IV INFUSION ADD-ON: CPT | Mod: GZ

## 2022-11-18 PROCEDURE — 84484 ASSAY OF TROPONIN QUANT: CPT | Performed by: EMERGENCY MEDICINE

## 2022-11-18 PROCEDURE — 25000003 PHARM REV CODE 250: Performed by: STUDENT IN AN ORGANIZED HEALTH CARE EDUCATION/TRAINING PROGRAM

## 2022-11-18 PROCEDURE — 86901 BLOOD TYPING SEROLOGIC RH(D): CPT | Performed by: EMERGENCY MEDICINE

## 2022-11-18 PROCEDURE — G0378 HOSPITAL OBSERVATION PER HR: HCPCS

## 2022-11-18 PROCEDURE — 25000003 PHARM REV CODE 250: Performed by: EMERGENCY MEDICINE

## 2022-11-18 PROCEDURE — 63600175 PHARM REV CODE 636 W HCPCS: Performed by: EMERGENCY MEDICINE

## 2022-11-18 PROCEDURE — 82150 ASSAY OF AMYLASE: CPT | Performed by: EMERGENCY MEDICINE

## 2022-11-18 PROCEDURE — 93010 EKG 12-LEAD: ICD-10-PCS | Mod: ,,, | Performed by: INTERNAL MEDICINE

## 2022-11-18 PROCEDURE — 85730 THROMBOPLASTIN TIME PARTIAL: CPT | Performed by: EMERGENCY MEDICINE

## 2022-11-18 PROCEDURE — 99285 EMERGENCY DEPT VISIT HI MDM: CPT | Mod: 25

## 2022-11-18 PROCEDURE — 80053 COMPREHEN METABOLIC PANEL: CPT | Performed by: EMERGENCY MEDICINE

## 2022-11-18 PROCEDURE — 85025 COMPLETE CBC W/AUTO DIFF WBC: CPT | Performed by: EMERGENCY MEDICINE

## 2022-11-18 PROCEDURE — 83735 ASSAY OF MAGNESIUM: CPT | Performed by: EMERGENCY MEDICINE

## 2022-11-18 PROCEDURE — 83690 ASSAY OF LIPASE: CPT | Performed by: EMERGENCY MEDICINE

## 2022-11-18 PROCEDURE — 93010 ELECTROCARDIOGRAM REPORT: CPT | Mod: ,,, | Performed by: INTERNAL MEDICINE

## 2022-11-18 PROCEDURE — 85610 PROTHROMBIN TIME: CPT | Performed by: EMERGENCY MEDICINE

## 2022-11-18 PROCEDURE — 25500020 PHARM REV CODE 255: Performed by: EMERGENCY MEDICINE

## 2022-11-18 PROCEDURE — C9113 INJ PANTOPRAZOLE SODIUM, VIA: HCPCS | Performed by: EMERGENCY MEDICINE

## 2022-11-18 PROCEDURE — 96375 TX/PRO/DX INJ NEW DRUG ADDON: CPT

## 2022-11-18 PROCEDURE — 81001 URINALYSIS AUTO W/SCOPE: CPT | Performed by: EMERGENCY MEDICINE

## 2022-11-18 RX ORDER — SULFAMETHOXAZOLE AND TRIMETHOPRIM 800; 160 MG/1; MG/1
1 TABLET ORAL 2 TIMES DAILY
Status: ON HOLD | COMMUNITY
Start: 2022-11-01 | End: 2022-11-21 | Stop reason: HOSPADM

## 2022-11-18 RX ORDER — MORPHINE SULFATE 2 MG/ML
2 INJECTION, SOLUTION INTRAMUSCULAR; INTRAVENOUS EVERY 4 HOURS PRN
Status: DISCONTINUED | OUTPATIENT
Start: 2022-11-18 | End: 2022-11-19

## 2022-11-18 RX ORDER — PRAVASTATIN SODIUM 10 MG/1
20 TABLET ORAL NIGHTLY
Status: DISCONTINUED | OUTPATIENT
Start: 2022-11-18 | End: 2022-11-21 | Stop reason: HOSPADM

## 2022-11-18 RX ORDER — AMLODIPINE BESYLATE 5 MG/1
5 TABLET ORAL DAILY
Status: DISCONTINUED | OUTPATIENT
Start: 2022-11-19 | End: 2022-11-21 | Stop reason: HOSPADM

## 2022-11-18 RX ORDER — AMLODIPINE BESYLATE 5 MG/1
2.5 TABLET ORAL NIGHTLY
COMMUNITY
Start: 2022-10-28 | End: 2023-10-24

## 2022-11-18 RX ORDER — PANTOPRAZOLE SODIUM 40 MG/10ML
40 INJECTION, POWDER, LYOPHILIZED, FOR SOLUTION INTRAVENOUS DAILY
Status: DISCONTINUED | OUTPATIENT
Start: 2022-11-19 | End: 2022-11-19 | Stop reason: SDUPTHER

## 2022-11-18 RX ORDER — IPRATROPIUM BROMIDE 0.5 MG/2.5ML
0.5 SOLUTION RESPIRATORY (INHALATION) EVERY 6 HOURS
Status: DISCONTINUED | OUTPATIENT
Start: 2022-11-19 | End: 2022-11-20

## 2022-11-18 RX ORDER — SODIUM CHLORIDE 9 MG/ML
INJECTION, SOLUTION INTRAVENOUS CONTINUOUS
Status: DISCONTINUED | OUTPATIENT
Start: 2022-11-18 | End: 2022-11-21 | Stop reason: HOSPADM

## 2022-11-18 RX ORDER — ONDANSETRON 2 MG/ML
4 INJECTION INTRAMUSCULAR; INTRAVENOUS EVERY 8 HOURS PRN
Status: DISCONTINUED | OUTPATIENT
Start: 2022-11-18 | End: 2022-11-21 | Stop reason: HOSPADM

## 2022-11-18 RX ORDER — CLOPIDOGREL BISULFATE 75 MG/1
75 TABLET ORAL DAILY
Status: DISCONTINUED | OUTPATIENT
Start: 2022-11-19 | End: 2022-11-21 | Stop reason: HOSPADM

## 2022-11-18 RX ORDER — PANTOPRAZOLE SODIUM 40 MG/10ML
80 INJECTION, POWDER, LYOPHILIZED, FOR SOLUTION INTRAVENOUS
Status: COMPLETED | OUTPATIENT
Start: 2022-11-18 | End: 2022-11-18

## 2022-11-18 RX ORDER — ASPIRIN 81 MG/1
81 TABLET ORAL DAILY
Status: DISCONTINUED | OUTPATIENT
Start: 2022-11-19 | End: 2022-11-21 | Stop reason: HOSPADM

## 2022-11-18 RX ORDER — SODIUM CHLORIDE 0.9 % (FLUSH) 0.9 %
10 SYRINGE (ML) INJECTION
Status: DISCONTINUED | OUTPATIENT
Start: 2022-11-18 | End: 2022-11-21 | Stop reason: HOSPADM

## 2022-11-18 RX ORDER — ONDANSETRON 2 MG/ML
4 INJECTION INTRAMUSCULAR; INTRAVENOUS
Status: COMPLETED | OUTPATIENT
Start: 2022-11-18 | End: 2022-11-18

## 2022-11-18 RX ORDER — ARFORMOTEROL TARTRATE 15 UG/2ML
15 SOLUTION RESPIRATORY (INHALATION) 2 TIMES DAILY
Status: DISCONTINUED | OUTPATIENT
Start: 2022-11-19 | End: 2022-11-21 | Stop reason: HOSPADM

## 2022-11-18 RX ORDER — METOPROLOL SUCCINATE 50 MG/1
50 TABLET, EXTENDED RELEASE ORAL 2 TIMES DAILY
Status: DISCONTINUED | OUTPATIENT
Start: 2022-11-18 | End: 2022-11-21 | Stop reason: HOSPADM

## 2022-11-18 RX ADMIN — IOHEXOL 100 ML: 350 INJECTION, SOLUTION INTRAVENOUS at 03:11

## 2022-11-18 RX ADMIN — PANTOPRAZOLE SODIUM 80 MG: 40 INJECTION, POWDER, FOR SOLUTION INTRAVENOUS at 06:11

## 2022-11-18 RX ADMIN — PRAVASTATIN SODIUM 20 MG: 10 TABLET ORAL at 10:11

## 2022-11-18 RX ADMIN — SODIUM CHLORIDE: 0.9 INJECTION, SOLUTION INTRAVENOUS at 10:11

## 2022-11-18 RX ADMIN — ONDANSETRON 4 MG: 2 INJECTION INTRAMUSCULAR; INTRAVENOUS at 06:11

## 2022-11-18 RX ADMIN — SODIUM CHLORIDE 500 ML: 0.9 INJECTION, SOLUTION INTRAVENOUS at 06:11

## 2022-11-18 RX ADMIN — METOPROLOL SUCCINATE 50 MG: 50 TABLET, FILM COATED, EXTENDED RELEASE ORAL at 10:11

## 2022-11-19 ENCOUNTER — ANESTHESIA EVENT (OUTPATIENT)
Dept: SURGERY | Facility: HOSPITAL | Age: 82
End: 2022-11-19
Payer: MEDICARE

## 2022-11-19 LAB
ANION GAP SERPL CALC-SCNC: 7 MMOL/L (ref 8–16)
BUN SERPL-MCNC: 14 MG/DL (ref 8–23)
CALCIUM SERPL-MCNC: 8.6 MG/DL (ref 8.7–10.5)
CHLORIDE SERPL-SCNC: 103 MMOL/L (ref 95–110)
CO2 SERPL-SCNC: 25 MMOL/L (ref 23–29)
CREAT SERPL-MCNC: 0.7 MG/DL (ref 0.5–1.4)
ERYTHROCYTE [DISTWIDTH] IN BLOOD BY AUTOMATED COUNT: 11.9 % (ref 11.5–14.5)
EST. GFR  (NO RACE VARIABLE): >60 ML/MIN/1.73 M^2
GLUCOSE SERPL-MCNC: 91 MG/DL (ref 70–110)
HCT VFR BLD AUTO: 33.6 % (ref 37–48.5)
HCT VFR BLD AUTO: 33.7 % (ref 37–48.5)
HGB BLD-MCNC: 11 G/DL (ref 12–16)
HGB BLD-MCNC: 11 G/DL (ref 12–16)
MAGNESIUM SERPL-MCNC: 1.5 MG/DL (ref 1.6–2.6)
MCH RBC QN AUTO: 31.3 PG (ref 27–31)
MCHC RBC AUTO-ENTMCNC: 32.6 G/DL (ref 32–36)
MCV RBC AUTO: 96 FL (ref 82–98)
PLATELET # BLD AUTO: 379 K/UL (ref 150–450)
PMV BLD AUTO: 8 FL (ref 9.2–12.9)
POTASSIUM SERPL-SCNC: 3.9 MMOL/L (ref 3.5–5.1)
RBC # BLD AUTO: 3.51 M/UL (ref 4–5.4)
SODIUM SERPL-SCNC: 135 MMOL/L (ref 136–145)
WBC # BLD AUTO: 7.35 K/UL (ref 3.9–12.7)

## 2022-11-19 PROCEDURE — 36415 COLL VENOUS BLD VENIPUNCTURE: CPT | Performed by: STUDENT IN AN ORGANIZED HEALTH CARE EDUCATION/TRAINING PROGRAM

## 2022-11-19 PROCEDURE — 94799 UNLISTED PULMONARY SVC/PX: CPT

## 2022-11-19 PROCEDURE — C9113 INJ PANTOPRAZOLE SODIUM, VIA: HCPCS | Performed by: STUDENT IN AN ORGANIZED HEALTH CARE EDUCATION/TRAINING PROGRAM

## 2022-11-19 PROCEDURE — 85018 HEMOGLOBIN: CPT | Mod: 91 | Performed by: STUDENT IN AN ORGANIZED HEALTH CARE EDUCATION/TRAINING PROGRAM

## 2022-11-19 PROCEDURE — 63600175 PHARM REV CODE 636 W HCPCS: Performed by: STUDENT IN AN ORGANIZED HEALTH CARE EDUCATION/TRAINING PROGRAM

## 2022-11-19 PROCEDURE — G0378 HOSPITAL OBSERVATION PER HR: HCPCS

## 2022-11-19 PROCEDURE — 96366 THER/PROPH/DIAG IV INF ADDON: CPT

## 2022-11-19 PROCEDURE — 99900031 HC PATIENT EDUCATION (STAT)

## 2022-11-19 PROCEDURE — 85027 COMPLETE CBC AUTOMATED: CPT | Performed by: STUDENT IN AN ORGANIZED HEALTH CARE EDUCATION/TRAINING PROGRAM

## 2022-11-19 PROCEDURE — 94640 AIRWAY INHALATION TREATMENT: CPT

## 2022-11-19 PROCEDURE — 99900035 HC TECH TIME PER 15 MIN (STAT)

## 2022-11-19 PROCEDURE — 25000003 PHARM REV CODE 250: Performed by: STUDENT IN AN ORGANIZED HEALTH CARE EDUCATION/TRAINING PROGRAM

## 2022-11-19 PROCEDURE — 94761 N-INVAS EAR/PLS OXIMETRY MLT: CPT | Mod: XB

## 2022-11-19 PROCEDURE — 25000242 PHARM REV CODE 250 ALT 637 W/ HCPCS: Performed by: STUDENT IN AN ORGANIZED HEALTH CARE EDUCATION/TRAINING PROGRAM

## 2022-11-19 PROCEDURE — 85014 HEMATOCRIT: CPT | Mod: 91 | Performed by: STUDENT IN AN ORGANIZED HEALTH CARE EDUCATION/TRAINING PROGRAM

## 2022-11-19 PROCEDURE — 97161 PT EVAL LOW COMPLEX 20 MIN: CPT

## 2022-11-19 PROCEDURE — 96376 TX/PRO/DX INJ SAME DRUG ADON: CPT

## 2022-11-19 PROCEDURE — 97165 OT EVAL LOW COMPLEX 30 MIN: CPT

## 2022-11-19 PROCEDURE — 96365 THER/PROPH/DIAG IV INF INIT: CPT

## 2022-11-19 PROCEDURE — 97535 SELF CARE MNGMENT TRAINING: CPT

## 2022-11-19 PROCEDURE — 83735 ASSAY OF MAGNESIUM: CPT | Performed by: STUDENT IN AN ORGANIZED HEALTH CARE EDUCATION/TRAINING PROGRAM

## 2022-11-19 PROCEDURE — 80048 BASIC METABOLIC PNL TOTAL CA: CPT | Performed by: STUDENT IN AN ORGANIZED HEALTH CARE EDUCATION/TRAINING PROGRAM

## 2022-11-19 RX ORDER — OXYCODONE AND ACETAMINOPHEN 10; 325 MG/1; MG/1
1 TABLET ORAL EVERY 4 HOURS PRN
Status: DISCONTINUED | OUTPATIENT
Start: 2022-11-19 | End: 2022-11-21 | Stop reason: HOSPADM

## 2022-11-19 RX ORDER — MAGNESIUM SULFATE HEPTAHYDRATE 40 MG/ML
2 INJECTION, SOLUTION INTRAVENOUS ONCE
Status: COMPLETED | OUTPATIENT
Start: 2022-11-19 | End: 2022-11-19

## 2022-11-19 RX ORDER — PANTOPRAZOLE SODIUM 40 MG/10ML
80 INJECTION, POWDER, LYOPHILIZED, FOR SOLUTION INTRAVENOUS DAILY
Status: DISCONTINUED | OUTPATIENT
Start: 2022-11-20 | End: 2022-11-20

## 2022-11-19 RX ADMIN — METOPROLOL SUCCINATE 50 MG: 50 TABLET, FILM COATED, EXTENDED RELEASE ORAL at 08:11

## 2022-11-19 RX ADMIN — PRAVASTATIN SODIUM 20 MG: 10 TABLET ORAL at 09:11

## 2022-11-19 RX ADMIN — PANTOPRAZOLE SODIUM 40 MG: 40 INJECTION, POWDER, FOR SOLUTION INTRAVENOUS at 08:11

## 2022-11-19 RX ADMIN — IPRATROPIUM BROMIDE 0.5 MG: 0.5 SOLUTION RESPIRATORY (INHALATION) at 01:11

## 2022-11-19 RX ADMIN — ASPIRIN 81 MG: 81 TABLET, COATED ORAL at 08:11

## 2022-11-19 RX ADMIN — IPRATROPIUM BROMIDE 0.5 MG: 0.5 SOLUTION RESPIRATORY (INHALATION) at 08:11

## 2022-11-19 RX ADMIN — OXYCODONE HYDROCHLORIDE AND ACETAMINOPHEN 1 TABLET: 10; 325 TABLET ORAL at 06:11

## 2022-11-19 RX ADMIN — ARFORMOTEROL TARTRATE 15 MCG: 15 SOLUTION RESPIRATORY (INHALATION) at 08:11

## 2022-11-19 RX ADMIN — CLOPIDOGREL BISULFATE 75 MG: 75 TABLET, FILM COATED ORAL at 08:11

## 2022-11-19 RX ADMIN — OXYCODONE HYDROCHLORIDE AND ACETAMINOPHEN 1 TABLET: 10; 325 TABLET ORAL at 10:11

## 2022-11-19 RX ADMIN — METOPROLOL SUCCINATE 50 MG: 50 TABLET, FILM COATED, EXTENDED RELEASE ORAL at 09:11

## 2022-11-19 RX ADMIN — AMLODIPINE BESYLATE 5 MG: 5 TABLET ORAL at 09:11

## 2022-11-19 RX ADMIN — MAGNESIUM SULFATE 2 G: 2 INJECTION INTRAVENOUS at 08:11

## 2022-11-19 NOTE — HPI
82-year-old, with history of COPD, hypertension presents to the ED on account of epigastric discomfort.  Of patient states that for the past few days, she is had decrease in appetite with feeling of bloatedness and associated weakness in which she has been unable to tolerate anything orally and has not been able to get out of her recliner for the past 5 days.  She  endorses nausea but denies any vomiting, hematochezia, hemoptysis, melena, changes in bowel or urinary habits.  Abdominal discomfort but is located in the epigastric region, nonradiating with no associated aggravating or relieving factor.  She denies any unilateral weaknesses, shortness of breath or fevers.  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  She states that she quit smoking 6 months ago, does not drink alcohol or use any illicit medications.  On presentation to the ED, lipase elevated at 118 and amylase 228.  Abdominal CT showed no acute findings.

## 2022-11-19 NOTE — CARE UPDATE
11/19/22 1311   Patient Assessment/Suction   Level of Consciousness (AVPU) alert   Respiratory Effort Normal;Unlabored   Expansion/Accessory Muscles/Retractions no use of accessory muscles   All Lung Fields Breath Sounds clear;diminished   Rhythm/Pattern, Respiratory no shortness of breath reported   Cough Frequency no cough   PRE-TX-O2   O2 Device (Oxygen Therapy) room air   SpO2 95 %   Pulse Oximetry Type Intermittent   $ Pulse Oximetry - Multiple Charge Pulse Oximetry - Multiple   Pulse 68   Resp 17   Aerosol Therapy   $ Aerosol Therapy Charges Aerosol Treatment   Daily Review of Necessity (SVN) completed   Respiratory Treatment Status (SVN) given   Treatment Route (SVN) mask;oxygen   Patient Position (SVN) HOB elevated   Post Treatment Assessment (SVN) breath sounds unchanged;vital signs unchanged   Signs of Intolerance (SVN) none   Education   $ Education Bronchodilator;15 min   Respiratory Evaluation   $ Care Plan Tech Time 15 min   $ Eval/Re-eval Charges Re-evaluation

## 2022-11-19 NOTE — PLAN OF CARE
[facility]  Initial Discharge Assessment       Primary Care Provider: Abiodun Will MD    Admission Diagnosis: Acute pancreatitis without infection or necrosis, unspecified pancreatitis type [K85.90]    Admission Date: 11/18/2022  Expected Discharge Date: TBD         Payor: [id number] MEDICARE / Plan: [employer] HEALTH / Product Type: Medicare Advantage /     Extended Emergency Contact Information  Primary Emergency Contact: Bautista Jhaveri  Address: [address] [place] [address] [place]  Mobile Phone: [phone]  Relation: Spouse  Preferred language: English   needed? No  Secondary Emergency Contact: Mariajose Lockwood  Mobile Phone: [phone]  Relation: Daughter  Preferred language: English   needed? No    Discharge Plan A: Home with family  Discharge Plan B: Home with family      [id number] - [place], [place] - [address] AT [facility] [address]  [address]  [facility] [address]  Phone: [phone] Fax: [phone]

## 2022-11-19 NOTE — PT/OT/SLP EVAL
"Occupational Therapy   Evaluation and Discharge Note    Name: Carmen Wilkinson  MRN: [id number]  Admitting Diagnosis:  <principal problem not specified>   Recent Surgery: * No surgery found *      Recommendations:     Discharge Recommendations: home  Discharge Equipment Recommendations:  shower chair  Barriers to discharge:  None    Assessment:     Carmen Wilkinson is a 82 y.o. female with a medical diagnosis of <principal problem not specified>. At this time, patient is functioning at their prior level of function and does not require further acute OT services.     Plan:     During this hospitalization, patient does not require further acute OT services.  Please re-consult if situation changes.    Plan of Care Reviewed with: patient    Subjective     Chief Complaint: Pain "all over," and not eating enough  Patient/Family Comments/goals: Return home    Occupational Profile:  Living Environment: Pt lives with  in a 1 story home with 1st floor equipped. No Steps to enter. Pt uses a tub/shower combo and standard height toilet.  Previous level of function: Mod I with ADLs and mobility  Roles and Routines: household chores  Equipment Used at home:  walker, rolling, grab bar  Assistance upon Discharge:  able    Pain/Comfort:  Pain Rating 1: 7/10  Location - Orientation 1: generalized  Location 1:  ("all over")  Pain Addressed 1: Distraction, Reposition  Pain Rating Post-Intervention 1: 6/10    Patients cultural, spiritual, Jehovah's witness conflicts given the current situation: no    Objective:     Communicated with: nurse prior to session.  Patient found HOB elevated with peripheral IV upon OT entry to room.    General Precautions: Standard, fall   Orthopedic Precautions:N/A   Braces: N/A  Respiratory Status: Room air     Occupational Performance:    Bed Mobility:    Patient completed Scooting/Bridging with independence  Patient completed Supine to Sit with independence  Patient completed Sit to Supine " with independence    Functional Mobility/Transfers:  Patient completed Sit <> Stand Transfer with supervision  with  no assistive device   Functional Mobility: ambulated 10 feet in room with RW, no LOB and SPV    Activities of Daily Living:  Upper Body Dressing: supervision donning personal robe  Lower Body Dressing: supervision donning/doffing socks while sitting EOB    Cognitive/Visual Perceptual:  Cognitive/Psychosocial Skills:     -       Oriented to: Person, Place, Time, and Situation   -       Safety awareness/insight to disability: intact   -       Mood/Affect/Coping skills/emotional control: Pleasant    Physical Exam:  Balance:    -       SPV sitting and standing balance  Upper Extremity Range of Motion:     -       Right Upper Extremity: WFL  -       Left Upper Extremity: WFL  Upper Extremity Strength:    -       Right Upper Extremity: WFL  -       Left Upper Extremity: WFL    AMPAC 6 Click ADL:  AMPAC Total Score: 24    Treatment & Education:  OT role and POC  Safety with ADLs and mobility  Call for assistance    Patient left HOB elevated with all lines intact, call button in reach, bed alarm off, and nurse notified    GOALS:   Multidisciplinary Problems       Occupational Therapy Goals       Not on file                    History:     Past Medical History:   Diagnosis Date    Back pain     Brain aneurysm     Carotid stenosis     Cataract     Diverticulitis     Emphysema lung     Feeling anxious     Hyperlipidemia     Hypertension     Macular degeneration     PVD (peripheral vascular disease)     Squamous cell carcinoma 08/29/2017    right medical cheek, SSIS, efudex tx         Past Surgical History:   Procedure Laterality Date    ABDOMINAL SURGERY      ANGIOGRAPHY OF MESENTERIC VESSELS Left 9/7/2022    Procedure: ANGIOGRAM, MESENTERIC VESSELS;  Surgeon: Krish Machado MD;  Location: [facility] CATH/EP LAB;  Service: General;  Laterality: Left;    ARTERIOGRAM, MESENTERIC N/A 6/30/2021    Procedure:  ARTERIOGRAM, MESENTERIC;  Surgeon: Krish Machado MD;  Location: [facility] CATH/EP LAB;  Service: General;  Laterality: N/A;    BACK SURGERY      CATARACT EXTRACTION, BILATERAL      COLONOSCOPY N/A 4/4/2022    Procedure: COLONOSCOPY;  Surgeon: Lino Devi MD;  Location: [facility] ENDO;  Service: Endoscopy;  Laterality: N/A;    CREATION, BYPASS, ARTERIAL, AORTA TO FEMORAL N/A 1/13/2021    Procedure: ARTERIOGRAM, MESENTERIC;  Surgeon: Krish Machado MD;  Location: [facility] CATH/EP LAB;  Service: General;  Laterality: N/A;    ENDOSCOPY OF PROXIMAL SMALL INTESTINE N/A 4/4/2022    Procedure: ENTEROSCOPY, PROXIMAL;  Surgeon: Lino Devi MD;  Location: [facility] ENDO;  Service: Endoscopy;  Laterality: N/A;    HYSTERECTOMY      IMPLANTATION OF SPINAL CORD STIMULATOR IN CERVICAL SPINE         Time Tracking:     OT Date of Treatment: 11/19/22  OT Start Time: 0849  OT Stop Time: 0902  OT Total Time (min): 13 min    Billable Minutes:Evaluation 5  Self Care/Home Management 8    11/19/2022

## 2022-11-19 NOTE — SUBJECTIVE & OBJECTIVE
Past Medical History:   Diagnosis Date    Back pain     Brain aneurysm     Carotid stenosis     Cataract     Diverticulitis     Emphysema lung     Feeling anxious     Hyperlipidemia     Hypertension     Macular degeneration     PVD (peripheral vascular disease)     Squamous cell carcinoma 08/29/2017    right medical cheek, SSIS, efudex tx       Past Surgical History:   Procedure Laterality Date    ABDOMINAL SURGERY      ANGIOGRAPHY OF MESENTERIC VESSELS Left 9/7/2022    Procedure: ANGIOGRAM, MESENTERIC VESSELS;  Surgeon: Krish Machado MD;  Location: [facility] CATH/EP LAB;  Service: General;  Laterality: Left;    ARTERIOGRAM, MESENTERIC N/A 6/30/2021    Procedure: ARTERIOGRAM, MESENTERIC;  Surgeon: Krish Machado MD;  Location: [facility] CATH/EP LAB;  Service: General;  Laterality: N/A;    BACK SURGERY      CATARACT EXTRACTION, BILATERAL      COLONOSCOPY N/A 4/4/2022    Procedure: COLONOSCOPY;  Surgeon: Lino Devi MD;  Location: [facility] ENDO;  Service: Endoscopy;  Laterality: N/A;    CREATION, BYPASS, ARTERIAL, AORTA TO FEMORAL N/A 1/13/2021    Procedure: ARTERIOGRAM, MESENTERIC;  Surgeon: Krish Machado MD;  Location: [facility] CATH/EP LAB;  Service: General;  Laterality: N/A;    ENDOSCOPY OF PROXIMAL SMALL INTESTINE N/A 4/4/2022    Procedure: ENTEROSCOPY, PROXIMAL;  Surgeon: Lino Devi MD;  Location: [facility] ENDO;  Service: Endoscopy;  Laterality: N/A;    HYSTERECTOMY      IMPLANTATION OF SPINAL CORD STIMULATOR IN CERVICAL SPINE         Review of patient's allergies indicates:  No Known Allergies    No current facility-administered medications on file prior to encounter.     Current Outpatient Medications on File Prior to Encounter   Medication Sig    amLODIPine (NORVASC) 5 MG tablet Take 5 mg by mouth once daily.    aspirin (ECOTRIN) 81 MG EC tablet Take 81 mg by mouth once daily.    cholecalciferol, vitamin D3, (VITAMIN D3) 10 mcg/mL (400 unit/mL) Drop Take 400 Units by mouth once daily.    clopidogreL (PLAVIX) 75 mg  tablet Take 1 tablet (75 mg total) by mouth once daily.    ferrous sulfate 325 (65 FE) MG EC tablet Take 325 mg by mouth once daily.    folic acid/multivit-min/lutein (CENTRUM SILVER ORAL) Take 1 capsule by mouth once daily.    KRILL OIL ORAL Take 1,000 mg by mouth once daily.     L. gasseri-B. bifidum-B longum 1.5 billion cell Cap Take 1 tablet by mouth once daily.     metoprolol succinate (TOPROL-XL) 50 MG 24 hr tablet Take 50 mg by mouth 2 (two) times daily.    oxyCODONE-acetaminophen (PERCOCET)  mg per tablet Take 1 tablet by mouth. 5 times daily    simvastatin (ZOCOR) 10 MG tablet Take 10 mg by mouth once daily.     umeclidinium-vilanteroL (ANORO ELLIPTA) 62.5-25 mcg/actuation DsDv Inhale 1 puff into the lungs once daily. Controller    vit A/vit C/vit E/zinc/copper (PRESERVISION AREDS ORAL) Take 1 capsule by mouth 2 (two) times a day.    ALPRAZolam (XANAX) 0.5 MG tablet Take 0.5 mg by mouth 2 (two) times daily.    amlodipine-benazepril 5-10 mg (LOTREL) 5-10 mg per capsule Hold until follow up with your pcp (Patient not taking: Reported on 2022)    coenzyme Q10 100 mg capsule Take 100 mg by mouth once daily.    oxycodone-acetaminophen (PERCOCET)  mg per tablet Take 1 tablet by mouth every 6 (six) hours as needed for Pain.    oxyCODONE-acetaminophen (PERCOCET)  mg per tablet Take 1 tablet by mouth.    sulfamethoxazole-trimethoprim 800-160mg (BACTRIM DS) 800-160 mg Tab Take 1 tablet by mouth 2 (two) times daily.    TALICIA -12.5 mg CpID SMARTSI Capsule(s) By Mouth Every 8 Hours     Family History    None       Tobacco Use    Smoking status: Former     Packs/day: 1.00     Types: Cigarettes     Start date: 1965     Quit date: 2022     Years since quittin.6    Smokeless tobacco: Never   Substance and Sexual Activity    Alcohol use: No    Drug use: Not Currently    Sexual activity: Not on file     Review of Systems   Constitutional:  Negative for fever.   HENT:  Negative  for sore throat and trouble swallowing.    Respiratory:  Negative for shortness of breath.    Cardiovascular:  Negative for chest pain.   Gastrointestinal:  Positive for abdominal pain and nausea. Negative for constipation, diarrhea and vomiting.   Neurological:  Positive for weakness.   All other systems reviewed and are negative.  Objective:     Vital Signs (Most Recent):  Temp: 97.4 °F (36.3 °C) (11/18/22 1418)  Pulse: 78 (11/18/22 1900)  Resp: 17 (11/18/22 1900)  BP: (!) 147/68 (11/18/22 1900)  SpO2: 97 % (11/18/22 1900)   Vital Signs (24h Range):  Temp:  [97.4 °F (36.3 °C)] 97.4 °F (36.3 °C)  Pulse:  [78-93] 78  Resp:  [17-18] 17  SpO2:  [97 %-98 %] 97 %  BP: (147-155)/(68-90) 147/68     Weight: 34 kg (75 lb)  Body mass index is 13.72 kg/m².    Physical Exam  Vitals and nursing note reviewed.   HENT:      Mouth/Throat:      Mouth: Mucous membranes are moist.   Cardiovascular:      Pulses: Normal pulses.   Pulmonary:      Effort: Pulmonary effort is normal.   Abdominal:      General: There is no distension.      Palpations: Abdomen is soft.      Tenderness: There is no rebound.   Musculoskeletal:         General: Normal range of motion.      Cervical back: Normal range of motion.      Right lower leg: No edema.      Left lower leg: No edema.   Skin:     General: Skin is warm.   Neurological:      Mental Status: She is alert.           Significant Labs: All pertinent labs within the past 24 hours have been reviewed.  CBC:   Recent Labs   Lab 11/18/22  1442   WBC 8.58   HGB 13.8   HCT 41.6   *     CMP:   Recent Labs   Lab 11/18/22  1442   *   K 4.4      CO2 24   *   BUN 22   CREATININE 0.7   CALCIUM 10.0   PROT 8.7*   ALBUMIN 4.3   BILITOT 0.6   ALKPHOS 78   AST 29   ALT 20   ANIONGAP 10     Lipase:   Recent Labs   Lab 11/18/22  1442   LIPASE 118*     Urine Studies:   Recent Labs   Lab 11/18/22  1454   COLORU Yellow   APPEARANCEUA Clear   PHUR 6.0   SPECGRAV 1.020   PROTEINUA 1+*   GLUCUA  Negative   KETONESU Negative   BILIRUBINUA Negative   OCCULTUA Negative   NITRITE Negative   UROBILINOGEN Negative   LEUKOCYTESUR Negative   RBCUA 2   WBCUA 3   BACTERIA Negative   SQUAMEPITHEL 2   HYALINECASTS 6*       Significant Imaging: I have reviewed all pertinent imaging results/findings within the past 24 hours.  Imaging Results              CT Abdomen Pelvis With Contrast (Final result)  Result time 11/18/22 16:29:09      Final result by Siddhartha Shultz MD (11/18/22 16:29:09)                   Narrative:    CMS MANDATED QUALITY DATA-CT RADIATION DOSE-436  All CT scans at this facility use dose modulation, iterative reconstruction, and or weight-based dosing when appropriate to reduce radiation dose to as low as reasonably achievable.    HISTORY: Abdominal abscess/infection suspected; Abdominal pain    FINDINGS: Axial postcontrast imaging was performed with 100 mL Omnipaque 350 IV contrast. Comparison to multiple prior exams including 07/19/2022.    CT ABDOMEN: Focal airspace opacity in the lingula inferiorly is consistent with subsegmental atelectasis, with scattered emphysema in the lower lungs. There is unchanged intrahepatic and extrahepatic biliary ductal dilatation, with the liver enhancing normally. The gallbladder enhances normally, with no calcified gallstones.    The spleen is normal in size and enhances normally, with the pancreas enhancing normally. The main pancreatic duct is prominent, unchanged, with no peripancreatic fluid or edema. The adrenal glands and kidneys enhance normally, with right renal arterial vascular calcifications. No renal calculi or hydronephrosis.    Diffuse calcified plaque involves normal caliber abdominal aorta and iliac arteries. No enlarged mesenteric or retroperitoneal lymph nodes. There is no bowel wall thickening, inflammation, or bowel obstruction. The appendix is normal. No ascites or intraperitoneal free air.    CT PELVIS: There is a sigmoid colon suture line,  with the rectum and urinary bladder enhancing normally. The uterus is surgically absent, with no pelvic free fluid. No enlarged pelvic or inguinal lymph nodes.    The extraperitoneal soft tissues enhance normally. There are postoperative changes of previous multilevel lumbar laminectomy, with degenerative disc space narrowing in the spine, and advanced degenerative narrowing of both hip joint spaces. The bones are diffusely osteopenic.    IMPRESSION:  1. No acute findings in the abdomen or pelvis.  2. Additional observations as described.    Electronically signed by:  Siddhartha Shultz MD  11/18/2022 4:29 PM [facility] Workstation: [id number]

## 2022-11-19 NOTE — PROGRESS NOTES
[facility] Medicine  Progress Note    Patient Name: Carmen Wilkinson  MRN: [id number]  Patient Class: OP- Observation   Admission Date: 11/18/2022  Length of Stay: 0 days  Attending Physician: Rea Ritchie MD  Primary Care Provider: Abiodun Will MD        Subjective:     Principal Problem:<principal problem not specified>        HPI:  82-year-old, with history of COPD, hypertension presents to the ED on account of epigastric discomfort.  Of patient states that for the past few days, she is had decrease in appetite with feeling of bloatedness and associated weakness in which she has been unable to tolerate anything orally and has not been able to get out of her recliner for the past 5 days.  She  endorses nausea but denies any vomiting, hematochezia, hemoptysis, melena, changes in bowel or urinary habits.  Abdominal discomfort but is located in the epigastric region, nonradiating with no associated aggravating or relieving factor.  She denies any unilateral weaknesses, shortness of breath or fevers.  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  She states that she quit smoking 6 months ago, does not drink alcohol or use any illicit medications.  On presentation to the ED, lipase elevated at 118 and amylase 228.  Abdominal CT showed no acute findings.        Overview/Hospital Course:  11/19:  Patient states that she is feeling better today, nausea has resolved.  Will start patient on clear liquid diet and advance as tolerated.  Patient complains of generalized pain and states that she takes Percocet at home 5 times daily, will start patient on p.r.n. Percocet.      Review of Systems   Constitutional:  Negative for fever.   Respiratory:  Negative for chest tightness and shortness of breath.    Cardiovascular:  Negative for chest pain and palpitations.    Gastrointestinal:  Negative for abdominal pain and nausea.   Musculoskeletal:  Positive for myalgias.   Neurological:  Negative for syncope.   All other systems reviewed and are negative.  Objective:     Vital Signs (Most Recent):  Temp: 98 °F (36.7 °C) (11/19/22 1100)  Pulse: 68 (11/19/22 1311)  Resp: 17 (11/19/22 1311)  BP: (!) 150/80 (11/19/22 1100)  SpO2: 95 % (11/19/22 1311)   Vital Signs (24h Range):  Temp:  [97.4 °F (36.3 °C)-98.5 °F (36.9 °C)] 98 °F (36.7 °C)  Pulse:  [68-93] 68  Resp:  [12-18] 17  SpO2:  [95 %-100 %] 95 %  BP: (133-155)/(64-90) 150/80     Weight: 34.8 kg (76 lb 11.5 oz)  Body mass index is 14.03 kg/m².    Intake/Output Summary (Last 24 hours) at 11/19/2022 1414  Last data filed at 11/18/2022 2049  Gross per 24 hour   Intake 500 ml   Output --   Net 500 ml      Physical Exam  Vitals and nursing note reviewed.   HENT:      Head: Normocephalic.      Nose: Nose normal.      Mouth/Throat:      Mouth: Mucous membranes are moist.   Eyes:      Pupils: Pupils are equal, round, and reactive to light.   Cardiovascular:      Pulses: Normal pulses.   Pulmonary:      Effort: Pulmonary effort is normal.   Abdominal:      Palpations: Abdomen is soft.      Tenderness: There is no abdominal tenderness.   Musculoskeletal:         General: Normal range of motion.      Cervical back: Normal range of motion.   Neurological:      General: No focal deficit present.      Mental Status: She is alert and oriented to person, place, and time. Mental status is at baseline.   Psychiatric:         Mood and Affect: Mood normal.       Significant Labs: All pertinent labs within the past 24 hours have been reviewed.  BMP:   Recent Labs   Lab 11/19/22  0616   GLU 91   *   K 3.9      CO2 25   BUN 14   CREATININE 0.7   CALCIUM 8.6*   MG 1.5*     CBC:   Recent Labs   Lab 11/18/22  1442 11/19/22  0616   WBC 8.58 7.35   HGB 13.8 11.0*   HCT 41.6 33.7*   * 379     Magnesium:   Recent Labs   Lab 11/18/22  1527  11/19/22  0616   MG 1.8 1.5*     Urine Studies:   Recent Labs   Lab 11/18/22  1454   COLORU Yellow   APPEARANCEUA Clear   PHUR 6.0   SPECGRAV 1.020   PROTEINUA 1+*   GLUCUA Negative   KETONESU Negative   BILIRUBINUA Negative   OCCULTUA Negative   NITRITE Negative   UROBILINOGEN Negative   LEUKOCYTESUR Negative   RBCUA 2   WBCUA 3   BACTERIA Negative   SQUAMEPITHEL 2   HYALINECASTS 6*       Significant Imaging: I have reviewed all pertinent imaging results/findings within the past 24 hours.  Imaging Results              CT Abdomen Pelvis With Contrast (Final result)  Result time 11/18/22 16:29:09      Final result by Siddhartha Shultz MD (11/18/22 16:29:09)                   Narrative:    CMS MANDATED QUALITY DATA-CT RADIATION DOSE-436  All CT scans at this facility use dose modulation, iterative reconstruction, and or weight-based dosing when appropriate to reduce radiation dose to as low as reasonably achievable.    HISTORY: Abdominal abscess/infection suspected; Abdominal pain    FINDINGS: Axial postcontrast imaging was performed with 100 mL Omnipaque 350 IV contrast. Comparison to multiple prior exams including 07/19/2022.    CT ABDOMEN: Focal airspace opacity in the lingula inferiorly is consistent with subsegmental atelectasis, with scattered emphysema in the lower lungs. There is unchanged intrahepatic and extrahepatic biliary ductal dilatation, with the liver enhancing normally. The gallbladder enhances normally, with no calcified gallstones.    The spleen is normal in size and enhances normally, with the pancreas enhancing normally. The main pancreatic duct is prominent, unchanged, with no peripancreatic fluid or edema. The adrenal glands and kidneys enhance normally, with right renal arterial vascular calcifications. No renal calculi or hydronephrosis.    Diffuse calcified plaque involves normal caliber abdominal aorta and iliac arteries. No enlarged mesenteric or retroperitoneal lymph nodes. There is no  bowel wall thickening, inflammation, or bowel obstruction. The appendix is normal. No ascites or intraperitoneal free air.    CT PELVIS: There is a sigmoid colon suture line, with the rectum and urinary bladder enhancing normally. The uterus is surgically absent, with no pelvic free fluid. No enlarged pelvic or inguinal lymph nodes.    The extraperitoneal soft tissues enhance normally. There are postoperative changes of previous multilevel lumbar laminectomy, with degenerative disc space narrowing in the spine, and advanced degenerative narrowing of both hip joint spaces. The bones are diffusely osteopenic.    IMPRESSION:  1. No acute findings in the abdomen or pelvis.  2. Additional observations as described.    Electronically signed by:  Siddhartha Shultz MD  11/18/2022 4:29 PM [facility] Workstation: [id number]                                        Assessment/Plan:      Acute pancreatitis  Advanced diet as tolerated  Decrease IV fluids  CT scan shows possible enlarged pancreatic duct, chronic??  Pain control      Chronic obstructive pulmonary disease  Stable  Continue home medications        VTE Risk Mitigation (From admission, onward)         Ordered     IP VTE HIGH RISK PATIENT  Once         11/18/22 2118     Place sequential compression device  Until discontinued         11/18/22 2118                Discharge Planning   RAY:      Code Status: DNR   Is the patient medically ready for discharge?:     Reason for patient still in hospital (select all that apply): Patient trending condition  Discharge Plan A: Home with family                  Rea Ritchie MD  Department of Hospital Medicine   [facility]

## 2022-11-19 NOTE — PT/OT/SLP EVAL
Physical Therapy Evaluation and Discharge Note    Patient Name:  Carmen Wilkinson   MRN:  [id number]    Recommendations:     Discharge Recommendations:  home   Discharge Equipment Recommendations: shower chair   Barriers to discharge: None    Assessment:     Carmen Wilkinson is a 82 y.o. female admitted with a medical diagnosis of <principal problem not specified>. .  At this time, patient is functioning at their prior level of function and does not require further acute PT services.     Recent Surgery: * No surgery found *      Plan:     During this hospitalization, patient does not require further acute PT services.  Please re-consult if situation changes.      Subjective     Chief Complaint: pain  Patient/Family Comments/goals: go home  Pain/Comfort:  Pain Rating 1: 7/10  Location - Orientation 1: generalized  Location 1:  (all over)  Pain Addressed 1: Distraction, Reposition  Pain Rating Post-Intervention 1: 6/10    Patients cultural, spiritual, Moravian conflicts given the current situation: no    Living Environment:  Pt lives with  in a 1 story home with 1st floor equipped. No Steps to enter. Pt uses a tub/shower combo and standard height toilet.  Previous level of function: Mod I with ADLs and mobility  Equipment Used at home:  walker, rolling, grab bar  Assistance upon Discharge:   Prior to admission, patients level of function was mod I at household level, limited gait distance due to O2 demands.  Equipment used at home: rollator.  DME owned (not currently used): none.  Upon discharge, patient will have assistance from family.    Objective:     Communicated with nurseGriselda prior to session.  Patient found HOB elevated with peripheral IV upon PT entry to room.    General Precautions: Standard, fall   Orthopedic Precautions:N/A   Braces: N/A   Respiratory Status: Room air    Exams:  Cognitive Exam:  Patient is oriented to Person, Place, Time, and Situation  Gross Motor  Coordination:  WFL  RLE Strength: WFL  LLE Strength: WFL    Functional Mobility:  Bed Mobility:     Supine to Sit: modified independence  Sit to Supine: modified independence  Transfers:     Sit to Stand:  supervision with rolling walker  Gait: amb x 70 ft with RW SBA, SPO2 94-96% on RA    AM-PAC 6 CLICK MOBILITY  Total Score:23       Treatment and Education:   Educated in PT roles and goals, importance of increased activity OOB, fall prevention, use of call bell    AM-PAC 6 CLICK MOBILITY  Total Score:23     Patient left HOB elevated with all lines intact and call button in reach.    GOALS:   Multidisciplinary Problems       Physical Therapy Goals       Not on file                    History:     Past Medical History:   Diagnosis Date    Back pain     Brain aneurysm     Carotid stenosis     Cataract     Diverticulitis     Emphysema lung     Feeling anxious     Hyperlipidemia     Hypertension     Macular degeneration     PVD (peripheral vascular disease)     Squamous cell carcinoma 08/29/2017    right medical cheek, SSIS, efudex tx       Past Surgical History:   Procedure Laterality Date    ABDOMINAL SURGERY      ANGIOGRAPHY OF MESENTERIC VESSELS Left 9/7/2022    Procedure: ANGIOGRAM, MESENTERIC VESSELS;  Surgeon: Krish Machado MD;  Location: [facility] CATH/EP LAB;  Service: General;  Laterality: Left;    ARTERIOGRAM, MESENTERIC N/A 6/30/2021    Procedure: ARTERIOGRAM, MESENTERIC;  Surgeon: Krish Machado MD;  Location: [facility] CATH/EP LAB;  Service: General;  Laterality: N/A;    BACK SURGERY      CATARACT EXTRACTION, BILATERAL      COLONOSCOPY N/A 4/4/2022    Procedure: COLONOSCOPY;  Surgeon: Lino Devi MD;  Location: [facility] ENDO;  Service: Endoscopy;  Laterality: N/A;    CREATION, BYPASS, ARTERIAL, AORTA TO FEMORAL N/A 1/13/2021    Procedure: ARTERIOGRAM, MESENTERIC;  Surgeon: Krish Machado MD;  Location: [facility] CATH/EP LAB;  Service: General;  Laterality: N/A;    ENDOSCOPY OF PROXIMAL SMALL INTESTINE N/A  4/4/2022    Procedure: ENTEROSCOPY, PROXIMAL;  Surgeon: Lino Devi MD;  Location: [facility];  Service: Endoscopy;  Laterality: N/A;    HYSTERECTOMY      IMPLANTATION OF SPINAL CORD STIMULATOR IN CERVICAL SPINE         Time Tracking:     PT Received On: 11/19/22  PT Start Time: 0920     PT Stop Time: 0928  PT Total Time (min): 8 min     Billable Minutes: Evaluation 8      11/19/2022

## 2022-11-19 NOTE — SUBJECTIVE & OBJECTIVE
Review of Systems   Constitutional:  Negative for fever.   Respiratory:  Negative for chest tightness and shortness of breath.    Cardiovascular:  Negative for chest pain and palpitations.   Gastrointestinal:  Negative for abdominal pain and nausea.   Musculoskeletal:  Positive for myalgias.   Neurological:  Negative for syncope.   All other systems reviewed and are negative.  Objective:     Vital Signs (Most Recent):  Temp: 98 °F (36.7 °C) (11/19/22 1100)  Pulse: 68 (11/19/22 1311)  Resp: 17 (11/19/22 1311)  BP: (!) 150/80 (11/19/22 1100)  SpO2: 95 % (11/19/22 1311)   Vital Signs (24h Range):  Temp:  [97.4 °F (36.3 °C)-98.5 °F (36.9 °C)] 98 °F (36.7 °C)  Pulse:  [68-93] 68  Resp:  [12-18] 17  SpO2:  [95 %-100 %] 95 %  BP: (133-155)/(64-90) 150/80     Weight: 34.8 kg (76 lb 11.5 oz)  Body mass index is 14.03 kg/m².    Intake/Output Summary (Last 24 hours) at 11/19/2022 1414  Last data filed at 11/18/2022 2049  Gross per 24 hour   Intake 500 ml   Output --   Net 500 ml      Physical Exam  Vitals and nursing note reviewed.   HENT:      Head: Normocephalic.      Nose: Nose normal.      Mouth/Throat:      Mouth: Mucous membranes are moist.   Eyes:      Pupils: Pupils are equal, round, and reactive to light.   Cardiovascular:      Pulses: Normal pulses.   Pulmonary:      Effort: Pulmonary effort is normal.   Abdominal:      Palpations: Abdomen is soft.      Tenderness: There is no abdominal tenderness.   Musculoskeletal:         General: Normal range of motion.      Cervical back: Normal range of motion.   Neurological:      General: No focal deficit present.      Mental Status: She is alert and oriented to person, place, and time. Mental status is at baseline.   Psychiatric:         Mood and Affect: Mood normal.       Significant Labs: All pertinent labs within the past 24 hours have been reviewed.  BMP:   Recent Labs   Lab 11/19/22  0616   GLU 91   *   K 3.9      CO2 25   BUN 14   CREATININE 0.7   CALCIUM  8.6*   MG 1.5*     CBC:   Recent Labs   Lab 11/18/22  1442 11/19/22  0616   WBC 8.58 7.35   HGB 13.8 11.0*   HCT 41.6 33.7*   * 379     Magnesium:   Recent Labs   Lab 11/18/22  1442 11/19/22  0616   MG 1.8 1.5*     Urine Studies:   Recent Labs   Lab 11/18/22  1454   COLORU Yellow   APPEARANCEUA Clear   PHUR 6.0   SPECGRAV 1.020   PROTEINUA 1+*   GLUCUA Negative   KETONESU Negative   BILIRUBINUA Negative   OCCULTUA Negative   NITRITE Negative   UROBILINOGEN Negative   LEUKOCYTESUR Negative   RBCUA 2   WBCUA 3   BACTERIA Negative   SQUAMEPITHEL 2   HYALINECASTS 6*       Significant Imaging: I have reviewed all pertinent imaging results/findings within the past 24 hours.  Imaging Results              CT Abdomen Pelvis With Contrast (Final result)  Result time 11/18/22 16:29:09      Final result by Siddhartha Shultz MD (11/18/22 16:29:09)                   Narrative:    CMS MANDATED QUALITY DATA-CT RADIATION DOSE-436  All CT scans at this facility use dose modulation, iterative reconstruction, and or weight-based dosing when appropriate to reduce radiation dose to as low as reasonably achievable.    HISTORY: Abdominal abscess/infection suspected; Abdominal pain    FINDINGS: Axial postcontrast imaging was performed with 100 mL Omnipaque 350 IV contrast. Comparison to multiple prior exams including 07/19/2022.    CT ABDOMEN: Focal airspace opacity in the lingula inferiorly is consistent with subsegmental atelectasis, with scattered emphysema in the lower lungs. There is unchanged intrahepatic and extrahepatic biliary ductal dilatation, with the liver enhancing normally. The gallbladder enhances normally, with no calcified gallstones.    The spleen is normal in size and enhances normally, with the pancreas enhancing normally. The main pancreatic duct is prominent, unchanged, with no peripancreatic fluid or edema. The adrenal glands and kidneys enhance normally, with right renal arterial vascular calcifications. No  renal calculi or hydronephrosis.    Diffuse calcified plaque involves normal caliber abdominal aorta and iliac arteries. No enlarged mesenteric or retroperitoneal lymph nodes. There is no bowel wall thickening, inflammation, or bowel obstruction. The appendix is normal. No ascites or intraperitoneal free air.    CT PELVIS: There is a sigmoid colon suture line, with the rectum and urinary bladder enhancing normally. The uterus is surgically absent, with no pelvic free fluid. No enlarged pelvic or inguinal lymph nodes.    The extraperitoneal soft tissues enhance normally. There are postoperative changes of previous multilevel lumbar laminectomy, with degenerative disc space narrowing in the spine, and advanced degenerative narrowing of both hip joint spaces. The bones are diffusely osteopenic.    IMPRESSION:  1. No acute findings in the abdomen or pelvis.  2. Additional observations as described.    Electronically signed by:  Siddhartha Shultz MD  11/18/2022 4:29 PM [facility] Workstation: [id number]

## 2022-11-19 NOTE — PROGRESS NOTES
Automatic Inhaler to Nebulizer Interchange    Umeclidinium/Vilanterol (Anoro) 62.5 mcg/25 mcg changed to Ipratropium 0.5 mg every 6 hours AND Arformoterol 15 mcg BID per [facility] Automatic Therapeutic Sustitutions Protocol.    Please contact pharmacy at extension [phone] with any questions.     Thank you,   Terrie Knox

## 2022-11-19 NOTE — CONSULTS
Consult Note  Gastroenterology    Consult Requested By: hospital medicine  Reason for Consult: upper abdominal pain , ? pancreatitis    SUBJECTIVE:     History of Present Illness:  Patient is a 82 y.o. female presents with several weeks of progressive upper abdominal pain - she has a long hx of tobacco use and has extensive PVD. Admission CT abdomen revealed dilated intra/extrahepatic bile ducts with a prominent PD, but liver, GB and pancreas were otherwise normal.  Radiologist reported the ductal changes were chronic and unchanged.  LFT's are all low normal.  Pt reports minimal recent wt loss but is very thin/ cachectic and was told this waas secondary to chronic intestinal ischemia. She underwent EGD/colonoscopy 4/22 for GI bleed.  Colonoscopy was normal but EGD revealed a cratered 1.4cm  - pt was treated with PPI + Carafate but she reports taking if for only one week post D/C and today has had a dark maroon stool. Amylase/lipase are slightly elevated.Onset of symptoms was gradual starting several days ago with gradually worsening course since that time. Patient denies change in bowel habits, constipation, dysphagia, jaundice, reflux symptoms, and vomiting. Symptoms are aggravated by eating. Symptoms improve with none. Past history includes as per HPI/PMH. Previous studies include CT scan.     Review of patient's allergies indicates:  No Known Allergies    Past Medical History:   Diagnosis Date    Back pain     Brain aneurysm     Carotid stenosis     Cataract     Diverticulitis     Emphysema lung     Feeling anxious     Hyperlipidemia     Hypertension     Macular degeneration     PVD (peripheral vascular disease)     Squamous cell carcinoma 08/29/2017    right medical cheek, SSIS, efudex tx     Past Surgical History:   Procedure Laterality Date    ABDOMINAL SURGERY      ANGIOGRAPHY OF MESENTERIC VESSELS Left 9/7/2022    Procedure: ANGIOGRAM, MESENTERIC VESSELS;  Surgeon: Krish Machado MD;  Location: [facility]  CATH/EP LAB;  Service: General;  Laterality: Left;    ARTERIOGRAM, MESENTERIC N/A 2021    Procedure: ARTERIOGRAM, MESENTERIC;  Surgeon: Krish Machado MD;  Location: [facility] CATH/EP LAB;  Service: General;  Laterality: N/A;    BACK SURGERY      CATARACT EXTRACTION, BILATERAL      COLONOSCOPY N/A 2022    Procedure: COLONOSCOPY;  Surgeon: Lino Devi MD;  Location: [facility] ENDO;  Service: Endoscopy;  Laterality: N/A;    CREATION, BYPASS, ARTERIAL, AORTA TO FEMORAL N/A 2021    Procedure: ARTERIOGRAM, MESENTERIC;  Surgeon: Krish Machado MD;  Location: [facility] CATH/EP LAB;  Service: General;  Laterality: N/A;    ENDOSCOPY OF PROXIMAL SMALL INTESTINE N/A 2022    Procedure: ENTEROSCOPY, PROXIMAL;  Surgeon: Lino Devi MD;  Location: [facility] ENDO;  Service: Endoscopy;  Laterality: N/A;    HYSTERECTOMY      IMPLANTATION OF SPINAL CORD STIMULATOR IN CERVICAL SPINE       Family History   Problem Relation Age of Onset    Melanoma Neg Hx     Psoriasis Neg Hx     Eczema Neg Hx     Lupus Neg Hx      Social History     Tobacco Use    Smoking status: Former     Packs/day: 1.00     Types: Cigarettes     Start date: 1965     Quit date: 2022     Years since quittin.6    Smokeless tobacco: Never   Substance Use Topics    Alcohol use: No    Drug use: Not Currently       Review of Systems:  GENERAL:  No weight loss, fever or chills.  HEENT:  No changes in vision, rhinorrhea, nasal congestion.  PULMONARY:  No shortness of breath, coughing or wheezing.  CARDIOVASCULAR:  No chest pain, palpitations or syncope.  GI:  No abdominal pain, nausea, vomiting, diarrhea or constipation.  :  No dysuria, urgency or frequency.  MUSCULOSKELETAL:  No joint pain or arthralgia.  SKIN:  No rashes or skin breakdown.  ENDOCRINE:  No polyuria or polydipsia. No thyroid disease.  NEURO/PSYCH:  No headaches or trouble with cognition. Stable mood.        OBJECTIVE:     Vital Signs (Most Recent)  Temp: 98 °F (36.7 °C) (22  1100)  Pulse: 68 (11/19/22 1311)  Resp: 17 (11/19/22 1311)  BP: (!) 150/80 (11/19/22 1100)  SpO2: 95 % (11/19/22 1311)    Temperature Range Min/Max (Last 24H):  Temp:  [98 °F (36.7 °C)-98.5 °F (36.9 °C)]     Physical Exam:  GENERAL:  No apparent distress. Alert and oriented times three very thin  HEENT:  PERRLA, EOMI. Conjunctivae intact. Posterior pharynx clear  NECK:  Supple with no lymphadenopathy or thyromegaly  LUNGS:  No respiratory distress. Clear to auscultation bilaterally with good air movement  CARDIAC:  RRR without murmur, rub or gallop  ABDOMEN:  Positive bowel sounds. Nontender and nondistended. No organomegaly  EXTREMITIES:  Peripheral pulses are 2+. Hands and feet are warm. Good capillary refill in fingers. No clubbing, cyanosis or edema  SKIN:  Skin color, texture and turgor normal. No rashes, ulcerations or nodules  NEUROLOGIC:  CN II-XII intact. Light touch sensation intact. Muscle strength 5/5 in all extremities     Laboratory:  I have reviewed all pertinent lab results within the past 24 hours.  CBC:   Recent Labs   Lab 11/19/22  0616   WBC 7.35   RBC 3.51*   HGB 11.0*   HCT 33.7*      MCV 96   MCH 31.3*   MCHC 32.6     CMP:   Recent Labs   Lab 11/18/22  1442 11/19/22  0616   * 91   CALCIUM 10.0 8.6*   ALBUMIN 4.3  --    PROT 8.7*  --    * 135*   K 4.4 3.9   CO2 24 25    103   BUN 22 14   CREATININE 0.7 0.7   ALKPHOS 78  --    ALT 20  --    AST 29  --    BILITOT 0.6  --      Coagulation:   Recent Labs   Lab 11/18/22  1442   INR 1.2   APTT 35.4*         Diagnostic Results:  CT: I have personally reviewed both the image and report    Imaging Reports:  Imaging Results              CT Abdomen Pelvis With Contrast (Final result)  Result time 11/18/22 16:29:09      Final result by Siddhartha Shultz MD (11/18/22 16:29:09)                   Narrative:    CMS MANDATED QUALITY DATA-CT RADIATION DOSE-436  All CT scans at this facility use dose modulation, iterative reconstruction,  and or weight-based dosing when appropriate to reduce radiation dose to as low as reasonably achievable.    HISTORY: Abdominal abscess/infection suspected; Abdominal pain    FINDINGS: Axial postcontrast imaging was performed with 100 mL Omnipaque 350 IV contrast. Comparison to multiple prior exams including 07/19/2022.    CT ABDOMEN: Focal airspace opacity in the lingula inferiorly is consistent with subsegmental atelectasis, with scattered emphysema in the lower lungs. There is unchanged intrahepatic and extrahepatic biliary ductal dilatation, with the liver enhancing normally. The gallbladder enhances normally, with no calcified gallstones.    The spleen is normal in size and enhances normally, with the pancreas enhancing normally. The main pancreatic duct is prominent, unchanged, with no peripancreatic fluid or edema. The adrenal glands and kidneys enhance normally, with right renal arterial vascular calcifications. No renal calculi or hydronephrosis.    Diffuse calcified plaque involves normal caliber abdominal aorta and iliac arteries. No enlarged mesenteric or retroperitoneal lymph nodes. There is no bowel wall thickening, inflammation, or bowel obstruction. The appendix is normal. No ascites or intraperitoneal free air.    CT PELVIS: There is a sigmoid colon suture line, with the rectum and urinary bladder enhancing normally. The uterus is surgically absent, with no pelvic free fluid. No enlarged pelvic or inguinal lymph nodes.    The extraperitoneal soft tissues enhance normally. There are postoperative changes of previous multilevel lumbar laminectomy, with degenerative disc space narrowing in the spine, and advanced degenerative narrowing of both hip joint spaces. The bones are diffusely osteopenic.    IMPRESSION:  1. No acute findings in the abdomen or pelvis.  2. Additional observations as described.    Electronically signed by:  Siddhartha Shultz MD  11/18/2022 4:29 PM [facility] Workstation: [id number]                                       ASSESSMENT/PLAN:     History of Present Illness:  Patient is a 82 y.o. female presents with several weeks of progressive upper abdominal pain - she has a long hx of tobacco use and has extensive PVD. Admission CT abdomen revealed dilated intra/extrahepatic bile ducts with a prominent PD, but liver, GB and pancreas were otherwise normal.  Radiologist reported the ductal changes were chronic and unchanged.  LFT's are all low normal.  Pt reports minimal recent wt loss but is very thin/ cachectic and was told this waas secondary to chronic intestinal ischemia. She underwent EGD/colonoscopy 4/22 for GI bleed.  Colonoscopy was normal but EGD revealed a cratered 1.4cm  - pt was treated with PPI + Carafate but she reports taking if for only one week post D/C and today has had a dark maroon stool. Amylase/lipase are slightly elevated.  Hgb has significantly dropped from 41 to 33.7 today.  Imp - concerned about acute UGI bleed in this pt with known  and inadequate treatment.    Plan: begin BID PPI IV tx and follow Hgb closely - EGD tomorrow.  Low probability for acute pancreatitis..

## 2022-11-19 NOTE — PLAN OF CARE
11/19/22 1159   GASTON Message   Medicare Outpatient and Observation Notification regarding financial responsibility Given to patient/caregiver;Explained to patient/caregiver;Signed/date by patient/caregiver   Time GASTON was signed 1000

## 2022-11-19 NOTE — H&P
[facility] - Emergency Dept  Hospital Medicine  History & Physical    Patient Name: Carmen Wilkinson  MRN: [id number]  Patient Class: OP- Observation  Admission Date: 11/18/2022  Attending Physician: Rea Ritchie MD  Primary Care Provider: Abiodun Will MD         Patient information was obtained from patient and ER records.     Subjective:     Principal Problem:<principal problem not specified>    Chief Complaint:   Chief Complaint   Patient presents with    Abdominal Pain     Pt reports abdominal pain and being unable to eat. 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        HPI: 82-year-old, with history of COPD, hypertension presents to the ED on account of epigastric discomfort.  Of patient states that for the past few days, she is had decrease in appetite with feeling of bloatedness and associated weakness in which she has been unable to tolerate anything orally and has not been able to get out of her recliner for the past 5 days.  She  endorses nausea but denies any vomiting, hematochezia, hemoptysis, melena, changes in bowel or urinary habits.  Abdominal discomfort but is located in the epigastric region, nonradiating with no associated aggravating or relieving factor.  She denies any unilateral weaknesses, shortness of breath or fevers.  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  She states that she quit smoking 6 months ago, does not drink alcohol or use any illicit medications.  On presentation to the ED, lipase elevated at 118 and amylase 228.  Abdominal CT showed no acute findings.        Past Medical History:   Diagnosis Date    Back pain     Brain aneurysm     Carotid stenosis     Cataract     Diverticulitis     Emphysema lung     Feeling anxious     Hyperlipidemia     Hypertension     Macular degeneration      PVD (peripheral vascular disease)     Squamous cell carcinoma 08/29/2017    right medical cheek, SSIS, efudex tx       Past Surgical History:   Procedure Laterality Date    ABDOMINAL SURGERY      ANGIOGRAPHY OF MESENTERIC VESSELS Left 9/7/2022    Procedure: ANGIOGRAM, MESENTERIC VESSELS;  Surgeon: Krish Machado MD;  Location: [facility] CATH/EP LAB;  Service: General;  Laterality: Left;    ARTERIOGRAM, MESENTERIC N/A 6/30/2021    Procedure: ARTERIOGRAM, MESENTERIC;  Surgeon: Krish Machado MD;  Location: [facility] CATH/EP LAB;  Service: General;  Laterality: N/A;    BACK SURGERY      CATARACT EXTRACTION, BILATERAL      COLONOSCOPY N/A 4/4/2022    Procedure: COLONOSCOPY;  Surgeon: Lino Devi MD;  Location: [facility] ENDO;  Service: Endoscopy;  Laterality: N/A;    CREATION, BYPASS, ARTERIAL, AORTA TO FEMORAL N/A 1/13/2021    Procedure: ARTERIOGRAM, MESENTERIC;  Surgeon: Krish Machado MD;  Location: [facility] CATH/EP LAB;  Service: General;  Laterality: N/A;    ENDOSCOPY OF PROXIMAL SMALL INTESTINE N/A 4/4/2022    Procedure: ENTEROSCOPY, PROXIMAL;  Surgeon: Lino Devi MD;  Location: [facility] ENDO;  Service: Endoscopy;  Laterality: N/A;    HYSTERECTOMY      IMPLANTATION OF SPINAL CORD STIMULATOR IN CERVICAL SPINE         Review of patient's allergies indicates:  No Known Allergies    No current facility-administered medications on file prior to encounter.     Current Outpatient Medications on File Prior to Encounter   Medication Sig    amLODIPine (NORVASC) 5 MG tablet Take 5 mg by mouth once daily.    aspirin (ECOTRIN) 81 MG EC tablet Take 81 mg by mouth once daily.    cholecalciferol, vitamin D3, (VITAMIN D3) 10 mcg/mL (400 unit/mL) Drop Take 400 Units by mouth once daily.    clopidogreL (PLAVIX) 75 mg tablet Take 1 tablet (75 mg total) by mouth once daily.    ferrous sulfate 325 (65 FE) MG EC tablet Take 325 mg by mouth once daily.    folic acid/multivit-min/lutein (CENTRUM SILVER ORAL) Take 1 capsule  by mouth once daily.    KRILL OIL ORAL Take 1,000 mg by mouth once daily.     L. gasseri-B. bifidum-B longum 1.5 billion cell Cap Take 1 tablet by mouth once daily.     metoprolol succinate (TOPROL-XL) 50 MG 24 hr tablet Take 50 mg by mouth 2 (two) times daily.    oxyCODONE-acetaminophen (PERCOCET)  mg per tablet Take 1 tablet by mouth. 5 times daily    simvastatin (ZOCOR) 10 MG tablet Take 10 mg by mouth once daily.     umeclidinium-vilanteroL (ANORO ELLIPTA) 62.5-25 mcg/actuation DsDv Inhale 1 puff into the lungs once daily. Controller    vit A/vit C/vit E/zinc/copper (PRESERVISION AREDS ORAL) Take 1 capsule by mouth 2 (two) times a day.    ALPRAZolam (XANAX) 0.5 MG tablet Take 0.5 mg by mouth 2 (two) times daily.    amlodipine-benazepril 5-10 mg (LOTREL) 5-10 mg per capsule Hold until follow up with your pcp (Patient not taking: Reported on 2022)    coenzyme Q10 100 mg capsule Take 100 mg by mouth once daily.    oxycodone-acetaminophen (PERCOCET)  mg per tablet Take 1 tablet by mouth every 6 (six) hours as needed for Pain.    oxyCODONE-acetaminophen (PERCOCET)  mg per tablet Take 1 tablet by mouth.    sulfamethoxazole-trimethoprim 800-160mg (BACTRIM DS) 800-160 mg Tab Take 1 tablet by mouth 2 (two) times daily.    TALICIA -12.5 mg CpID SMARTSI Capsule(s) By Mouth Every 8 Hours     Family History    None       Tobacco Use    Smoking status: Former     Packs/day: 1.00     Types: Cigarettes     Start date: 1965     Quit date: 2022     Years since quittin.6    Smokeless tobacco: Never   Substance and Sexual Activity    Alcohol use: No    Drug use: Not Currently    Sexual activity: Not on file     Review of Systems   Constitutional:  Negative for fever.   HENT:  Negative for sore throat and trouble swallowing.    Respiratory:  Negative for shortness of breath.    Cardiovascular:  Negative for chest pain.   Gastrointestinal:  Positive for abdominal pain  and nausea. Negative for constipation, diarrhea and vomiting.   Neurological:  Positive for weakness.   All other systems reviewed and are negative.  Objective:     Vital Signs (Most Recent):  Temp: 97.4 °F (36.3 °C) (11/18/22 1418)  Pulse: 78 (11/18/22 1900)  Resp: 17 (11/18/22 1900)  BP: (!) 147/68 (11/18/22 1900)  SpO2: 97 % (11/18/22 1900)   Vital Signs (24h Range):  Temp:  [97.4 °F (36.3 °C)] 97.4 °F (36.3 °C)  Pulse:  [78-93] 78  Resp:  [17-18] 17  SpO2:  [97 %-98 %] 97 %  BP: (147-155)/(68-90) 147/68     Weight: 34 kg (75 lb)  Body mass index is 13.72 kg/m².    Physical Exam  Vitals and nursing note reviewed.   HENT:      Mouth/Throat:      Mouth: Mucous membranes are moist.   Cardiovascular:      Pulses: Normal pulses.   Pulmonary:      Effort: Pulmonary effort is normal.   Abdominal:      General: There is no distension.      Palpations: Abdomen is soft.      Tenderness: There is no rebound.   Musculoskeletal:         General: Normal range of motion.      Cervical back: Normal range of motion.      Right lower leg: No edema.      Left lower leg: No edema.   Skin:     General: Skin is warm.   Neurological:      Mental Status: She is alert.           Significant Labs: All pertinent labs within the past 24 hours have been reviewed.  CBC:   Recent Labs   Lab 11/18/22  1442   WBC 8.58   HGB 13.8   HCT 41.6   *     CMP:   Recent Labs   Lab 11/18/22  1442   *   K 4.4      CO2 24   *   BUN 22   CREATININE 0.7   CALCIUM 10.0   PROT 8.7*   ALBUMIN 4.3   BILITOT 0.6   ALKPHOS 78   AST 29   ALT 20   ANIONGAP 10     Lipase:   Recent Labs   Lab 11/18/22  1442   LIPASE 118*     Urine Studies:   Recent Labs   Lab 11/18/22  1454   COLORU Yellow   APPEARANCEUA Clear   PHUR 6.0   SPECGRAV 1.020   PROTEINUA 1+*   GLUCUA Negative   KETONESU Negative   BILIRUBINUA Negative   OCCULTUA Negative   NITRITE Negative   UROBILINOGEN Negative   LEUKOCYTESUR Negative   RBCUA 2   WBCUA 3   BACTERIA Negative    SQUAMEPITHEL 2   HYALINECASTS 6*       Significant Imaging: I have reviewed all pertinent imaging results/findings within the past 24 hours.  Imaging Results              CT Abdomen Pelvis With Contrast (Final result)  Result time 11/18/22 16:29:09      Final result by Siddhartha Shultz MD (11/18/22 16:29:09)                   Narrative:    CMS MANDATED QUALITY DATA-CT RADIATION DOSE-436  All CT scans at this facility use dose modulation, iterative reconstruction, and or weight-based dosing when appropriate to reduce radiation dose to as low as reasonably achievable.    HISTORY: Abdominal abscess/infection suspected; Abdominal pain    FINDINGS: Axial postcontrast imaging was performed with 100 mL Omnipaque 350 IV contrast. Comparison to multiple prior exams including 07/19/2022.    CT ABDOMEN: Focal airspace opacity in the lingula inferiorly is consistent with subsegmental atelectasis, with scattered emphysema in the lower lungs. There is unchanged intrahepatic and extrahepatic biliary ductal dilatation, with the liver enhancing normally. The gallbladder enhances normally, with no calcified gallstones.    The spleen is normal in size and enhances normally, with the pancreas enhancing normally. The main pancreatic duct is prominent, unchanged, with no peripancreatic fluid or edema. The adrenal glands and kidneys enhance normally, with right renal arterial vascular calcifications. No renal calculi or hydronephrosis.    Diffuse calcified plaque involves normal caliber abdominal aorta and iliac arteries. No enlarged mesenteric or retroperitoneal lymph nodes. There is no bowel wall thickening, inflammation, or bowel obstruction. The appendix is normal. No ascites or intraperitoneal free air.    CT PELVIS: There is a sigmoid colon suture line, with the rectum and urinary bladder enhancing normally. The uterus is surgically absent, with no pelvic free fluid. No enlarged pelvic or inguinal lymph nodes.    The  extraperitoneal soft tissues enhance normally. There are postoperative changes of previous multilevel lumbar laminectomy, with degenerative disc space narrowing in the spine, and advanced degenerative narrowing of both hip joint spaces. The bones are diffusely osteopenic.    IMPRESSION:  1. No acute findings in the abdomen or pelvis.  2. Additional observations as described.    Electronically signed by:  Siddhartha Shultz MD  11/18/2022 4:29 PM CST Workstation: [id number]                                      Assessment/Plan:     Acute pancreatitis  IV fluids  CT scan shows possible enlarged pancreatic duct, chronic??  NPO and advance diet as tolerated  Pain control      Chronic obstructive pulmonary disease  Stable  Continue home medications      VTE Risk Mitigation (From admission, onward)    None             Rea Ritchie MD  Department of Hospital Medicine   [facility] - Emergency Dept

## 2022-11-19 NOTE — ASSESSMENT & PLAN NOTE
IV fluids  CT scan shows possible enlarged pancreatic duct, chronic??  NPO and advance diet as tolerated  Pain control

## 2022-11-19 NOTE — HOSPITAL COURSE
Patient is an 82-year-old female with COPD, hypertension, hypothyroidism, severe peripheral arterial disease status post subclavian carotid bypass, celiac artery stent, chronic SMA stenosis who is presenting with abdominal pain and was found to have mild episode of pancreatitis.  She was given IV fluids, pain control and nausea control and had significant improvement in her symptoms.  Gastroenterology was consulted and was concerned that she had dark stools and performed EGD for further evaluation given her recent history of GI bleeding with peptic ulcer disease.  EGD was performed and did not find any signs of active bleeding or ulceration.  They did perform biopsies which are pending at discharge.  The patient's hemoglobin was stable.  She did not have any further bleeding.  She will continue on aspirin and Plavix and also Protonix 40 mg twice daily.  She was eating well on the day of discharge.  She will need to follow-up with outpatient nutrition and primary care physician for ongoing nutrition assessment and supplementation she likely has some degree of cachexia from her chronic SMA stenosis.  I discussed the discharge plan and instructions with the patient on the day of discharge.  We reviewed return precautions.  She was discharged in good condition with plans for close outpatient follow-up.

## 2022-11-19 NOTE — ASSESSMENT & PLAN NOTE
Advanced diet as tolerated  Decrease IV fluids  CT scan shows possible enlarged pancreatic duct, chronic??  Pain control

## 2022-11-19 NOTE — ED PROVIDER NOTES
Encounter Date: 11/18/2022       History     Chief Complaint   Patient presents with    Abdominal Pain     Pt reports abdominal pain and being unable to eat. 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     Patient with a history of multiple mesenteric artery stents.  Patient reports approximately 1 week history of decreased appetite.  Patient over last 3-4 days been having nonspecific diffuse abdominal cramping.  Positive nausea.  Patient reports 1 dark stool that was formed today.  Unsure if this is similar to previous upper GI bleed.  No history of pancreatitis in the past.  Positive nausea no vomiting.  Currently pain is mild.    Review of patient's allergies indicates:  No Known Allergies  Past Medical History:   Diagnosis Date    Back pain     Brain aneurysm     Carotid stenosis     Cataract     Diverticulitis     Emphysema lung     Feeling anxious     Hyperlipidemia     Hypertension     Macular degeneration     PVD (peripheral vascular disease)     Squamous cell carcinoma 08/29/2017    right medical cheek, SSIS, efudex tx     Past Surgical History:   Procedure Laterality Date    ABDOMINAL SURGERY      ANGIOGRAPHY OF MESENTERIC VESSELS Left 9/7/2022    Procedure: ANGIOGRAM, MESENTERIC VESSELS;  Surgeon: Krish Machado MD;  Location: [facility] CATH/EP LAB;  Service: General;  Laterality: Left;    ARTERIOGRAM, MESENTERIC N/A 6/30/2021    Procedure: ARTERIOGRAM, MESENTERIC;  Surgeon: Krish Machado MD;  Location: [facility] CATH/EP LAB;  Service: General;  Laterality: N/A;    BACK SURGERY      CATARACT EXTRACTION, BILATERAL      COLONOSCOPY N/A 4/4/2022    Procedure: COLONOSCOPY;  Surgeon: Lino Devi MD;  Location: [facility] ENDO;  Service: Endoscopy;  Laterality: N/A;    CREATION, BYPASS, ARTERIAL, AORTA TO FEMORAL N/A 1/13/2021    Procedure: ARTERIOGRAM, MESENTERIC;  Surgeon: Krish Machado MD;  Location: [facility] CATH/EP LAB;  Service: General;  Laterality: N/A;    ENDOSCOPY OF  PROXIMAL SMALL INTESTINE N/A 2022    Procedure: ENTEROSCOPY, PROXIMAL;  Surgeon: Lino Devi MD;  Location: [facility];  Service: Endoscopy;  Laterality: N/A;    HYSTERECTOMY      IMPLANTATION OF SPINAL CORD STIMULATOR IN CERVICAL SPINE       Family History   Problem Relation Age of Onset    Melanoma Neg Hx     Psoriasis Neg Hx     Eczema Neg Hx     Lupus Neg Hx      Social History     Tobacco Use    Smoking status: Former     Packs/day: 1.00     Types: Cigarettes     Start date: 1965     Quit date: 2022     Years since quittin.6    Smokeless tobacco: Never   Substance Use Topics    Alcohol use: No    Drug use: Not Currently     Review of Systems   Constitutional:  Negative for chills and fever.   HENT:  Negative for congestion.    Eyes:  Negative for visual disturbance.   Respiratory:  Negative for shortness of breath.    Cardiovascular:  Negative for chest pain and palpitations.   Gastrointestinal:  Positive for abdominal pain and nausea. Negative for vomiting.   Genitourinary:  Negative for dysuria.   Musculoskeletal:  Negative for joint swelling.   Neurological:  Negative for seizures, syncope and headaches.   Psychiatric/Behavioral:  Negative for confusion.      Physical Exam     Initial Vitals [22 1418]   BP Pulse Resp Temp SpO2   (!) 155/90 93 18 97.4 °F (36.3 °C) 98 %      MAP       --         Physical Exam    Nursing note and vitals reviewed.  Constitutional: She is not diaphoretic. No distress.   HENT:   Head: Normocephalic and atraumatic.   Eyes: Conjunctivae are normal.   Neck:   Normal range of motion.  Cardiovascular:  Normal rate.           Pulmonary/Chest: Breath sounds normal.   Abdominal: Abdomen is soft. Bowel sounds are normal.   Mild diffuse abdominal tenderness with no guarding or rebound   Musculoskeletal:         General: Normal range of motion.      Cervical back: Normal range of motion.     Neurological: She is alert. She has normal strength. No cranial nerve  deficit or sensory deficit.   No gross deficits   Skin: No rash noted.   Psychiatric: She has a normal mood and affect.       ED Course   Procedures  Labs Reviewed   APTT - Abnormal; Notable for the following components:       Result Value    aPTT 35.4 (*)     All other components within normal limits   AMYLASE - Abnormal; Notable for the following components:    Amylase 228 (*)     All other components within normal limits   CBC W/ AUTO DIFFERENTIAL - Abnormal; Notable for the following components:    MCH 31.6 (*)     Platelets 506 (*)     MPV 8.0 (*)     Gran % 74.2 (*)     Lymph % 17.1 (*)     All other components within normal limits   COMPREHENSIVE METABOLIC PANEL - Abnormal; Notable for the following components:    Sodium 134 (*)     Glucose 115 (*)     Total Protein 8.7 (*)     All other components within normal limits   LIPASE - Abnormal; Notable for the following components:    Lipase 118 (*)     All other components within normal limits   PROTIME-INR - Abnormal; Notable for the following components:    PT 14.4 (*)     All other components within normal limits   URINALYSIS, REFLEX TO URINE CULTURE - Abnormal; Notable for the following components:    Protein, UA 1+ (*)     All other components within normal limits    Narrative:     Specimen Source->Urine   URINALYSIS MICROSCOPIC - Abnormal; Notable for the following components:    Hyaline Casts, UA 6 (*)     All other components within normal limits    Narrative:     Specimen Source->Urine   TROPONIN I HIGH SENSITIVITY   MAGNESIUM   TYPE & SCREEN   ISTAT CREATININE   POCT CREATININE        ECG Results              EKG 12-lead (Final result)  Result time 11/18/22 17:45:04      Final result by Interface, Lab In [facility] (11/18/22 17:45:04)                   Narrative:    Test Reason : R10.9,    Vent. Rate : 105 BPM     Atrial Rate : 105 BPM     P-R Int : 130 ms          QRS Dur : 078 ms      QT Int : 332 ms       P-R-T Axes : 075 038 229 degrees     QTc Int :  438 ms    Sinus tachycardia with Premature atrial complexes  Septal infarct ,age undetermined  Inferior infarct ,age undetermined  T wave abnormality, consider lateral ischemia  Abnormal ECG  When compared with ECG of 02-AUG-2022 15:05,  Significant changes have occurred  Confirmed by Hamzah Edwards MD ([id number]) on 11/18/2022 5:44:51 PM    Referred By: SUGARERR   SELF           Confirmed By:Hamzah Edwards MD                                  Imaging Results              CT Abdomen Pelvis With Contrast (Final result)  Result time 11/18/22 16:29:09      Final result by Siddhartha Shultz MD (11/18/22 16:29:09)                   Narrative:    CMS MANDATED QUALITY DATA-CT RADIATION DOSE-436  All CT scans at this facility use dose modulation, iterative reconstruction, and or weight-based dosing when appropriate to reduce radiation dose to as low as reasonably achievable.    HISTORY: Abdominal abscess/infection suspected; Abdominal pain    FINDINGS: Axial postcontrast imaging was performed with 100 mL Omnipaque 350 IV contrast. Comparison to multiple prior exams including 07/19/2022.    CT ABDOMEN: Focal airspace opacity in the lingula inferiorly is consistent with subsegmental atelectasis, with scattered emphysema in the lower lungs. There is unchanged intrahepatic and extrahepatic biliary ductal dilatation, with the liver enhancing normally. The gallbladder enhances normally, with no calcified gallstones.    The spleen is normal in size and enhances normally, with the pancreas enhancing normally. The main pancreatic duct is prominent, unchanged, with no peripancreatic fluid or edema. The adrenal glands and kidneys enhance normally, with right renal arterial vascular calcifications. No renal calculi or hydronephrosis.    Diffuse calcified plaque involves normal caliber abdominal aorta and iliac arteries. No enlarged mesenteric or retroperitoneal lymph nodes. There is no bowel wall thickening, inflammation, or bowel  obstruction. The appendix is normal. No ascites or intraperitoneal free air.    CT PELVIS: There is a sigmoid colon suture line, with the rectum and urinary bladder enhancing normally. The uterus is surgically absent, with no pelvic free fluid. No enlarged pelvic or inguinal lymph nodes.    The extraperitoneal soft tissues enhance normally. There are postoperative changes of previous multilevel lumbar laminectomy, with degenerative disc space narrowing in the spine, and advanced degenerative narrowing of both hip joint spaces. The bones are diffusely osteopenic.    IMPRESSION:  1. No acute findings in the abdomen or pelvis.  2. Additional observations as described.    Electronically signed by:  Siddhartha Shultz MD  11/18/2022 4:29 PM CST Workstation: [id number]                                     Medications   sodium chloride 0.9% bolus 500 mL (500 mLs Intravenous New Bag 11/18/22 1834)   iohexoL (OMNIPAQUE 350) injection 100 mL (100 mLs Intravenous Given 11/18/22 1538)   ondansetron injection 4 mg (4 mg Intravenous Given 11/18/22 1835)   pantoprazole injection 80 mg (80 mg Intravenous Given 11/18/22 1837)     Medical Decision Making:   History:   Old Medical Records: I decided to obtain old medical records.  Clinical Tests:   Lab Tests: Reviewed  Radiological Study: Reviewed  Medical Tests: Reviewed  ED Management:  Patient presents with abdominal pain and elevated amylase and lipase consistent with acute pancreatitis.  CT scan does have prominent pancreatic duct.  Similar prominence on previous imaging.  Unsure patient has calculus versus stricture as cause of pancreatitis.  No evidence of acute mesenteric ischemia.  Will begin fluid resuscitation.  Hospitalist consulted for admission.  Gastroenterology consultation pending.  Patient reports dark stool with no definite GI bleeding.  Will give Protonix obtain type and screen.  Monitor stool for bleeding.                        Clinical Impression:   Final  diagnoses:  [R10.9] Abdominal pain  [K85.90] Acute pancreatitis without infection or necrosis, unspecified pancreatitis type (Primary)        ED Disposition Condition    Admit Stable                Edvasile Mehta MD  11/18/22 1919

## 2022-11-20 ENCOUNTER — ANESTHESIA (OUTPATIENT)
Dept: SURGERY | Facility: HOSPITAL | Age: 82
End: 2022-11-20
Payer: MEDICARE

## 2022-11-20 LAB
ANION GAP SERPL CALC-SCNC: 6 MMOL/L (ref 8–16)
BUN SERPL-MCNC: 13 MG/DL (ref 8–23)
CALCIUM SERPL-MCNC: 8.9 MG/DL (ref 8.7–10.5)
CHLORIDE SERPL-SCNC: 109 MMOL/L (ref 95–110)
CO2 SERPL-SCNC: 24 MMOL/L (ref 23–29)
CREAT SERPL-MCNC: 0.7 MG/DL (ref 0.5–1.4)
ERYTHROCYTE [DISTWIDTH] IN BLOOD BY AUTOMATED COUNT: 12 % (ref 11.5–14.5)
EST. GFR  (NO RACE VARIABLE): >60 ML/MIN/1.73 M^2
GLUCOSE SERPL-MCNC: 93 MG/DL (ref 70–110)
HCT VFR BLD AUTO: 32.4 % (ref 37–48.5)
HGB BLD-MCNC: 10 G/DL (ref 12–16)
HGB BLD-MCNC: 10.4 G/DL (ref 12–16)
HGB BLD-MCNC: 10.5 G/DL (ref 12–16)
HGB BLD-MCNC: 10.9 G/DL (ref 12–16)
LIPASE SERPL-CCNC: 135 U/L (ref 4–60)
MCH RBC QN AUTO: 31.3 PG (ref 27–31)
MCHC RBC AUTO-ENTMCNC: 32.1 G/DL (ref 32–36)
MCV RBC AUTO: 98 FL (ref 82–98)
PLATELET # BLD AUTO: 354 K/UL (ref 150–450)
PMV BLD AUTO: 8.2 FL (ref 9.2–12.9)
POTASSIUM SERPL-SCNC: 3.9 MMOL/L (ref 3.5–5.1)
RBC # BLD AUTO: 3.32 M/UL (ref 4–5.4)
SODIUM SERPL-SCNC: 139 MMOL/L (ref 136–145)
WBC # BLD AUTO: 5.84 K/UL (ref 3.9–12.7)

## 2022-11-20 PROCEDURE — 36415 COLL VENOUS BLD VENIPUNCTURE: CPT | Performed by: STUDENT IN AN ORGANIZED HEALTH CARE EDUCATION/TRAINING PROGRAM

## 2022-11-20 PROCEDURE — 63600175 PHARM REV CODE 636 W HCPCS: Performed by: INTERNAL MEDICINE

## 2022-11-20 PROCEDURE — 83690 ASSAY OF LIPASE: CPT | Performed by: STUDENT IN AN ORGANIZED HEALTH CARE EDUCATION/TRAINING PROGRAM

## 2022-11-20 PROCEDURE — G0378 HOSPITAL OBSERVATION PER HR: HCPCS

## 2022-11-20 PROCEDURE — 25000003 PHARM REV CODE 250: Performed by: STUDENT IN AN ORGANIZED HEALTH CARE EDUCATION/TRAINING PROGRAM

## 2022-11-20 PROCEDURE — 99900035 HC TECH TIME PER 15 MIN (STAT)

## 2022-11-20 PROCEDURE — 85018 HEMOGLOBIN: CPT | Performed by: STUDENT IN AN ORGANIZED HEALTH CARE EDUCATION/TRAINING PROGRAM

## 2022-11-20 PROCEDURE — 85027 COMPLETE CBC AUTOMATED: CPT | Performed by: STUDENT IN AN ORGANIZED HEALTH CARE EDUCATION/TRAINING PROGRAM

## 2022-11-20 PROCEDURE — 99900031 HC PATIENT EDUCATION (STAT)

## 2022-11-20 PROCEDURE — C9113 INJ PANTOPRAZOLE SODIUM, VIA: HCPCS | Performed by: STUDENT IN AN ORGANIZED HEALTH CARE EDUCATION/TRAINING PROGRAM

## 2022-11-20 PROCEDURE — 94640 AIRWAY INHALATION TREATMENT: CPT

## 2022-11-20 PROCEDURE — 25000003 PHARM REV CODE 250: Performed by: INTERNAL MEDICINE

## 2022-11-20 PROCEDURE — 63600175 PHARM REV CODE 636 W HCPCS: Performed by: STUDENT IN AN ORGANIZED HEALTH CARE EDUCATION/TRAINING PROGRAM

## 2022-11-20 PROCEDURE — 94799 UNLISTED PULMONARY SVC/PX: CPT

## 2022-11-20 PROCEDURE — 25000242 PHARM REV CODE 250 ALT 637 W/ HCPCS: Performed by: INTERNAL MEDICINE

## 2022-11-20 PROCEDURE — 94761 N-INVAS EAR/PLS OXIMETRY MLT: CPT | Mod: XB

## 2022-11-20 PROCEDURE — 96376 TX/PRO/DX INJ SAME DRUG ADON: CPT | Mod: 59

## 2022-11-20 PROCEDURE — 63600175 PHARM REV CODE 636 W HCPCS: Performed by: NURSE ANESTHETIST, CERTIFIED REGISTERED

## 2022-11-20 PROCEDURE — 88305 TISSUE EXAM BY PATHOLOGIST: CPT | Mod: TC

## 2022-11-20 PROCEDURE — 37000008 HC ANESTHESIA 1ST 15 MINUTES: Performed by: INTERNAL MEDICINE

## 2022-11-20 PROCEDURE — C9113 INJ PANTOPRAZOLE SODIUM, VIA: HCPCS | Performed by: INTERNAL MEDICINE

## 2022-11-20 PROCEDURE — 43239 EGD BIOPSY SINGLE/MULTIPLE: CPT | Performed by: INTERNAL MEDICINE

## 2022-11-20 PROCEDURE — 25000242 PHARM REV CODE 250 ALT 637 W/ HCPCS: Performed by: STUDENT IN AN ORGANIZED HEALTH CARE EDUCATION/TRAINING PROGRAM

## 2022-11-20 PROCEDURE — 80048 BASIC METABOLIC PNL TOTAL CA: CPT | Performed by: STUDENT IN AN ORGANIZED HEALTH CARE EDUCATION/TRAINING PROGRAM

## 2022-11-20 PROCEDURE — 85018 HEMOGLOBIN: CPT | Mod: 91 | Performed by: STUDENT IN AN ORGANIZED HEALTH CARE EDUCATION/TRAINING PROGRAM

## 2022-11-20 PROCEDURE — 37000009 HC ANESTHESIA EA ADD 15 MINS: Performed by: INTERNAL MEDICINE

## 2022-11-20 PROCEDURE — 27200043 HC FORCEPS, BIOPSY: Performed by: INTERNAL MEDICINE

## 2022-11-20 RX ORDER — IPRATROPIUM BROMIDE 0.5 MG/2.5ML
0.5 SOLUTION RESPIRATORY (INHALATION)
Status: DISCONTINUED | OUTPATIENT
Start: 2022-11-21 | End: 2022-11-21 | Stop reason: HOSPADM

## 2022-11-20 RX ORDER — PANTOPRAZOLE SODIUM 40 MG/10ML
80 INJECTION, POWDER, LYOPHILIZED, FOR SOLUTION INTRAVENOUS 2 TIMES DAILY
Status: DISCONTINUED | OUTPATIENT
Start: 2022-11-20 | End: 2022-11-21 | Stop reason: HOSPADM

## 2022-11-20 RX ORDER — PROPOFOL 10 MG/ML
VIAL (ML) INTRAVENOUS
Status: DISCONTINUED | OUTPATIENT
Start: 2022-11-20 | End: 2022-11-20

## 2022-11-20 RX ADMIN — IPRATROPIUM BROMIDE 0.5 MG: 0.5 SOLUTION RESPIRATORY (INHALATION) at 07:11

## 2022-11-20 RX ADMIN — ARFORMOTEROL TARTRATE 15 MCG: 15 SOLUTION RESPIRATORY (INHALATION) at 07:11

## 2022-11-20 RX ADMIN — PANTOPRAZOLE SODIUM 80 MG: 40 INJECTION, POWDER, FOR SOLUTION INTRAVENOUS at 09:11

## 2022-11-20 RX ADMIN — OXYCODONE HYDROCHLORIDE AND ACETAMINOPHEN 1 TABLET: 10; 325 TABLET ORAL at 01:11

## 2022-11-20 RX ADMIN — SODIUM CHLORIDE: 0.9 INJECTION, SOLUTION INTRAVENOUS at 09:11

## 2022-11-20 RX ADMIN — OXYCODONE HYDROCHLORIDE AND ACETAMINOPHEN 1 TABLET: 10; 325 TABLET ORAL at 10:11

## 2022-11-20 RX ADMIN — IPRATROPIUM BROMIDE 0.5 MG: 0.5 SOLUTION RESPIRATORY (INHALATION) at 01:11

## 2022-11-20 RX ADMIN — PRAVASTATIN SODIUM 20 MG: 10 TABLET ORAL at 09:11

## 2022-11-20 RX ADMIN — SODIUM CHLORIDE, SODIUM LACTATE, POTASSIUM CHLORIDE, AND CALCIUM CHLORIDE: .6; .31; .03; .02 INJECTION, SOLUTION INTRAVENOUS at 10:11

## 2022-11-20 RX ADMIN — PROPOFOL 100 MG: 10 INJECTION, EMULSION INTRAVENOUS at 11:11

## 2022-11-20 RX ADMIN — PROPOFOL 50 MG: 10 INJECTION, EMULSION INTRAVENOUS at 11:11

## 2022-11-20 RX ADMIN — METOPROLOL SUCCINATE 50 MG: 50 TABLET, FILM COATED, EXTENDED RELEASE ORAL at 09:11

## 2022-11-20 NOTE — NURSING
Spoke with Dr. Raymundo and rec'd ok to hold ASA and Plavix this am.  Pt. has poss. GI bleed and for goes procedure this am.

## 2022-11-20 NOTE — PROGRESS NOTES
[facility] Medicine  Progress Note    Patient Name: Carmen Wilkinson  MRN: [id number]  Patient Class: OP- Observation   Admission Date: 11/18/2022  Length of Stay: 0 days  Attending Physician: Loyd Raymundo MD  Primary Care Provider: Abiodun Will MD        Subjective:     Principal Problem:GI bleed        HPI:  82-year-old, with history of COPD, hypertension presents to the ED on account of epigastric discomfort.  Of patient states that for the past few days, she is had decrease in appetite with feeling of bloatedness and associated weakness in which she has been unable to tolerate anything orally and has not been able to get out of her recliner for the past 5 days.  She  endorses nausea but denies any vomiting, hematochezia, hemoptysis, melena, changes in bowel or urinary habits.  Abdominal discomfort but is located in the epigastric region, nonradiating with no associated aggravating or relieving factor.  She denies any unilateral weaknesses, shortness of breath or fevers.  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  She states that she quit smoking 6 months ago, does not drink alcohol or use any illicit medications.  On presentation to the ED, lipase elevated at 118 and amylase 228.  Abdominal CT showed no acute findings.        Overview/Hospital Course:  11/19:  Patient states that she is feeling better today, nausea has resolved.  Will start patient on clear liquid diet and advance as tolerated.  Patient complains of generalized pain and states that she takes Percocet at home 5 times daily, will start patient on p.r.n. Percocet.      Interval History: admitted with presumed pancreatitis, found to have melena and drop in Hgb. Pending EGD today    Review of Systems   All other systems reviewed and are negative.  Objective:     Vital Signs (Most Recent):  Temp:  98.5 °F (36.9 °C) (11/20/22 0325)  Pulse: 68 (11/20/22 0717)  Resp: 18 (11/20/22 0717)  BP: (!) 100/58 (11/20/22 0325)  SpO2: 95 % (11/20/22 0717)   Vital Signs (24h Range):  Temp:  [98 °F (36.7 °C)-98.6 °F (37 °C)] 98.5 °F (36.9 °C)  Pulse:  [59-77] 68  Resp:  [15-18] 18  SpO2:  [94 %-96 %] 95 %  BP: (100-150)/(57-82) 100/58     Weight: 34.8 kg (76 lb 11.5 oz)  Body mass index is 14.03 kg/m².  No intake or output data in the 24 hours ending 11/20/22 0845   Physical Exam  Constitutional:       Appearance: Normal appearance.      Comments: cachectic   HENT:      Head: Normocephalic and atraumatic.      Nose: Nose normal.      Mouth/Throat:      Mouth: Mucous membranes are moist.   Eyes:      Conjunctiva/sclera: Conjunctivae normal.      Pupils: Pupils are equal, round, and reactive to light.   Cardiovascular:      Rate and Rhythm: Normal rate and regular rhythm.      Pulses: Normal pulses.      Heart sounds: Normal heart sounds. No murmur heard.    No friction rub. No gallop.   Pulmonary:      Effort: Pulmonary effort is normal.      Breath sounds: Normal breath sounds. No wheezing or rales.   Abdominal:      General: Abdomen is flat. Bowel sounds are normal. There is no distension.      Palpations: Abdomen is soft.      Tenderness: There is no abdominal tenderness. There is no guarding or rebound.   Musculoskeletal:         General: No swelling. Normal range of motion.      Cervical back: Normal range of motion and neck supple.   Skin:     General: Skin is warm and dry.   Neurological:      General: No focal deficit present.      Mental Status: She is alert.   Psychiatric:         Mood and Affect: Mood normal.         Thought Content: Thought content normal.         Judgment: Judgment normal.       Significant Labs: All pertinent labs within the past 24 hours have been reviewed.    Significant Imaging: I have reviewed all pertinent imaging results/findings within the past 24 hours.      Assessment/Plan:      * GI  bleed  - hx of cratered ulceration in her stomach. Melena yesterday. Hgb stable today.   - GI taking for EGD today  - continue PPI  - npo and advance diet after procedure.   - she did not follow up with GI after her discharge in April, and she only took her PPI for one week.       Mesenteric artery stenosis  Holding ASA and Plavix this am  Will need to resume  She has additional carotid bypass and celiac artery stent      Emphysema lung  Continue home medoications      Acute pancreatitis  Advanced diet as tolerated  Decrease IV fluids  CT scan shows possible enlarged pancreatic duct, chronic??  Pain control  GI consult as noted above      Chronic obstructive pulmonary disease  Stable  Continue home medications      VTE Risk Mitigation (From admission, onward)         Ordered     IP VTE HIGH RISK PATIENT  Once         11/18/22 2118     Place sequential compression device  Until discontinued         11/18/22 2118                Discharge Planning   RAY:      Code Status: DNR   Is the patient medically ready for discharge?:     Reason for patient still in hospital (select all that apply): Treatment  Discharge Plan A: Home with family                  Loyd Raymundo MD  Department of Hospital Medicine   [facility]

## 2022-11-20 NOTE — PROVATION PATIENT INSTRUCTIONS
Discharge Summary/Instructions after an Endoscopic Procedure  Patient Name: Carmen Wilkinson  Patient MRN: [id number]  Patient YOB: 1940 Sunday, November 20, 2022  Pedro Hall MD  RESTRICTIONS:  During your procedure today, you received medications for sedation.  These   medications may affect your judgment, balance and coordination.  Therefore,   for 24 hours, you have the following restrictions:   - DO NOT drive a car, operate machinery, make legal/financial decisions,   sign important papers or drink alcohol.    ACTIVITY:  Today: no heavy lifting, straining or running due to procedural   sedation/anesthesia.  The following day: return to full activity including work.  DIET:  Eat and drink normally unless instructed otherwise.     TREATMENT FOR COMMON SIDE EFFECTS:  - Mild abdominal pain, nausea, belching, bloating or excessive gas:  rest,   eat lightly and use a heating pad.  - Sore Throat: treat with throat lozenges and/or gargle with warm salt   water.  - Because air was used during the procedure, expelling large amounts of air   from your rectum or belching is normal.  - If a bowel prep was taken, you may not have a bowel movement for 1-3 days.    This is normal.  SYMPTOMS TO WATCH FOR AND REPORT TO YOUR PHYSICIAN:  1. Abdominal pain or bloating, other than gas cramps.  2. Chest pain.  3. Back pain.  4. Signs of infection such as: chills or fever occurring within 24 hours   after the procedure.  5. Rectal bleeding, which would show as bright red, maroon, or black stools.   (A tablespoon of blood from the rectum is not serious, especially if   hemorrhoids are present.)  6. Vomiting.  7. Weakness or dizziness.  GO DIRECTLY TO THE NEAREST EMERGENCY ROOM IF YOU HAVE ANY OF THE FOLLOWING:      Difficulty breathing              Chills and/or fever over 101 F   Persistent vomiting and/or vomiting blood   Severe abdominal pain   Severe chest pain   Black, tarry stools   Bleeding- more than one  tablespoon   Any other symptom or condition that you feel may need urgent attention  Your doctor recommends these additional instructions:  If any biopsies were taken, your doctors clinic will contact you in 1 to 2   weeks with any results.  - Return patient to [facility] for ongoing care.   - continue PPI - will f/u on bx's and repeat pancreatic enzymes ( alcon/lip   slightly elevated on admit )  For questions, problems or results please call your physician - Pedro Hall MD at Work:  [phone].  [facility], EMERGENCY ROOM PHONE NUMBER: [phone]  IF A COMPLICATION OR EMERGENCY SITUATION ARISES AND YOU ARE UNABLE TO REACH   YOUR PHYSICIAN - GO DIRECTLY TO THE EMERGENCY ROOM.  Pedro Hall MD  11/20/2022 11:26:04 AM  This report has been verified and signed electronically.  Dear patient,  As a result of recent federal legislation (The Federal Cures Act), you may   receive lab or pathology results from your procedure in your [facility]   account before your physician is able to contact you. Your physician or   their representative will relay the results to you with their   recommendations at their soonest availability.  Thank you,  PROVATION

## 2022-11-20 NOTE — ANESTHESIA PREPROCEDURE EVALUATION
11/19/2022  Carmen Wilkinson is a 82 y.o., female.      Patient Active Problem List   Diagnosis    Pre-procedural cardiovascular examination    Mesenteric artery stenosis    Dyspnea    Dehydration    COPD exacerbation    Malnutrition of moderate degree    Anemia, unspecified    GI bleed    Emphysema lung    Chronic obstructive pulmonary disease    Acute pancreatitis       Past Surgical History:   Procedure Laterality Date    ABDOMINAL SURGERY      ANGIOGRAPHY OF MESENTERIC VESSELS Left 9/7/2022    Procedure: ANGIOGRAM, MESENTERIC VESSELS;  Surgeon: Krish Machado MD;  Location: [facility] CATH/EP LAB;  Service: General;  Laterality: Left;    ARTERIOGRAM, MESENTERIC N/A 6/30/2021    Procedure: ARTERIOGRAM, MESENTERIC;  Surgeon: Krish Machado MD;  Location: [facility] CATH/EP LAB;  Service: General;  Laterality: N/A;    BACK SURGERY      CATARACT EXTRACTION, BILATERAL      COLONOSCOPY N/A 4/4/2022    Procedure: COLONOSCOPY;  Surgeon: Lino Devi MD;  Location: [facility] ENDO;  Service: Endoscopy;  Laterality: N/A;    CREATION, BYPASS, ARTERIAL, AORTA TO FEMORAL N/A 1/13/2021    Procedure: ARTERIOGRAM, MESENTERIC;  Surgeon: Krish Machado MD;  Location: [facility] CATH/EP LAB;  Service: General;  Laterality: N/A;    ENDOSCOPY OF PROXIMAL SMALL INTESTINE N/A 4/4/2022    Procedure: ENTEROSCOPY, PROXIMAL;  Surgeon: Lino Devi MD;  Location: [facility] ENDO;  Service: Endoscopy;  Laterality: N/A;    HYSTERECTOMY      IMPLANTATION OF SPINAL CORD STIMULATOR IN CERVICAL SPINE          Tobacco Use:  The patient  reports that she quit smoking about 7 months ago. Her smoking use included cigarettes. She started smoking about 57 years ago. She smoked an average of 1 pack per day. She has never used smokeless tobacco.     Results for orders placed or performed during the hospital encounter of 11/18/22   EKG  12-lead    Collection Time: 11/18/22  2:49 PM    Narrative    Test Reason : R10.9,    Vent. Rate : 105 BPM     Atrial Rate : 105 BPM     P-R Int : 130 ms          QRS Dur : 078 ms      QT Int : 332 ms       P-R-T Axes : 075 038 229 degrees     QTc Int : 438 ms    Sinus tachycardia with Premature atrial complexes  Septal infarct ,age undetermined  Inferior infarct ,age undetermined  T wave abnormality, consider lateral ischemia  Abnormal ECG  When compared with ECG of 02-AUG-2022 15:05,  Significant changes have occurred  Confirmed by Hamzah Edwards MD ([id number]) on 11/18/2022 5:44:51 PM    Referred By: JUAN R   SELF           Confirmed By:Hamzah Edwards MD        Imaging Results          CT Abdomen Pelvis With Contrast (Final result)  Result time 11/18/22 16:29:09    Final result by Siddhartha Shultz MD (11/18/22 16:29:09)                 Narrative:    CMS MANDATED QUALITY DATA-CT RADIATION DOSE-436  All CT scans at this facility use dose modulation, iterative reconstruction, and or weight-based dosing when appropriate to reduce radiation dose to as low as reasonably achievable.    HISTORY: Abdominal abscess/infection suspected; Abdominal pain    FINDINGS: Axial postcontrast imaging was performed with 100 mL Omnipaque 350 IV contrast. Comparison to multiple prior exams including 07/19/2022.    CT ABDOMEN: Focal airspace opacity in the lingula inferiorly is consistent with subsegmental atelectasis, with scattered emphysema in the lower lungs. There is unchanged intrahepatic and extrahepatic biliary ductal dilatation, with the liver enhancing normally. The gallbladder enhances normally, with no calcified gallstones.    The spleen is normal in size and enhances normally, with the pancreas enhancing normally. The main pancreatic duct is prominent, unchanged, with no peripancreatic fluid or edema. The adrenal glands and kidneys enhance normally, with right renal arterial vascular calcifications. No renal calculi or  hydronephrosis.    Diffuse calcified plaque involves normal caliber abdominal aorta and iliac arteries. No enlarged mesenteric or retroperitoneal lymph nodes. There is no bowel wall thickening, inflammation, or bowel obstruction. The appendix is normal. No ascites or intraperitoneal free air.    CT PELVIS: There is a sigmoid colon suture line, with the rectum and urinary bladder enhancing normally. The uterus is surgically absent, with no pelvic free fluid. No enlarged pelvic or inguinal lymph nodes.    The extraperitoneal soft tissues enhance normally. There are postoperative changes of previous multilevel lumbar laminectomy, with degenerative disc space narrowing in the spine, and advanced degenerative narrowing of both hip joint spaces. The bones are diffusely osteopenic.    IMPRESSION:  1. No acute findings in the abdomen or pelvis.  2. Additional observations as described.    Electronically signed by:  Siddhartha Shultz MD  11/18/2022 4:29 PM CST Workstation: [id number]                               Lab Results   Component Value Date    WBC 7.35 11/19/2022    HGB 11.0 (L) 11/19/2022    HCT 33.6 (L) 11/19/2022    MCV 96 11/19/2022     11/19/2022     BMP  Lab Results   Component Value Date     (L) 11/19/2022    K 3.9 11/19/2022     11/19/2022    CO2 25 11/19/2022    BUN 14 11/19/2022    CREATININE 0.7 11/19/2022    CALCIUM 8.6 (L) 11/19/2022    ANIONGAP 7 (L) 11/19/2022    GLU 91 11/19/2022     (H) 11/18/2022     (H) 09/06/2022       Results for orders placed during the hospital encounter of 04/01/22    Echo Saline Bubble? No    Interpretation Summary  · The left ventricle is small with concentric remodeling and hyperdynamic systolic function.  · The estimated ejection fraction is 75%.  · Indeterminate left ventricular diastolic function.  · Mild right ventricular enlargement with normal right ventricular systolic function.  · Mild to moderate tricuspid regurgitation.  ·  Mild-to-moderate aortic regurgitation.            Pre-op Assessment    I have reviewed the Patient Summary Reports.     I have reviewed the Nursing Notes.    I have reviewed the Medications.     Review of Systems  Anesthesia Hx:  No problems with previous Anesthesia  Denies Family Hx of Anesthesia complications.   Denies Personal Hx of Anesthesia complications.   Social:  Former Smoker    Hematology/Oncology:         -- Cancer in past history (hx skin CA):   Cardiovascular:   Hypertension, well controlled PVD (hx of mesenteric artery disease)    Pulmonary:   COPD (daily inhaler, rescue prn, no recent exacerbations noted, hx hoarsness ), moderate Asthma mild    Hepatic/GI:   PUD, Hx PUD admitted with melena and acute pancreatitis Hepatic/GI Symptoms: (GI bleeding)    Musculoskeletal:   Hx C-spine neurostimulator (no longer working?) Spine Disorders: (lumbar stenosis, chronic back and leg pain) lumbar Chronic Pain        Physical Exam  General: Well nourished, Cooperative, Alert and Oriented    Airway:  Mallampati: III / II  Mouth Opening: Normal  TM Distance: Normal  Tongue: Normal  Neck ROM: Normal ROM    Dental:  Dentures    Chest/Lungs:  Clear to auscultation    Heart:  Rate: Normal  Rhythm: Regular Rhythm  Sounds: Normal    Abdomen:  Normal, Soft, Nontender        Anesthesia Plan  Type of Anesthesia, risks & benefits discussed:    Anesthesia Type: MAC  Intra-op Monitoring Plan: Standard ASA Monitors  Post Op Pain Control Plan:   (medical reason for not using multimodal pain management)  Induction:  IV  Informed Consent: Informed consent signed with the Patient and all parties understand the risks and agree with anesthesia plan.  All questions answered.   ASA Score: 3 Emergent  Anesthesia Plan Notes: MAC  Propofol       Ready For Surgery From Anesthesia Perspective.     .

## 2022-11-20 NOTE — ANESTHESIA POSTPROCEDURE EVALUATION
Anesthesia Post Evaluation    Patient: Carmen Fletcher Pflug    Procedure(s) Performed: Procedure(s) (LRB):  EGD (ESOPHAGOGASTRODUODENOSCOPY) (N/A)    Final Anesthesia Type: MAC      Patient location: OR 1.  Patient participation: Yes- Able to Participate  Level of consciousness: awake and alert  Post-procedure vital signs: reviewed and stable  Pain management: adequate  Airway patency: patent    PONV status at discharge: No PONV  Anesthetic complications: no      Cardiovascular status: blood pressure returned to baseline and stable  Respiratory status: unassisted and room air  Hydration status: euvolemic  Follow-up not needed.          Vitals Value Taken Time   /55 11/20/22 1142   Temp 36.5 °C (97.7 °F) 11/20/22 1132   Pulse 62 11/20/22 1142   Resp 18 11/20/22 1142   SpO2 97 % 11/20/22 1142         Event Time   Out of Recovery 11/20/2022 11:42:00         Pain/Wili Score: Pain Rating Prior to Med Admin: 6 (11/19/2022 10:33 PM)

## 2022-11-20 NOTE — ASSESSMENT & PLAN NOTE
Holding ASA and Plavix this am  Will need to resume  She has additional carotid bypass and celiac artery stent

## 2022-11-20 NOTE — ASSESSMENT & PLAN NOTE
- hx of cratered ulceration in her stomach. Melena yesterday. Hgb stable today.   - GI taking for EGD today  - continue PPI  - npo and advance diet after procedure.   - she did not follow up with GI after her discharge in April, and she only took her PPI for one week.

## 2022-11-20 NOTE — SUBJECTIVE & OBJECTIVE
Interval History: admitted with presumed pancreatitis, found to have melena and drop in Hgb. Pending EGD today    Review of Systems   All other systems reviewed and are negative.  Objective:     Vital Signs (Most Recent):  Temp: 98.5 °F (36.9 °C) (11/20/22 0325)  Pulse: 68 (11/20/22 0717)  Resp: 18 (11/20/22 0717)  BP: (!) 100/58 (11/20/22 0325)  SpO2: 95 % (11/20/22 0717)   Vital Signs (24h Range):  Temp:  [98 °F (36.7 °C)-98.6 °F (37 °C)] 98.5 °F (36.9 °C)  Pulse:  [59-77] 68  Resp:  [15-18] 18  SpO2:  [94 %-96 %] 95 %  BP: (100-150)/(57-82) 100/58     Weight: 34.8 kg (76 lb 11.5 oz)  Body mass index is 14.03 kg/m².  No intake or output data in the 24 hours ending 11/20/22 0845   Physical Exam  Constitutional:       Appearance: Normal appearance.      Comments: cachectic   HENT:      Head: Normocephalic and atraumatic.      Nose: Nose normal.      Mouth/Throat:      Mouth: Mucous membranes are moist.   Eyes:      Conjunctiva/sclera: Conjunctivae normal.      Pupils: Pupils are equal, round, and reactive to light.   Cardiovascular:      Rate and Rhythm: Normal rate and regular rhythm.      Pulses: Normal pulses.      Heart sounds: Normal heart sounds. No murmur heard.    No friction rub. No gallop.   Pulmonary:      Effort: Pulmonary effort is normal.      Breath sounds: Normal breath sounds. No wheezing or rales.   Abdominal:      General: Abdomen is flat. Bowel sounds are normal. There is no distension.      Palpations: Abdomen is soft.      Tenderness: There is no abdominal tenderness. There is no guarding or rebound.   Musculoskeletal:         General: No swelling. Normal range of motion.      Cervical back: Normal range of motion and neck supple.   Skin:     General: Skin is warm and dry.   Neurological:      General: No focal deficit present.      Mental Status: She is alert.   Psychiatric:         Mood and Affect: Mood normal.         Thought Content: Thought content normal.         Judgment: Judgment  normal.       Significant Labs: All pertinent labs within the past 24 hours have been reviewed.    Significant Imaging: I have reviewed all pertinent imaging results/findings within the past 24 hours.

## 2022-11-20 NOTE — CARE UPDATE
11/19/22 2033   Patient Assessment/Suction   Level of Consciousness (AVPU) alert   Respiratory Effort Unlabored   Expansion/Accessory Muscles/Retractions no use of accessory muscles   All Lung Fields Breath Sounds clear;diminished   Rhythm/Pattern, Respiratory no shortness of breath reported   Cough Frequency infrequent   Cough Type no productive sputum   PRE-TX-O2   O2 Device (Oxygen Therapy) room air   SpO2 95 %   Pulse Oximetry Type Intermittent   $ Pulse Oximetry - Multiple Charge Pulse Oximetry - Multiple   Pulse 77   Resp 15   Positioning   Head of Bed (HOB) Positioning HOB elevated;HOB at 30 degrees   Aerosol Therapy   $ Aerosol Therapy Charges Aerosol Treatment   Daily Review of Necessity (SVN) completed   Respiratory Treatment Status (SVN) given   Treatment Route (SVN) mask   Patient Position (SVN) HOB elevated;semi-Yee's   Post Treatment Assessment (SVN) breath sounds improved   Breath Sounds Post-Respiratory Treatment   Throughout All Fields Post-Treatment All Fields   Throughout All Fields Post-Treatment aeration increased   Post-treatment Heart Rate (beats/min) 75   Post-treatment Resp Rate (breaths/min) 19   Education   $ Education Bronchodilator;15 min   Respiratory Evaluation   $ Care Plan Tech Time 15 min

## 2022-11-20 NOTE — CARE UPDATE
11/20/22 0717   Patient Assessment/Suction   Level of Consciousness (AVPU) alert   Respiratory Effort Normal;Unlabored   Expansion/Accessory Muscles/Retractions no use of accessory muscles;no retractions;expansion symmetric   All Lung Fields Breath Sounds clear;diminished   Rhythm/Pattern, Respiratory unlabored;pattern regular;depth regular;chest wiggle adequate;no shortness of breath reported   Cough Frequency infrequent   Cough Type good   PRE-TX-O2   O2 Device (Oxygen Therapy) room air   SpO2 95 %   Pulse Oximetry Type Intermittent   $ Pulse Oximetry - Multiple Charge Pulse Oximetry - Multiple   Pulse 68   Resp 18   Aerosol Therapy   $ Aerosol Therapy Charges Aerosol Treatment   Daily Review of Necessity (SVN) completed   Respiratory Treatment Status (SVN) given   Treatment Route (SVN) oxygen;mask   Patient Position (SVN) HOB elevated   Post Treatment Assessment (SVN) increased aeration   Signs of Intolerance (SVN) none   Breath Sounds Post-Respiratory Treatment   Throughout All Fields Post-Treatment All Fields   Throughout All Fields Post-Treatment aeration increased   Post-treatment Heart Rate (beats/min) 74   Post-treatment Resp Rate (breaths/min) 19   Education   $ Education Bronchodilator;15 min   Respiratory Evaluation   $ Care Plan Tech Time 15 min

## 2022-11-20 NOTE — TRANSFER OF CARE
"Anesthesia Transfer of Care Note    Patient: Carmen Fletcher Pfluvivienne    Procedure(s) Performed: Procedure(s) (LRB):  EGD (ESOPHAGOGASTRODUODENOSCOPY) (N/A)    Patient location: GI    Anesthesia Type: MAC    Transport from OR: Transported from OR on room air with adequate spontaneous ventilation    Post pain: adequate analgesia    Post assessment: no apparent anesthetic complications and tolerated procedure well    Post vital signs: stable    Level of consciousness: awake and alert    Nausea/Vomiting: no nausea/vomiting    Complications: none    Transfer of care protocol was followed      Last vitals:   Visit Vitals  /65 (BP Location: Left arm, Patient Position: Lying)   Pulse 66   Temp 36.8 °C (98.2 °F) (Oral)   Resp 18   Ht 5' 2" (1.575 m)   Wt 34.8 kg (76 lb 11.5 oz)   LMP  (LMP Unknown)   SpO2 96%   Breastfeeding No   BMI 14.03 kg/m²     "

## 2022-11-21 VITALS
RESPIRATION RATE: 18 BRPM | TEMPERATURE: 99 F | SYSTOLIC BLOOD PRESSURE: 129 MMHG | OXYGEN SATURATION: 92 % | HEIGHT: 62 IN | HEART RATE: 70 BPM | DIASTOLIC BLOOD PRESSURE: 59 MMHG | BODY MASS INDEX: 14.12 KG/M2 | WEIGHT: 76.75 LBS

## 2022-11-21 LAB
AMYLASE SERPL-CCNC: 219 U/L (ref 20–110)
ANION GAP SERPL CALC-SCNC: 5 MMOL/L (ref 8–16)
BUN SERPL-MCNC: 10 MG/DL (ref 8–23)
CALCIUM SERPL-MCNC: 8.5 MG/DL (ref 8.7–10.5)
CHLORIDE SERPL-SCNC: 108 MMOL/L (ref 95–110)
CO2 SERPL-SCNC: 26 MMOL/L (ref 23–29)
CREAT SERPL-MCNC: 0.6 MG/DL (ref 0.5–1.4)
ERYTHROCYTE [DISTWIDTH] IN BLOOD BY AUTOMATED COUNT: 12.2 % (ref 11.5–14.5)
EST. GFR  (NO RACE VARIABLE): >60 ML/MIN/1.73 M^2
GLUCOSE SERPL-MCNC: 92 MG/DL (ref 70–110)
HCT VFR BLD AUTO: 30.9 % (ref 37–48.5)
HGB BLD-MCNC: 10 G/DL (ref 12–16)
HGB BLD-MCNC: 10 G/DL (ref 12–16)
HGB BLD-MCNC: 9.8 G/DL (ref 12–16)
LIPASE SERPL-CCNC: 142 U/L (ref 4–60)
MCH RBC QN AUTO: 31.6 PG (ref 27–31)
MCHC RBC AUTO-ENTMCNC: 32.4 G/DL (ref 32–36)
MCV RBC AUTO: 98 FL (ref 82–98)
PLATELET # BLD AUTO: 308 K/UL (ref 150–450)
PMV BLD AUTO: 8.7 FL (ref 9.2–12.9)
POTASSIUM SERPL-SCNC: 4 MMOL/L (ref 3.5–5.1)
RBC # BLD AUTO: 3.16 M/UL (ref 4–5.4)
SODIUM SERPL-SCNC: 139 MMOL/L (ref 136–145)
WBC # BLD AUTO: 8.58 K/UL (ref 3.9–12.7)

## 2022-11-21 PROCEDURE — 82150 ASSAY OF AMYLASE: CPT | Performed by: INTERNAL MEDICINE

## 2022-11-21 PROCEDURE — 36415 COLL VENOUS BLD VENIPUNCTURE: CPT | Performed by: INTERNAL MEDICINE

## 2022-11-21 PROCEDURE — 99900031 HC PATIENT EDUCATION (STAT)

## 2022-11-21 PROCEDURE — 80048 BASIC METABOLIC PNL TOTAL CA: CPT | Performed by: INTERNAL MEDICINE

## 2022-11-21 PROCEDURE — 85018 HEMOGLOBIN: CPT | Performed by: INTERNAL MEDICINE

## 2022-11-21 PROCEDURE — 85027 COMPLETE CBC AUTOMATED: CPT | Performed by: INTERNAL MEDICINE

## 2022-11-21 PROCEDURE — 25000242 PHARM REV CODE 250 ALT 637 W/ HCPCS: Performed by: INTERNAL MEDICINE

## 2022-11-21 PROCEDURE — G0378 HOSPITAL OBSERVATION PER HR: HCPCS

## 2022-11-21 PROCEDURE — 83690 ASSAY OF LIPASE: CPT | Performed by: INTERNAL MEDICINE

## 2022-11-21 PROCEDURE — 94640 AIRWAY INHALATION TREATMENT: CPT

## 2022-11-21 PROCEDURE — 99900035 HC TECH TIME PER 15 MIN (STAT)

## 2022-11-21 PROCEDURE — 94761 N-INVAS EAR/PLS OXIMETRY MLT: CPT

## 2022-11-21 PROCEDURE — 25000242 PHARM REV CODE 250 ALT 637 W/ HCPCS: Performed by: STUDENT IN AN ORGANIZED HEALTH CARE EDUCATION/TRAINING PROGRAM

## 2022-11-21 RX ORDER — PANTOPRAZOLE SODIUM 40 MG/1
40 TABLET, DELAYED RELEASE ORAL 2 TIMES DAILY
Qty: 180 TABLET | Refills: 1 | Status: SHIPPED | OUTPATIENT
Start: 2022-11-21 | End: 2024-02-27

## 2022-11-21 RX ADMIN — ARFORMOTEROL TARTRATE 15 MCG: 15 SOLUTION RESPIRATORY (INHALATION) at 07:11

## 2022-11-21 RX ADMIN — IPRATROPIUM BROMIDE 0.5 MG: 0.5 SOLUTION RESPIRATORY (INHALATION) at 07:11

## 2022-11-21 NOTE — PLAN OF CARE
Patient cleared for discharge from case management standpoint.    Follow up appointments scheduled and added to AVS.    Chart and discharge orders reviewed.  Patient discharged home with no further case management needs.           11/21/22 0913   Final Note   Assessment Type Final Discharge Note   Anticipated Discharge Disposition Home   What phone number can be called within the next 1-3 days to see how you are doing after discharge? [phone]   Hospital Resources/Appts/Education Provided Appointments scheduled and added to AVS

## 2022-11-21 NOTE — RESPIRATORY THERAPY
11/20/22 1928   Patient Assessment/Suction   Level of Consciousness (AVPU) alert   Respiratory Effort Normal;Unlabored   Expansion/Accessory Muscles/Retractions no use of accessory muscles   All Lung Fields Breath Sounds equal bilaterally;wheezes, high-pitched   Rhythm/Pattern, Respiratory unlabored   Cough Frequency infrequent   PRE-TX-O2   O2 Device (Oxygen Therapy) room air   SpO2 97 %   Pulse Oximetry Type Intermittent   $ Pulse Oximetry - Multiple Charge Pulse Oximetry - Multiple   Pulse 78   Resp 18   Aerosol Therapy   $ Aerosol Therapy Charges Aerosol Treatment   Daily Review of Necessity (SVN) completed   Respiratory Treatment Status (SVN) given   Treatment Route (SVN) mask   Patient Position (SVN) HOB elevated   Post Treatment Assessment (SVN) breath sounds unchanged   Signs of Intolerance (SVN) none   Breath Sounds Post-Respiratory Treatment   Post-treatment Heart Rate (beats/min) 77   Post-treatment Resp Rate (breaths/min) 18   Education   $ Education Bronchodilator;15 min   Respiratory Evaluation   $ Care Plan Tech Time 15 min   $ Eval/Re-eval Charges Evaluation   Evaluation For Re-Eval 3 day

## 2022-11-21 NOTE — DISCHARGE SUMMARY
Replaced by [facility]  Discharge Summary      Patient Name: Carmen Wilkinson  MRN: [id number]  VALERIE: [id number]  Patient Class: OP- Observation  Admission Date: 11/18/2022  Hospital Length of Stay: 0 days  Discharge Date and Time:  11/21/2022 8:23 AM  Attending Physician: Loyd Raymundo MD   Discharging Provider: Loyd Raymundo MD  Primary Care Provider: Abiodun Will MD    Primary Care Team: Networked reference to record PCT     HPI:   82-year-old, with history of COPD, hypertension presents to the ED on account of epigastric discomfort.  Of patient states that for the past few days, she is had decrease in appetite with feeling of bloatedness and associated weakness in which she has been unable to tolerate anything orally and has not been able to get out of her recliner for the past 5 days.  She  endorses nausea but denies any vomiting, hematochezia, hemoptysis, melena, changes in bowel or urinary habits.  Abdominal discomfort but is located in the epigastric region, nonradiating with no associated aggravating or relieving factor.  She denies any unilateral weaknesses, shortness of breath or fevers.  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  She states that she quit smoking 6 months ago, does not drink alcohol or use any illicit medications.  On presentation to the ED, lipase elevated at 118 and amylase 228.  Abdominal CT showed no acute findings.        Procedure(s) (LRB):  EGD (ESOPHAGOGASTRODUODENOSCOPY) (N/A)      Hospital Course:   Patient is an 82-year-old female with COPD, hypertension, hypothyroidism, severe peripheral arterial disease status post subclavian carotid bypass, celiac artery stent, chronic SMA stenosis who is presenting with abdominal pain and was found to have mild episode of pancreatitis.  She was given IV fluids, pain control and nausea  control and had significant improvement in her symptoms.  Gastroenterology was consulted and was concerned that she had dark stools and performed EGD for further evaluation given her recent history of GI bleeding with peptic ulcer disease.  EGD was performed and did not find any signs of active bleeding or ulceration.  They did perform biopsies which are pending at discharge.  The patient's hemoglobin was stable.  She did not have any further bleeding.  She will continue on aspirin and Plavix and also Protonix 40 mg twice daily.  She was eating well on the day of discharge.  She will need to follow-up with outpatient nutrition and primary care physician for ongoing nutrition assessment and supplementation she likely has some degree of cachexia from her chronic SMA stenosis.  I discussed the discharge plan and instructions with the patient on the day of discharge.  We reviewed return precautions.  She was discharged in good condition with plans for close outpatient follow-up.         Goals of Care Treatment Preferences:  Code Status: DNR      Consults:   Consults (From admission, onward)        Status Ordering Provider     Inpatient consult to Gastroenterology  Once        Provider:  Pedro Monroy Jr., MD    Acknowledged PEDRO MONROY JR     Inpatient consult to Hospitalist  Once        Provider:  Rea Ritchie MD    Acknowledged PEDRO MONROY JR          No new Assessment & Plan notes have been filed under this hospital service since the last note was generated.  Service: Hospital Medicine    Final Active Diagnoses:    Diagnosis Date Noted POA    PRINCIPAL PROBLEM:  GI bleed [K92.2] 04/04/2022 Yes    Mesenteric artery stenosis [K55.1] 01/13/2021 Yes    Emphysema lung [J43.9] 04/04/2022 Yes    Acute pancreatitis [K85.90] 11/18/2022 Yes    Chronic obstructive pulmonary disease [J44.9] 06/29/2022 Yes      Problems Resolved During this Admission:       Discharged Condition: good    Disposition:      Follow Up:   Follow-up Information     Abiodun Will MD. Schedule an appointment as soon as possible for a visit in 1 week(s).    Specialty: Family Medicine  Contact information:  [address]  [place] [address] [phone]             Lino Devi MD. Schedule an appointment as soon as possible for a visit in 1 month(s).    Specialty: Gastroenterology  Contact information:  [address]  [place] [id number] [phone]                       Patient Instructions:      Diet Adult Regular     No dressing needed     Activity as tolerated       Significant Diagnostic Studies: Labs:   BMP:   Recent Labs   Lab 11/20/22 0415 11/21/22 0545   GLU 93 92    139   K 3.9 4.0    108   CO2 24 26   BUN 13 10   CREATININE 0.7 0.6   CALCIUM 8.9 8.5*   , CBC   Recent Labs   Lab 11/20/22 0415 11/20/22  0751 11/20/22  1709 11/21/22  0033 11/21/22  0545   WBC 5.84  --   --   --  8.58   HGB 10.4*  10.4*   < > 10.4* 9.8* 10.0*  10.0*   HCT 32.4*  --   --   --  30.9*     --   --   --  308    < > = values in this interval not displayed.    and All labs within the past 24 hours have been reviewed    Pending Diagnostic Studies:     Procedure Component Value Units Date/Time    Amylase [[id number]] Collected: 11/21/22 0545    Order Status: Sent Lab Status: In process Updated: 11/21/22 0648    Specimen: Blood     Lipase [[id number]] Collected: 11/21/22 0545    Order Status: Sent Lab Status: In process Updated: 11/21/22 0648    Specimen: Blood     Specimen to Pathology - Surgery [[id number]] Collected: 11/20/22 1551    Order Status: Sent Lab Status: No result     Specimen: Tissue          Medications:  Reconciled Home Medications:      Medication List      START taking these medications    pantoprazole 40 MG tablet  Commonly known as: PROTONIX  Take 1 tablet (40 mg total) by mouth 2 (two) times daily.        CHANGE how you take these medications    oxyCODONE-acetaminophen  mg per tablet  Commonly known  as: PERCOCET  Take 1 tablet by mouth. 5 times daily  What changed: Another medication with the same name was removed. Continue taking this medication, and follow the directions you see here.        CONTINUE taking these medications    amLODIPine 5 MG tablet  Commonly known as: NORVASC  Take 5 mg by mouth once daily.     ANORO ELLIPTA 62.5-25 mcg/actuation Dsdv  Generic drug: umeclidinium-vilanteroL  Inhale 1 puff into the lungs once daily. Controller     aspirin 81 MG EC tablet  Commonly known as: ECOTRIN  Take 81 mg by mouth once daily.     CENTRUM SILVER ORAL  Take 1 capsule by mouth once daily.     cholecalciferol (vitamin D3) 10 mcg/mL (400 unit/mL) Drop  Commonly known as: VITAMIN D3  Take 400 Units by mouth once daily.     clopidogreL 75 mg tablet  Commonly known as: PLAVIX  Take 1 tablet (75 mg total) by mouth once daily.     ferrous sulfate 325 (65 FE) MG EC tablet  Take 325 mg by mouth once daily.     KRILL OIL ORAL  Take 1,000 mg by mouth once daily.     L. gasseri-B. bifidum-B longum 1.5 billion cell Cap  Take 1 tablet by mouth once daily.     metoprolol succinate 50 MG 24 hr tablet  Commonly known as: TOPROL-XL  Take 50 mg by mouth 2 (two) times daily.     PRESERVISION AREDS ORAL  Take 1 capsule by mouth 2 (two) times a day.     simvastatin 10 MG tablet  Commonly known as: ZOCOR  Take 10 mg by mouth once daily.        STOP taking these medications    ALPRAZolam 0.5 MG tablet  Commonly known as: XANAX     amlodipine-benazepril 5-10 mg 5-10 mg per capsule  Commonly known as: LOTREL     coenzyme Q10 100 mg capsule     sulfamethoxazole-trimethoprim 800-160mg 800-160 mg Tab  Commonly known as: BACTRIM DS     TALICIA -12.5 mg Cpid  Generic drug: omeprazole-amoxicill-rifabutin            Indwelling Lines/Drains at time of discharge:   Lines/Drains/Airways     Drain  Duration           Female External Urinary Catheter 11/19/22 0100 2 days                Time spent on the discharge of patient: 55  minutes         Loyd Raymundo MD  Department of Hospital Medicine  [facility]

## 2022-11-21 NOTE — PLAN OF CARE
11/21/22 0736   Patient Assessment/Suction   Level of Consciousness (AVPU) alert   Respiratory Effort Normal;Unlabored   All Lung Fields Breath Sounds clear   Rhythm/Pattern, Respiratory no shortness of breath reported;pattern regular;unlabored   PRE-TX-O2   O2 Device (Oxygen Therapy) room air   SpO2 (!) 92 %   Pulse Oximetry Type Intermittent   $ Pulse Oximetry - Multiple Charge Pulse Oximetry - Multiple   Pulse 70   Resp 18   Aerosol Therapy   $ Aerosol Therapy Charges Aerosol Treatment   Daily Review of Necessity (SVN) completed   Respiratory Treatment Status (SVN) given   Treatment Route (SVN) mask;oxygen   Patient Position (SVN) Yee's;HOB elevated   Post Treatment Assessment (SVN) breath sounds improved   Signs of Intolerance (SVN) none   Education   $ Education Bronchodilator;15 min   Respiratory Evaluation   $ Care Plan Tech Time 15 min

## 2022-12-07 ENCOUNTER — TELEPHONE (OUTPATIENT)
Dept: DERMATOLOGY | Facility: CLINIC | Age: 82
End: 2022-12-07
Payer: MEDICARE

## 2022-12-07 NOTE — TELEPHONE ENCOUNTER
Patient scheduled.     ----- Message from Leigha Burgos sent at 12/7/2022  1:57 PM CST -----  Regarding: pt called  Name of Who is Calling: GOLD RIVERA [[id number]]      What is the request in detail: pt is requesting to be seen for skin screening and a growth in her hairline. Please advise       Can the clinic reply by [facility]: No      What Number to Call Back [facility] not [facility]: [phone]

## 2022-12-28 ENCOUNTER — OFFICE VISIT (OUTPATIENT)
Dept: PULMONOLOGY | Facility: CLINIC | Age: 82
End: 2022-12-28
Payer: MEDICARE

## 2022-12-28 VITALS
TEMPERATURE: 97 F | BODY MASS INDEX: 14.92 KG/M2 | HEART RATE: 79 BPM | SYSTOLIC BLOOD PRESSURE: 118 MMHG | OXYGEN SATURATION: 97 % | WEIGHT: 81.63 LBS | DIASTOLIC BLOOD PRESSURE: 74 MMHG

## 2022-12-28 DIAGNOSIS — R49.0 HOARSENESS: ICD-10-CM

## 2022-12-28 DIAGNOSIS — J44.9 CHRONIC OBSTRUCTIVE PULMONARY DISEASE, UNSPECIFIED COPD TYPE: Primary | ICD-10-CM

## 2022-12-28 PROCEDURE — 99214 OFFICE O/P EST MOD 30 MIN: CPT | Mod: S$GLB,,, | Performed by: INTERNAL MEDICINE

## 2022-12-28 PROCEDURE — 1125F PR PAIN SEVERITY QUANTIFIED, PAIN PRESENT: ICD-10-PCS | Mod: CPTII,S$GLB,, | Performed by: INTERNAL MEDICINE

## 2022-12-28 PROCEDURE — 3074F SYST BP LT 130 MM HG: CPT | Mod: CPTII,S$GLB,, | Performed by: INTERNAL MEDICINE

## 2022-12-28 PROCEDURE — 99214 PR OFFICE/OUTPT VISIT, EST, LEVL IV, 30-39 MIN: ICD-10-PCS | Mod: S$GLB,,, | Performed by: INTERNAL MEDICINE

## 2022-12-28 PROCEDURE — 1125F AMNT PAIN NOTED PAIN PRSNT: CPT | Mod: CPTII,S$GLB,, | Performed by: INTERNAL MEDICINE

## 2022-12-28 PROCEDURE — 3074F PR MOST RECENT SYSTOLIC BLOOD PRESSURE < 130 MM HG: ICD-10-PCS | Mod: CPTII,S$GLB,, | Performed by: INTERNAL MEDICINE

## 2022-12-28 PROCEDURE — 1159F MED LIST DOCD IN RCRD: CPT | Mod: CPTII,S$GLB,, | Performed by: INTERNAL MEDICINE

## 2022-12-28 PROCEDURE — 3078F DIAST BP <80 MM HG: CPT | Mod: CPTII,S$GLB,, | Performed by: INTERNAL MEDICINE

## 2022-12-28 PROCEDURE — 1159F PR MEDICATION LIST DOCUMENTED IN MEDICAL RECORD: ICD-10-PCS | Mod: CPTII,S$GLB,, | Performed by: INTERNAL MEDICINE

## 2022-12-28 PROCEDURE — 3078F PR MOST RECENT DIASTOLIC BLOOD PRESSURE < 80 MM HG: ICD-10-PCS | Mod: CPTII,S$GLB,, | Performed by: INTERNAL MEDICINE

## 2022-12-28 RX ORDER — ALBUTEROL SULFATE 90 UG/1
2 AEROSOL, METERED RESPIRATORY (INHALATION) EVERY 4 HOURS PRN
Qty: 18 G | Refills: 11 | Status: SHIPPED | OUTPATIENT
Start: 2022-12-28 | End: 2023-12-28 | Stop reason: SDUPTHER

## 2022-12-28 NOTE — PROGRESS NOTES
SUBJECTIVE:    Patient ID: Carmen Wilkinson is a 82 y.o. female.    Chief Complaint: Follow-up and COPD (6 month follow up COPD)    HPI The patient is here with hoarseness.   She is not doing well with her exercise tolerance.  She does not have an albuterol inhaler.  She does have mesenteric ischemia which makes it very difficult for her to absorb calories.  She is not smoking.  She is using Anoro.  We had changed her from Breo to Anoro last visit because of hoarseness.  Past Medical History:   Diagnosis Date    Back pain     Brain aneurysm     Carotid stenosis     Cataract     Diverticulitis     Emphysema lung     Feeling anxious     Hyperlipidemia     Hypertension     Macular degeneration     PVD (peripheral vascular disease)     Squamous cell carcinoma 08/29/2017    right medical cheek, SSIS, efudex tx     Past Surgical History:   Procedure Laterality Date    ABDOMINAL SURGERY      ANGIOGRAPHY OF MESENTERIC VESSELS Left 09/07/2022    Procedure: ANGIOGRAM, MESENTERIC VESSELS;  Surgeon: Krish Machado MD;  Location: [facility] CATH/EP LAB;  Service: General;  Laterality: Left;    ARTERIOGRAM, MESENTERIC N/A 06/30/2021    Procedure: ARTERIOGRAM, MESENTERIC;  Surgeon: Krish Machado MD;  Location: [facility] CATH/EP LAB;  Service: General;  Laterality: N/A;    BACK SURGERY      CATARACT EXTRACTION, BILATERAL      COLONOSCOPY N/A 04/04/2022    Procedure: COLONOSCOPY;  Surgeon: Lino Devi MD;  Location: [facility] ENDO;  Service: Endoscopy;  Laterality: N/A;    CREATION, BYPASS, ARTERIAL, AORTA TO FEMORAL N/A 01/13/2021    Procedure: ARTERIOGRAM, MESENTERIC;  Surgeon: Krish Machado MD;  Location: [facility] CATH/EP LAB;  Service: General;  Laterality: N/A;    ENDOSCOPY OF PROXIMAL SMALL INTESTINE N/A 04/04/2022    Procedure: ENTEROSCOPY, PROXIMAL;  Surgeon: Lino Devi MD;  Location: [facility] ENDO;  Service: Endoscopy;  Laterality: N/A;    ESOPHAGOGASTRODUODENOSCOPY N/A 11/20/2022    Procedure: EGD  (ESOPHAGOGASTRODUODENOSCOPY);  Surgeon: Pedro Hall Jr., MD;  Location: [facility];  Service: Endoscopy;  Laterality: N/A;    HYSTERECTOMY      IMPLANTATION OF SPINAL CORD STIMULATOR IN CERVICAL SPINE      UPPER GASTROINTESTINAL ENDOSCOPY  11/20/2022    w/ antrum biopsy     Family History   Problem Relation Age of Onset    Melanoma Neg Hx     Psoriasis Neg Hx     Eczema Neg Hx     Lupus Neg Hx         Social History:   Marital Status:   Occupation: Data Unavailable  Alcohol History:  reports no history of alcohol use.  Tobacco History:  reports that she quit smoking about 8 months ago. Her smoking use included cigarettes. She started smoking about 57 years ago. She smoked an average of 1 pack per day. She has never used smokeless tobacco.  Drug History:  reports that she does not currently use drugs.    Review of patient's allergies indicates:   Allergen Reactions    Sulfa (sulfonamide antibiotics) Nausea Only       Current Outpatient Medications   Medication Sig Dispense Refill    amLODIPine (NORVASC) 5 MG tablet Take 5 mg by mouth once daily.      aspirin (ECOTRIN) 81 MG EC tablet Take 81 mg by mouth once daily.      cholecalciferol, vitamin D3, (VITAMIN D3) 10 mcg/mL (400 unit/mL) Drop Take 400 Units by mouth once daily.      clopidogreL (PLAVIX) 75 mg tablet Take 1 tablet (75 mg total) by mouth once daily. 30 tablet 3    ferrous sulfate 325 (65 FE) MG EC tablet Take 325 mg by mouth once daily.      folic acid/multivit-min/lutein (CENTRUM SILVER ORAL) Take 1 capsule by mouth once daily.      KRILL OIL ORAL Take 1,000 mg by mouth once daily.       L. gasseri-B. bifidum-B longum 1.5 billion cell Cap Take 1 tablet by mouth once daily.       metoprolol succinate (TOPROL-XL) 50 MG 24 hr tablet Take 50 mg by mouth 2 (two) times daily.      oxyCODONE-acetaminophen (PERCOCET)  mg per tablet Take 1 tablet by mouth. 5 times daily      pantoprazole (PROTONIX) 40 MG tablet Take 1 tablet (40 mg total) by mouth  2 (two) times daily. 180 tablet 1    simvastatin (ZOCOR) 10 MG tablet Take 10 mg by mouth once daily.       umeclidinium-vilanteroL (ANORO ELLIPTA) 62.5-25 mcg/actuation DsDv Inhale 1 puff into the lungs once daily. Controller 1 each 5    vit A/vit C/vit E/zinc/copper (PRESERVISION AREDS ORAL) Take 1 capsule by mouth 2 (two) times a day.      albuterol (PROAIR HFA) 90 mcg/actuation inhaler Inhale 2 puffs into the lungs every 4 (four) hours as needed for Wheezing. Rescue 18 g 11     No current facility-administered medications for this visit.       Alpha-1 Antitrypsin:  Last PFT:  05/03/2022 moderate obstruction, minimal restriction, and a very severe diffusion defect with no response to bronchodilators  Last CT:  04/01/2022  .  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  2.  Patent stent in the proximal celiac artery.  3. 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  3.  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  4.  Moderate centrilobular emphysema.     Review of Systems  General: Feeling a little worse than last time.  She is tired a lot, her energy level is down.  Eyes: Vision is has macular degneration  ENT:  No sinusitis or pharyngitis.   Heart:: No chest pain or palpitations.  Lungs: more shortness of breath, no cough  GI: occasional diarrhea  : No dysuria, hesitancy, or nocturia.  Musculoskeletal:back pain  Skin: No lesions or rashes. Bruises from plavix  Neuro: No headaches or neuropathy.  Lymph: No edema or adenopathy.  Psych: No anxiety or depression.  Endo: down2 pounds     OBJECTIVE:      /74 (BP Location: Left arm, Patient Position: Sitting, BP Method: Medium (Manual))   Pulse 79   Temp 97.3 °F (36.3 °C)   Wt 37 kg (81 lb 9.6 oz)   LMP  (LMP Unknown)   SpO2 97%   BMI 14.92 kg/m²     Physical Exam  GENERAL: Older, very thin patient in no distress.  HEENT: Pupils equal and reactive. Extraocular movements  intact. Nose intact.      Pharynx moist.  NECK: Supple.   HEART: Regular rate and rhythm. No murmur or gallop auscultated.  LUNGS: Clear to auscultation and percussion. Lung excursion symmetrical. No change in fremitus. No adventitial noises.  ABDOMEN: Bowel sounds present. Non-tender, no masses palpated.  EXTREMITIES: Normal muscle tone and joint movement, no cyanosis or clubbing.   LYMPHATICS: No adenopathy palpated, no edema.  SKIN: Dry, intact, no lesions.   NEURO: Cranial nerves II-XII intact. Motor strength 5/5 bilaterally, upper and lower extremities.  PSYCH: Appropriate affect.    Assessment:       1. Chronic obstructive pulmonary disease, unspecified COPD type    2. Hoarseness          Too old to follow CTs  Plan:       Chronic obstructive pulmonary disease, unspecified COPD type  -     albuterol (PROAIR HFA) 90 mcg/actuation inhaler; Inhale 2 puffs into the lungs every 4 (four) hours as needed for Wheezing. Rescue  Dispense: 18 g; Refill: 11    Hoarseness  -     Ambulatory referral/consult to ENT; Future; Expected date: 01/04/2023      Had her flu shot  See an ENT  continue Anoro daily  Albuterol 2 puffs every 6 hours as needed for acute shortness of breath   Come by the office for instructions on how to use if necessary  Follow up in about 6 months (around 6/28/2023).

## 2023-02-02 ENCOUNTER — TELEPHONE (OUTPATIENT)
Dept: PULMONOLOGY | Facility: CLINIC | Age: 83
End: 2023-02-02

## 2023-02-02 NOTE — TELEPHONE ENCOUNTER
Pt has yet to hear from anyone to schedule an apt with ENT.  I gave her phone number [phone] to try and get scheduled with someone.

## 2023-02-02 NOTE — TELEPHONE ENCOUNTER
----- Message from Trena Hamilton sent at 1/23/2023 10:16 AM CST -----  Regarding: RE: Pts ENT referral  Patient call call [phone]   ----- Message -----  From: Thaddeus Martinez MA  Sent: 1/19/2023  11:53 AM CST  To: Trena Hamilton  Subject: RE: Pts ENT referral                             So the pt can just call [phone] and get her ENT referral scheduled?   ----- Message -----  From: Trena Hamilton  Sent: 1/19/2023  11:47 AM CST  To: Thaddeus Martinez MA, Roshan Genao  Subject: RE: Pts ENT referral                             We do not call patient to set up appointments. You may want to forward this information to the patient PCP office or scheduling department.   ----- Message -----  From: Thaddeus Martinez MA  Sent: 1/12/2023   9:14 AM CST  To: Roshan Genao  Subject: Pts ENT referral                                 Good Morning,   Can you please contact pt at her home number [phone] so she can get set up with ENT please.  For some reason her husbands cell phone number was marked as her primary number instead of her home.    Thank you

## 2023-02-06 ENCOUNTER — OFFICE VISIT (OUTPATIENT)
Dept: DERMATOLOGY | Facility: CLINIC | Age: 83
End: 2023-02-06
Payer: MEDICARE

## 2023-02-06 VITALS — HEIGHT: 62 IN | WEIGHT: 81 LBS | BODY MASS INDEX: 14.91 KG/M2

## 2023-02-06 DIAGNOSIS — Z85.828 HISTORY OF NONMELANOMA SKIN CANCER: ICD-10-CM

## 2023-02-06 DIAGNOSIS — L82.1 SEBORRHEIC KERATOSES: ICD-10-CM

## 2023-02-06 DIAGNOSIS — L81.4 SOLAR LENTIGO: ICD-10-CM

## 2023-02-06 DIAGNOSIS — D22.9 MULTIPLE BENIGN NEVI: Primary | ICD-10-CM

## 2023-02-06 DIAGNOSIS — D18.01 CHERRY ANGIOMA: ICD-10-CM

## 2023-02-06 DIAGNOSIS — L90.5 SCAR: ICD-10-CM

## 2023-02-06 PROCEDURE — 1101F PR PT FALLS ASSESS DOC 0-1 FALLS W/OUT INJ PAST YR: ICD-10-PCS | Mod: CPTII,S$GLB,, | Performed by: DERMATOLOGY

## 2023-02-06 PROCEDURE — 1126F AMNT PAIN NOTED NONE PRSNT: CPT | Mod: CPTII,S$GLB,, | Performed by: DERMATOLOGY

## 2023-02-06 PROCEDURE — 1160F PR REVIEW ALL MEDS BY PRESCRIBER/CLIN PHARMACIST DOCUMENTED: ICD-10-PCS | Mod: CPTII,S$GLB,, | Performed by: DERMATOLOGY

## 2023-02-06 PROCEDURE — 99999 PR PBB SHADOW E&M-EST. PATIENT-LVL III: ICD-10-PCS | Mod: PBBFAC,,, | Performed by: DERMATOLOGY

## 2023-02-06 PROCEDURE — 99213 PR OFFICE/OUTPT VISIT, EST, LEVL III, 20-29 MIN: ICD-10-PCS | Mod: S$GLB,,, | Performed by: DERMATOLOGY

## 2023-02-06 PROCEDURE — 3288F PR FALLS RISK ASSESSMENT DOCUMENTED: ICD-10-PCS | Mod: CPTII,S$GLB,, | Performed by: DERMATOLOGY

## 2023-02-06 PROCEDURE — 1159F MED LIST DOCD IN RCRD: CPT | Mod: CPTII,S$GLB,, | Performed by: DERMATOLOGY

## 2023-02-06 PROCEDURE — 1101F PT FALLS ASSESS-DOCD LE1/YR: CPT | Mod: CPTII,S$GLB,, | Performed by: DERMATOLOGY

## 2023-02-06 PROCEDURE — 1126F PR PAIN SEVERITY QUANTIFIED, NO PAIN PRESENT: ICD-10-PCS | Mod: CPTII,S$GLB,, | Performed by: DERMATOLOGY

## 2023-02-06 PROCEDURE — 1160F RVW MEDS BY RX/DR IN RCRD: CPT | Mod: CPTII,S$GLB,, | Performed by: DERMATOLOGY

## 2023-02-06 PROCEDURE — 1159F PR MEDICATION LIST DOCUMENTED IN MEDICAL RECORD: ICD-10-PCS | Mod: CPTII,S$GLB,, | Performed by: DERMATOLOGY

## 2023-02-06 PROCEDURE — 3288F FALL RISK ASSESSMENT DOCD: CPT | Mod: CPTII,S$GLB,, | Performed by: DERMATOLOGY

## 2023-02-06 PROCEDURE — 99213 OFFICE O/P EST LOW 20 MIN: CPT | Mod: S$GLB,,, | Performed by: DERMATOLOGY

## 2023-02-06 PROCEDURE — 99999 PR PBB SHADOW E&M-EST. PATIENT-LVL III: CPT | Mod: PBBFAC,,, | Performed by: DERMATOLOGY

## 2023-02-06 NOTE — PROGRESS NOTES
Subjective:       Patient ID:  Carmen Wilkinson is a 83 y.o. female who presents for   Chief Complaint   Patient presents with    Skin Check     tbsc    Spot     face     LOV- 10/28/19- sk, lentigo    Here today for a TBSC  C/o spots on her face    Has h/o  SCCIS R medial cheek, bx 8/2017; discussed Mohs vs topical tx Treated with imiquimod x 2 courses, last 11/2- to 12/29   Has no fhx of MM    Current Outpatient Medications:   ·  albuterol (PROAIR HFA) 90 mcg/actuation inhaler, Inhale 2 puffs into the lungs every 4 (four) hours as needed for Wheezing. Rescue, Disp: 18 g, Rfl: 11  ·  amLODIPine (NORVASC) 5 MG tablet, Take 5 mg by mouth once daily., Disp: , Rfl:   ·  aspirin (ECOTRIN) 81 MG EC tablet, Take 81 mg by mouth once daily., Disp: , Rfl:   ·  cholecalciferol, vitamin D3, (VITAMIN D3) 10 mcg/mL (400 unit/mL) Drop, Take 400 Units by mouth once daily., Disp: , Rfl:   ·  clopidogreL (PLAVIX) 75 mg tablet, Take 1 tablet (75 mg total) by mouth once daily., Disp: 30 tablet, Rfl: 3  ·  ferrous sulfate 325 (65 FE) MG EC tablet, Take 325 mg by mouth once daily., Disp: , Rfl:   ·  folic acid/multivit-min/lutein (CENTRUM SILVER ORAL), Take 1 capsule by mouth once daily., Disp: , Rfl:   ·  KRILL OIL ORAL, Take 1,000 mg by mouth once daily. , Disp: , Rfl:   ·  L. gasseri-B. bifidum-B longum 1.5 billion cell Cap, Take 1 tablet by mouth once daily. , Disp: , Rfl:   ·  metoprolol succinate (TOPROL-XL) 50 MG 24 hr tablet, Take 50 mg by mouth 2 (two) times daily., Disp: , Rfl:   ·  oxyCODONE-acetaminophen (PERCOCET)  mg per tablet, Take 1 tablet by mouth. 5 times daily, Disp: , Rfl:   ·  pantoprazole (PROTONIX) 40 MG tablet, Take 1 tablet (40 mg total) by mouth 2 (two) times daily., Disp: 180 tablet, Rfl: 1  ·  simvastatin (ZOCOR) 10 MG tablet, Take 10 mg by mouth once daily. , Disp: , Rfl:   ·  umeclidinium-vilanteroL (ANORO ELLIPTA) 62.5-25 mcg/actuation DsDv, Inhale 1 puff into the lungs once daily.  Controller, Disp: 1 each, Rfl: 5  ·  vit A/vit C/vit E/zinc/copper (PRESERVISION AREDS ORAL), Take 1 capsule by mouth 2 (two) times a day., Disp: , Rfl:         Review of Systems   Constitutional:  Negative for fever and chills.   Skin:  Positive for dry skin, daily sunscreen use and activity-related sunscreen use. Negative for itching, rash and wears hat.   Hematologic/Lymphatic: Bruises/bleeds easily.      Objective:    Physical Exam   Constitutional: She appears well-developed and well-nourished. No distress.   Neurological: She is alert and oriented to person, place, and time. She is not disoriented.   Psychiatric: She has a normal mood and affect.   Skin:   Areas Examined (abnormalities noted in diagram):   Scalp / Hair Palpated and Inspected  Head / Face Inspection Performed  Neck Inspection Performed  Chest / Axilla Inspection Performed  Abdomen Inspection Performed  Back Inspection Performed  RUE Inspected  LUE Inspection Performed  Nails and Digits Inspection Performed                     Diagram Legend     Erythematous scaling macule/papule c/w actinic keratosis       Vascular papule c/w angioma      Pigmented verrucoid papule/plaque c/w seborrheic keratosis      Yellow umbilicated papule c/w sebaceous hyperplasia      Irregularly shaped tan macule c/w lentigo     1-2 mm smooth white papules consistent with Milia      Movable subcutaneous cyst with punctum c/w epidermal inclusion cyst      Subcutaneous movable cyst c/w pilar cyst      Firm pink to brown papule c/w dermatofibroma      Pedunculated fleshy papule(s) c/w skin tag(s)      Evenly pigmented macule c/w junctional nevus     Mildly variegated pigmented, slightly irregular-bordered macule c/w mildly atypical nevus      Flesh colored to evenly pigmented papule c/w intradermal nevus       Pink pearly papule/plaque c/w basal cell carcinoma      Erythematous hyperkeratotic cursted plaque c/w SCC      Surgical scar with no sign of skin cancer recurrence       Open and closed comedones      Inflammatory papules and pustules      Verrucoid papule consistent consistent with wart     Erythematous eczematous patches and plaques     Dystrophic onycholytic nail with subungual debris c/w onychomycosis     Umbilicated papule    Erythematous-base heme-crusted tan verrucoid plaque consistent with inflamed seborrheic keratosis     Erythematous Silvery Scaling Plaque c/w Psoriasis     See annotation      Assessment / Plan:        Multiple benign nevi  Careful dermoscopy evaluation of nevi performed with none identified as needing biopsy today  Monitor for new mole or moles that are becoming bigger, darker, irritated, or developing irregular borders.     History of nonmelanoma skin cancer  Scar  Area of previous SCC (R medial cheek) examined. Site well healed with no signs of recurrence.    Total body skin examination performed today including at least 12 points as noted in physical examination. No lesions suspicious for malignancy noted.    Cherry angioma  This is a benign vascular lesion. Reassurance given. No treatment required.     Seborrheic keratoses  These are benign inherited growths without a malignant potential. Reassurance given to patient. No treatment is necessary.     Solar lentigo  This is a benign hyperpigmented sun induced lesion. Daily sun protection will reduce the number of new lesions. Treatment of these benign lesions are considered cosmetic.    Patient instructed in importance in daily broad spectrum sun protection of at least spf 30. Mineral sunscreen ingredients preferred (Zinc +/- Titanium) and can be found OTC.   Recommend Elta MD for daily use on face and neck.  Patient encouraged to wear hat for all outdoor exposure.   Also discussed sun avoidance and use of protective clothing.             Follow up in about 1 year (around 2/6/2024).

## 2023-02-20 ENCOUNTER — OFFICE VISIT (OUTPATIENT)
Dept: OTOLARYNGOLOGY | Facility: CLINIC | Age: 83
End: 2023-02-20
Payer: MEDICARE

## 2023-02-20 DIAGNOSIS — R49.9 CHANGE IN VOICE: ICD-10-CM

## 2023-02-20 DIAGNOSIS — J38.7 PRESBYLARYNGES: Primary | ICD-10-CM

## 2023-02-20 PROBLEM — K92.2 GI BLEED: Status: RESOLVED | Noted: 2022-04-04 | Resolved: 2023-02-20

## 2023-02-20 PROCEDURE — 3288F PR FALLS RISK ASSESSMENT DOCUMENTED: ICD-10-PCS | Mod: CPTII,S$GLB,, | Performed by: PHYSICIAN ASSISTANT

## 2023-02-20 PROCEDURE — 1159F PR MEDICATION LIST DOCUMENTED IN MEDICAL RECORD: ICD-10-PCS | Mod: CPTII,S$GLB,, | Performed by: PHYSICIAN ASSISTANT

## 2023-02-20 PROCEDURE — 99204 OFFICE O/P NEW MOD 45 MIN: CPT | Mod: 25,S$GLB,, | Performed by: PHYSICIAN ASSISTANT

## 2023-02-20 PROCEDURE — 1160F PR REVIEW ALL MEDS BY PRESCRIBER/CLIN PHARMACIST DOCUMENTED: ICD-10-PCS | Mod: CPTII,S$GLB,, | Performed by: PHYSICIAN ASSISTANT

## 2023-02-20 PROCEDURE — 1160F RVW MEDS BY RX/DR IN RCRD: CPT | Mod: CPTII,S$GLB,, | Performed by: PHYSICIAN ASSISTANT

## 2023-02-20 PROCEDURE — 1159F MED LIST DOCD IN RCRD: CPT | Mod: CPTII,S$GLB,, | Performed by: PHYSICIAN ASSISTANT

## 2023-02-20 PROCEDURE — 1101F PR PT FALLS ASSESS DOC 0-1 FALLS W/OUT INJ PAST YR: ICD-10-PCS | Mod: CPTII,S$GLB,, | Performed by: PHYSICIAN ASSISTANT

## 2023-02-20 PROCEDURE — 99204 PR OFFICE/OUTPT VISIT, NEW, LEVL IV, 45-59 MIN: ICD-10-PCS | Mod: 25,S$GLB,, | Performed by: PHYSICIAN ASSISTANT

## 2023-02-20 PROCEDURE — 3288F FALL RISK ASSESSMENT DOCD: CPT | Mod: CPTII,S$GLB,, | Performed by: PHYSICIAN ASSISTANT

## 2023-02-20 PROCEDURE — 31575 DIAGNOSTIC LARYNGOSCOPY: CPT | Mod: S$GLB,,, | Performed by: PHYSICIAN ASSISTANT

## 2023-02-20 PROCEDURE — 99999 PR PBB SHADOW E&M-EST. PATIENT-LVL III: CPT | Mod: PBBFAC,,, | Performed by: PHYSICIAN ASSISTANT

## 2023-02-20 PROCEDURE — 1101F PT FALLS ASSESS-DOCD LE1/YR: CPT | Mod: CPTII,S$GLB,, | Performed by: PHYSICIAN ASSISTANT

## 2023-02-20 PROCEDURE — 31575 LARYNGOSCOPY: ICD-10-PCS | Mod: S$GLB,,, | Performed by: PHYSICIAN ASSISTANT

## 2023-02-20 PROCEDURE — 99999 PR PBB SHADOW E&M-EST. PATIENT-LVL III: ICD-10-PCS | Mod: PBBFAC,,, | Performed by: PHYSICIAN ASSISTANT

## 2023-02-20 RX ORDER — VITAMIN E 200 UNIT
1 CAPSULE ORAL DAILY
COMMUNITY

## 2023-02-20 RX ORDER — MULTIVITAMIN
TABLET ORAL
COMMUNITY
End: 2023-08-29

## 2023-02-20 RX ORDER — CALCIUM POLYCARBOPHIL 625 MG
625 TABLET ORAL DAILY
COMMUNITY

## 2023-02-20 RX ORDER — VIT C/E/ZN/COPPR/LUTEIN/ZEAXAN 250MG-90MG
1 CAPSULE ORAL 2 TIMES DAILY
COMMUNITY

## 2023-02-20 NOTE — PROCEDURES
Laryngoscopy    Date/Time: 2/20/2023 2:30 PM  Performed by: Matheus Petersen PA-C  Authorized by: Matheus Petersen PA-C     Consent Done?:  Yes (Verbal)  Anesthesia:     Local anesthetic:  Lidocaine 4% and Christopher-Synephrine 1/2%    Patient tolerance:  Patient tolerated the procedure well with no immediate complications    Decongestion performed?: Yes    Laryngoscopy:     Areas examined:  Nasopharynx, oropharynx, hypopharynx, larynx and vocal cords    Laryngoscope size: disposable ambu scope.  Nose External:      No external nasal deformity  Nasopharynx:      No mucosa lesions     Posterior choanae patent     Eustachian tube patent  Larynx/hypopharynx:      No epiglottis lesions     No epiglottis edema     No AE folds lesions     No vocal cord polyps     Equal and normal bilateral     No hypopharynx lesions     No piriform sinus pooling     No piriform sinus lesions     Good adduction of vocal fold with phonation and abduction with inspiration. 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     Interarytenoid erythema noted.

## 2023-02-20 NOTE — PROGRESS NOTES
[facility]    Subjective:      Patient: Camren Wilkinson Patient PCP: Abiodun Will MD         :  1940     Sex:  female      MRN:  [id number]          Date of Visit: 2023      Chief Complaint: Voice hoarseness (RSI 7/45/GERD 0/5 /VHI 7/120 )    Patient ID: Carmen Wilkinson is a 83 y.o. female w with h/o COPD who presents today in office for evaluation of voice change x 6 months. Pt states she was started on breo and notice voice change after starting breo. Pt started on Anoro secondary to breo causing voice hoarseness per pt. Pt followed by pulmonologist Dr. Whitney Molina. Pt states that voice change has improved, but she continues to have intermittent issues with voice. Pt states that she washes her mouth out with water after using Anoro. Pt had EGD completed 2022 noted 3cm hiatal hernia and with normal appearing esophagus. Pt currently taking protonix 40mg BID. Pt states that her acid reflux symptoms are stable with use of protonix. Pt denies throat pain, dysphagia/odynophagia, fever/chills or drenching night sweats.     Tobacco/smoking History: 1ppd x 57 years. Pt quit smoking 2022.    Past Medical History  She has a past medical history of Back pain, Brain aneurysm, Carotid stenosis, Cataract, Diverticulitis, Emphysema lung, Feeling anxious, Hyperlipidemia, Hypertension, Macular degeneration, PVD (peripheral vascular disease), and Squamous cell carcinoma.    Family History  Her family history is not on file.    Past Surgical History:   Procedure Laterality Date    ABDOMINAL SURGERY      ANGIOGRAPHY OF MESENTERIC VESSELS Left 2022    Procedure: ANGIOGRAM, MESENTERIC VESSELS;  Surgeon: Krish Machado MD;  Location: [facility] CATH/EP LAB;  Service: General;  Laterality: Left;    ARTERIOGRAM, MESENTERIC N/A 2021    Procedure: ARTERIOGRAM, MESENTERIC;  Surgeon: Krish Machado MD;  Location: [facility] CATH/EP LAB;  Service: General;  Laterality: N/A;    BACK SURGERY       CATARACT EXTRACTION, BILATERAL      COLONOSCOPY N/A 2022    Procedure: COLONOSCOPY;  Surgeon: Lino Devi MD;  Location: [facility] ENDO;  Service: Endoscopy;  Laterality: N/A;    CREATION, BYPASS, ARTERIAL, AORTA TO FEMORAL N/A 2021    Procedure: ARTERIOGRAM, MESENTERIC;  Surgeon: Krish Machado MD;  Location: [facility] CATH/EP LAB;  Service: General;  Laterality: N/A;    ENDOSCOPY OF PROXIMAL SMALL INTESTINE N/A 2022    Procedure: ENTEROSCOPY, PROXIMAL;  Surgeon: Lino Devi MD;  Location: [facility] ENDO;  Service: Endoscopy;  Laterality: N/A;    ESOPHAGOGASTRODUODENOSCOPY N/A 2022    Procedure: EGD (ESOPHAGOGASTRODUODENOSCOPY);  Surgeon: Pedro Hall Jr., MD;  Location: [facility] ENDO;  Service: Endoscopy;  Laterality: N/A;    HYSTERECTOMY      IMPLANTATION OF SPINAL CORD STIMULATOR IN CERVICAL SPINE      UPPER GASTROINTESTINAL ENDOSCOPY  2022    w/ antrum biopsy     Social History     Tobacco Use    Smoking status: Former     Packs/day: 1.00     Types: Cigarettes     Start date: 1965     Quit date: 2022     Years since quittin.8    Smokeless tobacco: Never   Substance and Sexual Activity    Alcohol use: No    Drug use: Not Currently    Sexual activity: Not on file     Medications  She has a current medication list which includes the following prescription(s): albuterol, amlodipine, aspirin, cholecalciferol (vitamin d3), clopidogrel, ferrous sulfate, folic acid/multivit-min/lutein, krill oil, l. gasseri-b. bifidum-b longum, metoprolol succinate, oxycodone-acetaminophen, pantoprazole, simvastatin, anoro ellipta, vit a/vit c/vit e/zinc/copper, preservision areds-2, zinc acetate, coq10 (liposomal ubiquinol), megared omega-3 krill oil, multivit with min-folic acid, multivitamin, and fibercon.    Review of patient's allergies indicates:   Allergen Reactions    Sulfa (sulfonamide antibiotics) Nausea Only     All medications, allergies, and past history have been  reviewed.    Objective:      Vitals:  Vitals - 1 value per visit 12/28/2022 2/6/2023 2/6/2023   SYSTOLIC 118 - -   DIASTOLIC 74 - -   Pulse 79 - -   Temp 97.3 - -   Resp - - -   SPO2 97 - -   Weight (lb) 81.6 - 81   Weight (kg) 37.014 - 36.741   Height - - 62   BMI (Calculated) - - 14.8   VISIT REPORT - - -   Pain Score  - 0 -       There is no height or weight on file to calculate BSA.  Physical Exam  Constitutional:       General: She is not in acute distress.     Appearance: Normal appearance. She is not ill-appearing.   HENT:      Head: Normocephalic and atraumatic.      Right Ear: Tympanic membrane, ear canal and external ear normal.      Left Ear: Tympanic membrane, ear canal and external ear normal.      Nose: Nose normal.      Mouth/Throat:      Lips: Pink. No lesions.      Mouth: Mucous membranes are moist. No oral lesions.      Tongue: No lesions.      Palate: No lesions.      Pharynx: Oropharynx is clear. Uvula midline. No pharyngeal swelling, oropharyngeal exudate, posterior oropharyngeal erythema or uvula swelling.   Eyes:      General:         Right eye: No discharge.         Left eye: No discharge.      Extraocular Movements: Extraocular movements intact.      Conjunctiva/sclera: Conjunctivae normal.   Pulmonary:      Effort: Pulmonary effort is normal.   Neurological:      General: No focal deficit present.      Mental Status: She is alert and oriented to person, place, and time. Mental status is at baseline.   Psychiatric:         Mood and Affect: Mood normal.         Behavior: Behavior normal.         Thought Content: Thought content normal.         Judgment: Judgment normal.     Laryngoscopy     Date/Time: 2/20/2023 2:30 PM  Performed by: Matheus Petersen PA-C  Authorized by: Matheus Petersen PA-C      Consent Done?:  Yes (Verbal)  Anesthesia:     Local anesthetic:  Lidocaine 4% and Christopher-Synephrine 1/2%    Patient tolerance:  Patient tolerated the procedure well with no immediate  complications    Decongestion performed?: Yes    Laryngoscopy:     Areas examined:  Nasopharynx, oropharynx, hypopharynx, larynx and vocal cords    Laryngoscope size: disposable ambu scope.  Nose External:      No external nasal deformity  Nasopharynx:      No mucosa lesions     Posterior choanae patent     Eustachian tube patent  Larynx/hypopharynx:      No epiglottis lesions     No epiglottis edema     No AE folds lesions     No vocal cord polyps     Equal and normal bilateral     No hypopharynx lesions     No piriform sinus pooling     No piriform sinus lesions     Good adduction of vocal fold with phonation and abduction with inspiration. 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      Interarytenoid erythema noted.   Labs:  WBC   Date Value Ref Range Status   11/21/2022 8.58 3.90 - 12.70 K/uL Final     Eosinophil %   Date Value Ref Range Status   11/18/2022 0.2 0.0 - 8.0 % Final     Eos #   Date Value Ref Range Status   11/18/2022 0.0 0.0 - 0.5 K/uL Final     Platelets   Date Value Ref Range Status   11/21/2022 308 150 - 450 K/uL Final     Glucose   Date Value Ref Range Status   11/21/2022 92 70 - 110 mg/dL Final       All lab results, imaging results, and data have been reviewed.    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 Change in voice  R49.9 784.49            Plan:      Presbylarynges (voice change)  -     Laryngoscopy today showed good adduction of vocal fold with phonation and abduction with inspiration. 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 Pt is to start speech therapy for voice. Pt advised to follow up in office if having continual voice change without improvement with speech therapy or if having worsening of voice change after completing speech therapy. Pt did have mild interarytenoid erythema likely secondary to reflux, so pt advised to  continue protonix as prescribed. Proceed with plan as above and follow up as needed for ENT issues/concerns.

## 2023-03-16 ENCOUNTER — CLINICAL SUPPORT (OUTPATIENT)
Dept: REHABILITATION | Facility: HOSPITAL | Age: 83
End: 2023-03-16
Payer: MEDICARE

## 2023-03-16 DIAGNOSIS — R49.9 CHANGE IN VOICE: ICD-10-CM

## 2023-03-16 DIAGNOSIS — J38.7 PRESBYLARYNGES: ICD-10-CM

## 2023-03-16 DIAGNOSIS — R49.0 DYSPHONIA: ICD-10-CM

## 2023-03-16 PROCEDURE — 92524 BEHAVRAL QUALIT ANALYS VOICE: CPT | Mod: PN

## 2023-03-16 PROCEDURE — 92507 TX SP LANG VOICE COMM INDIV: CPT | Mod: PN

## 2023-03-16 NOTE — PLAN OF CARE
[facility]  Speech Therapy Evaluation -Voice    Date: 3/16/2023     Name: Carmen Wilkinson   MRN: [id number]    Therapy Diagnosis:   Encounter Diagnoses   Name Primary?    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   Qualitative Analysis of Voice and Resonance  20   Speech Language Voice Therapy  15     Precautions: Standard  Subjective   Date of Onset: approximately 1 year   History of Current Condition:   Patient reports starting a new inhaler about a year ago and noted voice changes since then. Patient reports vocal raspiness intermittent for frequency and time of day - no known trigger. Patient denies history of acid reflux. Patient reports  has trouble understanding her. Patient reports intermittent throat clearing. Patient reports quitting smoking last year - 1 PPD for 64 years.   Past Medical History: Carmen Wilkinson  has a past medical history of Back pain, Brain aneurysm, Carotid stenosis, Cataract, Diverticulitis, Emphysema lung, Feeling anxious, Hyperlipidemia, Hypertension, Macular degeneration, PVD (peripheral vascular disease), and Squamous cell carcinoma (08/29/2017).  Carmen Wilkinson  has a past surgical history that includes Creation, Bypass, Arterial, AORTA TO FEMORAL (N/A, 01/13/2021); Implantation of spinal cord stimulator in cervical spine; Cataract extraction, bilateral; Hysterectomy; Abdominal surgery; Back surgery; arteriogram, mesenteric (N/A, 06/30/2021); Colonoscopy (N/A, 04/04/2022); Endoscopy of proximal small intestine (N/A, 04/04/2022); Angiography of  mesenteric vessels (Left, 09/07/2022); Upper gastrointestinal endoscopy (11/20/2022); and Esophagogastroduodenoscopy (N/A, 11/20/2022).  Medical Hx and Allergies: Carmen has a current medication list which includes the following prescription(s): albuterol, amlodipine, aspirin, cholecalciferol (vitamin d3), clopidogrel, coq10 (liposomal ubiquinol), ferrous sulfate, folic acid/multivit-min/lutein, krill oil, megared omega-3 krill oil, l. gasseri-b. bifidum-b longum, metoprolol succinate, multivit with min-folic acid, multivitamin, oxycodone-acetaminophen, pantoprazole, fibercon, simvastatin, anoro ellipta, vit a/vit c/vit e/zinc/copper, preservision areds-2, and zinc acetate.   Review of patient's allergies indicates:   Allergen Reactions    Sulfa (sulfonamide antibiotics) Nausea Only     ENT and FNE: Laryngeal endoscopy findings per [facility] on 2/20/2023:  Laryngoscopy     Date/Time: 2/20/2023 2:30 PM  Performed by: Matheus Petersen PA-C  Authorized by: Matheus Petersen PA-C      Consent Done?:  Yes (Verbal)  Anesthesia:     Local anesthetic:  Lidocaine 4% and Christopher-Synephrine 1/2%    Patient tolerance:  Patient tolerated the procedure well with no immediate complications    Decongestion performed?: Yes    Laryngoscopy:     Areas examined:  Nasopharynx, oropharynx, hypopharynx, larynx and vocal cords    Laryngoscope size: disposable ambu scope.  Nose External:      No external nasal deformity  Nasopharynx:      No mucosa lesions     Posterior choanae patent     Eustachian tube patent  Larynx/hypopharynx:      No epiglottis lesions     No epiglottis edema     No AE folds lesions     No vocal cord polyps     Equal and normal bilateral     No hypopharynx lesions     No piriform sinus pooling     No piriform sinus lesions     Good adduction of vocal fold with phonation and abduction with inspiration. 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      Interarytenoid erythema noted.        Past Medical History:   Past Medical History:   Diagnosis Date    Back pain     Brain aneurysm     Carotid stenosis     Cataract     Diverticulitis     Emphysema lung     Feeling anxious     Hyperlipidemia     Hypertension     Macular degeneration     PVD (peripheral vascular disease)     Squamous cell carcinoma 08/29/2017    right medical cheek, SSIS, efudex tx    Carmen Wilkinson  has a past surgical history that includes Creation, Bypass, Arterial, AORTA TO FEMORAL (N/A, 01/13/2021); Implantation of spinal cord stimulator in cervical spine; Cataract extraction, bilateral; Hysterectomy; Abdominal surgery; Back surgery; arteriogram, mesenteric (N/A, 06/30/2021); Colonoscopy (N/A, 04/04/2022); Endoscopy of proximal small intestine (N/A, 04/04/2022); Angiography of mesenteric vessels (Left, 09/07/2022); Upper gastrointestinal endoscopy (11/20/2022); and Esophagogastroduodenoscopy (N/A, 11/20/2022).  Medical Hx and Allergies: Carmen has a current medication list which includes the following prescription(s): albuterol, amlodipine, aspirin, cholecalciferol (vitamin d3), clopidogrel, coq10 (liposomal ubiquinol), ferrous sulfate, folic acid/multivit-min/lutein, krill oil, megared omega-3 krill oil, l. gasseri-b. bifidum-b longum, metoprolol succinate, multivit with min-folic acid, multivitamin, oxycodone-acetaminophen, pantoprazole, fibercon, simvastatin, anoro ellipta, vit a/vit c/vit e/zinc/copper, preservision areds-2, and zinc acetate.   Review of patient's allergies indicates:   Allergen Reactions    Sulfa (sulfonamide antibiotics) Nausea Only     Prior Level of Function: Within Normal Limits    Current Level of Function:  mild hoarseness  Prior Therapy:  n/a  Vocal Tract Discomfort:   0/10  Pain Location / Description: no pain indicated across session  Nutrition/Reflux:  full oral - regular/thin liquids  Respiratory: room air   Hearing: perceived Within  "Normal Limits   Social Voice Use:  minimal   Work Voice Use: retired - nursing  Vocal Hygiene: etoh - none, smoking - quit last year, water - "not enough" 24 oz, sleep - 8 hours + naps  Work/Life Stress: 6/10  Patient Therapy Goals:  hoping my voice gets better    Objective   Formal Assessment:  Swallowing:  full oral - regular/thin liquids   Breathing: throat clearing    Posture: Within Normal Limits   GRBAS: [id number]    Perceptual assessment:    -Quality: rough  -Volume: appropriate for age and gender  -Pitch: appropriate for age and gender  -Flexibility: appropriate for age and gender    Habitual respiratory pattern: diaphragmatic.  CAPE-V Overall Score: 45    VHI-10 (completed to assess self-perceived handicap associated with dysphonia; >11 considered abnormal): 20 indicating MILD perceived impairment   RSI (completed to assess the possible presence and/or severity of LPR symptoms and any relationship between this condition and the pt's dysphagia; score of ~15 may indicate LPR): 12      Treatment   Total Treatment Time Separate from Evaluation:  15 minutes   Stimulability  Diaphragmatic Breathing: Pt completed diaphragmatic breathing x5 independently. No changes  in vocal quality.  PhoRTE: Patient was stimulable for improved voice using increased projection and PhoRTE voice protocol.  Did benefit from minimal cueing to improve loudness without changing pitch. She was also able to read a passage at the end of the session and maintain carryover.   She reported she felt her voice quality was improved.  Phonation resistance training exercises (PhoRTE [Chris & Vickey, 2013]): to be practiced 2x/day, 7 days/week  - Sustain the vowel /a/ with a loud, energized voice for as long as possible. (x3)  - Glide from modal pitch to high pitch on /a/ using a loud, energized voice. (x3)  - Glide from modal pitch to low pitch on the vowel /a/ using a loud, energized voice. (x3)  - Shout selected functional phrases using a " loud and higher-pitched voice, like calling over a fence. (4 phrases, 2x each)  - Shout selected functional phrases using a strong, authoritative voice at a low pitch level. (4 phrases, 2x each)     Patient noted with improved voicing in task      Education: Plan of Care, role of SLP in care, vocal hygeine, course of medical disease affect on therapy diagnosis , scheduling/ cancellation policy, and insurance limitations / visit limit  were discussed with patient. Patient expressed understanding.     Home Program: patient asked to continue PhoRTE protocol as discussed/completed today 2x daily - patient voiced understanding. 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 She presents with MILD dysphonia characterized by vocal hoarseness and reduced volume.   Demonstrates impairments including limitations as described in the problem list. Positive prognostic factors include good understanding. Negative prognostic factors include n/a. No barriers to therapy identified. Patient will benefit from skilled, outpatient neurological rehabilitation speech therapy.    Rehab Potential: good  Patient's spiritual, cultural, and educational needs considered and patient agreeable to plan of care and goals.    Short Term Goals (4 weeks):   Short Term Goals:  1. Pt will complete neck relaxation exercises 10x each per session/at home ind'ly.    2. Patient will complete SOVT exercises and/or resonant-focused exercises with min A.  3. Patient will improve the quality, efficiency, and ease of phonation through resonant and/or airflow exercises from the syllable to conversation level with 80% accuracy.  4. Patient will increase awareness for voice conservations behaviors by following the 50/10 rule and reduce voice use in competing noise environments.   5. Patient will demonstrate the ability to increase awareness of voicing  behavior through self-monitoring to facilitate generalization in functional speaking situations with 80% accuracy.       6. Pt will reduce/eliminate/substitute throat clearing ind'ly.     Long Term Goals (6 weeks):   Patient will implement and adhere to vocal hygiene protocols on a daily basis, including the elimination of phonotraumatic behaviors.  Patient and clinician will facilitate changes in vocal function in order to restore functional use of voice for daily occupational, social, and emotional demands.  Pt will demonstrate improved vocal quality/loudness/intonantion for sustained vocalization/speech at the word/phrase/sentence/conversational level to communicate basic medical and social needs in functional living environment.     Plan     Plan of Care Certification Period: 3/16/2023 to 4/27/2023    Recommended Treatment Plan:  Patient will participate in the [facility] rehabilitation program for speech therapy 1 times per week for 6 weeks to address her Communication deficits, to educate patient and their family, and to participate in a home exercise program.    Other Recommendations: n/a    Therapist's Name:   Norma Figueroa CCC-SLP   3/16/2023     I CERTIFY THE NEED FOR THESE SERVICES FURNISHED UNDER THIS PLAN OF TREATMENT AND WHILE UNDER MY CARE    Physician Name: _______________________________    Physician Signature: ____________________________

## 2023-03-16 NOTE — PATIENT INSTRUCTIONS
PHORTE EXERCISES          Loud, long, energized ahhhh x 5     Loud and energized pitch glide ascending ahhhh x 5     Loud and energized pitch glide descending ahhh x 5          Phrases using loud and high voice (like shouting over the fence) x 10  (2 sets)     Phrases in a loud and low voice (authoritative) x 10  (2 sets)

## 2023-03-29 NOTE — PROGRESS NOTES
[facility]  Speech Therapy Treatment Note- Voice  Date: 3/30/2023     Name: Carmen Wilkinson   MRN: [id number]   Therapy Diagnosis:   Encounter Diagnoses   Name Primary?    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 Patient reports noted improvement with exercises but continued change in vocal quality in conversation.  Patient reports drinking approximately 24oz water daily.   She was compliant to home exercise program.   Response to previous treatment: good   Pain Scale:  6/10 on a Visual Analog Scale currently.   Pain Location: back   Objective:        UNTIMED  Procedure   Speech- Language- Voice Therapy     Total Untimed Units: 1  Charges Billed/Number of units: 1/1    Short Term Goals: (4 weeks) Current Progress:   1. Pt will complete neck relaxation exercises 10x each per session/at home ind'ly.      Progressing/ Not Met 3/30/2023   -Not addressed this session.      2. Patient will complete SOVT exercises and/or resonant-focused exercises with min A.     Progressing/ Not Met 3/30/2023   -Not addressed this session.      3. Patient will improve the quality, efficiency, and ease of phonation through resonant and/or airflow exercises from the syllable to conversation level with 80% accuracy.     Progressing/ Not Met 3/30/2023   -Not addressed this session.      4. Patient will increase  awareness for voice conservations behaviors by following the 50/10 rule and reduce voice use in competing noise environments.      Progressing/ Not Met 3/30/2023   See education below    5. Patient will demonstrate the ability to increase awareness of voicing behavior through self-monitoring to facilitate generalization in functional speaking situations with 80% accuracy.          Progressing/ Not Met 3/30/2023   See education below     6. Pt will reduce/eliminate/substitute throat clearing ind'ly.      Progressing/ Not Met 3/30/2023   See education     7. GOAL ADDED 3/30/2023 patient will complete PhoRTE protocol with 100% accuracy independently with noted improvement in vocal quality.  Patient was stimulable for improved voice using increased projection and PhoRTE voice protocol.  Did benefit from moderate cueing to improve loudness without changing pitch. She was also able to read a passage at the end of the session and maintain carryover.   She reported she felt her voice quality was improved.  Phonation resistance training exercises (PhoRTE [Chris & Vickey, 2013]): to be practiced 2x/day, 7 days/week  - Sustain the vowel /a/ with a loud, energized voice for as long as possible. (x5)  - Glide from modal pitch to high pitch on /a/ using a loud, energized voice. (x5)  - Glide from modal pitch to low pitch on the vowel /a/ using a loud, energized voice. (x5)  - Shout selected functional phrases using a loud and higher-pitched voice, like calling over a fence. (5 phrases, 2x each)  - Shout selected functional phrases using a strong, authoritative voice at a low pitch level. (5 phrases, 2x each)         Patient Education/Response:   Patient completed education across today's session regarding the following:  Vocal hygiene handout provided   Continue PhoRTE at home with frequency discussed  Throat clearing strategies - written at bottom of PhoRTE handout    Home program established: yes-continue PhoRTE exercises and  vocal hygiene as directed today.   Exercises were reviewed and Carmen was able to demonstrate them prior to the end of the session.  Carmen demonstrated good  understanding of the education provided.     See Electronic Medical Record under Patient Instructions for exercises provided throughout therapy.  Assessment:   Carmen is progressing well towards her goals. Patient with continued education and reinforcement of PhoRTE and vocal hygiene today. Patient noted with minimal improvement of vocal quality during PhoRTE in session today. Patient encouraged to continue exercises 2x daily for 6 days in effort to determine improvement. Current goals remain appropriate. Goals to be updated as necessary.     Patient prognosis is Good. Patient will continue to benefit from skilled outpatient speech and language therapy to address the deficits listed in the problem list on initial evaluation, provide patient/family education and to maximize patient's level of independence in the home and community environment.   Medical necessity is demonstrated by the following IMPAIRMENTS:  Patient with decreased vocal intensity resulting in severely decreased speech intelligibility. 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  Plan:   Continue Plan of Care with focus on vocal function related to presbylarynges    Norma Figueroa CCC-SLP   3/30/2023

## 2023-03-30 ENCOUNTER — CLINICAL SUPPORT (OUTPATIENT)
Dept: REHABILITATION | Facility: HOSPITAL | Age: 83
End: 2023-03-30
Payer: MEDICARE

## 2023-03-30 DIAGNOSIS — R49.0 DYSPHONIA: ICD-10-CM

## 2023-03-30 DIAGNOSIS — J38.7 PRESBYLARYNGES: Primary | ICD-10-CM

## 2023-03-30 DIAGNOSIS — R49.9 CHANGE IN VOICE: ICD-10-CM

## 2023-03-30 PROCEDURE — 92507 TX SP LANG VOICE COMM INDIV: CPT | Mod: PN

## 2023-03-30 NOTE — PATIENT INSTRUCTIONS
DATE: ______________ PT NAME: _________________________________ : _________________  Vocal Hygiene   Provide a moist environment for your voice:   Drink lots of water  Breathe in through your nose, not your mouth  Use a humidifier to humidify the air, especially while sleeping  Limit products that increase dryness (i.e., caffeine, alcohol, certain medications)  Eliminate behaviors that irritate your vocal folds:   Smoking   Habitual cough and throat clearing   Acid reflux   Reduce vocal overuse and misuse:   Overuse:   Excessive talking and/or singing  Misuse:   Raising your voice  Yelling, shouting, screaming  Talking over background noise (i.e., restaurants, bars, talking on phone, radio or television)  Whispering   Tips for staying hydrated:   Drink water after drinking a caffeinated beverage (coffee, alcohol, soft drink)  Drink more water than typical if:   You are in a hot environment  You are taking a medication that increases dryness  You are doing physical activity  Alternatives to:   Habitual Coughing/Throat Clearing:   Stay hydrated  Sip water instead of clearing throat  Suck on ice chips or hard candies  Hard swallow   Raising Your Voice:   Use nonverbal methods to get attention (clapping, whistle, moving hands/arms)  Use an amplifier (like a microphone)  Move away from noise and closer to your conversation partner  Excessive Voice Use:   Take voice naps during the day to rest your voice, especially after a period of increase voice use  Do not use your voice if you don't have to  Use non-verbal communication when possible (gestures, email, texts, written messages, etc.)    Notes: _______________________________________________________________________________________________________________________________________________________________________________________________________________________________________________________________

## 2023-04-19 ENCOUNTER — TELEPHONE (OUTPATIENT)
Dept: DERMATOLOGY | Facility: CLINIC | Age: 83
End: 2023-04-19
Payer: MEDICARE

## 2023-04-19 NOTE — TELEPHONE ENCOUNTER
----- Message from Eleonora Baltazar sent at 4/19/2023 12:11 PM CDT -----  Contact: Patient  Type:  Sooner Appointment Request    Caller is requesting a sooner appointment.  Caller declined first available appointment listed below.  Caller will not accept being placed on the waitlist and is requesting a message be sent to doctor.    Name of Caller:  Patient  When is the first available appointment?  08/2023  Symptoms:  finger nail is falling off, needs advise  Best Call Back Number:  [phone]  Additional Information:  Please call the patient back to schedule/advise. Thank you!

## 2023-05-08 NOTE — Clinical Note
The wire was inserted into the middle suprarenal abdominal aorta. [de-identified] : Today I had a lengthy discussion with the patient regarding their right foot pain.I have addressed all the patient's concerns surrounding the pathology of their condition. XR imaging from an outside facility was reviewed with the patient today in office. Discussed with the patient conservative and surgical treatment options. I educated the patient about potential fusion surgery options and potential changes in mobility as a result. I recommend that the patient utilize a Evans extension plate. I recommend that the patient utilize Voltaren gel topically. If the Voltaren gel could not be obtained, Icy Hot, Biofreeze, or Bengay can be utilized instead. I recommend that the patient utilize ice, NSAIDS PRN, and heat. They can also elevate their right foot above the level of the heart. \par \par The patient understood and verbally agreed to the treatment plan. All of their questions were answered and they were satisfied with the visit. The patient should call the office if they have any questions or experience worsening symptoms.\par \par I would like to see the patient back in the office in PRN weeks to reassess their condition. 	\par \par

## 2023-05-18 ENCOUNTER — OFFICE VISIT (OUTPATIENT)
Dept: DERMATOLOGY | Facility: CLINIC | Age: 83
End: 2023-05-18
Payer: MEDICARE

## 2023-05-18 VITALS — WEIGHT: 80.94 LBS | HEIGHT: 62 IN | BODY MASS INDEX: 14.89 KG/M2

## 2023-05-18 DIAGNOSIS — L60.3 NAIL DYSTROPHY: ICD-10-CM

## 2023-05-18 DIAGNOSIS — A49.8 BACTERIAL INFECTION DUE TO PSEUDOMONAS: Primary | ICD-10-CM

## 2023-05-18 PROCEDURE — 1160F PR REVIEW ALL MEDS BY PRESCRIBER/CLIN PHARMACIST DOCUMENTED: ICD-10-PCS | Mod: CPTII,,, | Performed by: DERMATOLOGY

## 2023-05-18 PROCEDURE — 99999 PR PBB SHADOW E&M-EST. PATIENT-LVL II: CPT | Mod: PBBFAC,,, | Performed by: DERMATOLOGY

## 2023-05-18 PROCEDURE — 1159F PR MEDICATION LIST DOCUMENTED IN MEDICAL RECORD: ICD-10-PCS | Mod: CPTII,,, | Performed by: DERMATOLOGY

## 2023-05-18 PROCEDURE — 99212 OFFICE O/P EST SF 10 MIN: CPT | Mod: PO | Performed by: DERMATOLOGY

## 2023-05-18 PROCEDURE — 1160F RVW MEDS BY RX/DR IN RCRD: CPT | Mod: CPTII,,, | Performed by: DERMATOLOGY

## 2023-05-18 PROCEDURE — 3288F PR FALLS RISK ASSESSMENT DOCUMENTED: ICD-10-PCS | Mod: CPTII,,, | Performed by: DERMATOLOGY

## 2023-05-18 PROCEDURE — 1159F MED LIST DOCD IN RCRD: CPT | Mod: CPTII,,, | Performed by: DERMATOLOGY

## 2023-05-18 PROCEDURE — 3288F FALL RISK ASSESSMENT DOCD: CPT | Mod: CPTII,,, | Performed by: DERMATOLOGY

## 2023-05-18 PROCEDURE — 1101F PT FALLS ASSESS-DOCD LE1/YR: CPT | Mod: CPTII,,, | Performed by: DERMATOLOGY

## 2023-05-18 PROCEDURE — 1101F PR PT FALLS ASSESS DOC 0-1 FALLS W/OUT INJ PAST YR: ICD-10-PCS | Mod: CPTII,,, | Performed by: DERMATOLOGY

## 2023-05-18 PROCEDURE — 99214 OFFICE O/P EST MOD 30 MIN: CPT | Mod: ,,, | Performed by: DERMATOLOGY

## 2023-05-18 PROCEDURE — 99999 PR PBB SHADOW E&M-EST. PATIENT-LVL II: ICD-10-PCS | Mod: PBBFAC,,, | Performed by: DERMATOLOGY

## 2023-05-18 PROCEDURE — 99214 PR OFFICE/OUTPT VISIT, EST, LEVL IV, 30-39 MIN: ICD-10-PCS | Mod: ,,, | Performed by: DERMATOLOGY

## 2023-05-18 RX ORDER — GENTAMICIN SULFATE 3 MG/ML
SOLUTION/ DROPS OPHTHALMIC
Qty: 15 ML | Refills: 1 | Status: SHIPPED | OUTPATIENT
Start: 2023-05-18 | End: 2023-07-18 | Stop reason: SDUPTHER

## 2023-05-18 RX ORDER — FLUCONAZOLE 200 MG/1
TABLET ORAL
Qty: 8 TABLET | Refills: 0 | Status: SHIPPED | OUTPATIENT
Start: 2023-05-18 | End: 2023-07-18 | Stop reason: SDUPTHER

## 2023-05-18 NOTE — PROGRESS NOTES
Subjective:      Patient ID:  Carmen Wilkinson is a 83 y.o. female who presents for   Chief Complaint   Patient presents with    fingernail     LOV 2/6/23 Benign nevi, Scar, Angioma, SK    Patient here for fingernails falling off  Patient states for about 4 months  Left ring finger nail fell and regrew      Has h/o  SCCIS R medial cheek, bx 8/2017; discussed Mohs vs topical tx Treated with imiquimod x 2 courses, last 11/2- to 12/29   Has no fhx of MM    Current Outpatient Medications:   ·  albuterol (PROAIR HFA) 90 mcg/actuation inhaler, Inhale 2 puffs into the lungs every 4 (four) hours as needed for Wheezing. Rescue, Disp: 18 g, Rfl: 11  ·  amLODIPine (NORVASC) 5 MG tablet, Take 5 mg by mouth once daily., Disp: , Rfl:   ·  aspirin (ECOTRIN) 81 MG EC tablet, Take 81 mg by mouth once daily., Disp: , Rfl:   ·  cholecalciferol, vitamin D3, (VITAMIN D3) 10 mcg/mL (400 unit/mL) Drop, Take 400 Units by mouth once daily., Disp: , Rfl:   ·  clopidogreL (PLAVIX) 75 mg tablet, Take 1 tablet (75 mg total) by mouth once daily., Disp: 30 tablet, Rfl: 3  ·  ferrous sulfate 325 (65 FE) MG EC tablet, Take 325 mg by mouth once daily., Disp: , Rfl:   ·  krill-om-3-dha-epa-phospho-ast (MEGARED OMEGA-3 KRILL OIL) 365-33-48-50 mg Cap, as directed Orally, Disp: , Rfl:   ·  L. gasseri-B. bifidum-B longum 1.5 billion cell Cap, Take 1 tablet by mouth once daily. , Disp: , Rfl:   ·  metoprolol succinate (TOPROL-XL) 50 MG 24 hr tablet, Take 50 mg by mouth 2 (two) times daily., Disp: , Rfl:   ·  multivit with min-folic acid 0.4 mg Tab, as directed Orally, Disp: , Rfl:   ·  oxyCODONE-acetaminophen (PERCOCET)  mg per tablet, Take 1 tablet by mouth. 5 times daily, Disp: , Rfl:   ·  pantoprazole (PROTONIX) 40 MG tablet, Take 1 tablet (40 mg total) by mouth 2 (two) times daily., Disp: 180 tablet, Rfl: 1  ·  polycarbophil (FIBERCON) 625 mg tablet, 2 tablets as needed Orally Three times a day, Disp: , Rfl:   ·  simvastatin (ZOCOR) 10  MG tablet, Take 10 mg by mouth once daily. , Disp: , Rfl:   ·  vit A/vit C/vit E/zinc/copper (PRESERVISION AREDS ORAL), Take 1 capsule by mouth 2 (two) times a day., Disp: , Rfl:   ·  zinc acetate 25 mg (zinc) Cap, 1 tablet Orally Once a day, Disp: , Rfl:   ·  coQ10, liposomal ubiquinol, 100 mg/5 mL Susp, as directed Orally, Disp: , Rfl:   ·  folic acid/multivit-min/lutein (CENTRUM SILVER ORAL), Take 1 capsule by mouth once daily., Disp: , Rfl:   ·  KRILL OIL ORAL, Take 1,000 mg by mouth once daily. , Disp: , Rfl:   ·  multivitamin (MULTIPLE VITAMINS DAILY ORAL), 1 tablet Orally Once a day, Disp: , Rfl:   ·  umeclidinium-vilanteroL (ANORO ELLIPTA) 62.5-25 mcg/actuation DsDv, Inhale 1 puff into the lungs once daily. Controller, Disp: 1 each, Rfl: 5  ·  vit C,H-Yy-aeqwd-lutein-zeaxan (PRESERVISION AREDS-2) 250-90-40-1 mg Cap, as directed Orally daily, Disp: , Rfl:        Review of Systems   Constitutional:  Negative for fever, chills, fatigue and malaise.   Respiratory:  Negative for cough and shortness of breath.    Skin:  Positive for dry skin, daily sunscreen use and activity-related sunscreen use. Negative for itching, rash and wears hat.   Hematologic/Lymphatic: Bruises/bleeds easily.     Objective:   Physical Exam   Constitutional: She appears well-developed and well-nourished.   Neurological: She is alert and oriented to person, place, and time.   Psychiatric: She has a normal mood and affect.      Diagram Legend     Erythematous scaling macule/papule c/w actinic keratosis       Vascular papule c/w angioma      Pigmented verrucoid papule/plaque c/w seborrheic keratosis      Yellow umbilicated papule c/w sebaceous hyperplasia      Irregularly shaped tan macule c/w lentigo     1-2 mm smooth white papules consistent with Milia      Movable subcutaneous cyst with punctum c/w epidermal inclusion cyst      Subcutaneous movable cyst c/w pilar cyst      Firm pink to brown papule c/w dermatofibroma      Pedunculated  fleshy papule(s) c/w skin tag(s)      Evenly pigmented macule c/w junctional nevus     Mildly variegated pigmented, slightly irregular-bordered macule c/w mildly atypical nevus      Flesh colored to evenly pigmented papule c/w intradermal nevus       Pink pearly papule/plaque c/w basal cell carcinoma      Erythematous hyperkeratotic cursted plaque c/w SCC      Surgical scar with no sign of skin cancer recurrence      Open and closed comedones      Inflammatory papules and pustules      Verrucoid papule consistent consistent with wart     Erythematous eczematous patches and plaques     Dystrophic onycholytic nail with subungual debris c/w onychomycosis     Umbilicated papule    Erythematous-base heme-crusted tan verrucoid plaque consistent with inflamed seborrheic keratosis     Erythematous Silvery Scaling Plaque c/w Psoriasis     See annotation        Assessment / Plan:        Bacterial infection due to Pseudomonas  -     gentamicin (GARAMYCIN) 0.3 % ophthalmic solution; Apply 2-3 drops to diseased nail twice daily  Dispense: 15 mL; Refill: 1    Nail dystrophy  -     fluconazole (DIFLUCAN) 200 MG Tab; Take 1 tab PO every Friday.  Dispense: 8 tablet; Refill: 0    No wet soapy work  Use gloves for all water immersion  Add vinegar soak daily to twice daily (2 vol water, 1 vol vinegar, soak 5 min, rinse, apply gent drops)         Follow up in about 2 months (around 7/18/2023).

## 2023-06-29 ENCOUNTER — OFFICE VISIT (OUTPATIENT)
Dept: PULMONOLOGY | Facility: CLINIC | Age: 83
End: 2023-06-29
Payer: MEDICARE

## 2023-06-29 VITALS
DIASTOLIC BLOOD PRESSURE: 58 MMHG | BODY MASS INDEX: 15.11 KG/M2 | HEART RATE: 67 BPM | SYSTOLIC BLOOD PRESSURE: 120 MMHG | OXYGEN SATURATION: 95 % | WEIGHT: 82.63 LBS

## 2023-06-29 DIAGNOSIS — J43.2 CENTRILOBULAR EMPHYSEMA: ICD-10-CM

## 2023-06-29 DIAGNOSIS — J44.9 CHRONIC OBSTRUCTIVE PULMONARY DISEASE, UNSPECIFIED COPD TYPE: Primary | ICD-10-CM

## 2023-06-29 PROCEDURE — 3078F PR MOST RECENT DIASTOLIC BLOOD PRESSURE < 80 MM HG: ICD-10-PCS | Mod: CPTII,S$GLB,, | Performed by: INTERNAL MEDICINE

## 2023-06-29 PROCEDURE — 3078F DIAST BP <80 MM HG: CPT | Mod: CPTII,S$GLB,, | Performed by: INTERNAL MEDICINE

## 2023-06-29 PROCEDURE — 1159F PR MEDICATION LIST DOCUMENTED IN MEDICAL RECORD: ICD-10-PCS | Mod: CPTII,S$GLB,, | Performed by: INTERNAL MEDICINE

## 2023-06-29 PROCEDURE — 1125F AMNT PAIN NOTED PAIN PRSNT: CPT | Mod: CPTII,S$GLB,, | Performed by: INTERNAL MEDICINE

## 2023-06-29 PROCEDURE — 99213 OFFICE O/P EST LOW 20 MIN: CPT | Mod: S$GLB,,, | Performed by: INTERNAL MEDICINE

## 2023-06-29 PROCEDURE — 3074F PR MOST RECENT SYSTOLIC BLOOD PRESSURE < 130 MM HG: ICD-10-PCS | Mod: CPTII,S$GLB,, | Performed by: INTERNAL MEDICINE

## 2023-06-29 PROCEDURE — 1159F MED LIST DOCD IN RCRD: CPT | Mod: CPTII,S$GLB,, | Performed by: INTERNAL MEDICINE

## 2023-06-29 PROCEDURE — 99213 PR OFFICE/OUTPT VISIT, EST, LEVL III, 20-29 MIN: ICD-10-PCS | Mod: S$GLB,,, | Performed by: INTERNAL MEDICINE

## 2023-06-29 PROCEDURE — 1125F PR PAIN SEVERITY QUANTIFIED, PAIN PRESENT: ICD-10-PCS | Mod: CPTII,S$GLB,, | Performed by: INTERNAL MEDICINE

## 2023-06-29 PROCEDURE — 3074F SYST BP LT 130 MM HG: CPT | Mod: CPTII,S$GLB,, | Performed by: INTERNAL MEDICINE

## 2023-06-29 RX ORDER — MEGESTROL ACETATE 20 MG/1
40 TABLET ORAL 2 TIMES DAILY
COMMUNITY

## 2023-06-29 NOTE — PROGRESS NOTES
SUBJECTIVE:    Patient ID: Carmen Wilkinson is a 83 y.o. female.    Chief Complaint: Follow-up and COPD (6 month follow up COPD)    HPI The patient is here with her hoarseness better.  Her breathing is stable.  She is using Anoro and is using albuterol once or twice a week.  She is sleeping a lot and she is very tired.  Her weight is stable.  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  She has been given Megace to try to improve her appetite.  Past Medical History:   Diagnosis Date    Back pain     Brain aneurysm     Carotid stenosis     Cataract     Diverticulitis     Emphysema lung     Feeling anxious     Hyperlipidemia     Hypertension     Macular degeneration     PVD (peripheral vascular disease)     Squamous cell carcinoma 08/29/2017    right medical cheek, SSIS, efudex tx     Past Surgical History:   Procedure Laterality Date    ABDOMINAL SURGERY      ANGIOGRAPHY OF MESENTERIC VESSELS Left 09/07/2022    Procedure: ANGIOGRAM, MESENTERIC VESSELS;  Surgeon: Krish Machado MD;  Location: [facility] CATH/EP LAB;  Service: General;  Laterality: Left;    ARTERIOGRAM, MESENTERIC N/A 06/30/2021    Procedure: ARTERIOGRAM, MESENTERIC;  Surgeon: Krish Machado MD;  Location: [facility] CATH/EP LAB;  Service: General;  Laterality: N/A;    BACK SURGERY      CATARACT EXTRACTION, BILATERAL      COLONOSCOPY N/A 04/04/2022    Procedure: COLONOSCOPY;  Surgeon: Lino Devi MD;  Location: [facility] ENDO;  Service: Endoscopy;  Laterality: N/A;    CREATION, BYPASS, ARTERIAL, AORTA TO FEMORAL N/A 01/13/2021    Procedure: ARTERIOGRAM, MESENTERIC;  Surgeon: Krish Machado MD;  Location: [facility] CATH/EP LAB;  Service: General;  Laterality: N/A;    ENDOSCOPY OF PROXIMAL SMALL INTESTINE N/A 04/04/2022    Procedure: ENTEROSCOPY, PROXIMAL;  Surgeon: Lino Devi MD;  Location: [facility] ENDO;  Service: Endoscopy;  Laterality: N/A;    ESOPHAGOGASTRODUODENOSCOPY N/A 11/20/2022    Procedure: EGD (ESOPHAGOGASTRODUODENOSCOPY);   Surgeon: Pedro Hall Jr., MD;  Location: [facility];  Service: Endoscopy;  Laterality: N/A;    HYSTERECTOMY      IMPLANTATION OF SPINAL CORD STIMULATOR IN CERVICAL SPINE      UPPER GASTROINTESTINAL ENDOSCOPY  11/20/2022    w/ antrum biopsy     Family History   Problem Relation Age of Onset    Melanoma Neg Hx     Psoriasis Neg Hx     Eczema Neg Hx     Lupus Neg Hx         Social History:   Marital Status:   Occupation: Data Unavailable  Alcohol History:  reports no history of alcohol use.  Tobacco History:  reports that she quit smoking about 14 months ago. Her smoking use included cigarettes. She started smoking about 58 years ago. She smoked an average of 1 pack per day. She has never used smokeless tobacco.  Drug History:  reports that she does not currently use drugs.    Review of patient's allergies indicates:   Allergen Reactions    Sulfa (sulfonamide antibiotics) Nausea Only       Current Outpatient Medications   Medication Sig Dispense Refill    albuterol (PROAIR HFA) 90 mcg/actuation inhaler Inhale 2 puffs into the lungs every 4 (four) hours as needed for Wheezing. Rescue 18 g 11    amLODIPine (NORVASC) 5 MG tablet Take 5 mg by mouth once daily.      aspirin (ECOTRIN) 81 MG EC tablet Take 81 mg by mouth once daily.      cholecalciferol, vitamin D3, (VITAMIN D3) 10 mcg/mL (400 unit/mL) Drop Take 400 Units by mouth once daily.      clopidogreL (PLAVIX) 75 mg tablet Take 1 tablet (75 mg total) by mouth once daily. 30 tablet 3    ferrous sulfate 325 (65 FE) MG EC tablet Take 325 mg by mouth once daily.      fluconazole (DIFLUCAN) 200 MG Tab Take 1 tab PO every Friday. 8 tablet 0    folic acid/multivit-min/lutein (CENTRUM SILVER ORAL) Take 1 capsule by mouth once daily.      gentamicin (GARAMYCIN) 0.3 % ophthalmic solution Apply 2-3 drops to diseased nail twice daily 15 mL 1    KRILL OIL ORAL Take 1,000 mg by mouth once daily.       megestroL (MEGACE) 20 MG Tab Take 40 mg by mouth 2 (two) times daily.       metoprolol succinate (TOPROL-XL) 50 MG 24 hr tablet Take 50 mg by mouth 2 (two) times daily.      oxyCODONE-acetaminophen (PERCOCET)  mg per tablet Take 1 tablet by mouth. 5 times daily      pantoprazole (PROTONIX) 40 MG tablet Take 1 tablet (40 mg total) by mouth 2 (two) times daily. 180 tablet 1    polycarbophil (FIBERCON) 625 mg tablet 2 tablets as needed Orally Three times a day      simvastatin (ZOCOR) 10 MG tablet Take 10 mg by mouth once daily.       umeclidinium-vilanteroL (ANORO ELLIPTA) 62.5-25 mcg/actuation DsDv Inhale 1 puff into the lungs once daily. Controller 1 each 5    vit A/vit C/vit E/zinc/copper (PRESERVISION AREDS ORAL) Take 1 capsule by mouth 2 (two) times a day.      coQ10, liposomal ubiquinol, 100 mg/5 mL Susp as directed Orally      krill-om-3-dha-epa-phospho-ast (MEGARED OMEGA-3 KRILL OIL) 774-53-53-50 mg Cap as directed Orally      L. gasseri-B. bifidum-B longum 1.5 billion cell Cap Take 1 tablet by mouth once daily.       multivit with min-folic acid 0.4 mg Tab as directed Orally      multivitamin (MULTIPLE VITAMINS DAILY ORAL) 1 tablet Orally Once a day      vit C,O-We-ukcty-lutein-zeaxan (PRESERVISION AREDS-2) 250-90-40-1 mg Cap as directed Orally daily      zinc acetate 25 mg (zinc) Cap 1 tablet Orally Once a day       No current facility-administered medications for this visit.       Alpha-1 Antitrypsin:  Last PFT:  05/03/2022 moderate obstruction, minimal restriction, and a very severe diffusion defect with no response to bronchodilators  Last CT:  04/01/2022  .  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  2.  Patent stent in the proximal celiac artery.  3. 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  3.  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  4.  Moderate centrilobular emphysema.     Review of Systems  General:She is tired a lot, her energy level is down.  Eyes: Vision is has macular  degneration  ENT:  No sinusitis or pharyngitis.   Heart:: No chest pain or palpitations.  Lungs: more shortness of breath, no cough  GI: occasional diarrhea  : No dysuria, hesitancy, or nocturia.  Musculoskeletal:back pain is ongoing  Skin: No lesions or rashes. Bruises from plavix  Neuro: No headaches or neuropathy.  Lymph: No edema or adenopathy.  Psych: No anxiety or depression.  Endo:  Up 1 pound    OBJECTIVE:      BP (!) 120/58 (BP Location: Right arm, Patient Position: Sitting, BP Method: Medium (Manual))   Pulse 67   Wt 37.5 kg (82 lb 9.6 oz)   LMP  (LMP Unknown)   SpO2 95%   BMI 15.11 kg/m²     Physical Exam  GENERAL: Older, very thin patient in no distress.  HEENT: Pupils equal and reactive. Extraocular movements intact. Nose intact.      Pharynx moist.  NECK: Supple.   HEART: Regular rate and rhythm. No murmur or gallop auscultated.  LUNGS: Clear to auscultation and percussion. Lung excursion symmetrical. No change in fremitus. No adventitial noises.  ABDOMEN: Bowel sounds present. Non-tender, no masses palpated.  EXTREMITIES: Normal muscle tone and joint movement, no cyanosis or clubbing.   LYMPHATICS: No adenopathy palpated, no edema.  SKIN: Dry, intact, no lesions.   NEURO: Cranial nerves II-XII intact. Motor strength 5/5 bilaterally, upper and lower extremities.  PSYCH: Appropriate affect.    Assessment:       1. Chronic obstructive pulmonary disease, unspecified COPD type    2. Centrilobular emphysema            Too old to follow CTs  Plan:       Chronic obstructive pulmonary disease, unspecified COPD type    Centrilobular emphysema          Continue Anoro daily and Albuterol 2 puffs every 6 hours as needed for acute shortness of breath   Follow up in about 6 months (around 12/29/2023).  Will do repeat PFT's next year

## 2023-07-18 ENCOUNTER — OFFICE VISIT (OUTPATIENT)
Dept: DERMATOLOGY | Facility: CLINIC | Age: 83
End: 2023-07-18
Payer: MEDICARE

## 2023-07-18 VITALS — BODY MASS INDEX: 15.09 KG/M2 | HEIGHT: 62 IN | WEIGHT: 82 LBS

## 2023-07-18 DIAGNOSIS — A49.8 BACTERIAL INFECTION DUE TO PSEUDOMONAS: ICD-10-CM

## 2023-07-18 DIAGNOSIS — D48.5 NEOPLASM OF UNCERTAIN BEHAVIOR OF SKIN: Primary | ICD-10-CM

## 2023-07-18 DIAGNOSIS — L60.3 NAIL DYSTROPHY: ICD-10-CM

## 2023-07-18 PROCEDURE — 88342 IMHCHEM/IMCYTCHM 1ST ANTB: CPT | Mod: 26,,, | Performed by: DERMATOLOGY

## 2023-07-18 PROCEDURE — 1159F PR MEDICATION LIST DOCUMENTED IN MEDICAL RECORD: ICD-10-PCS | Mod: CPTII,S$GLB,, | Performed by: DERMATOLOGY

## 2023-07-18 PROCEDURE — 99213 OFFICE O/P EST LOW 20 MIN: CPT | Mod: 25,S$GLB,, | Performed by: DERMATOLOGY

## 2023-07-18 PROCEDURE — 1101F PR PT FALLS ASSESS DOC 0-1 FALLS W/OUT INJ PAST YR: ICD-10-PCS | Mod: CPTII,S$GLB,, | Performed by: DERMATOLOGY

## 2023-07-18 PROCEDURE — 88305 TISSUE EXAM BY PATHOLOGIST: CPT | Mod: 26,,, | Performed by: DERMATOLOGY

## 2023-07-18 PROCEDURE — 1101F PT FALLS ASSESS-DOCD LE1/YR: CPT | Mod: CPTII,S$GLB,, | Performed by: DERMATOLOGY

## 2023-07-18 PROCEDURE — 88305 TISSUE EXAM BY PATHOLOGIST: ICD-10-PCS | Mod: 26,,, | Performed by: DERMATOLOGY

## 2023-07-18 PROCEDURE — 3288F FALL RISK ASSESSMENT DOCD: CPT | Mod: CPTII,S$GLB,, | Performed by: DERMATOLOGY

## 2023-07-18 PROCEDURE — 1159F MED LIST DOCD IN RCRD: CPT | Mod: CPTII,S$GLB,, | Performed by: DERMATOLOGY

## 2023-07-18 PROCEDURE — 11102 PR TANGENTIAL BIOPSY, SKIN, SINGLE LESION: ICD-10-PCS | Mod: S$GLB,,, | Performed by: DERMATOLOGY

## 2023-07-18 PROCEDURE — 3288F PR FALLS RISK ASSESSMENT DOCUMENTED: ICD-10-PCS | Mod: CPTII,S$GLB,, | Performed by: DERMATOLOGY

## 2023-07-18 PROCEDURE — 99213 PR OFFICE/OUTPT VISIT, EST, LEVL III, 20-29 MIN: ICD-10-PCS | Mod: 25,S$GLB,, | Performed by: DERMATOLOGY

## 2023-07-18 PROCEDURE — 88342 IMHCHEM/IMCYTCHM 1ST ANTB: CPT | Performed by: DERMATOLOGY

## 2023-07-18 PROCEDURE — 88305 TISSUE EXAM BY PATHOLOGIST: CPT | Performed by: DERMATOLOGY

## 2023-07-18 PROCEDURE — 1160F PR REVIEW ALL MEDS BY PRESCRIBER/CLIN PHARMACIST DOCUMENTED: ICD-10-PCS | Mod: CPTII,S$GLB,, | Performed by: DERMATOLOGY

## 2023-07-18 PROCEDURE — 1160F RVW MEDS BY RX/DR IN RCRD: CPT | Mod: CPTII,S$GLB,, | Performed by: DERMATOLOGY

## 2023-07-18 PROCEDURE — 11102 TANGNTL BX SKIN SINGLE LES: CPT | Mod: S$GLB,,, | Performed by: DERMATOLOGY

## 2023-07-18 PROCEDURE — 88342 CHG IMMUNOCYTOCHEMISTRY: ICD-10-PCS | Mod: 26,,, | Performed by: DERMATOLOGY

## 2023-07-18 RX ORDER — GENTAMICIN SULFATE 3 MG/ML
SOLUTION/ DROPS OPHTHALMIC
Qty: 15 ML | Refills: 1 | Status: SHIPPED | OUTPATIENT
Start: 2023-07-18 | End: 2023-10-24

## 2023-07-18 RX ORDER — FLUCONAZOLE 200 MG/1
TABLET ORAL
Qty: 8 TABLET | Refills: 0 | Status: SHIPPED | OUTPATIENT
Start: 2023-07-18 | End: 2023-08-29

## 2023-07-18 NOTE — PROGRESS NOTES
Subjective:      Patient ID:  Carmen Wilkinson is a 83 y.o. female who presents for   Chief Complaint   Patient presents with    Follow-up     Nail dystrophy     LOV 5/18/23 Nail dystrophy bacterial infection     Patient here today for a follow up for nail dystrophy to right thumb.  Pt states nail is growing out, and satisfied with results.  C/O spot to right ear x 2 weeks, scabs and hurts.    Derm Hx:  Has h/o  SCCIS R medial cheek, bx 8/2017; discussed Mohs vs topical tx Treated with imiquimod x 2 courses, last 11/2- to 12/29   Has no fhx of MM    Current Outpatient Medications:   ·  albuterol (PROAIR HFA) 90 mcg/actuation inhaler, Inhale 2 puffs into the lungs every 4 (four) hours as needed for Wheezing. Rescue, Disp: 18 g, Rfl: 11  ·  amLODIPine (NORVASC) 5 MG tablet, Take 5 mg by mouth once daily., Disp: , Rfl:   ·  aspirin (ECOTRIN) 81 MG EC tablet, Take 81 mg by mouth once daily., Disp: , Rfl:   ·  cholecalciferol, vitamin D3, (VITAMIN D3) 10 mcg/mL (400 unit/mL) Drop, Take 400 Units by mouth once daily., Disp: , Rfl:   ·  clopidogreL (PLAVIX) 75 mg tablet, Take 1 tablet (75 mg total) by mouth once daily., Disp: 30 tablet, Rfl: 3  ·  coQ10, liposomal ubiquinol, 100 mg/5 mL Susp, as directed Orally, Disp: , Rfl:   ·  ferrous sulfate 325 (65 FE) MG EC tablet, Take 325 mg by mouth once daily., Disp: , Rfl:   ·  fluconazole (DIFLUCAN) 200 MG Tab, Take 1 tab PO every Friday., Disp: 8 tablet, Rfl: 0  ·  folic acid/multivit-min/lutein (CENTRUM SILVER ORAL), Take 1 capsule by mouth once daily., Disp: , Rfl:   ·  gentamicin (GARAMYCIN) 0.3 % ophthalmic solution, Apply 2-3 drops to diseased nail twice daily, Disp: 15 mL, Rfl: 1  ·  KRILL OIL ORAL, Take 1,000 mg by mouth once daily. , Disp: , Rfl:   ·  krill-om-3-dha-epa-phospho-ast (MEGARED OMEGA-3 KRILL OIL) 282-59-04-50 mg Cap, as directed Orally, Disp: , Rfl:   ·  L. gasseri-B. bifidum-B longum 1.5 billion cell Cap, Take 1 tablet by mouth once daily. , Disp:  , Rfl:   ·  megestroL (MEGACE) 20 MG Tab, Take 40 mg by mouth 2 (two) times daily., Disp: , Rfl:   ·  metoprolol succinate (TOPROL-XL) 50 MG 24 hr tablet, Take 50 mg by mouth 2 (two) times daily., Disp: , Rfl:   ·  multivit with min-folic acid 0.4 mg Tab, as directed Orally, Disp: , Rfl:   ·  multivitamin (MULTIPLE VITAMINS DAILY ORAL), 1 tablet Orally Once a day, Disp: , Rfl:   ·  oxyCODONE-acetaminophen (PERCOCET)  mg per tablet, Take 1 tablet by mouth. 5 times daily, Disp: , Rfl:   ·  pantoprazole (PROTONIX) 40 MG tablet, Take 1 tablet (40 mg total) by mouth 2 (two) times daily., Disp: 180 tablet, Rfl: 1  ·  polycarbophil (FIBERCON) 625 mg tablet, 2 tablets as needed Orally Three times a day, Disp: , Rfl:   ·  simvastatin (ZOCOR) 10 MG tablet, Take 10 mg by mouth once daily. , Disp: , Rfl:   ·  umeclidinium-vilanteroL (ANORO ELLIPTA) 62.5-25 mcg/actuation DsDv, Inhale 1 puff into the lungs once daily. Controller, Disp: 1 each, Rfl: 5  ·  vit A/vit C/vit E/zinc/copper (PRESERVISION AREDS ORAL), Take 1 capsule by mouth 2 (two) times a day., Disp: , Rfl:   ·  vit C,K-Eq-wsrbr-lutein-zeaxan (PRESERVISION AREDS-2) 250-90-40-1 mg Cap, as directed Orally daily, Disp: , Rfl:   ·  zinc acetate 25 mg (zinc) Cap, 1 tablet Orally Once a day, Disp: , Rfl:       Review of Systems   Constitutional:  Negative for fever, chills, fatigue and malaise.   Respiratory:  Negative for cough and shortness of breath.    Skin:  Positive for dry skin, daily sunscreen use and activity-related sunscreen use. Negative for itching, rash and wears hat.   Hematologic/Lymphatic: Bruises/bleeds easily.     Objective:   Physical Exam   Constitutional: She appears well-developed and well-nourished.   Neurological: She is alert and oriented to person, place, and time.   Psychiatric: She has a normal mood and affect.   Skin:   Areas Examined (abnormalities noted in diagram):   Head / Face Inspection Performed              Diagram Legend      Erythematous scaling macule/papule c/w actinic keratosis       Vascular papule c/w angioma      Pigmented verrucoid papule/plaque c/w seborrheic keratosis      Yellow umbilicated papule c/w sebaceous hyperplasia      Irregularly shaped tan macule c/w lentigo     1-2 mm smooth white papules consistent with Milia      Movable subcutaneous cyst with punctum c/w epidermal inclusion cyst      Subcutaneous movable cyst c/w pilar cyst      Firm pink to brown papule c/w dermatofibroma      Pedunculated fleshy papule(s) c/w skin tag(s)      Evenly pigmented macule c/w junctional nevus     Mildly variegated pigmented, slightly irregular-bordered macule c/w mildly atypical nevus      Flesh colored to evenly pigmented papule c/w intradermal nevus       Pink pearly papule/plaque c/w basal cell carcinoma      Erythematous hyperkeratotic cursted plaque c/w SCC      Surgical scar with no sign of skin cancer recurrence      Open and closed comedones      Inflammatory papules and pustules      Verrucoid papule consistent consistent with wart     Erythematous eczematous patches and plaques     Dystrophic onycholytic nail with subungual debris c/w onychomycosis     Umbilicated papule    Erythematous-base heme-crusted tan verrucoid plaque consistent with inflamed seborrheic keratosis     Erythematous Silvery Scaling Plaque c/w Psoriasis     See annotation              Assessment / Plan:      Pathology Orders:       Normal Orders This Visit    Specimen to Pathology, Dermatology     Comments:    Number of Specimens:->1  ------------------------->-------------------------  Spec 1 Procedure:->Biopsy  Spec 1 Clinical Impression:->[facility] vs SCC vs other  Spec 1 Source:->R helix rim    Questions:    Procedure Type: Dermatology and skin neoplasms    Number of Specimens: 1    ------------------------: -------------------------    Spec 1 Procedure: Biopsy    Spec 1 Clinical Impression: [facility] vs SCC vs other    Spec 1 Source: R helix rim    Release  to patient:           Neoplasm of uncertain behavior of skin  -     Specimen to Pathology, Dermatology  Shave biopsy procedure note:    Shave biopsy performed after verbal consent including risk of infection, scar, recurrence, need for additional treatment of site. Area prepped with alcohol, anesthetized with approximately 1.0cc of 1% lidocaine with epinephrine. Lesional tissue shaved with razor blade. Hemostasis achieved with application of aluminum chloride followed by hyfrecation. No complications. Dressing applied. Wound care explained.    Bacterial infection due to Pseudomonas, nail  -     gentamicin (GARAMYCIN) 0.3 % ophthalmic solution; Apply 2-3 drops to diseased nail twice daily  Dispense: 15 mL; Refill: 1    Nail dystrophy, R thumb  Improved with weekly diflucan, daily vinegar soaks, topical gentamicin drops  Continue for an additional 2 months, diflucan refilled  Proximal growth is healthy    Counseled Diflucan can have side affects such as change in taste, diarrhea, headache, nausea, these are not urgent, please report if bothersome. If a rash or feeling faint or having palpations please seek urgent care.           LOS NUMBER AND COMPLEXITY OF PROBLEMS    COMPLEXITY OF DATA RISK TOTAL TIME (m)   01972  51371 [] 1 self-limited or minor problem [] Minimal to none [] No treatment recommended or patient to monitor. Reassurance.  15-29  10-19   98380  [id number] Low  [x] 2 or more self limited or minor problems  [] 1 stable chronic illness  [] 1 acute, uncomplicated illness or injury Limited (2)  [] Prior external notes from each unique source  [] Review result of each unique test  [] Order each unique test  OR [] Independent historian Low  []  OTC medications   []  Discussed/Decision for minor skin surgery (no risk factors) 30-44  20-29   31656  54123 Moderate  []  1 or more chronic unstable illness (not at goal or progression or exacerbation) or SE of treatment  []  2 or more stable chronic illnesses  []  1  acute illness with systemic symptoms  []  1 acute complicated injury  []  1 undiagnosed new problem with uncertain prognosis Moderate (1/3 below)  []  3 or more data items        *Now includes independent historian  []  Independent interpretation of a test  []  Discuss management/test with another provider Moderate  [x]  Prescription drug mgmt  []  Discussed/Decision for Minor surgery with risk factors  []  Mgmt limited by social determinates 45-59  30-39   56086  21904 High  []  1 or more chronic illness with severe exacerbation, progression or SE of treatment  []  1 acute or chronic illness/injury that poses a threat to life or bodily function Extensive (2/3 below)  []  3 or more data items        *Now includes independent historian.  []  Independent interpretation of a test  []  Discuss management/test with another provider High  []  Major surgery with risk discussed  []  Drug therapy requiring intensive monitoring for toxicity  []  Hospitalization  []  Decision for DNR 60-74  40-54         Follow up in about 3 months (around 10/18/2023), or if symptoms worsen or fail to improve.

## 2023-07-18 NOTE — PATIENT INSTRUCTIONS
Shave Biopsy Wound Care    Your doctor has performed a shave biopsy today.  A band aid and vaseline ointment has been placed over the site.  This should remain in place for 24 hours.  It is recommended that you keep the area dry for the first 24 hours.  After 24 hours, you may remove the band aid and wash the area with warm soap and water and apply Vaseline jelly.  Many patients prefer to use Neosporin or Bacitracin ointment.  This is acceptable; however, know that you can develop an allergy to this medication even if you have used it safely for years.  It is important to keep the area moist.  Letting it dry out and get air slows healing time, and will worsen the scar.  Band aid is optional after first 24 hours.      If you notice increasing redness, tenderness, pain, or yellow drainage at the biopsy site, please notify your doctor.  These are signs of an infection.    If your biopsy site is bleeding, apply firm pressure for 15 minutes straight.  Repeat for another 15 minutes, if it is still bleeding.   If the surgical site continues to bleed, then please contact your doctor.       [facility] - DERMATOLOGY  [address], [address]  [place] [address]  Dept: [phone]  Dept Fax: [phone]

## 2023-07-25 LAB
FINAL PATHOLOGIC DIAGNOSIS: NORMAL
GROSS: NORMAL
Lab: NORMAL
MICROSCOPIC EXAM: NORMAL

## 2023-08-04 ENCOUNTER — HOSPITAL ENCOUNTER (OUTPATIENT)
Dept: RADIOLOGY | Facility: HOSPITAL | Age: 83
Discharge: HOME | End: 2023-08-04
Attending: NURSE PRACTITIONER
Payer: MEDICARE

## 2023-08-04 ENCOUNTER — OFFICE VISIT (OUTPATIENT)
Dept: PULMONOLOGY | Facility: CLINIC | Age: 83
End: 2023-08-04
Payer: MEDICARE

## 2023-08-04 VITALS
HEART RATE: 92 BPM | BODY MASS INDEX: 14.52 KG/M2 | SYSTOLIC BLOOD PRESSURE: 118 MMHG | OXYGEN SATURATION: 95 % | DIASTOLIC BLOOD PRESSURE: 68 MMHG | WEIGHT: 79.38 LBS

## 2023-08-04 DIAGNOSIS — E46 MALNUTRITION, UNSPECIFIED TYPE: ICD-10-CM

## 2023-08-04 DIAGNOSIS — R06.00 DYSPNEA, UNSPECIFIED TYPE: ICD-10-CM

## 2023-08-04 DIAGNOSIS — J44.9 CHRONIC OBSTRUCTIVE PULMONARY DISEASE, UNSPECIFIED COPD TYPE: Primary | ICD-10-CM

## 2023-08-04 PROCEDURE — 3074F PR MOST RECENT SYSTOLIC BLOOD PRESSURE < 130 MM HG: ICD-10-PCS | Mod: CPTII,S$GLB,, | Performed by: NURSE PRACTITIONER

## 2023-08-04 PROCEDURE — 99213 PR OFFICE/OUTPT VISIT, EST, LEVL III, 20-29 MIN: ICD-10-PCS | Mod: S$GLB,,, | Performed by: NURSE PRACTITIONER

## 2023-08-04 PROCEDURE — 99213 OFFICE O/P EST LOW 20 MIN: CPT | Mod: S$GLB,,, | Performed by: NURSE PRACTITIONER

## 2023-08-04 PROCEDURE — 1159F PR MEDICATION LIST DOCUMENTED IN MEDICAL RECORD: ICD-10-PCS | Mod: CPTII,S$GLB,, | Performed by: NURSE PRACTITIONER

## 2023-08-04 PROCEDURE — 1125F PR PAIN SEVERITY QUANTIFIED, PAIN PRESENT: ICD-10-PCS | Mod: CPTII,S$GLB,, | Performed by: NURSE PRACTITIONER

## 2023-08-04 PROCEDURE — 1125F AMNT PAIN NOTED PAIN PRSNT: CPT | Mod: CPTII,S$GLB,, | Performed by: NURSE PRACTITIONER

## 2023-08-04 PROCEDURE — 3078F DIAST BP <80 MM HG: CPT | Mod: CPTII,S$GLB,, | Performed by: NURSE PRACTITIONER

## 2023-08-04 PROCEDURE — 3074F SYST BP LT 130 MM HG: CPT | Mod: CPTII,S$GLB,, | Performed by: NURSE PRACTITIONER

## 2023-08-04 PROCEDURE — 71046 X-RAY EXAM CHEST 2 VIEWS: CPT | Mod: TC

## 2023-08-04 PROCEDURE — 1159F MED LIST DOCD IN RCRD: CPT | Mod: CPTII,S$GLB,, | Performed by: NURSE PRACTITIONER

## 2023-08-04 PROCEDURE — 3078F PR MOST RECENT DIASTOLIC BLOOD PRESSURE < 80 MM HG: ICD-10-PCS | Mod: CPTII,S$GLB,, | Performed by: NURSE PRACTITIONER

## 2023-08-04 NOTE — PROGRESS NOTES
SUBJECTIVE:    Patient ID: Carmen Wilkinson is a 83 y.o. female.    Chief Complaint: Follow-up and Shortness of Breath (Follow up SOB)    HPI   Patient here today feeling more short of breath. She is not sure if it is from the heat. She is not coughing.  She has lost more weight. She states she had recent labs last week with PCP and was told everything was stable. She is using Anoro daily.    Past Medical History:   Diagnosis Date    Back pain     Brain aneurysm     Carotid stenosis     Cataract     Diverticulitis     Emphysema lung     Feeling anxious     Hyperlipidemia     Hypertension     Macular degeneration     PVD (peripheral vascular disease)     Squamous cell carcinoma 08/29/2017    right medical cheek, SSIS, efudex tx     Past Surgical History:   Procedure Laterality Date    ABDOMINAL SURGERY      ANGIOGRAPHY OF MESENTERIC VESSELS Left 09/07/2022    Procedure: ANGIOGRAM, MESENTERIC VESSELS;  Surgeon: Krish Machado MD;  Location: [facility] CATH/EP LAB;  Service: General;  Laterality: Left;    ARTERIOGRAM, MESENTERIC N/A 06/30/2021    Procedure: ARTERIOGRAM, MESENTERIC;  Surgeon: Krish Machado MD;  Location: [facility] CATH/EP LAB;  Service: General;  Laterality: N/A;    BACK SURGERY      CATARACT EXTRACTION, BILATERAL      COLONOSCOPY N/A 04/04/2022    Procedure: COLONOSCOPY;  Surgeon: Lino Devi MD;  Location: [facility];  Service: Endoscopy;  Laterality: N/A;    CREATION, BYPASS, ARTERIAL, AORTA TO FEMORAL N/A 01/13/2021    Procedure: ARTERIOGRAM, MESENTERIC;  Surgeon: Krish Machado MD;  Location: [facility] CATH/EP LAB;  Service: General;  Laterality: N/A;    ENDOSCOPY OF PROXIMAL SMALL INTESTINE N/A 04/04/2022    Procedure: ENTEROSCOPY, PROXIMAL;  Surgeon: Lino Devi MD;  Location: [facility];  Service: Endoscopy;  Laterality: N/A;    ESOPHAGOGASTRODUODENOSCOPY N/A 11/20/2022    Procedure: EGD (ESOPHAGOGASTRODUODENOSCOPY);  Surgeon: Pedro Hlal Jr., MD;  Location: [facility];  Service:  Endoscopy;  Laterality: N/A;    HYSTERECTOMY      IMPLANTATION OF SPINAL CORD STIMULATOR IN CERVICAL SPINE      UPPER GASTROINTESTINAL ENDOSCOPY  11/20/2022    w/ antrum biopsy     Family History   Problem Relation Age of Onset    Melanoma Neg Hx     Psoriasis Neg Hx     Eczema Neg Hx     Lupus Neg Hx         Social History:   Marital Status:   Occupation: Data Unavailable  Alcohol History:  reports no history of alcohol use.  Tobacco History:  reports that she quit smoking about 16 months ago. Her smoking use included cigarettes. She started smoking about 58 years ago. She has a 56.8 pack-year smoking history. She has never used smokeless tobacco.  Drug History:  reports that she does not currently use drugs.    Review of patient's allergies indicates:   Allergen Reactions    Sulfa (sulfonamide antibiotics) Nausea Only       Current Outpatient Medications   Medication Sig Dispense Refill    albuterol (PROAIR HFA) 90 mcg/actuation inhaler Inhale 2 puffs into the lungs every 4 (four) hours as needed for Wheezing. Rescue 18 g 11    amLODIPine (NORVASC) 5 MG tablet Take 5 mg by mouth once daily.      aspirin (ECOTRIN) 81 MG EC tablet Take 81 mg by mouth once daily.      cholecalciferol, vitamin D3, (VITAMIN D3) 10 mcg/mL (400 unit/mL) Drop Take 400 Units by mouth once daily.      clopidogreL (PLAVIX) 75 mg tablet Take 1 tablet (75 mg total) by mouth once daily. 30 tablet 3    ferrous sulfate 325 (65 FE) MG EC tablet Take 325 mg by mouth once daily.      folic acid/multivit-min/lutein (CENTRUM SILVER ORAL) Take 1 capsule by mouth once daily.      gentamicin (GARAMYCIN) 0.3 % ophthalmic solution Apply 2-3 drops to diseased nail twice daily 15 mL 1    KRILL OIL ORAL Take 1,000 mg by mouth once daily.       krill-om-3-dha-epa-phospho-ast (MEGARED OMEGA-3 KRILL OIL) 476-99-72-50 mg Cap as directed Orally      L. gasseri-B. bifidum-B longum 1.5 billion cell Cap Take 1 tablet by mouth once daily.       megestroL  (MEGACE) 20 MG Tab Take 40 mg by mouth 2 (two) times daily.      metoprolol succinate (TOPROL-XL) 50 MG 24 hr tablet Take 50 mg by mouth 2 (two) times daily.      multivit with min-folic acid 0.4 mg Tab as directed Orally      oxyCODONE-acetaminophen (PERCOCET)  mg per tablet Take 1 tablet by mouth. 5 times daily      pantoprazole (PROTONIX) 40 MG tablet Take 1 tablet (40 mg total) by mouth 2 (two) times daily. 180 tablet 1    polycarbophil (FIBERCON) 625 mg tablet 2 tablets as needed Orally Three times a day      simvastatin (ZOCOR) 10 MG tablet Take 10 mg by mouth once daily.       vit A/vit C/vit E/zinc/copper (PRESERVISION AREDS ORAL) Take 1 capsule by mouth 2 (two) times a day.      zinc acetate 25 mg (zinc) Cap 1 tablet Orally Once a day      coQ10, liposomal ubiquinol, 100 mg/5 mL Susp as directed Orally      fluconazole (DIFLUCAN) 200 MG Tab Take 1 tab PO every Friday. (Patient not taking: Reported on 8/4/2023) 8 tablet 0    fluticasone-umeclidin-vilanter (TRELEGY ELLIPTA) 100-62.5-25 mcg DsDv Inhale 1 puff into the lungs once daily. 1 each 6    multivitamin (MULTIPLE VITAMINS DAILY ORAL) 1 tablet Orally Once a day      vit C,W-Iv-hfqjp-lutein-zeaxan (PRESERVISION AREDS-2) 250-90-40-1 mg Cap as directed Orally daily       No current facility-administered medications for this visit.       Last PFT:  05/03/2022 moderate obstruction, minimal restriction, and a very severe diffusion defect with no response to bronchodilators  Last CT:  04/01/2022  .  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  2.  Patent stent in the proximal celiac artery.  3. 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  3.  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  4.  Moderate centrilobular emphysema.     Review of Systems  General:fatigued   Eyes: Vision is has macular degneration  ENT:  No sinusitis or pharyngitis.   Heart:: No chest pain or  palpitations.  Lungs: more shortness of breath, no cough  GI: occasional diarrhea  : No dysuria, hesitancy, or nocturia.  Musculoskeletal:back pain is ongoing  Skin: No lesions or rashes. Bruises from plavix  Neuro: No headaches or neuropathy.  Lymph: No edema or adenopathy.  Psych: No anxiety or depression.  Endo:  weight loss     OBJECTIVE:      /68 (BP Location: Right arm, Patient Position: Sitting, BP Method: Medium (Manual))   Pulse 92   Wt 36 kg (79 lb 6.4 oz)   LMP  (LMP Unknown)   SpO2 95%   BMI 14.52 kg/m²     Physical Exam  GENERAL: Older, very thin patient in no distress.  HEENT: Pupils equal and reactive. Extraocular movements intact. Nose intact.      Pharynx moist.  NECK: Supple.   HEART: Regular rate and rhythm. No murmur or gallop auscultated.  LUNGS: quiet   ABDOMEN: Bowel sounds present. Non-tender, no masses palpated.  EXTREMITIES: Normal muscle tone and joint movement, no cyanosis or clubbing.   LYMPHATICS: No adenopathy palpated, no edema.  SKIN: Dry, intact, no lesions.   NEURO: Cranial nerves II-XII intact. Motor strength 5/5 bilaterally, upper and lower extremities.  PSYCH: Appropriate affect.    Assessment:       1. Chronic obstructive pulmonary disease, unspecified COPD type    2. Dyspnea, unspecified type    3. Malnutrition, unspecified type                Plan:       Chronic obstructive pulmonary disease, unspecified COPD type    Dyspnea, unspecified type  -     X-Ray Chest PA And Lateral; Future    Malnutrition, unspecified type    Other orders  -     fluticasone-umeclidin-vilanter (TRELEGY ELLIPTA) 100-62.5-25 mcg DsDv; Inhale 1 puff into the lungs once daily.  Dispense: 1 each; Refill: 6        Chest xray   Stop the anoro   Start Trelegy 1 puff daily, rinse after you use it  Get lab results from dr. Tan office to make sure no more anemic       Follow up if symptoms worsen or fail to improve.

## 2023-08-04 NOTE — PATIENT INSTRUCTIONS
Chest xray   Stop the anoro   Start Trelegy 1 puff daily, rinse after you use it  Get lab results from dr. Tan office

## 2023-08-07 ENCOUNTER — TELEPHONE (OUTPATIENT)
Dept: PULMONOLOGY | Facility: CLINIC | Age: 83
End: 2023-08-07

## 2023-08-29 ENCOUNTER — HOSPITAL ENCOUNTER (OUTPATIENT)
Dept: PREADMISSION TESTING | Facility: HOSPITAL | Age: 83
Discharge: HOME | End: 2023-08-29
Attending: INTERNAL MEDICINE
Payer: MEDICARE

## 2023-08-29 VITALS
HEART RATE: 84 BPM | SYSTOLIC BLOOD PRESSURE: 155 MMHG | WEIGHT: 79 LBS | DIASTOLIC BLOOD PRESSURE: 76 MMHG | TEMPERATURE: 99 F | HEIGHT: 62 IN | OXYGEN SATURATION: 96 % | BODY MASS INDEX: 14.54 KG/M2 | RESPIRATION RATE: 18 BRPM

## 2023-08-29 DIAGNOSIS — Z51.81 MONITORING FOR LONG-TERM ANTICOAGULANT USE: ICD-10-CM

## 2023-08-29 DIAGNOSIS — Z79.01 MONITORING FOR LONG-TERM ANTICOAGULANT USE: ICD-10-CM

## 2023-08-29 DIAGNOSIS — Z01.818 PRE-OP TESTING: Primary | ICD-10-CM

## 2023-08-29 PROCEDURE — 93010 ELECTROCARDIOGRAM REPORT: CPT | Mod: ,,, | Performed by: GENERAL PRACTICE

## 2023-08-29 PROCEDURE — 93005 ELECTROCARDIOGRAM TRACING: CPT | Performed by: GENERAL PRACTICE

## 2023-08-29 PROCEDURE — 93010 EKG 12-LEAD: ICD-10-PCS | Mod: ,,, | Performed by: GENERAL PRACTICE

## 2023-08-29 NOTE — DISCHARGE INSTRUCTIONS
To confirm, Your procedure is scheduled for:  Thursday, August 31, 2023    Endoscopy will call the afternoon prior with the final arrival time on Wednesday, August 30, 2023    Please report to Outpatient [place] via [facility] entrance. Check in at registration desk.    Do not eat or drink anything after midnight the night before procedure.    TAKE ONLY THESE MEDICATIONS WITH A SMALL SIP OF WATER THE MORNING OF YOUR PROCEDURE:  METOPROLOL/INHALERS/PAIN PILL      DO NOT TAKE THESE MEDICATIONS PRIOR to your procedure or per your surgeon's request: ASPIRIN, ALEVE, ADVIL, IBUPROFEN, FISH OIL VITAMIN E, HERBALS  (May take Tylenol)    ONLY if you are prescribed any types of blood thinners such as:  Aspirin, Coumadin, Plavix, Pradaxa, Xarelto, Aggrenox, Effient, Eliquis, Savasya, Brilinta, or any other, ask your surgeon whether you should stop taking them and how long before surgery you should stop.  You may also need to verify with the prescribing physician if it is ok to stop your medication.      INSTRUCTIONS IMPORTANT!!    If you wear contact lenses, dentures, hearing aids or glasses, bring a container to put them in during surgery and give to a family member for safe keeping.      Please leave all jewelry, piercing's and valuables at home.     Make arrangements in advance for transportation home by a responsible adult.    You must make arrangements for transportation, TAXI'S, UBER'S OR LYFTS ARE NOT ALLOWED.        If you have any questions about these instructions, call Pre-Op Admit  Nursing at [phone] or the Pre-Op Endoscopy [phone]

## 2023-08-31 ENCOUNTER — HOSPITAL ENCOUNTER (OUTPATIENT)
Facility: HOSPITAL | Age: 83
Discharge: HOME | End: 2023-08-31
Attending: INTERNAL MEDICINE | Admitting: INTERNAL MEDICINE
Payer: MEDICARE

## 2023-08-31 ENCOUNTER — ANESTHESIA EVENT (OUTPATIENT)
Dept: SURGERY | Facility: HOSPITAL | Age: 83
End: 2023-08-31
Payer: MEDICARE

## 2023-08-31 ENCOUNTER — ANESTHESIA (OUTPATIENT)
Dept: SURGERY | Facility: HOSPITAL | Age: 83
End: 2023-08-31
Payer: MEDICARE

## 2023-08-31 VITALS
BODY MASS INDEX: 14.54 KG/M2 | DIASTOLIC BLOOD PRESSURE: 66 MMHG | RESPIRATION RATE: 16 BRPM | WEIGHT: 79 LBS | TEMPERATURE: 99 F | SYSTOLIC BLOOD PRESSURE: 143 MMHG | OXYGEN SATURATION: 88 % | HEART RATE: 64 BPM | HEIGHT: 62 IN

## 2023-08-31 DIAGNOSIS — R13.10 DYSPHAGIA: ICD-10-CM

## 2023-08-31 PROCEDURE — 27000014 HC INFLATION DEVICE: Performed by: INTERNAL MEDICINE

## 2023-08-31 PROCEDURE — 37000009 HC ANESTHESIA EA ADD 15 MINS: Performed by: INTERNAL MEDICINE

## 2023-08-31 PROCEDURE — D9220A PRA ANESTHESIA: Mod: CRNA,,, | Performed by: NURSE ANESTHETIST, CERTIFIED REGISTERED

## 2023-08-31 PROCEDURE — C1726 CATH, BAL DIL, NON-VASCULAR: HCPCS | Performed by: INTERNAL MEDICINE

## 2023-08-31 PROCEDURE — 27200043 HC FORCEPS, BIOPSY: Performed by: INTERNAL MEDICINE

## 2023-08-31 PROCEDURE — 88305 TISSUE EXAM BY PATHOLOGIST: CPT | Mod: TC,59 | Performed by: PATHOLOGY

## 2023-08-31 PROCEDURE — D9220A PRA ANESTHESIA: ICD-10-PCS | Mod: CRNA,,, | Performed by: NURSE ANESTHETIST, CERTIFIED REGISTERED

## 2023-08-31 PROCEDURE — D9220A PRA ANESTHESIA: ICD-10-PCS | Mod: ANES,,, | Performed by: ANESTHESIOLOGY

## 2023-08-31 PROCEDURE — 25000003 PHARM REV CODE 250: Performed by: NURSE ANESTHETIST, CERTIFIED REGISTERED

## 2023-08-31 PROCEDURE — 43239 EGD BIOPSY SINGLE/MULTIPLE: CPT | Performed by: INTERNAL MEDICINE

## 2023-08-31 PROCEDURE — 63600175 PHARM REV CODE 636 W HCPCS: Performed by: NURSE ANESTHETIST, CERTIFIED REGISTERED

## 2023-08-31 PROCEDURE — D9220A PRA ANESTHESIA: Mod: ANES,,, | Performed by: ANESTHESIOLOGY

## 2023-08-31 PROCEDURE — 43249 ESOPH EGD DILATION <30 MM: CPT | Performed by: INTERNAL MEDICINE

## 2023-08-31 PROCEDURE — 37000008 HC ANESTHESIA 1ST 15 MINUTES: Performed by: INTERNAL MEDICINE

## 2023-08-31 RX ORDER — PROPOFOL 10 MG/ML
VIAL (ML) INTRAVENOUS
Status: DISCONTINUED | OUTPATIENT
Start: 2023-08-31 | End: 2023-08-31

## 2023-08-31 RX ADMIN — SODIUM CHLORIDE: 900 INJECTION, SOLUTION INTRAVENOUS at 09:08

## 2023-08-31 RX ADMIN — PROPOFOL 25 MG: 10 INJECTION, EMULSION INTRAVENOUS at 09:08

## 2023-08-31 RX ADMIN — PROPOFOL 100 MG: 10 INJECTION, EMULSION INTRAVENOUS at 09:08

## 2023-08-31 NOTE — H&P
GASTROENTEROLOGY PRE-PROCEDURE H&P NOTE  Patient Name: Carmen Sparrowg  Patient MRN: [id number]  Patient : 1940    Service date: 2023    PCP: Abiodun Will MD    No chief complaint on file.      HPI: Patient is a 83 y.o. female with PMHx as below here for evaluation of dysphagia.     Carmen Wilkinson  is a 83 year old   female  who is seen today for a follow-up visit. 83 year old female developed acute onset solid food dysphagia associated with gagging.  the food wouldnt move up or down.  happened twice after.  on pantoprazole.  no gerd.    of note h/o duodenal avm in .  normal colon .  was 80 lbs as well .     Past Medical History:  Past Medical History:   Diagnosis Date    Back pain     Brain aneurysm     Carotid stenosis     Cataract     Diverticulitis     Emphysema lung     Feeling anxious     Hyperlipidemia     Hypertension     Macular degeneration     PVD (peripheral vascular disease)     Squamous cell carcinoma 2017    right medical cheek, SSIS, efudex tx        Past Surgical History:  Past Surgical History:   Procedure Laterality Date    ABDOMINAL SURGERY      ANGIOGRAPHY OF MESENTERIC VESSELS Left 2022    Procedure: ANGIOGRAM, MESENTERIC VESSELS;  Surgeon: Krish Machado MD;  Location: [facility] CATH/EP LAB;  Service: General;  Laterality: Left;    ARTERIOGRAM, MESENTERIC N/A 2021    Procedure: ARTERIOGRAM, MESENTERIC;  Surgeon: Krish Machado MD;  Location: [facility] CATH/EP LAB;  Service: General;  Laterality: N/A;    BACK SURGERY      CATARACT EXTRACTION, BILATERAL      COLONOSCOPY N/A 2022    Procedure: COLONOSCOPY;  Surgeon: Lino Devi MD;  Location: [facility] ENDO;  Service: Endoscopy;  Laterality: N/A;    CREATION, BYPASS, ARTERIAL, AORTA TO FEMORAL N/A 2021    Procedure: ARTERIOGRAM, MESENTERIC;  Surgeon: Krish Machado MD;  Location: [facility] CATH/EP LAB;  Service: General;  Laterality: N/A;    ENDOSCOPY OF PROXIMAL SMALL INTESTINE N/A  04/04/2022    Procedure: ENTEROSCOPY, PROXIMAL;  Surgeon: Lino Devi MD;  Location: [facility] ENDO;  Service: Endoscopy;  Laterality: N/A;    ESOPHAGOGASTRODUODENOSCOPY N/A 11/20/2022    Procedure: EGD (ESOPHAGOGASTRODUODENOSCOPY);  Surgeon: Pedro Hall Jr., MD;  Location: [facility] ENDO;  Service: Endoscopy;  Laterality: N/A;    HYSTERECTOMY      IMPLANTATION OF SPINAL CORD STIMULATOR IN CERVICAL SPINE      UPPER GASTROINTESTINAL ENDOSCOPY  11/20/2022    w/ antrum biopsy        Home Medications:  Medications Prior to Admission   Medication Sig Dispense Refill Last Dose    aspirin (ECOTRIN) 81 MG EC tablet Take 81 mg by mouth once daily.   8/25/2023    metoprolol succinate (TOPROL-XL) 50 MG 24 hr tablet Take 50 mg by mouth 2 (two) times daily.   8/31/2023    oxyCODONE-acetaminophen (PERCOCET)  mg per tablet Take 1 tablet by mouth every 4 (four) hours as needed. 5 times daily   8/31/2023 at 0610    albuterol (PROAIR HFA) 90 mcg/actuation inhaler Inhale 2 puffs into the lungs every 4 (four) hours as needed for Wheezing. Rescue 18 g 11     amLODIPine (NORVASC) 5 MG tablet Take 2.5 mg by mouth every evening.       cholecalciferol, vitamin D3, (VITAMIN D3) 10 mcg/mL (400 unit/mL) Drop Take 400 Units by mouth once daily.       clopidogreL (PLAVIX) 75 mg tablet Take 1 tablet (75 mg total) by mouth once daily. 30 tablet 3 8/25/2023    ferrous sulfate 325 (65 FE) MG EC tablet Take 325 mg by mouth once daily.       fluticasone-umeclidin-vilanter (TRELEGY ELLIPTA) 100-62.5-25 mcg DsDv Inhale 1 puff into the lungs once daily. 1 each 6     gentamicin (GARAMYCIN) 0.3 % ophthalmic solution Apply 2-3 drops to diseased nail twice daily (Patient taking differently: 2 drops by Other route 2 (two) times a day. Apply 2-3 drops to diseased nail twice daily) 15 mL 1     krill-om-3-dha-epa-phospho-ast (MEGARED OMEGA-3 KRILL OIL) 591-06-26-50 mg Cap Take 1 tablet by mouth once daily.       L. gasseri-B. bifidum-B longum 1.5 billion  cell Cap Take 1 tablet by mouth once daily.        megestroL (MEGACE) 20 MG Tab Take 40 mg by mouth 2 (two) times daily.       pantoprazole (PROTONIX) 40 MG tablet Take 1 tablet (40 mg total) by mouth 2 (two) times daily. 180 tablet 1     polycarbophil (FIBERCON) 625 mg tablet 2 tablets as needed Orally Three times a day       simvastatin (ZOCOR) 10 MG tablet Take 20 mg by mouth every evening.       UNABLE TO FIND Take 1 tablet by mouth once daily. medication name: Ronak Mohan MVI       vit C,B-Ij-oucuk-lutein-zeaxan (PRESERVISION AREDS-2) 250-90-40-1 mg Cap Take 1 tablet by mouth 2 (two) times a day.                  Review of patient's allergies indicates:   Allergen Reactions    Sulfa (sulfonamide antibiotics) Nausea Only       Social History:   Social History     Occupational History    Not on file   Tobacco Use    Smoking status: Former     Current packs/day: 0.00     Average packs/day: 1 pack/day for 56.8 years (56.8 ttl pk-yrs)     Types: Cigarettes     Start date: 1965     Quit date: 2022     Years since quittin.4    Smokeless tobacco: Never   Substance and Sexual Activity    Alcohol use: No    Drug use: Not Currently    Sexual activity: Not on file       Family History:   Family History   Problem Relation Age of Onset    Melanoma Neg Hx     Psoriasis Neg Hx     Eczema Neg Hx     Lupus Neg Hx        Review of Systems:  A 10 point review of systems was performed and was normal, except as mentioned in the HPI, including constitutional, HEENT, heme, lymph, cardiovascular, respiratory, gastrointestinal, genitourinary, neurologic, endocrine, psychiatric and musculoskeletal.      OBJECTIVE:    Physical Exam:  24 Hour Vital Sign Ranges: Temp:  [97.9 °F (36.6 °C)] 97.9 °F (36.6 °C)  Pulse:  [73] 73  Resp:  [18] 18  SpO2:  [97 %] 97 %  BP: (167)/(68) 167/68  Most recent vitals: BP (!) 167/68 (BP Location: Left arm, Patient Position: Lying)   Pulse 73   Temp 97.9 °F (36.6 °C) (Oral)   Resp 18   Ht  "5' 2" (1.575 m)   Wt 35.8 kg (79 lb)   LMP  (LMP Unknown)   SpO2 97% Comment: room air  BMI 14.45 kg/m²    GEN: well-developed, well-nourished, awake and alert, non-toxic appearing adult  HEENT: PERRL, sclera anicteric, oral mucosa pink and moist without lesion  NECK: trachea midline; Good ROM  CV: regular rate and rhythm, no murmurs or gallops  RESP: clear to auscultation bilaterally, no wheezes, rhonci or rales  ABD: soft, non-tender, non-distended, normal bowel sounds  EXT: no swelling or edema, 2+ pulses distally  SKIN: no rashes or jaundice  PSYCH: normal affect    Labs:   Recent Labs     08/29/23  1008   WBC 10.41   *        Recent Labs     08/29/23  1008      K 4.4   *   CO2 24   BUN 29*   GLU 95     No results for input(s): "ALB" in the last 72 hours.    Invalid input(s): "ALKP", "SGOT", "SGPT", "TBIL", "DBIL", "TPRO"  Recent Labs     08/29/23  1008   INR 1.0         IMPRESSION / RECOMMENDATIONS:  Pt with solid food dysphagia  -get EGD with dilation of esophagus  -reports recent stress and echo  -ct abd normal overall w/o acute changes.    EGD   with interventions as warranted.   RIsks, benefits, alternatives discussed in detail regarding upcoming procedures and sedation. Some of the more common endoscopic complications include but not limited to immediate or delayed perforation, bleeding, infections, pain, inadvertent injury to surrounding tissue / organs and possible need for surgical evaluation. Patient expressed understanding, all questions answered and will proceed with procedure as planned.     Lino Devi  8/31/2023  9:37 AM      "

## 2023-08-31 NOTE — TRANSFER OF CARE
"Anesthesia Transfer of Care Note    Patient: Carmen Fletcher Pfstanley    Procedure(s) Performed: Procedure(s) (LRB):  EGD (ESOPHAGOGASTRODUODENOSCOPY) (N/A)    Patient location: GI    Anesthesia Type: MAC and general    Post pain: adequate analgesia    Post assessment: no apparent anesthetic complications    Post vital signs: stable    Level of consciousness: awake and responds to stimulation    Nausea/Vomiting: no nausea/vomiting    Complications: none    Transfer of care protocol was followedComments: AAOx3, NAD, resp=unlabored, zero complications noted, handoff given / all questions answered       Last vitals:   Visit Vitals  BP (!) 167/68 (BP Location: Left arm, Patient Position: Lying)   Pulse 73   Temp 36.6 °C (97.9 °F) (Oral)   Resp 18   Ht 5' 2" (1.575 m)   Wt 35.8 kg (79 lb)   LMP  (LMP Unknown)   SpO2 97%   BMI 14.45 kg/m²     "

## 2023-08-31 NOTE — ANESTHESIA PREPROCEDURE EVALUATION
08/31/2023  Carmen Wilkinson is a 83 y.o., female.      Patient Active Problem List   Diagnosis    Pre-procedural cardiovascular examination    Mesenteric artery stenosis    Dyspnea    Dehydration    COPD exacerbation    Malnutrition of moderate degree    Anemia, unspecified    Emphysema lung    Chronic obstructive pulmonary disease    Acute pancreatitis    Dysphonia    Presbylarynges       Past Surgical History:   Procedure Laterality Date    ABDOMINAL SURGERY      ANGIOGRAPHY OF MESENTERIC VESSELS Left 09/07/2022    Procedure: ANGIOGRAM, MESENTERIC VESSELS;  Surgeon: Krish Machado MD;  Location: [facility] CATH/EP LAB;  Service: General;  Laterality: Left;    ARTERIOGRAM, MESENTERIC N/A 06/30/2021    Procedure: ARTERIOGRAM, MESENTERIC;  Surgeon: Krish Machado MD;  Location: [facility] CATH/EP LAB;  Service: General;  Laterality: N/A;    BACK SURGERY  2008    CATARACT EXTRACTION, BILATERAL      COLONOSCOPY N/A 04/04/2022    Procedure: COLONOSCOPY;  Surgeon: Lino Devi MD;  Location: [facility] ENDO;  Service: Endoscopy;  Laterality: N/A;    CREATION, BYPASS, ARTERIAL, AORTA TO FEMORAL N/A 01/13/2021    Procedure: ARTERIOGRAM, MESENTERIC;  Surgeon: Krish Machado MD;  Location: [facility] CATH/EP LAB;  Service: General;  Laterality: N/A;    ENDOSCOPY OF PROXIMAL SMALL INTESTINE N/A 04/04/2022    Procedure: ENTEROSCOPY, PROXIMAL;  Surgeon: Lino Devi MD;  Location: [facility];  Service: Endoscopy;  Laterality: N/A;    ESOPHAGOGASTRODUODENOSCOPY N/A 11/20/2022    Procedure: EGD (ESOPHAGOGASTRODUODENOSCOPY);  Surgeon: Pedro Hall Jr., MD;  Location: [facility];  Service: Endoscopy;  Laterality: N/A;    HYSTERECTOMY      IMPLANTATION OF SPINAL CORD STIMULATOR IN CERVICAL SPINE      UPPER GASTROINTESTINAL ENDOSCOPY  11/20/2022    w/ antrum biopsy        Tobacco Use:  The patient  reports  that she quit smoking about 17 months ago. Her smoking use included cigarettes. She started smoking about 58 years ago. She has a 56.8 pack-year smoking history. She has never used smokeless tobacco.     Results for orders placed or performed during the hospital encounter of 08/29/23   EKG 12-lead    Collection Time: 08/29/23  9:22 AM    Narrative    Test Reason : Z01.818,    Vent. Rate : 069 BPM     Atrial Rate : 069 BPM     P-R Int : 154 ms          QRS Dur : 064 ms      QT Int : 364 ms       P-R-T Axes : 074 072 075 degrees     QTc Int : 390 ms    Normal sinus rhythm  Normal ECG  When compared with ECG of 18-NOV-2022 14:49,  Premature atrial complexes are no longer Present  Vent. rate has decreased BY  36 BPM  Criteria for Septal infarct are no longer Present  Criteria for Inferior infarct are no longer Present  Non-specific change in ST segment in Inferior leads  T wave inversion no longer evident in Inferior leads  T wave inversion no longer evident in Anterior-lateral leads    Referred By:             Confirmed By:              Lab Results   Component Value Date    WBC 10.41 08/29/2023    HGB 11.1 (L) 08/29/2023    HCT 33.8 (L) 08/29/2023     (H) 08/29/2023     08/29/2023     BMP  Lab Results   Component Value Date     08/29/2023    K 4.4 08/29/2023     (H) 08/29/2023    CO2 24 08/29/2023    BUN 29 (H) 08/29/2023    CREATININE 0.9 08/29/2023    CALCIUM 9.6 08/29/2023    ANIONGAP 6 (L) 08/29/2023    GLU 95 08/29/2023    GLU 92 11/21/2022    GLU 93 11/20/2022       Results for orders placed during the hospital encounter of 04/01/22    Echo Saline Bubble? No    Interpretation Summary  · The left ventricle is small with concentric remodeling and hyperdynamic systolic function.  · The estimated ejection fraction is 75%.  · Indeterminate left ventricular diastolic function.  · Mild right ventricular enlargement with normal right ventricular systolic function.  · Mild to moderate tricuspid  regurgitation.  · Mild-to-moderate aortic regurgitation.            Pre-op Assessment    I have reviewed the Patient Summary Reports.     I have reviewed the Nursing Notes. I have reviewed the NPO Status.   I have reviewed the Medications.     Review of Systems  Anesthesia Hx:  No problems with previous Anesthesia  Denies Family Hx of Anesthesia complications.   Denies Personal Hx of Anesthesia complications.   Social:  Former Smoker    Hematology/Oncology:         -- Cancer in past history (hx skin CA):   EENT/Dental:   Macular degen   Cardiovascular:   Hypertension, well controlled PVD (hx of mesenteric artery disease)    Pulmonary:   COPD (daily inhaler, rescue prn, no recent exacerbations noted, hx hoarsness ), moderate Asthma mild    Hepatic/GI:   PUD, Hx PUD admitted with melena and acute pancreatitis Hepatic/GI Symptoms: (GI bleeding)    Musculoskeletal:   Hx C-spine neurostimulator (no longer active) Spine Disorders: (lumbar stenosis, chronic back and leg pain) lumbar Chronic Pain        Physical Exam  General: Well nourished, Cooperative, Alert and Oriented    Airway:  Mallampati: III / II  Mouth Opening: Normal  TM Distance: Normal  Tongue: Normal  Neck ROM: Normal ROM    Dental:  Dentures  Upper denture  Chest/Lungs:  Clear to auscultation    Heart:  Rate: Normal  Rhythm: Regular Rhythm  Sounds: Normal    Abdomen:  Normal, Soft, Nontender        Anesthesia Plan  Type of Anesthesia, risks & benefits discussed:    Anesthesia Type: Gen Natural Airway  Intra-op Monitoring Plan: Standard ASA Monitors  Post Op Pain Control Plan:   (medical reason for not using multimodal pain management)  Induction:  IV  Informed Consent: Informed consent signed with the Patient and all parties understand the risks and agree with anesthesia plan.  All questions answered.   ASA Score: 3  Anesthesia Plan Notes:   GNA  Propofol  POM  Zofran    Ready For Surgery From Anesthesia Perspective.     .

## 2023-08-31 NOTE — PROVATION PATIENT INSTRUCTIONS
Discharge Summary/Instructions after an Endoscopic Procedure  Patient Name: Carmen Wilkinson  Patient MRN: [id number]  Patient YOB: 1940 Thursday, August 31, 2023  Lino Devi MD  RESTRICTIONS:  During your procedure today, you received medications for sedation.  These   medications may affect your judgment, balance and coordination.  Therefore,   for 24 hours, you have the following restrictions:   - DO NOT drive a car, operate machinery, make legal/financial decisions,   sign important papers or drink alcohol.    ACTIVITY:  Today: no heavy lifting, straining or running due to procedural   sedation/anesthesia.  The following day: return to full activity including work.  DIET:  Eat and drink normally unless instructed otherwise.     TREATMENT FOR COMMON SIDE EFFECTS:  - Mild abdominal pain, nausea, belching, bloating or excessive gas:  rest,   eat lightly and use a heating pad.  - Sore Throat: treat with throat lozenges and/or gargle with warm salt   water.  - Because air was used during the procedure, expelling large amounts of air   from your rectum or belching is normal.  - If a bowel prep was taken, you may not have a bowel movement for 1-3 days.    This is normal.  SYMPTOMS TO WATCH FOR AND REPORT TO YOUR PHYSICIAN:  1. Abdominal pain or bloating, other than gas cramps.  2. Chest pain.  3. Back pain.  4. Signs of infection such as: chills or fever occurring within 24 hours   after the procedure.  5. Rectal bleeding, which would show as bright red, maroon, or black stools.   (A tablespoon of blood from the rectum is not serious, especially if   hemorrhoids are present.)  6. Vomiting.  7. Weakness or dizziness.  GO DIRECTLY TO THE NEAREST EMERGENCY ROOM IF YOU HAVE ANY OF THE FOLLOWING:      Difficulty breathing              Chills and/or fever over 101 F   Persistent vomiting and/or vomiting blood   Severe abdominal pain   Severe chest pain   Black, tarry stools   Bleeding- more than one  tablespoon   Any other symptom or condition that you feel may need urgent attention  Your doctor recommends these additional instructions:  If any biopsies were taken, your doctors clinic will contact you in 1 to 2   weeks with any results.  - Patient has a contact number available for emergencies.  The signs and   symptoms of potential delayed complications were discussed with the   patient.  Return to normal activities tomorrow.  Written discharge   instructions were provided to the patient.   - Resume previous diet.   - Continue present medications.   - Await pathology results.   - Repeat upper endoscopy PRN for retreatment.   - Resume plavix in 48 hours and baby aspirin today.  - Discharge patient to home (with escort).  For questions, problems or results please call your physician - Lino Devi MD at Work:  [phone].  [facility], EMERGENCY ROOM PHONE NUMBER: [phone]  IF A COMPLICATION OR EMERGENCY SITUATION ARISES AND YOU ARE UNABLE TO REACH   YOUR PHYSICIAN - GO DIRECTLY TO THE EMERGENCY ROOM.  MD Lino Allen MD  8/31/2023 10:10:00 AM  This report has been verified and signed electronically.  Dear patient,  As a result of recent federal legislation (The Federal Cures Act), you may   receive lab or pathology results from your procedure in your [facility]   account before your physician is able to contact you. Your physician or   their representative will relay the results to you with their   recommendations at their soonest availability.  Thank you,  PROVATION

## 2023-08-31 NOTE — ANESTHESIA POSTPROCEDURE EVALUATION
Anesthesia Post Evaluation    Patient: Carmen Fletcher Pflug    Procedure(s) Performed: Procedure(s) (LRB):  EGD (ESOPHAGOGASTRODUODENOSCOPY) (N/A)    Final Anesthesia Type: general      Patient location during evaluation: GI PACU  Patient participation: Yes- Able to Participate  Level of consciousness: awake and alert and oriented  Post-procedure vital signs: reviewed and stable  Pain management: adequate  Airway patency: patent    PONV status at discharge: No PONV  Anesthetic complications: no      Cardiovascular status: blood pressure returned to baseline  Respiratory status: unassisted, spontaneous ventilation and room air  Hydration status: euvolemic  Follow-up not needed.          Vitals Value Taken Time   /54 08/31/23 1009   Temp 36.6 °C (97.9 °F) 08/31/23 0839   Pulse 65 08/31/23 1013   Resp 18 08/31/23 0839   SpO2 99 % 08/31/23 1013   Vitals shown include unvalidated device data.      No case tracking events are documented in the log.      Pain/Wili Score: No data recorded

## 2023-09-27 ENCOUNTER — OFFICE VISIT (OUTPATIENT)
Dept: PULMONOLOGY | Facility: CLINIC | Age: 83
End: 2023-09-27
Payer: MEDICARE

## 2023-09-27 VITALS
DIASTOLIC BLOOD PRESSURE: 70 MMHG | BODY MASS INDEX: 14.54 KG/M2 | HEIGHT: 62 IN | OXYGEN SATURATION: 97 % | WEIGHT: 79 LBS | HEART RATE: 84 BPM | SYSTOLIC BLOOD PRESSURE: 135 MMHG

## 2023-09-27 DIAGNOSIS — J43.2 CENTRILOBULAR EMPHYSEMA: ICD-10-CM

## 2023-09-27 DIAGNOSIS — E43 SEVERE PROTEIN-CALORIE MALNUTRITION: ICD-10-CM

## 2023-09-27 DIAGNOSIS — K55.1 SUPERIOR MESENTERIC ARTERY STENOSIS: ICD-10-CM

## 2023-09-27 DIAGNOSIS — R06.00 DYSPNEA, UNSPECIFIED TYPE: ICD-10-CM

## 2023-09-27 DIAGNOSIS — J44.9 CHRONIC OBSTRUCTIVE PULMONARY DISEASE, UNSPECIFIED COPD TYPE: Primary | ICD-10-CM

## 2023-09-27 PROCEDURE — 3078F DIAST BP <80 MM HG: CPT | Mod: CPTII,S$GLB,, | Performed by: INTERNAL MEDICINE

## 2023-09-27 PROCEDURE — 1159F PR MEDICATION LIST DOCUMENTED IN MEDICAL RECORD: ICD-10-PCS | Mod: CPTII,S$GLB,, | Performed by: INTERNAL MEDICINE

## 2023-09-27 PROCEDURE — 3075F SYST BP GE 130 - 139MM HG: CPT | Mod: CPTII,S$GLB,, | Performed by: INTERNAL MEDICINE

## 2023-09-27 PROCEDURE — 1126F AMNT PAIN NOTED NONE PRSNT: CPT | Mod: CPTII,S$GLB,, | Performed by: INTERNAL MEDICINE

## 2023-09-27 PROCEDURE — 3078F PR MOST RECENT DIASTOLIC BLOOD PRESSURE < 80 MM HG: ICD-10-PCS | Mod: CPTII,S$GLB,, | Performed by: INTERNAL MEDICINE

## 2023-09-27 PROCEDURE — 3075F PR MOST RECENT SYSTOLIC BLOOD PRESS GE 130-139MM HG: ICD-10-PCS | Mod: CPTII,S$GLB,, | Performed by: INTERNAL MEDICINE

## 2023-09-27 PROCEDURE — 1159F MED LIST DOCD IN RCRD: CPT | Mod: CPTII,S$GLB,, | Performed by: INTERNAL MEDICINE

## 2023-09-27 PROCEDURE — 99214 PR OFFICE/OUTPT VISIT, EST, LEVL IV, 30-39 MIN: ICD-10-PCS | Mod: S$GLB,,, | Performed by: INTERNAL MEDICINE

## 2023-09-27 PROCEDURE — 1126F PR PAIN SEVERITY QUANTIFIED, NO PAIN PRESENT: ICD-10-PCS | Mod: CPTII,S$GLB,, | Performed by: INTERNAL MEDICINE

## 2023-09-27 PROCEDURE — 99214 OFFICE O/P EST MOD 30 MIN: CPT | Mod: S$GLB,,, | Performed by: INTERNAL MEDICINE

## 2023-09-27 NOTE — PROGRESS NOTES
SUBJECTIVE:    Patient ID: Carmen Wilkinson is a 83 y.o. female.    Chief Complaint: COPD and Shortness of Breath    HPI   Patient here today feeling more short of breath.  She is using trilogy but finds she is very dyspneic just walking to the bathroom from her recliner.  She states she had a stress test and was told that was okay.  She only weighs 79 lb now.  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  Her last PFT showed a severe diffusion defect and this is likely factoring into things.  Past Medical History:   Diagnosis Date    Back pain     Brain aneurysm 2018    Carotid stenosis     Cataract     Diverticulitis     Emphysema lung     Feeling anxious     Hyperlipidemia     Hypertension     Macular degeneration     PVD (peripheral vascular disease)     Squamous cell carcinoma 08/29/2017    right medical cheek, SSIS, efudex tx     Past Surgical History:   Procedure Laterality Date    ABDOMINAL SURGERY      ANGIOGRAPHY OF MESENTERIC VESSELS Left 09/07/2022    Procedure: ANGIOGRAM, MESENTERIC VESSELS;  Surgeon: Krish Machado MD;  Location: [facility] CATH/EP LAB;  Service: General;  Laterality: Left;    ARTERIOGRAM, MESENTERIC N/A 06/30/2021    Procedure: ARTERIOGRAM, MESENTERIC;  Surgeon: Krish Machado MD;  Location: [facility] CATH/EP LAB;  Service: General;  Laterality: N/A;    BACK SURGERY  2008    CATARACT EXTRACTION, BILATERAL      COLONOSCOPY N/A 04/04/2022    Procedure: COLONOSCOPY;  Surgeon: Lino Devi MD;  Location: [facility] ENDO;  Service: Endoscopy;  Laterality: N/A;    CREATION, BYPASS, ARTERIAL, AORTA TO FEMORAL N/A 01/13/2021    Procedure: ARTERIOGRAM, MESENTERIC;  Surgeon: Krish Machado MD;  Location: [facility] CATH/EP LAB;  Service: General;  Laterality: N/A;    ENDOSCOPY OF PROXIMAL SMALL INTESTINE N/A 04/04/2022    Procedure: ENTEROSCOPY, PROXIMAL;  Surgeon: Lino Devi MD;  Location: [facility] ENDO;  Service: Endoscopy;  Laterality: N/A;     ESOPHAGOGASTRODUODENOSCOPY N/A 11/20/2022    Procedure: EGD (ESOPHAGOGASTRODUODENOSCOPY);  Surgeon: Pedro Hall Jr., MD;  Location: [facility];  Service: Endoscopy;  Laterality: N/A;    ESOPHAGOGASTRODUODENOSCOPY N/A 8/31/2023    Procedure: EGD (ESOPHAGOGASTRODUODENOSCOPY);  Surgeon: Lino Devi MD;  Location: [facility];  Service: Endoscopy;  Laterality: N/A;    HYSTERECTOMY      IMPLANTATION OF SPINAL CORD STIMULATOR IN CERVICAL SPINE      UPPER GASTROINTESTINAL ENDOSCOPY  11/20/2022    w/ antrum biopsy     Family History   Problem Relation Age of Onset    Melanoma Neg Hx     Psoriasis Neg Hx     Eczema Neg Hx     Lupus Neg Hx         Social History:   Marital Status:   Occupation: Data Unavailable  Alcohol History:  reports no history of alcohol use.  Tobacco History:  reports that she quit smoking about 17 months ago. Her smoking use included cigarettes. She started smoking about 58 years ago. She has a 56.8 pack-year smoking history. She has never used smokeless tobacco.  Drug History:  reports that she does not currently use drugs.    Review of patient's allergies indicates:   Allergen Reactions    Sulfa (sulfonamide antibiotics) Nausea Only       Current Outpatient Medications   Medication Sig Dispense Refill    albuterol (PROAIR HFA) 90 mcg/actuation inhaler Inhale 2 puffs into the lungs every 4 (four) hours as needed for Wheezing. Rescue 18 g 11    amLODIPine (NORVASC) 5 MG tablet Take 2.5 mg by mouth every evening.      aspirin (ECOTRIN) 81 MG EC tablet Take 81 mg by mouth once daily.      cholecalciferol, vitamin D3, (VITAMIN D3) 10 mcg/mL (400 unit/mL) Drop Take 400 Units by mouth once daily.      clopidogreL (PLAVIX) 75 mg tablet Take 1 tablet (75 mg total) by mouth once daily. 30 tablet 3    ferrous sulfate 325 (65 FE) MG EC tablet Take 325 mg by mouth once daily.      fluticasone-umeclidin-vilanter (TRELEGY ELLIPTA) 100-62.5-25 mcg DsDv Inhale 1 puff into the lungs once daily. 1 each 6     gentamicin (GARAMYCIN) 0.3 % ophthalmic solution Apply 2-3 drops to diseased nail twice daily (Patient taking differently: 2 drops by Other route 2 (two) times a day. Apply 2-3 drops to diseased nail twice daily) 15 mL 1    krill-om-3-dha-epa-phospho-ast (MEGARED OMEGA-3 KRILL OIL) 915-71-66-50 mg Cap Take 1 tablet by mouth once daily.      L. gasseri-B. bifidum-B longum 1.5 billion cell Cap Take 1 tablet by mouth once daily.       megestroL (MEGACE) 20 MG Tab Take 40 mg by mouth 2 (two) times daily.      metoprolol succinate (TOPROL-XL) 50 MG 24 hr tablet Take 50 mg by mouth 2 (two) times daily.      oxyCODONE-acetaminophen (PERCOCET)  mg per tablet Take 1 tablet by mouth every 4 (four) hours as needed. 5 times daily      pantoprazole (PROTONIX) 40 MG tablet Take 1 tablet (40 mg total) by mouth 2 (two) times daily. 180 tablet 1    polycarbophil (FIBERCON) 625 mg tablet 2 tablets as needed Orally Three times a day      simvastatin (ZOCOR) 10 MG tablet Take 20 mg by mouth every evening.      vit C,Q-Li-gxxlw-lutein-zeaxan (PRESERVISION AREDS-2) 250-90-40-1 mg Cap Take 1 tablet by mouth 2 (two) times a day.      UNABLE TO FIND Take 1 tablet by mouth once daily. medication name: Ronak Mohan YADII       No current facility-administered medications for this visit.       Last PFT:  05/03/2022 moderate obstruction, minimal restriction, and a very severe diffusion defect with no response to bronchodilators  Last CT:  04/01/2022  .  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  2.  Patent stent in the proximal celiac artery.  3. 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  3.  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  4.  Moderate centrilobular emphysema.     Review of Systems  General:fatigued   Eyes: Vision is has macular degneration  ENT:  No sinusitis or pharyngitis.   Heart:: No chest pain or palpitations.  Lungs: more  "shortness of breath with any exertion no cough  GI: occasional diarrhea  : No dysuria, hesitancy, or nocturia.  Musculoskeletal:back pain is ongoing  Skin: No lesions or rashes. Bruises from plavix  Neuro: No headaches or neuropathy.  Lymph: No edema or adenopathy.  Psych: No anxiety or depression.  Endo:  weight loss another 3 pounds    OBJECTIVE:      /70 (BP Location: Right arm, Patient Position: Sitting, BP Method: Medium (Manual))   Pulse 84   Ht 5' 2" (1.575 m)   Wt 35.8 kg (79 lb)   LMP  (LMP Unknown)   SpO2 97%   BMI 14.45 kg/m²     Physical Exam  GENERAL: Older, cachectic patient in no distress.  HEENT: Pupils equal and reactive. Extraocular movements intact. Nose intact.      Pharynx moist.  NECK: Supple.   HEART: Regular rate and rhythm. No murmur or gallop auscultated.  LUNGS: quiet   ABDOMEN: Bowel sounds present. Non-tender, no masses palpated.  EXTREMITIES: Normal muscle tone and joint movement, no cyanosis or clubbing.  Markedly decreased muscle mass  LYMPHATICS: No adenopathy palpated, no edema.  SKIN: Dry, intact, no lesions.   NEURO: Cranial nerves II-XII intact. Motor strength 5/5 bilaterally, upper and lower extremities.  PSYCH: Appropriate affect.    Assessment:       1. Chronic obstructive pulmonary disease, unspecified COPD type    2. Dyspnea, unspecified type    3. Severe protein-calorie malnutrition    4. Centrilobular emphysema    5. Superior mesenteric artery stenosis                  Plan:       Chronic obstructive pulmonary disease, unspecified COPD type  -     Complete PFT with bronchodilator; Future  -     Stress test, pulmonary; Future; Expected date: 09/27/2023  -     Comprehensive Metabolic Panel; Future; Expected date: 09/27/2023    Dyspnea, unspecified type    Severe protein-calorie malnutrition    Centrilobular emphysema    Superior mesenteric artery stenosis          ContinueTrelegy 1 puff daily, rinse after you use it        Follow up in about 6 months (around " 3/27/2024).

## 2023-09-28 ENCOUNTER — TELEPHONE (OUTPATIENT)
Dept: PULMONOLOGY | Facility: CLINIC | Age: 83
End: 2023-09-28

## 2023-09-28 NOTE — TELEPHONE ENCOUNTER
----- Message from Woody Gillette sent at 9/28/2023 10:17 AM CDT -----  Regarding: Patient PCP Office called  Angle from [facility] called. And said that patient insurance [facility] needs a order to be sent for patient to have a walk test. In order for patient to have portable oxygen.  Angle can be reached at [phone]

## 2023-10-11 ENCOUNTER — HOSPITAL ENCOUNTER (EMERGENCY)
Facility: HOSPITAL | Age: 83
Discharge: HOME | End: 2023-10-12
Attending: EMERGENCY MEDICINE
Payer: MEDICARE

## 2023-10-11 ENCOUNTER — TELEPHONE (OUTPATIENT)
Dept: PULMONOLOGY | Facility: HOSPITAL | Age: 83
End: 2023-10-11

## 2023-10-11 ENCOUNTER — TELEPHONE (OUTPATIENT)
Dept: PULMONOLOGY | Facility: CLINIC | Age: 83
End: 2023-10-11

## 2023-10-11 ENCOUNTER — HOSPITAL ENCOUNTER (OUTPATIENT)
Dept: PULMONOLOGY | Facility: HOSPITAL | Age: 83
Discharge: HOME | End: 2023-10-11
Attending: INTERNAL MEDICINE
Payer: MEDICARE

## 2023-10-11 VITALS — HEIGHT: 62 IN | WEIGHT: 79 LBS | BODY MASS INDEX: 14.54 KG/M2

## 2023-10-11 DIAGNOSIS — R09.A2 FOREIGN BODY SENSATION IN THROAT: Primary | ICD-10-CM

## 2023-10-11 DIAGNOSIS — J44.9 CHRONIC OBSTRUCTIVE PULMONARY DISEASE, UNSPECIFIED COPD TYPE: ICD-10-CM

## 2023-10-11 DIAGNOSIS — R13.10 DYSPHAGIA, UNSPECIFIED TYPE: ICD-10-CM

## 2023-10-11 DIAGNOSIS — J96.11 CHRONIC HYPOXEMIC RESPIRATORY FAILURE: Primary | ICD-10-CM

## 2023-10-11 LAB
ALBUMIN SERPL BCP-MCNC: 4.5 G/DL (ref 3.5–5.2)
ALP SERPL-CCNC: 74 U/L (ref 55–135)
ALT SERPL W/O P-5'-P-CCNC: 17 U/L (ref 10–44)
ANION GAP SERPL CALC-SCNC: 11 MMOL/L (ref 8–16)
AST SERPL-CCNC: 21 U/L (ref 10–40)
BASOPHILS # BLD AUTO: 0.07 K/UL (ref 0–0.2)
BASOPHILS NFR BLD: 0.7 % (ref 0–1.9)
BILIRUB SERPL-MCNC: 0.3 MG/DL (ref 0.1–1)
BUN SERPL-MCNC: 34 MG/DL (ref 8–23)
CALCIUM SERPL-MCNC: 9.7 MG/DL (ref 8.7–10.5)
CHLORIDE SERPL-SCNC: 112 MMOL/L (ref 95–110)
CO2 SERPL-SCNC: 21 MMOL/L (ref 23–29)
CREAT SERPL-MCNC: 0.9 MG/DL (ref 0.5–1.4)
DIFFERENTIAL METHOD: ABNORMAL
EOSINOPHIL # BLD AUTO: 0.1 K/UL (ref 0–0.5)
EOSINOPHIL NFR BLD: 0.7 % (ref 0–8)
ERYTHROCYTE [DISTWIDTH] IN BLOOD BY AUTOMATED COUNT: 12.3 % (ref 11.5–14.5)
EST. GFR  (NO RACE VARIABLE): >60 ML/MIN/1.73 M^2
GLUCOSE SERPL-MCNC: 100 MG/DL (ref 70–110)
HCT VFR BLD AUTO: 36.1 % (ref 37–48.5)
HGB BLD-MCNC: 11.9 G/DL (ref 12–16)
IMM GRANULOCYTES # BLD AUTO: 0.05 K/UL (ref 0–0.04)
IMM GRANULOCYTES NFR BLD AUTO: 0.5 % (ref 0–0.5)
LYMPHOCYTES # BLD AUTO: 2.1 K/UL (ref 1–4.8)
LYMPHOCYTES NFR BLD: 21 % (ref 18–48)
MCH RBC QN AUTO: 33.4 PG (ref 27–31)
MCHC RBC AUTO-ENTMCNC: 33 G/DL (ref 32–36)
MCV RBC AUTO: 101 FL (ref 82–98)
MONOCYTES # BLD AUTO: 0.8 K/UL (ref 0.3–1)
MONOCYTES NFR BLD: 8.2 % (ref 4–15)
NEUTROPHILS # BLD AUTO: 7 K/UL (ref 1.8–7.7)
NEUTROPHILS NFR BLD: 68.9 % (ref 38–73)
NRBC BLD-RTO: 0 /100 WBC
PLATELET # BLD AUTO: 344 K/UL (ref 150–450)
PMV BLD AUTO: 8.4 FL (ref 9.2–12.9)
POTASSIUM SERPL-SCNC: 4 MMOL/L (ref 3.5–5.1)
PROT SERPL-MCNC: 8 G/DL (ref 6–8.4)
RBC # BLD AUTO: 3.56 M/UL (ref 4–5.4)
SODIUM SERPL-SCNC: 144 MMOL/L (ref 136–145)
WBC # BLD AUTO: 10.14 K/UL (ref 3.9–12.7)

## 2023-10-11 PROCEDURE — 85025 COMPLETE CBC W/AUTO DIFF WBC: CPT | Performed by: EMERGENCY MEDICINE

## 2023-10-11 PROCEDURE — 94060 EVALUATION OF WHEEZING: CPT

## 2023-10-11 PROCEDURE — 80053 COMPREHEN METABOLIC PANEL: CPT | Mod: 91 | Performed by: EMERGENCY MEDICINE

## 2023-10-11 PROCEDURE — 96361 HYDRATE IV INFUSION ADD-ON: CPT

## 2023-10-11 PROCEDURE — 94729 DIFFUSING CAPACITY: CPT

## 2023-10-11 PROCEDURE — 94010 BREATHING CAPACITY TEST: CPT

## 2023-10-11 PROCEDURE — 94618 PULMONARY STRESS TESTING: CPT

## 2023-10-11 PROCEDURE — 63600175 PHARM REV CODE 636 W HCPCS: Mod: JZ,JG | Performed by: EMERGENCY MEDICINE

## 2023-10-11 PROCEDURE — 99284 EMERGENCY DEPT VISIT MOD MDM: CPT | Mod: 25

## 2023-10-11 PROCEDURE — 94727 GAS DIL/WSHOT DETER LNG VOL: CPT

## 2023-10-11 PROCEDURE — 96374 THER/PROPH/DIAG INJ IV PUSH: CPT | Mod: 59

## 2023-10-11 PROCEDURE — 25000003 PHARM REV CODE 250: Performed by: EMERGENCY MEDICINE

## 2023-10-11 RX ORDER — SODIUM CHLORIDE 9 MG/ML
1000 INJECTION, SOLUTION INTRAVENOUS
Status: COMPLETED | OUTPATIENT
Start: 2023-10-11 | End: 2023-10-12

## 2023-10-11 RX ORDER — METOPROLOL SUCCINATE 25 MG/1
50 TABLET, EXTENDED RELEASE ORAL DAILY
Status: DISCONTINUED | OUTPATIENT
Start: 2023-10-11 | End: 2023-10-12 | Stop reason: HOSPADM

## 2023-10-11 RX ORDER — GLUCAGON 1 MG
1 KIT INJECTION ONCE
Status: COMPLETED | OUTPATIENT
Start: 2023-10-11 | End: 2023-10-11

## 2023-10-11 RX ADMIN — SODIUM CHLORIDE 1000 ML: 0.9 INJECTION, SOLUTION INTRAVENOUS at 10:10

## 2023-10-11 RX ADMIN — GLUCAGON 1 MG: KIT at 10:10

## 2023-10-11 RX ADMIN — METOPROLOL SUCCINATE 50 MG: 25 TABLET, FILM COATED, EXTENDED RELEASE ORAL at 11:10

## 2023-10-11 NOTE — CARE UPDATE
"   10/11/23 1306   6MW Test   Ordering Provider Whitney Molina MD   Diagnosis Shortness of Breath;Qualify for Oxygen   Height 5' 2" (1.575 m)   Weight 35.8 kg (79 lb)   BMI (Calculated) 14.4   Predicted Distance Meters (Calculated) 437.53 meters   Patient Race    6MWT Status completed without stopping   Patient Reported Dyspnea   Was O2 used? Yes   Delivery Method Cannula;Pull Tank;Continuous Flow   6MW Distance walked (feet) 1100 feet   Distance walked (meters) 335.28 meters   Did patient stop? No   Type of assistive device(s) used? a walker   Is extra documentation required for this patient? Yes   Pre-Exercise   Oxygen Saturation 91 %   Supplemental Oxygen Room Air   Heart Rate 74 bpm   Sophia Dyspnea Rating  somewhat heavy   Exercise 1 Minute   Oxygen Saturation 86 %   Supplemental Oxygen Room Air   Heart Rate 86 bpm   Exercise 2 Minutes   Oxygen Saturation 92 %   Supplemental Oxygen 2 L/M   Heart Rate 110 bpm   Exercise 3 Minutes   Oxygen Saturation 95 %   Supplemental Oxygen 2 L/M   Heart Rate 101 bpm   Exercise 4 Minutes   Oxygen Saturation 97 %   Supplemental Oxygen 2 L/M   Heart Rate 101 bpm   Exercise 5 Minutes   Oxygen Saturation 96 %   Supplemental Oxygen 2 L/M   Heart Rate 90 bpm   Post Exercise   Oxygen Saturation 97 %   Supplemental Oxygen 2 L/M   Heart Rate 100 bpm   Sophia Dyspnea Rating  moderate   Recovery   Oxygen Saturation 98 %   Supplemental Oxygen 2 L/M   Heart Rate 109 bpm   Sophia Dyspnea Rating  moderate   Interpretation   Is procedure ready for interpretation? Yes   Did the patient stop or pause? No   Total Laps Walked 5.5   Final Partial Lap Distance (feet) 0 feet   Total Distance Feet (Calculated) 1100 feet   Total Distance Meters (Calculated) 335.28 meters   Percentage of Predicted (Calculated) 76.63   Peak VO2 (Calculated) 14.04   Mets 4.01   Comments This is a hypoxemic 6 min. walk.   Patient requires __2___ liters of oxygen to adequately oxygenate with ambulation.   Oxygen " Qualification   Oxygen Qualification? Yes

## 2023-10-11 NOTE — TELEPHONE ENCOUNTER
The patient's PFTs are slightly improved.  She is now hypoxemic on her 6 minute walk.  Oxygen has been ordered.  No answer unable to leave message

## 2023-10-12 VITALS
HEART RATE: 74 BPM | WEIGHT: 80 LBS | BODY MASS INDEX: 14.63 KG/M2 | TEMPERATURE: 98 F | SYSTOLIC BLOOD PRESSURE: 146 MMHG | RESPIRATION RATE: 18 BRPM | OXYGEN SATURATION: 98 % | DIASTOLIC BLOOD PRESSURE: 69 MMHG

## 2023-10-12 NOTE — ED PROVIDER NOTES
"Encounter Date: 10/11/2023       History     Chief Complaint   Patient presents with    Foreign Body In Throat     Reports "food may be stuck in throat" onset "hour ago"      Chief complaint is difficulty swallowing saliva.  This is an 83-year-old female who had 3 episodes in last 24 hours where she had difficulty eating foods.  For about 30 minutes after trying to eat she would have episodes of spitting saliva.  This last episode that occurred tonight at 8:00 p.m. while eating meat loaf and macaroni cheese caused her to have difficulty swallowing spitting saliva vomiting up some food.  She does have a history of an EGD 2 months ago where her esophagus stretched.  Tonight with the vomiting and the difficulties tolerating saliva she also has some burping as well.  She was able to swallow some liquids in front of me but states it does not feel right and did spit up some saliva.        Review of patient's allergies indicates:   Allergen Reactions    Sulfa (sulfonamide antibiotics) Nausea Only     Past Medical History:   Diagnosis Date    Back pain     Brain aneurysm 2018    Carotid stenosis     Cataract     Diverticulitis     Emphysema lung     Feeling anxious     Hyperlipidemia     Hypertension     Macular degeneration     PVD (peripheral vascular disease)     Squamous cell carcinoma 08/29/2017    right medical cheek, SSIS, efudex tx     Past Surgical History:   Procedure Laterality Date    ABDOMINAL SURGERY      ANGIOGRAPHY OF MESENTERIC VESSELS Left 09/07/2022    Procedure: ANGIOGRAM, MESENTERIC VESSELS;  Surgeon: Krish Machado MD;  Location: [facility] CATH/EP LAB;  Service: General;  Laterality: Left;    ARTERIOGRAM, MESENTERIC N/A 06/30/2021    Procedure: ARTERIOGRAM, MESENTERIC;  Surgeon: Krish Machado MD;  Location: [facility] CATH/EP LAB;  Service: General;  Laterality: N/A;    BACK SURGERY  2008    CATARACT EXTRACTION, BILATERAL      COLONOSCOPY N/A 04/04/2022    Procedure: COLONOSCOPY;  Surgeon: Lino Devi, " MD;  Location: [facility] ENDO;  Service: Endoscopy;  Laterality: N/A;    CREATION, BYPASS, ARTERIAL, AORTA TO FEMORAL N/A 2021    Procedure: ARTERIOGRAM, MESENTERIC;  Surgeon: Krish Machado MD;  Location: [facility] CATH/EP LAB;  Service: General;  Laterality: N/A;    ENDOSCOPY OF PROXIMAL SMALL INTESTINE N/A 2022    Procedure: ENTEROSCOPY, PROXIMAL;  Surgeon: Lino Devi MD;  Location: [facility] ENDO;  Service: Endoscopy;  Laterality: N/A;    ESOPHAGOGASTRODUODENOSCOPY N/A 2022    Procedure: EGD (ESOPHAGOGASTRODUODENOSCOPY);  Surgeon: Pedro Hall Jr., MD;  Location: [facility] ENDO;  Service: Endoscopy;  Laterality: N/A;    ESOPHAGOGASTRODUODENOSCOPY N/A 2023    Procedure: EGD (ESOPHAGOGASTRODUODENOSCOPY);  Surgeon: Lino Devi MD;  Location: [facility] ENDO;  Service: Endoscopy;  Laterality: N/A;    HYSTERECTOMY      IMPLANTATION OF SPINAL CORD STIMULATOR IN CERVICAL SPINE      UPPER GASTROINTESTINAL ENDOSCOPY  2022    w/ antrum biopsy     Family History   Problem Relation Age of Onset    Melanoma Neg Hx     Psoriasis Neg Hx     Eczema Neg Hx     Lupus Neg Hx      Social History     Tobacco Use    Smoking status: Former     Current packs/day: 0.00     Average packs/day: 1 pack/day for 56.8 years (56.8 ttl pk-yrs)     Types: Cigarettes     Start date: 1965     Quit date: 2022     Years since quittin.5    Smokeless tobacco: Never   Substance Use Topics    Alcohol use: No    Drug use: Not Currently     Review of Systems   Constitutional:  Negative for chills and fever.   HENT:  Negative for ear pain, rhinorrhea and sore throat.    Eyes:  Negative for pain and visual disturbance.   Respiratory:  Negative for cough and shortness of breath.    Cardiovascular:  Negative for chest pain and palpitations.   Gastrointestinal:  Negative for abdominal pain, constipation, diarrhea, nausea and vomiting.   Genitourinary:  Negative for dysuria, frequency, hematuria and urgency.   Musculoskeletal:   Negative for back pain, joint swelling and myalgias.   Skin:  Negative for rash.   Neurological:  Negative for dizziness, seizures, weakness and headaches.   Psychiatric/Behavioral:  Negative for dysphoric mood. The patient is not nervous/anxious.        Physical Exam     Initial Vitals [10/11/23 2110]   BP Pulse Resp Temp SpO2   (!) 174/96 80 18 98 °F (36.7 °C) (!) 94 %      MAP       --         Physical Exam    Nursing note and vitals reviewed.  Constitutional: She appears well-developed and well-nourished.   HENT:   Head: Normocephalic and atraumatic.   Nose: Nose normal.   Mouth/Throat: Oropharynx is clear and moist.   Eyes: Conjunctivae, EOM and lids are normal. Pupils are equal, round, and reactive to light.   Neck: Trachea normal. Neck supple. No thyroid mass and no thyromegaly present.   Normal range of motion.  Cardiovascular:  Normal rate, regular rhythm and normal heart sounds.           Pulmonary/Chest: Effort normal and breath sounds normal.   Abdominal: Abdomen is soft. There is no abdominal tenderness.   Musculoskeletal:         General: Normal range of motion.      Cervical back: Normal range of motion and neck supple.     Neurological: She is alert and oriented to person, place, and time. She has normal strength and normal reflexes. No cranial nerve deficit or sensory deficit.   Skin: Skin is warm and dry.   Psychiatric: She has a normal mood and affect. Her speech is normal and behavior is normal. Judgment and thought content normal.         ED Course   Procedures  Labs Reviewed   COMPREHENSIVE METABOLIC PANEL - Abnormal; Notable for the following components:       Result Value    Chloride 112 (*)     CO2 21 (*)     BUN 34 (*)     All other components within normal limits   CBC W/ AUTO DIFFERENTIAL - Abnormal; Notable for the following components:    RBC 3.56 (*)     Hemoglobin 11.9 (*)     Hematocrit 36.1 (*)      (*)     MCH 33.4 (*)     MPV 8.4 (*)     Immature Grans (Abs) 0.05 (*)      All other components within normal limits          Imaging Results    None          Medications   metoprolol succinate (TOPROL-XL) 24 hr tablet 50 mg (50 mg Oral Given 10/11/23 2332)   glucagon (human recombinant) injection 1 mg (1 mg Intravenous Given 10/11/23 2237)   0.9%  NaCl infusion (0 mLs Intravenous Stopped 10/12/23 0044)     Medical Decision Making  The patient has questionable foreign body obstruction in the esophagus versus slowly increasing narrowing of the esophagus by history.  The patient was given glucagon is now drinking water and is much more comfortable.  I will attempt to give her 1 of her nightly medicines which she could take earlier to see if she can tolerate swallowing a pill.  We will then re-evaluate and possibly discharge home.Mary Alice Simons MD  11:16 PM 10/11/2023      The patient was rechecked and now feels back to her baseline.  She swallowed her nightly pill.  She is drinking water without difficulty and feels back to her baseline.  She is recommended to call her GI doctor Dr. Guzmán tomorrow and described her 3 events so he can schedule an outpatient EGD as soon as possible for further evaluation and treatment.Mary Alice Simons MD  12:00 AM 10/12/2023          Amount and/or Complexity of Data Reviewed  Labs: ordered.    Risk  Prescription drug management.                               Clinical Impression:   Final diagnoses:  [R09.A2] Foreign body sensation in throat (Primary)  [R13.10] Dysphagia, unspecified type        ED Disposition Condition    Discharge           ED Prescriptions    None       Follow-up Information    None          Mary Alice Simons MD  10/12/23 0132       Mary Alice Simons MD  10/12/23 0132

## 2023-10-12 NOTE — DISCHARGE INSTRUCTIONS
Call your GI doctor Dr. Bates in the morning to let him know about your situation so you can get a repeat EGD and further treatment.  Please return for worsening symptoms.  Please return for difficulty swallowing even liquids.

## 2023-10-16 ENCOUNTER — TELEPHONE (OUTPATIENT)
Dept: PULMONOLOGY | Facility: CLINIC | Age: 83
End: 2023-10-16

## 2023-10-17 DIAGNOSIS — R63.4 WEIGHT LOSS: ICD-10-CM

## 2023-10-17 DIAGNOSIS — K55.1: ICD-10-CM

## 2023-10-17 DIAGNOSIS — K22.2 ESOPHAGEAL STRICTURE: Primary | ICD-10-CM

## 2023-10-25 ENCOUNTER — ANESTHESIA EVENT (OUTPATIENT)
Dept: SURGERY | Facility: HOSPITAL | Age: 83
End: 2023-10-25
Payer: MEDICARE

## 2023-10-25 NOTE — ANESTHESIA PREPROCEDURE EVALUATION
10/25/2023  Carmen Wilkinson is a 83 y.o., female.      Tobacco Use:  The patient  reports that she quit smoking about 18 months ago. Her smoking use included cigarettes. She started smoking about 58 years ago. She has a 56.8 pack-year smoking history. She has never used smokeless tobacco.     Results for orders placed or performed during the hospital encounter of 08/29/23   EKG 12-lead    Collection Time: 08/29/23  9:22 AM    Narrative    Test Reason : Z01.818,    Vent. Rate : 069 BPM     Atrial Rate : 069 BPM     P-R Int : 154 ms          QRS Dur : 064 ms      QT Int : 364 ms       P-R-T Axes : 074 072 075 degrees     QTc Int : 390 ms    Normal sinus rhythm  Normal ECG  When compared with ECG of 18-NOV-2022 14:49,  Premature atrial complexes are no longer Present  Vent. rate has decreased BY  36 BPM  Criteria for Septal infarct are no longer Present  Criteria for Inferior infarct are no longer Present  Non-specific change in ST segment in Inferior leads  T wave inversion no longer evident in Inferior leads  T wave inversion no longer evident in Anterior-lateral leads  Confirmed by Joyce MEDINA, Evelio MCCORMICK ([id number]) on 8/31/2023 10:16:50 PM    Referred By:             Confirmed By:Evelio Cook MD             Lab Results   Component Value Date    WBC 10.14 10/11/2023    HGB 11.9 (L) 10/11/2023    HCT 36.1 (L) 10/11/2023     (H) 10/11/2023     10/11/2023     BMP  Lab Results   Component Value Date     10/11/2023    K 4.0 10/11/2023     (H) 10/11/2023    CO2 21 (L) 10/11/2023    BUN 34 (H) 10/11/2023    CREATININE 0.9 10/11/2023    CALCIUM 9.7 10/11/2023    ANIONGAP 11 10/11/2023     10/11/2023    GLU 99 10/11/2023    GLU 95 08/29/2023       Results for orders placed during the hospital encounter of 04/01/22    Echo Saline Bubble? No    Interpretation Summary  · The left  ventricle is small with concentric remodeling and hyperdynamic systolic function.  · The estimated ejection fraction is 75%.  · Indeterminate left ventricular diastolic function.  · Mild right ventricular enlargement with normal right ventricular systolic function.  · Mild to moderate tricuspid regurgitation.  · Mild-to-moderate aortic regurgitation.        Pre-op Assessment    I have reviewed the Patient Summary Reports.     I have reviewed the Nursing Notes. I have reviewed the NPO Status.   I have reviewed the Medications.     Review of Systems  Anesthesia Hx:  No problems with previous Anesthesia  Denies Family Hx of Anesthesia complications.   Denies Personal Hx of Anesthesia complications.   Social:  Former Smoker, No Alcohol Use    Hematology/Oncology:         -- Anemia: --  Cancer in past history:  surgery  Oncology Comments: Squamous cell carcinoma     EENT/Dental:   Dysphonia    Macular degeneration Eyes: Visual Impairment Has Bilateral and S/P Extraction - Bilateral Catarract   Cardiovascular:   Hypertension, poorly controlled PVD hyperlipidemia ECG has been reviewed. Hx of Mesenteric artery stenosis    Patient followed by Dr. Bowers seen 3 months ago     Cardiac workup negative per patient  Valvular Heart Disease: Aortic Regurgitation (AR), mild, moderate, Tricuspid Regurgitation (TR), mild, moderate   Carotoid Artery Disease    Pulmonary:   COPD Asthma Shortness of breath Daily inhaler - Trelegy Ellipta    Rescue inhaler last used 3 weeks ago     Home Oxygen ordered but patient has not received yet     No Hospitalizations for exacerbations   Patient followed by Dr. Molina - seen 2 weeks ago  Asthma:  Chronic Obstructive Pulmonary Disease (COPD): Emphysema    Hepatic/GI:   PUD, Hx Acute pancreatitis    Hx of Mesenteric artery stenosis   Musculoskeletal:   Hx cervical spine stimulator - not active Spine Disorders: lumbar and cervical Chronic Pain, Degenerative disease and Disc disease    Neurological:    Brain aneurysm present but not followed per patient    Psych:   Psychiatric History anxiety          Physical Exam  General: Well nourished, Cooperative, Alert and Oriented    Airway:  Mallampati: III   Mouth Opening: Normal  TM Distance: > 6 cm  Tongue: Normal  Neck ROM: Extension Decreased    Dental:  Dentures, Caps / Implants    Chest/Lungs:  Clear to auscultation, Normal Respiratory Rate    Heart:  Rate: Normal  Rhythm: Regular Rhythm        Anesthesia Plan  Type of Anesthesia, risks & benefits discussed:    Anesthesia Type: Gen Natural Airway  Intra-op Monitoring Plan: Standard ASA Monitors  Induction:  IV  Informed Consent: Informed consent signed with the Patient and all parties understand the risks and agree with anesthesia plan.  All questions answered.   ASA Score: 3  Anesthesia Plan Notes:       GNA    POM    Propofol     Ready For Surgery From Anesthesia Perspective.     .

## 2023-10-26 ENCOUNTER — HOSPITAL ENCOUNTER (OUTPATIENT)
Facility: HOSPITAL | Age: 83
Discharge: HOME | End: 2023-10-26
Attending: INTERNAL MEDICINE | Admitting: INTERNAL MEDICINE
Payer: MEDICARE

## 2023-10-26 ENCOUNTER — ANESTHESIA (OUTPATIENT)
Dept: SURGERY | Facility: HOSPITAL | Age: 83
End: 2023-10-26
Payer: MEDICARE

## 2023-10-26 VITALS
RESPIRATION RATE: 16 BRPM | SYSTOLIC BLOOD PRESSURE: 117 MMHG | OXYGEN SATURATION: 95 % | TEMPERATURE: 98 F | HEART RATE: 74 BPM | DIASTOLIC BLOOD PRESSURE: 56 MMHG

## 2023-10-26 DIAGNOSIS — K22.2 ESOPHAGEAL STRICTURE: ICD-10-CM

## 2023-10-26 PROCEDURE — 43450 DILATE ESOPHAGUS 1/MULT PASS: CPT | Performed by: INTERNAL MEDICINE

## 2023-10-26 PROCEDURE — D9220A PRA ANESTHESIA: Mod: CRNA,,, | Performed by: NURSE ANESTHETIST, CERTIFIED REGISTERED

## 2023-10-26 PROCEDURE — D9220A PRA ANESTHESIA: ICD-10-PCS | Mod: CRNA,,, | Performed by: NURSE ANESTHETIST, CERTIFIED REGISTERED

## 2023-10-26 PROCEDURE — D9220A PRA ANESTHESIA: Mod: ANES,,, | Performed by: ANESTHESIOLOGY

## 2023-10-26 PROCEDURE — 63600175 PHARM REV CODE 636 W HCPCS: Performed by: NURSE ANESTHETIST, CERTIFIED REGISTERED

## 2023-10-26 PROCEDURE — D9220A PRA ANESTHESIA: ICD-10-PCS | Mod: ANES,,, | Performed by: ANESTHESIOLOGY

## 2023-10-26 PROCEDURE — 43235 EGD DIAGNOSTIC BRUSH WASH: CPT | Performed by: INTERNAL MEDICINE

## 2023-10-26 PROCEDURE — 37000008 HC ANESTHESIA 1ST 15 MINUTES: Performed by: INTERNAL MEDICINE

## 2023-10-26 RX ORDER — PROPOFOL 10 MG/ML
VIAL (ML) INTRAVENOUS
Status: DISCONTINUED | OUTPATIENT
Start: 2023-10-26 | End: 2023-10-26

## 2023-10-26 RX ORDER — PHENYLEPHRINE HYDROCHLORIDE 10 MG/ML
INJECTION INTRAVENOUS
Status: DISCONTINUED | OUTPATIENT
Start: 2023-10-26 | End: 2023-10-26

## 2023-10-26 RX ADMIN — PROPOFOL 30 MG: 10 INJECTION, EMULSION INTRAVENOUS at 10:10

## 2023-10-26 RX ADMIN — PROPOFOL 80 MG: 10 INJECTION, EMULSION INTRAVENOUS at 10:10

## 2023-10-26 RX ADMIN — PHENYLEPHRINE HYDROCHLORIDE 100 MCG: 10 INJECTION INTRAVENOUS at 10:10

## 2023-10-26 RX ADMIN — SODIUM CHLORIDE, SODIUM LACTATE, POTASSIUM CHLORIDE, AND CALCIUM CHLORIDE: .6; .31; .03; .02 INJECTION, SOLUTION INTRAVENOUS at 10:10

## 2023-10-26 NOTE — ANESTHESIA POSTPROCEDURE EVALUATION
Anesthesia Post Evaluation    Patient: Carmen Fletcher Pflug    Procedure(s) Performed: Procedure(s) (LRB):  EGD (ESOPHAGOGASTRODUODENOSCOPY) (N/A)    Final Anesthesia Type: general      Patient location during evaluation: GI PACU  Patient participation: Yes- Able to Participate  Level of consciousness: awake and alert, oriented and awake  Post-procedure vital signs: reviewed and stable  Pain management: adequate  Airway patency: patent    PONV status at discharge: No PONV  Anesthetic complications: no      Cardiovascular status: blood pressure returned to baseline, hemodynamically stable and stable  Respiratory status: unassisted, spontaneous ventilation and room air  Hydration status: euvolemic  Follow-up not needed.          Vitals Value Taken Time   /51 10/26/23 1102   Temp 36.9 °C (98.4 °F) 10/26/23 1100   Pulse 71 10/26/23 1105   Resp 16 10/26/23 1055   SpO2 98 % 10/26/23 1105   Vitals shown include unvalidated device data.      No case tracking events are documented in the log.      Pain/Wili Score: No data recorded

## 2023-10-26 NOTE — [employer] PATIENT INSTRUCTIONS
Discharge Summary/Instructions after an Endoscopic Procedure  Patient Name: Carmen Wilkinson  Patient MRN: [id number]  Patient YOB: 1940 Thursday, October 26, 2023  Lino Devi MD  RESTRICTIONS:  During your procedure today, you received medications for sedation.  These   medications may affect your judgment, balance and coordination.  Therefore,   for 24 hours, you have the following restrictions:   - DO NOT drive a car, operate machinery, make legal/financial decisions,   sign important papers or drink alcohol.    ACTIVITY:  Today: no heavy lifting, straining or running due to procedural   sedation/anesthesia.  The following day: return to full activity including work.  DIET:  Eat and drink normally unless instructed otherwise.     TREATMENT FOR COMMON SIDE EFFECTS:  - Mild abdominal pain, nausea, belching, bloating or excessive gas:  rest,   eat lightly and use a heating pad.  - Sore Throat: treat with throat lozenges and/or gargle with warm salt   water.  - Because air was used during the procedure, expelling large amounts of air   from your rectum or belching is normal.  - If a bowel prep was taken, you may not have a bowel movement for 1-3 days.    This is normal.  SYMPTOMS TO WATCH FOR AND REPORT TO YOUR PHYSICIAN:  1. Abdominal pain or bloating, other than gas cramps.  2. Chest pain.  3. Back pain.  4. Signs of infection such as: chills or fever occurring within 24 hours   after the procedure.  5. Rectal bleeding, which would show as bright red, maroon, or black stools.   (A tablespoon of blood from the rectum is not serious, especially if   hemorrhoids are present.)  6. Vomiting.  7. Weakness or dizziness.  GO DIRECTLY TO THE NEAREST EMERGENCY ROOM IF YOU HAVE ANY OF THE FOLLOWING:      Difficulty breathing              Chills and/or fever over 101 F   Persistent vomiting and/or vomiting blood   Severe abdominal pain   Severe chest pain   Black, tarry stools   Bleeding- more than one  tablespoon   Any other symptom or condition that you feel may need urgent attention  Your doctor recommends these additional instructions:  If any biopsies were taken, your doctors clinic will contact you in 1 to 2   weeks with any results.  - Patient has a contact number available for emergencies.  The signs and   symptoms of potential delayed complications were discussed with the   patient.  Return to normal activities tomorrow.  Written discharge   instructions were provided to the patient.   - Resume previous diet.   - Continue present medications.   - Repeat upper endoscopy in 2 months for retreatment.   - PPI twice daily  - CT angio planned for history of chronic mesenteric ischemia.  - Discharge patient to home (with escort).  For questions, problems or results please call your physician - Lino Devi MD at Work:  [phone].  [facility], EMERGENCY ROOM PHONE NUMBER: [phone]  IF A COMPLICATION OR EMERGENCY SITUATION ARISES AND YOU ARE UNABLE TO REACH   YOUR PHYSICIAN - GO DIRECTLY TO THE EMERGENCY ROOM.  MD Lino Allen MD  10/26/2023 10:52:15 AM  This report has been verified and signed electronically.  Dear patient,  As a result of recent federal legislation (The Federal Cures Act), you may   receive lab or pathology results from your procedure in your [facility]   account before your physician is able to contact you. Your physician or   their representative will relay the results to you with their   recommendations at their soonest availability.  Thank you,  [employer]ATION

## 2023-10-26 NOTE — H&P
GASTROENTEROLOGY PRE-PROCEDURE H&P NOTE  Patient Name: Carmen Fletcher Pflug  Patient MRN: [id number]  Patient : 1940    Service date: 10/26/2023    PCP: Abiodun Will MD    No chief complaint on file.      HPI: Patient is a 83 y.o. female with PMHx as below here for evaluation of esophageal stricture and dysphagia.  EGD  with moderate stenosis 36-37 dilated to 15 mmHg.  .     Past Medical History:  Past Medical History:   Diagnosis Date    Back pain     Brain aneurysm 2018    Carotid stenosis     Cataract     Diverticulitis     Emphysema lung     Feeling anxious     Hyperlipidemia     Hypertension     Macular degeneration     PVD (peripheral vascular disease)     Squamous cell carcinoma 2017    right medical cheek, SSIS, efudex tx        Past Surgical History:  Past Surgical History:   Procedure Laterality Date    ABDOMINAL SURGERY      ANGIOGRAPHY OF MESENTERIC VESSELS Left 2022    Procedure: ANGIOGRAM, MESENTERIC VESSELS;  Surgeon: Krish Machado MD;  Location: [facility] CATH/EP LAB;  Service: General;  Laterality: Left;    ARTERIOGRAM, MESENTERIC N/A 2021    Procedure: ARTERIOGRAM, MESENTERIC;  Surgeon: Krish Machado MD;  Location: [facility] CATH/EP LAB;  Service: General;  Laterality: N/A;    BACK SURGERY      CATARACT EXTRACTION, BILATERAL      COLONOSCOPY N/A 2022    Procedure: COLONOSCOPY;  Surgeon: Lino Devi MD;  Location: [facility] ENDO;  Service: Endoscopy;  Laterality: N/A;    CREATION, BYPASS, ARTERIAL, AORTA TO FEMORAL N/A 2021    Procedure: ARTERIOGRAM, MESENTERIC;  Surgeon: Krish Machado MD;  Location: [facility] CATH/EP LAB;  Service: General;  Laterality: N/A;    ENDOSCOPY OF PROXIMAL SMALL INTESTINE N/A 2022    Procedure: ENTEROSCOPY, PROXIMAL;  Surgeon: Lino Devi MD;  Location: [facility] ENDO;  Service: Endoscopy;  Laterality: N/A;    ESOPHAGOGASTRODUODENOSCOPY N/A 2022    Procedure: EGD (ESOPHAGOGASTRODUODENOSCOPY);  Surgeon: Pedro SANCHEZ  Isabel Freire MD;  Location: [facility] ENDO;  Service: Endoscopy;  Laterality: N/A;    ESOPHAGOGASTRODUODENOSCOPY N/A 8/31/2023    Procedure: EGD (ESOPHAGOGASTRODUODENOSCOPY);  Surgeon: Lino Devi MD;  Location: [facility];  Service: Endoscopy;  Laterality: N/A;    HYSTERECTOMY      IMPLANTATION OF SPINAL CORD STIMULATOR IN CERVICAL SPINE      UPPER GASTROINTESTINAL ENDOSCOPY  11/20/2022    w/ antrum biopsy        Home Medications:  Medications Prior to Admission   Medication Sig Dispense Refill Last Dose    albuterol (PROAIR HFA) 90 mcg/actuation inhaler Inhale 2 puffs into the lungs every 4 (four) hours as needed for Wheezing. Rescue 18 g 11     aspirin (ECOTRIN) 81 MG EC tablet Take 81 mg by mouth once daily.       clopidogreL (PLAVIX) 75 mg tablet Take 1 tablet (75 mg total) by mouth once daily. 30 tablet 3     ferrous sulfate 325 (65 FE) MG EC tablet Take 325 mg by mouth once daily.       fluticasone-umeclidin-vilanter (TRELEGY ELLIPTA) 100-62.5-25 mcg DsDv Inhale 1 puff into the lungs once daily. 1 each 6     krill-om-3-dha-epa-phospho-ast (MEGARED OMEGA-3 KRILL OIL) 686-86-12-50 mg Cap Take 1 tablet by mouth once daily.       L. gasseri-B. bifidum-B longum 1.5 billion cell Cap Take 1 tablet by mouth once daily.        megestroL (MEGACE) 20 MG Tab Take 40 mg by mouth 2 (two) times daily.       metoprolol succinate (TOPROL-XL) 50 MG 24 hr tablet Take 50 mg by mouth 2 (two) times daily.       oxyCODONE-acetaminophen (PERCOCET)  mg per tablet Take 1 tablet by mouth every 4 (four) hours as needed for Pain. 5 times daily       pantoprazole (PROTONIX) 40 MG tablet Take 1 tablet (40 mg total) by mouth 2 (two) times daily. 180 tablet 1     polycarbophil (FIBERCON) 625 mg tablet Take 625 mg by mouth once daily.       simvastatin (ZOCOR) 10 MG tablet Take 20 mg by mouth every evening.       UNABLE TO FIND Take 1 tablet by mouth once daily. medication name: Ronak GRULLONI       vit C,Y-Le-hdxew-lutein-zeaxan  "(PRESERVISION AREDS-2) 250-90-40-1 mg Cap Take 1 tablet by mouth 2 (two) times a day.                  Review of patient's allergies indicates:   Allergen Reactions    Sulfa (sulfonamide antibiotics) Nausea Only       Social History:   Social History     Occupational History    Not on file   Tobacco Use    Smoking status: Former     Current packs/day: 0.00     Average packs/day: 1 pack/day for 56.8 years (56.8 ttl pk-yrs)     Types: Cigarettes     Start date: 1965     Quit date: 2022     Years since quittin.5    Smokeless tobacco: Never   Substance and Sexual Activity    Alcohol use: No    Drug use: Not Currently    Sexual activity: Not on file       Family History:   Family History   Problem Relation Age of Onset    Melanoma Neg Hx     Psoriasis Neg Hx     Eczema Neg Hx     Lupus Neg Hx        Review of Systems:  A 10 point review of systems was performed and was normal, except as mentioned in the HPI, including constitutional, HEENT, heme, lymph, cardiovascular, respiratory, gastrointestinal, genitourinary, neurologic, endocrine, psychiatric and musculoskeletal.      OBJECTIVE:    Physical Exam:  24 Hour Vital Sign Ranges:    Most recent vitals: LMP  (LMP Unknown)    GEN: well-developed, well-nourished, awake and alert, non-toxic appearing adult  HEENT: PERRL, sclera anicteric, oral mucosa pink and moist without lesion  NECK: trachea midline; Good ROM  CV: regular rate and rhythm, no murmurs or gallops  RESP: clear to auscultation bilaterally, no wheezes, rhonci or rales  ABD: soft, non-tender, non-distended, normal bowel sounds  EXT: no swelling or edema, 2+ pulses distally  SKIN: no rashes or jaundice  PSYCH: normal affect    Labs:   No results for input(s): "WBC", "MCV", "PLT" in the last 72 hours.    Invalid input(s): "HGBAU"  No results for input(s): "NA", "K", "CL", "CO2", "BUN", "GLU" in the last 72 hours.    Invalid input(s): "CREA"  No results for input(s): "ALB" in the last 72 " "hours.    Invalid input(s): "ALKP", "SGOT", "SGPT", "TBIL", "DBIL", "TPRO"  No results for input(s): "PT", "INR", "PTT" in the last 72 hours.      IMPRESSION / RECOMMENDATIONS:  Esophageal stricture  Plavix use on hold for procedure    EGD with dilation and interventions as warranted.   RIsks, benefits, alternatives discussed in detail regarding upcoming procedures and sedation. Some of the more common endoscopic complications include but not limited to immediate or delayed perforation, bleeding, infections, pain, inadvertent injury to surrounding tissue / organs and possible need for surgical evaluation. Patient expressed understanding, all questions answered and will proceed with procedure as planned.     Lino Devi  10/26/2023  8:32 AM      "

## 2023-10-26 NOTE — TRANSFER OF CARE
Anesthesia Transfer of Care Note    Patient: Carmen Fletcher Pfluvivienne    Procedure(s) Performed: Procedure(s) (LRB):  EGD (ESOPHAGOGASTRODUODENOSCOPY) (N/A)    Patient location: PACU    Anesthesia Type: general and MAC    Transport from OR: Transported from OR on room air with adequate spontaneous ventilation    Post pain: adequate analgesia    Post assessment: no apparent anesthetic complications    Post vital signs: stable    Level of consciousness: awake    Nausea/Vomiting: no nausea/vomiting    Complications: none    Transfer of care protocol was followed      Last vitals:   Visit Vitals  /65   Pulse 86   Temp 36.4 °C (97.5 °F)   Resp 16   LMP  (LMP Unknown)   SpO2 95%

## 2023-11-07 ENCOUNTER — HOSPITAL ENCOUNTER (OUTPATIENT)
Dept: RADIOLOGY | Facility: HOSPITAL | Age: 83
Discharge: HOME | End: 2023-11-07
Attending: INTERNAL MEDICINE
Payer: MEDICARE

## 2023-11-07 DIAGNOSIS — K55.1: ICD-10-CM

## 2023-11-07 DIAGNOSIS — R63.4 WEIGHT LOSS: ICD-10-CM

## 2023-11-07 DIAGNOSIS — K22.2 ESOPHAGEAL STRICTURE: ICD-10-CM

## 2023-11-07 PROCEDURE — 25500020 PHARM REV CODE 255: Performed by: INTERNAL MEDICINE

## 2023-11-07 PROCEDURE — 74174 CTA ABD&PLVS W/CONTRAST: CPT | Mod: TC

## 2023-11-07 RX ADMIN — IOHEXOL 100 ML: 350 INJECTION, SOLUTION INTRAVENOUS at 03:11

## 2023-12-11 ENCOUNTER — HOSPITAL ENCOUNTER (OUTPATIENT)
Dept: PREADMISSION TESTING | Facility: HOSPITAL | Age: 83
Discharge: HOME | End: 2023-12-11
Attending: INTERNAL MEDICINE
Payer: MEDICARE

## 2023-12-11 VITALS
BODY MASS INDEX: 14.95 KG/M2 | RESPIRATION RATE: 18 BRPM | OXYGEN SATURATION: 98 % | TEMPERATURE: 98 F | SYSTOLIC BLOOD PRESSURE: 174 MMHG | WEIGHT: 81.25 LBS | HEART RATE: 98 BPM | DIASTOLIC BLOOD PRESSURE: 83 MMHG | HEIGHT: 62 IN

## 2023-12-11 DIAGNOSIS — Z01.818 PREOP TESTING: Primary | ICD-10-CM

## 2023-12-11 NOTE — DISCHARGE INSTRUCTIONS
INSTRUCTIONS  To confirm your doctor has scheduled your surgery for: December 12/14/23    COVID TESTING SCHEDULED:     Morning of surgery please check in with registration near Parking Garage Entrance then proceed to Outpatient Surgery Department.    Preop nurses will call the afternoon prior to surgery between 4:00 and 6:00 PM with your final arrival time.  PLEASE NOTE:  The surgery schedule has many variables which may affect the time of your surgery case. Family members should be available if your surgery time changes. Plan to be here the day of your procedure between 4-6 hours.    TAKE ONLY THESE MEDICATIONS WITH A SMALL SIP OF WATER THE MORNING OF SURGERY: see list    DO NOT TAKE THESE MEDICATIONS 5-7 DAYS PRIOR to your procedure per your surgeon's request: ASPIRIN, ALEVE, BC powder, LAKEISHA SELTZER, IBUPROFEN, FISH OIL, VITAMIN E, OR HERBALS   (May take Tylenol)    If you are prescribed any types of blood thinners (Aspirin, Coumadin, Plavix, Pradaxa, Xarelto, Aggrenox, Effient, Eliquis, Savasya, Brilinta or any other), please ask your surgeon how many days before scheduled procedure should you stop taking them. You may also need to verify with prescribing physician if it is OK to stop your blood thinners.      INSTRUCTIONS IMPORTANT!!  Do not eat or drink anything after midnight.  ONLY if you are diabetic, check your sugar in the morning before your procedure.  Do not smoke, vape or drink alcoholic beverages 24 hours prior to your procedure.  Shower the night before AND the morning of your procedure with a Chlorhexidine wash such as hibiclens or Dial antibacterial soap from neck down. You may use your own shampoo and face wash. This helps your skin to be as bacteria free as possible.  If you wear contact lenses, dentures, hearing aids or glasses, bring a container to put them in and give to a family member.    Please leave all jewelry, piercings and valuables at home.  DO NOT remove hair from the surgery site.    If your condition changes such as fever, cough, etc, please notify your surgeon.   ONLY if you have been diagnosed with sleep apnea please bring your C-PAP machine.  ONLY if you wear home oxygen please bring your portable oxygen tank the day of your procedure.   ONLY if you have a neuro stimulator, please bring the controller with you the morning of surgery  Make arrangements in advance for transportation home by a responsible adult.  You must make arrangements for transportation, TAXI'S, UBER'S OR LYFTS ARE NOT ALLOWED.        If you have any questions about these instructions, call Pre-Op Admit  Nursing at [phone] or the Pre-Op Day Surgery Unit at [phone].

## 2023-12-13 ENCOUNTER — ANESTHESIA EVENT (OUTPATIENT)
Dept: SURGERY | Facility: HOSPITAL | Age: 83
End: 2023-12-13
Payer: MEDICARE

## 2023-12-14 ENCOUNTER — HOSPITAL ENCOUNTER (OUTPATIENT)
Facility: HOSPITAL | Age: 83
Discharge: HOME | End: 2023-12-14
Attending: INTERNAL MEDICINE | Admitting: INTERNAL MEDICINE
Payer: MEDICARE

## 2023-12-14 ENCOUNTER — ANESTHESIA (OUTPATIENT)
Dept: SURGERY | Facility: HOSPITAL | Age: 83
End: 2023-12-14
Payer: MEDICARE

## 2023-12-14 VITALS
DIASTOLIC BLOOD PRESSURE: 58 MMHG | RESPIRATION RATE: 17 BRPM | SYSTOLIC BLOOD PRESSURE: 87 MMHG | OXYGEN SATURATION: 100 % | HEART RATE: 65 BPM | HEART RATE: 66 BPM | DIASTOLIC BLOOD PRESSURE: 47 MMHG | SYSTOLIC BLOOD PRESSURE: 123 MMHG | OXYGEN SATURATION: 97 % | RESPIRATION RATE: 16 BRPM | TEMPERATURE: 97 F

## 2023-12-14 DIAGNOSIS — K22.2 ESOPHAGEAL STRICTURE: ICD-10-CM

## 2023-12-14 PROCEDURE — 27200043 HC FORCEPS, BIOPSY: Performed by: INTERNAL MEDICINE

## 2023-12-14 PROCEDURE — 63600175 PHARM REV CODE 636 W HCPCS: Performed by: NURSE ANESTHETIST, CERTIFIED REGISTERED

## 2023-12-14 PROCEDURE — 37000009 HC ANESTHESIA EA ADD 15 MINS: Performed by: INTERNAL MEDICINE

## 2023-12-14 PROCEDURE — 88312 SPECIAL STAINS GROUP 1: CPT | Mod: TC | Performed by: PATHOLOGY

## 2023-12-14 PROCEDURE — D9220A PRA ANESTHESIA: ICD-10-PCS | Mod: CRNA,,, | Performed by: NURSE ANESTHETIST, CERTIFIED REGISTERED

## 2023-12-14 PROCEDURE — 43450 DILATE ESOPHAGUS 1/MULT PASS: CPT | Performed by: INTERNAL MEDICINE

## 2023-12-14 PROCEDURE — D9220A PRA ANESTHESIA: Mod: ANES,,, | Performed by: ANESTHESIOLOGY

## 2023-12-14 PROCEDURE — D9220A PRA ANESTHESIA: Mod: CRNA,,, | Performed by: NURSE ANESTHETIST, CERTIFIED REGISTERED

## 2023-12-14 PROCEDURE — 43239 EGD BIOPSY SINGLE/MULTIPLE: CPT | Performed by: INTERNAL MEDICINE

## 2023-12-14 PROCEDURE — D9220A PRA ANESTHESIA: ICD-10-PCS | Mod: ANES,,, | Performed by: ANESTHESIOLOGY

## 2023-12-14 PROCEDURE — 37000008 HC ANESTHESIA 1ST 15 MINUTES: Performed by: INTERNAL MEDICINE

## 2023-12-14 PROCEDURE — 88305 TISSUE EXAM BY PATHOLOGIST: CPT | Mod: TC | Performed by: PATHOLOGY

## 2023-12-14 RX ORDER — PROPOFOL 10 MG/ML
VIAL (ML) INTRAVENOUS
Status: DISCONTINUED | OUTPATIENT
Start: 2023-12-14 | End: 2023-12-14

## 2023-12-14 RX ADMIN — PROPOFOL 80 MG: 10 INJECTION, EMULSION INTRAVENOUS at 07:12

## 2023-12-14 RX ADMIN — PROPOFOL 30 MG: 10 INJECTION, EMULSION INTRAVENOUS at 07:12

## 2023-12-14 RX ADMIN — SODIUM CHLORIDE, SODIUM LACTATE, POTASSIUM CHLORIDE, AND CALCIUM CHLORIDE: .6; .31; .03; .02 INJECTION, SOLUTION INTRAVENOUS at 07:12

## 2023-12-14 NOTE — TRANSFER OF CARE
Anesthesia Transfer of Care Note    Patient: Carmen Fletcher Pfstanley    Procedure(s) Performed: Procedure(s) (LRB):  EGD (ESOPHAGOGASTRODUODENOSCOPY) (N/A)    Patient location: GI    Anesthesia Type: general    Transport from OR: Transported from OR on room air with adequate spontaneous ventilation    Post pain: adequate analgesia    Post assessment: no apparent anesthetic complications    Post vital signs: stable    Level of consciousness: awake and alert    Nausea/Vomiting: no nausea/vomiting    Complications: none    Transfer of care protocol was followedComments: AAXO3 SV, exchanging well.  To PACU, VSS upon arrival. Report to RN       Last vitals: Visit Vitals  BP (!) 136/97   Pulse 80   Temp 36.5 °C (97.7 °F)   Resp 20   LMP  (LMP Unknown)   SpO2 100%

## 2023-12-14 NOTE — H&P
Chief Complaint:  dysphagia    HPI:  Pt h/o esophageal stricture here for repeat evaluation/dilation    EGD 10/23  Findings:       One benign-appearing, intrinsic moderate (circumferential scarring        or stenosis; an endoscope may pass) stenosis was found 37 to 38 cm        from the incisors. This stenosis measured 2 cm (in length). The        stenosis was traversed. The scope was withdrawn. Dilation was        performed with a Landa dilator with mild resistance at 42 Fr. The        dilation site was examined following endoscope reinsertion and        showed moderate mucosal disruption.        Diffuse mildly erythematous mucosa without bleeding was found in the        entire examined stomach.        The examined duodenum was normal.        The cardia and gastric fundus were normal on retroflexion.     Review of Systems:  Constitutional: No fever, weight loss  Eyes: No vision problems  ENT: No hearing problems, dysphagia  Cardiovascular: No chest pain or palpitations  Respiratory: No breathing problems or cough  GI: No diarrhea or constipation  SONNY: No urinary symptoms  Neurologic: No tremor or headaches  Musculoskeletal: No weakness or pain  Integumentary: no rashes or lesions  Psychiatric: no depression or anxiety  Complete ROS performed and negative unless stated above in HPI          Past Medical History:   Diagnosis Date    Back pain     Brain aneurysm 2018    Carotid stenosis     Cataract     Diverticulitis     Emphysema lung     Feeling anxious     Hyperlipidemia     Hypertension     Macular degeneration     PVD (peripheral vascular disease)     Squamous cell carcinoma 08/29/2017    right medical cheek, SSIS, efudex tx       Past Surgical History:   Procedure Laterality Date    ABDOMINAL SURGERY      ANGIOGRAPHY OF MESENTERIC VESSELS Left 09/07/2022    Procedure: ANGIOGRAM, MESENTERIC VESSELS;  Surgeon: Krish Machado MD;  Location: [facility] CATH/EP LAB;  Service: General;  Laterality: Left;     ARTERIOGRAM, MESENTERIC N/A 2021    Procedure: ARTERIOGRAM, MESENTERIC;  Surgeon: Krish Machado MD;  Location: [facility] CATH/EP LAB;  Service: General;  Laterality: N/A;    BACK SURGERY      CATARACT EXTRACTION, BILATERAL      COLONOSCOPY N/A 2022    Procedure: COLONOSCOPY;  Surgeon: Lino Devi MD;  Location: [facility];  Service: Endoscopy;  Laterality: N/A;    CREATION, BYPASS, ARTERIAL, AORTA TO FEMORAL N/A 2021    Procedure: ARTERIOGRAM, MESENTERIC;  Surgeon: Krish Machado MD;  Location: [facility] CATH/EP LAB;  Service: General;  Laterality: N/A;    ENDOSCOPY OF PROXIMAL SMALL INTESTINE N/A 2022    Procedure: ENTEROSCOPY, PROXIMAL;  Surgeon: Lino Devi MD;  Location: [facility];  Service: Endoscopy;  Laterality: N/A;    ESOPHAGOGASTRODUODENOSCOPY N/A 2022    Procedure: EGD (ESOPHAGOGASTRODUODENOSCOPY);  Surgeon: Pedro Hall Jr., MD;  Location: [facility];  Service: Endoscopy;  Laterality: N/A;    ESOPHAGOGASTRODUODENOSCOPY N/A 2023    Procedure: EGD (ESOPHAGOGASTRODUODENOSCOPY);  Surgeon: Lino Devi MD;  Location: [facility];  Service: Endoscopy;  Laterality: N/A;    ESOPHAGOGASTRODUODENOSCOPY N/A 10/26/2023    Procedure: EGD (ESOPHAGOGASTRODUODENOSCOPY);  Surgeon: Lino Devi MD;  Location: [facility];  Service: Endoscopy;  Laterality: N/A;    HYSTERECTOMY      IMPLANTATION OF SPINAL CORD STIMULATOR IN CERVICAL SPINE      UPPER GASTROINTESTINAL ENDOSCOPY  2022    w/ antrum biopsy       Family History   Problem Relation Age of Onset    Melanoma Neg Hx     Psoriasis Neg Hx     Eczema Neg Hx     Lupus Neg Hx        Social History     Socioeconomic History    Marital status:    Tobacco Use    Smoking status: Former     Current packs/day: 0.00     Average packs/day: 1 pack/day for 56.8 years (56.8 ttl pk-yrs)     Types: Cigarettes     Start date: 1965     Quit date: 2022     Years since quittin.7    Smokeless tobacco: Never   Substance  and Sexual Activity    Alcohol use: No    Drug use: Not Currently       Review of patient's allergies indicates:   Allergen Reactions    Sulfa (sulfonamide antibiotics) Nausea Only       No current facility-administered medications on file prior to encounter.     Current Outpatient Medications on File Prior to Encounter   Medication Sig Dispense Refill    albuterol (PROAIR HFA) 90 mcg/actuation inhaler Inhale 2 puffs into the lungs every 4 (four) hours as needed for Wheezing. Rescue 18 g 11    aspirin (ECOTRIN) 81 MG EC tablet Take 81 mg by mouth once daily.      clopidogreL (PLAVIX) 75 mg tablet Take 1 tablet (75 mg total) by mouth once daily. 30 tablet 3    ferrous sulfate 325 (65 FE) MG EC tablet Take 325 mg by mouth once daily.      fluticasone-umeclidin-vilanter (TRELEGY ELLIPTA) 100-62.5-25 mcg DsDv Inhale 1 puff into the lungs once daily. 1 each 6    krill-om-3-dha-epa-phospho-ast (MEGARED OMEGA-3 KRILL OIL) 975-85-09-50 mg Cap Take 1 tablet by mouth once daily.      L. gasseri-B. bifidum-B longum 1.5 billion cell Cap Take 1 tablet by mouth once daily.       megestroL (MEGACE) 20 MG Tab Take 40 mg by mouth 2 (two) times daily.      metoprolol succinate (TOPROL-XL) 50 MG 24 hr tablet Take 50 mg by mouth 2 (two) times daily.      oxyCODONE-acetaminophen (PERCOCET)  mg per tablet Take 1 tablet by mouth every 4 (four) hours as needed for Pain. 5 times daily      polycarbophil (FIBERCON) 625 mg tablet Take 625 mg by mouth once daily.      simvastatin (ZOCOR) 10 MG tablet Take 20 mg by mouth every evening.      UNABLE TO FIND Take 1 tablet by mouth once daily. medication name: Ronak GRULLONI      vit C,Y-To-tvazk-lutein-zeaxan (PRESERVISION AREDS-2) 250-90-40-1 mg Cap Take 1 tablet by mouth 2 (two) times a day.      pantoprazole (PROTONIX) 40 MG tablet Take 1 tablet (40 mg total) by mouth 2 (two) times daily. 180 tablet 1       Objective:  BP (!) 136/97   Pulse 80   Temp 97.7 °F (36.5 °C)   Resp 20    LMP  (LMP Unknown)   SpO2 100%   General: wd, wn, nad  HE: ncat, perrl, eomi  ENT: op pink and moist without lesions or exudates  CV: +s1s2, no mrg, rrr  Resp: ctapb, no wrr  GI: bs active, abd soft, nt, nd  Skin: no lesions, no rash  Neuro: cn 2-12 in tact, no focal deficits, no asterixis  Psych: regular rate speech, normal affect    Labs:  Recent Results (from the past 2688 hour(s))   CBC Without Differential    Collection Time: 08/29/23 10:08 AM   Result Value Ref Range    WBC 10.41 3.90 - 12.70 K/uL    RBC 3.39 (L) 4.00 - 5.40 M/uL    Hemoglobin 11.1 (L) 12.0 - 16.0 g/dL    Hematocrit 33.8 (L) 37.0 - 48.5 %     (H) 82 - 98 fL    MCH 32.7 (H) 27.0 - 31.0 pg    MCHC 32.8 32.0 - 36.0 g/dL    RDW 13.2 11.5 - 14.5 %    Platelets 325 150 - 450 K/uL    MPV 8.8 (L) 9.2 - 12.9 fL   Basic metabolic panel    Collection Time: 08/29/23 10:08 AM   Result Value Ref Range    Sodium 142 136 - 145 mmol/L    Potassium 4.4 3.5 - 5.1 mmol/L    Chloride 112 (H) 95 - 110 mmol/L    CO2 24 23 - 29 mmol/L    Glucose 95 70 - 110 mg/dL    BUN 29 (H) 8 - 23 mg/dL    Creatinine 0.9 0.5 - 1.4 mg/dL    Calcium 9.6 8.7 - 10.5 mg/dL    Anion Gap 6 (L) 8 - 16 mmol/L    eGFR >60.0 >60 mL/min/1.73 m^2   APTT    Collection Time: 08/29/23 10:08 AM   Result Value Ref Range    aPTT 24.8 21.0 - 32.0 sec   Protime-INR    Collection Time: 08/29/23 10:08 AM   Result Value Ref Range    Prothrombin Time 11.0 9.0 - 12.5 sec    INR 1.0 0.8 - 1.2   Comprehensive Metabolic Panel    Collection Time: 10/11/23  1:34 PM   Result Value Ref Range    Sodium 142 136 - 145 mmol/L    Potassium 4.2 3.5 - 5.1 mmol/L    Chloride 111 (H) 95 - 110 mmol/L    CO2 19 (L) 23 - 29 mmol/L    Glucose 99 70 - 110 mg/dL    BUN 34 (H) 8 - 23 mg/dL    Creatinine 1.0 0.5 - 1.4 mg/dL    Calcium 10.0 8.7 - 10.5 mg/dL    Total Protein 7.8 6.0 - 8.4 g/dL    Albumin 4.3 3.5 - 5.2 g/dL    Total Bilirubin 0.3 0.1 - 1.0 mg/dL    Alkaline Phosphatase 72 55 - 135 U/L    AST 21 10 - 40 U/L     ALT 16 10 - 44 U/L    eGFR 55.9 (A) >60 mL/min/1.73 m^2    Anion Gap 12 8 - 16 mmol/L   Comprehensive metabolic panel    Collection Time: 10/11/23 10:37 PM   Result Value Ref Range    Sodium 144 136 - 145 mmol/L    Potassium 4.0 3.5 - 5.1 mmol/L    Chloride 112 (H) 95 - 110 mmol/L    CO2 21 (L) 23 - 29 mmol/L    Glucose 100 70 - 110 mg/dL    BUN 34 (H) 8 - 23 mg/dL    Creatinine 0.9 0.5 - 1.4 mg/dL    Calcium 9.7 8.7 - 10.5 mg/dL    Total Protein 8.0 6.0 - 8.4 g/dL    Albumin 4.5 3.5 - 5.2 g/dL    Total Bilirubin 0.3 0.1 - 1.0 mg/dL    Alkaline Phosphatase 74 55 - 135 U/L    AST 21 10 - 40 U/L    ALT 17 10 - 44 U/L    eGFR >60.0 >60 mL/min/1.73 m^2    Anion Gap 11 8 - 16 mmol/L   CBC auto differential    Collection Time: 10/11/23 10:37 PM   Result Value Ref Range    WBC 10.14 3.90 - 12.70 K/uL    RBC 3.56 (L) 4.00 - 5.40 M/uL    Hemoglobin 11.9 (L) 12.0 - 16.0 g/dL    Hematocrit 36.1 (L) 37.0 - 48.5 %     (H) 82 - 98 fL    MCH 33.4 (H) 27.0 - 31.0 pg    MCHC 33.0 32.0 - 36.0 g/dL    RDW 12.3 11.5 - 14.5 %    Platelets 344 150 - 450 K/uL    MPV 8.4 (L) 9.2 - 12.9 fL    Immature Granulocytes 0.5 0.0 - 0.5 %    Gran # (ANC) 7.0 1.8 - 7.7 K/uL    Immature Grans (Abs) 0.05 (H) 0.00 - 0.04 K/uL    Lymph # 2.1 1.0 - 4.8 K/uL    Mono # 0.8 0.3 - 1.0 K/uL    Eos # 0.1 0.0 - 0.5 K/uL    Baso # 0.07 0.00 - 0.20 K/uL    nRBC 0 0 /100 WBC    Gran % 68.9 38.0 - 73.0 %    Lymph % 21.0 18.0 - 48.0 %    Mono % 8.2 4.0 - 15.0 %    Eosinophil % 0.7 0.0 - 8.0 %    Basophil % 0.7 0.0 - 1.9 %    Differential Method Automated    CBC Without Differential    Collection Time: 12/11/23 11:29 AM   Result Value Ref Range    WBC 9.53 3.90 - 12.70 K/uL    RBC 3.70 (L) 4.00 - 5.40 M/uL    Hemoglobin 12.0 12.0 - 16.0 g/dL    Hematocrit 37.1 37.0 - 48.5 %     (H) 82 - 98 fL    MCH 32.4 (H) 27.0 - 31.0 pg    MCHC 32.3 32.0 - 36.0 g/dL    RDW 12.2 11.5 - 14.5 %    Platelets 337 150 - 450 K/uL    MPV 8.9 (L) 9.2 - 12.9 fL    Comprehensive Metabolic Panel    Collection Time: 12/11/23 11:29 AM   Result Value Ref Range    Sodium 141 136 - 145 mmol/L    Potassium 4.3 3.5 - 5.1 mmol/L    Chloride 115 (H) 95 - 110 mmol/L    CO2 22 (L) 23 - 29 mmol/L    Glucose 102 70 - 110 mg/dL    BUN 31 (H) 8 - 23 mg/dL    Creatinine 0.9 0.5 - 1.4 mg/dL    Calcium 9.5 8.7 - 10.5 mg/dL    Total Protein 7.8 6.0 - 8.4 g/dL    Albumin 4.3 3.5 - 5.2 g/dL    Total Bilirubin 0.3 0.1 - 1.0 mg/dL    Alkaline Phosphatase 62 55 - 135 U/L    AST 16 10 - 40 U/L    ALT 12 10 - 44 U/L    eGFR >60.0 >60 mL/min/1.73 m^2    Anion Gap 4 (L) 8 - 16 mmol/L          Assessment:  Chronic stricture    Plan:  EGD dilation as needed

## 2023-12-14 NOTE — [employer]OVATION PATIENT INSTRUCTIONS
Discharge Summary/Instructions after an Endoscopic Procedure  Patient Name: Carmen Wilkinson  Patient MRN: [id number]  Patient YOB: 1940 Thursday, December 14, 2023  Lino Devi MD  RESTRICTIONS:  During your procedure today, you received medications for sedation.  These   medications may affect your judgment, balance and coordination.  Therefore,   for 24 hours, you have the following restrictions:   - DO NOT drive a car, operate machinery, make legal/financial decisions,   sign important papers or drink alcohol.    ACTIVITY:  Today: no heavy lifting, straining or running due to procedural   sedation/anesthesia.  The following day: return to full activity including work.  DIET:  Eat and drink normally unless instructed otherwise.     TREATMENT FOR COMMON SIDE EFFECTS:  - Mild abdominal pain, nausea, belching, bloating or excessive gas:  rest,   eat lightly and use a heating pad.  - Sore Throat: treat with throat lozenges and/or gargle with warm salt   water.  - Because air was used during the procedure, expelling large amounts of air   from your rectum or belching is normal.  - If a bowel prep was taken, you may not have a bowel movement for 1-3 days.    This is normal.  SYMPTOMS TO WATCH FOR AND REPORT TO YOUR PHYSICIAN:  1. Abdominal pain or bloating, other than gas cramps.  2. Chest pain.  3. Back pain.  4. Signs of infection such as: chills or fever occurring within 24 hours   after the procedure.  5. Rectal bleeding, which would show as bright red, maroon, or black stools.   (A tablespoon of blood from the rectum is not serious, especially if   hemorrhoids are present.)  6. Vomiting.  7. Weakness or dizziness.  GO DIRECTLY TO THE NEAREST EMERGENCY ROOM IF YOU HAVE ANY OF THE FOLLOWING:      Difficulty breathing              Chills and/or fever over 101 F   Persistent vomiting and/or vomiting blood   Severe abdominal pain   Severe chest pain   Black, tarry stools   Bleeding- more than one  tablespoon   Any other symptom or condition that you feel may need urgent attention  Your doctor recommends these additional instructions:  If any biopsies were taken, your doctors clinic will contact you in 1 to 2   weeks with any results.  - Patient has a contact number available for emergencies.  The signs and   symptoms of potential delayed complications were discussed with the   patient.  Return to normal activities tomorrow.  Written discharge   instructions were provided to the patient.   - Resume previous diet.   - Continue present medications.   - Await pathology results.   - Repeat upper endoscopy in 4 months for surveillance.   - Return to GI clinic PRN.   - Resume plavix in 36 hours and continue ppi  - Discharge patient to home (with escort).  For questions, problems or results please call your physician - Lino Devi MD at Work:  [phone].  [facility], EMERGENCY ROOM PHONE NUMBER: [phone]  IF A COMPLICATION OR EMERGENCY SITUATION ARISES AND YOU ARE UNABLE TO REACH   YOUR PHYSICIAN - GO DIRECTLY TO THE EMERGENCY ROOM.  MD Lino Allen MD  12/14/2023 8:02:27 AM  This report has been verified and signed electronically.  Dear patient,  As a result of recent federal legislation (The Federal Cures Act), you may   receive lab or pathology results from your procedure in your [facility]   account before your physician is able to contact you. Your physician or   their representative will relay the results to you with their   recommendations at their soonest availability.  Thank you,  [employer]OVATION

## 2023-12-14 NOTE — ANESTHESIA POSTPROCEDURE EVALUATION
Anesthesia Post Evaluation    Patient: Carmen Fletcher Pflug    Procedure(s) Performed: Procedure(s) (LRB):  EGD (ESOPHAGOGASTRODUODENOSCOPY) (N/A)    Final Anesthesia Type: general      Patient location during evaluation: GI PACU  Patient participation: Yes- Able to Participate  Level of consciousness: awake and alert and oriented  Post-procedure vital signs: reviewed and stable  Pain management: adequate  Airway patency: patent    PONV status at discharge: No PONV  Anesthetic complications: no      Cardiovascular status: blood pressure returned to baseline, hemodynamically stable and stable  Respiratory status: unassisted, spontaneous ventilation and room air  Hydration status: euvolemic  Follow-up not needed.              Vitals Value Taken Time   /56 12/14/23 0921   Temp 36.7 12/14/23 1026   Pulse 72 12/14/23 0921   Resp 16 12/14/23 0830   SpO2 98 % 12/14/23 0921   Vitals shown include unvalidated device data.      Event Time   Out of Recovery 08:45:23         Pain/Wili Score: No data recorded

## 2023-12-14 NOTE — ANESTHESIA PREPROCEDURE EVALUATION
12/13/2023  Carmen Wilkinson is a 83 y.o., female.      Tobacco Use:  The patient  reports that she quit smoking about 20 months ago. Her smoking use included cigarettes. She started smoking about 58 years ago. She has a 56.8 pack-year smoking history. She has never used smokeless tobacco.     Results for orders placed or performed during the hospital encounter of 08/29/23   EKG 12-lead    Collection Time: 08/29/23  9:22 AM    Narrative    Test Reason : Z01.818,    Vent. Rate : 069 BPM     Atrial Rate : 069 BPM     P-R Int : 154 ms          QRS Dur : 064 ms      QT Int : 364 ms       P-R-T Axes : 074 072 075 degrees     QTc Int : 390 ms    Normal sinus rhythm  Normal ECG  When compared with ECG of 18-NOV-2022 14:49,  Premature atrial complexes are no longer Present  Vent. rate has decreased BY  36 BPM  Criteria for Septal infarct are no longer Present  Criteria for Inferior infarct are no longer Present  Non-specific change in ST segment in Inferior leads  T wave inversion no longer evident in Inferior leads  T wave inversion no longer evident in Anterior-lateral leads  Confirmed by Joyce MEDINA, Evelio MCCORMICK ([id number]) on 8/31/2023 10:16:50 PM    Referred By:             Confirmed By:Evelio Cook MD             Lab Results   Component Value Date    WBC 9.53 12/11/2023    HGB 12.0 12/11/2023    HCT 37.1 12/11/2023     (H) 12/11/2023     12/11/2023     BMP  Lab Results   Component Value Date     12/11/2023    K 4.3 12/11/2023     (H) 12/11/2023    CO2 22 (L) 12/11/2023    BUN 31 (H) 12/11/2023    CREATININE 0.9 12/11/2023    CALCIUM 9.5 12/11/2023    ANIONGAP 4 (L) 12/11/2023     12/11/2023     10/11/2023    GLU 99 10/11/2023       Results for orders placed during the hospital encounter of 04/01/22    Echo Saline Bubble? No    Interpretation Summary  · The left ventricle  is small with concentric remodeling and hyperdynamic systolic function.  · The estimated ejection fraction is 75%.  · Indeterminate left ventricular diastolic function.  · Mild right ventricular enlargement with normal right ventricular systolic function.  · Mild to moderate tricuspid regurgitation.  · Mild-to-moderate aortic regurgitation.        Pre-op Assessment    I have reviewed the Patient Summary Reports.     I have reviewed the Nursing Notes. I have reviewed the NPO Status.   I have reviewed the Medications.     Review of Systems  Anesthesia Hx:  No problems with previous Anesthesia             Denies Family Hx of Anesthesia complications.    Denies Personal Hx of Anesthesia complications.                    Social:  Former Smoker, No Alcohol Use       Hematology/Oncology:       -- Anemia:                    --  Cancer in past history:              surgery   Oncology Comments: Squamous cell carcinoma     EENT/Dental:   Dysphonia    Macular degeneration Eyes: Visual Impairment   Has Bilateral and S/P Extraction - Bilateral Catarract                  Cardiovascular:     Hypertension, poorly controlled          PVD hyperlipidemia   ECG has been reviewed. Hx of Mesenteric artery stenosis    Patient followed by Dr. Bowers seen 3 months ago     Cardiac workup negative per patient     Valvular Heart Disease:   Aortic Regurgitation (AR), mild, moderate, Tricuspid Regurgitation (TR), mild, moderate           Carotoid Artery Disease               Pulmonary:   COPD Asthma  Shortness of breath   Daily inhaler - Trelegy Ellipta    Rescue inhaler last used 3 weeks ago     Home Oxygen ordered but patient has not received yet     No Hospitalizations for exacerbations   Patient followed by Dr. Molina - seen 2 weeks ago   Asthma:   Chronic Obstructive Pulmonary Disease (COPD):   Emphysema                   Hepatic/GI:   PUD,     Hx Acute pancreatitis    Hx of Mesenteric artery stenosis          Musculoskeletal:     Hx  cervical spine stimulator - not active       Spine Disorders: lumbar and cervical Chronic Pain, Degenerative disease and Disc disease           Neurological:           Brain aneurysm present but not followed per patient                             Psych:  Psychiatric History anxiety                 Physical Exam  General: Well nourished, Cooperative, Alert and Oriented    Airway:  Mallampati: III   Mouth Opening: Normal  TM Distance: > 6 cm  Tongue: Normal  Neck ROM: Extension Decreased    Dental:  Dentures, Caps / Implants    Chest/Lungs:  Clear to auscultation, Normal Respiratory Rate    Heart:  Rate: Normal  Rhythm: Regular Rhythm        Anesthesia Plan  Type of Anesthesia, risks & benefits discussed:    Anesthesia Type: Gen Natural Airway  Intra-op Monitoring Plan: Standard ASA Monitors  Induction:  IV  Informed Consent: Informed consent signed with the Patient and all parties understand the risks and agree with anesthesia plan.  All questions answered.   ASA Score: 3  Anesthesia Plan Notes:       GNA    POM    Propofol     Ready For Surgery From Anesthesia Perspective.     .

## 2023-12-28 ENCOUNTER — OFFICE VISIT (OUTPATIENT)
Dept: PULMONOLOGY | Facility: CLINIC | Age: 83
End: 2023-12-28
Payer: MEDICARE

## 2023-12-28 VITALS
OXYGEN SATURATION: 96 % | DIASTOLIC BLOOD PRESSURE: 68 MMHG | BODY MASS INDEX: 15.25 KG/M2 | WEIGHT: 83.38 LBS | SYSTOLIC BLOOD PRESSURE: 122 MMHG | HEART RATE: 107 BPM

## 2023-12-28 DIAGNOSIS — J96.11 CHRONIC HYPOXEMIC RESPIRATORY FAILURE: ICD-10-CM

## 2023-12-28 DIAGNOSIS — J44.9 CHRONIC OBSTRUCTIVE PULMONARY DISEASE, UNSPECIFIED COPD TYPE: Primary | ICD-10-CM

## 2023-12-28 PROCEDURE — 99214 PR OFFICE/OUTPT VISIT, EST, LEVL IV, 30-39 MIN: ICD-10-PCS | Mod: S$GLB,,, | Performed by: INTERNAL MEDICINE

## 2023-12-28 PROCEDURE — 3074F PR MOST RECENT SYSTOLIC BLOOD PRESSURE < 130 MM HG: ICD-10-PCS | Mod: CPTII,S$GLB,, | Performed by: INTERNAL MEDICINE

## 2023-12-28 PROCEDURE — 3078F PR MOST RECENT DIASTOLIC BLOOD PRESSURE < 80 MM HG: ICD-10-PCS | Mod: CPTII,S$GLB,, | Performed by: INTERNAL MEDICINE

## 2023-12-28 PROCEDURE — 1125F AMNT PAIN NOTED PAIN PRSNT: CPT | Mod: CPTII,S$GLB,, | Performed by: INTERNAL MEDICINE

## 2023-12-28 PROCEDURE — 1125F PR PAIN SEVERITY QUANTIFIED, PAIN PRESENT: ICD-10-PCS | Mod: CPTII,S$GLB,, | Performed by: INTERNAL MEDICINE

## 2023-12-28 PROCEDURE — 3074F SYST BP LT 130 MM HG: CPT | Mod: CPTII,S$GLB,, | Performed by: INTERNAL MEDICINE

## 2023-12-28 PROCEDURE — 3078F DIAST BP <80 MM HG: CPT | Mod: CPTII,S$GLB,, | Performed by: INTERNAL MEDICINE

## 2023-12-28 PROCEDURE — 1159F PR MEDICATION LIST DOCUMENTED IN MEDICAL RECORD: ICD-10-PCS | Mod: CPTII,S$GLB,, | Performed by: INTERNAL MEDICINE

## 2023-12-28 PROCEDURE — 1159F MED LIST DOCD IN RCRD: CPT | Mod: CPTII,S$GLB,, | Performed by: INTERNAL MEDICINE

## 2023-12-28 PROCEDURE — 99214 OFFICE O/P EST MOD 30 MIN: CPT | Mod: S$GLB,,, | Performed by: INTERNAL MEDICINE

## 2023-12-28 RX ORDER — ALBUTEROL SULFATE 90 UG/1
2 AEROSOL, METERED RESPIRATORY (INHALATION) EVERY 4 HOURS PRN
Qty: 18 G | Refills: 11 | Status: SHIPPED | OUTPATIENT
Start: 2023-12-28

## 2023-12-28 NOTE — PROGRESS NOTES
SUBJECTIVE:    Patient ID: Carmen Wilkinson is a 83 y.o. female.    Chief Complaint: Follow-up (3 month follow COPD)    HPI   The patient is here without her oxygen.  She was given a large portable concentrator which she cannot use with her walker.  The patient states she didn't know she was supposed to sleep on her oxygen.She is using her Trelegy daily.  She is not needing her albuterol much.  Past Medical History:   Diagnosis Date    Back pain     Brain aneurysm 2018    Carotid stenosis     Cataract     Diverticulitis     Emphysema lung     Feeling anxious     Hyperlipidemia     Hypertension     Macular degeneration     PVD (peripheral vascular disease)     Squamous cell carcinoma 08/29/2017    right medical cheek, SSIS, efudex tx     Past Surgical History:   Procedure Laterality Date    ABDOMINAL SURGERY      ANGIOGRAPHY OF MESENTERIC VESSELS Left 09/07/2022    Procedure: ANGIOGRAM, MESENTERIC VESSELS;  Surgeon: Krish Machado MD;  Location: [facility] CATH/EP LAB;  Service: General;  Laterality: Left;    ARTERIOGRAM, MESENTERIC N/A 06/30/2021    Procedure: ARTERIOGRAM, MESENTERIC;  Surgeon: Krish Machado MD;  Location: [facility] CATH/EP LAB;  Service: General;  Laterality: N/A;    BACK SURGERY  2008    CATARACT EXTRACTION, BILATERAL      COLONOSCOPY N/A 04/04/2022    Procedure: COLONOSCOPY;  Surgeon: Lino Devi MD;  Location: [facility];  Service: Endoscopy;  Laterality: N/A;    CREATION, BYPASS, ARTERIAL, AORTA TO FEMORAL N/A 01/13/2021    Procedure: ARTERIOGRAM, MESENTERIC;  Surgeon: Krish Machado MD;  Location: [facility] CATH/EP LAB;  Service: General;  Laterality: N/A;    ENDOSCOPY OF PROXIMAL SMALL INTESTINE N/A 04/04/2022    Procedure: ENTEROSCOPY, PROXIMAL;  Surgeon: Lino Devi MD;  Location: [facility];  Service: Endoscopy;  Laterality: N/A;    ESOPHAGOGASTRODUODENOSCOPY N/A 11/20/2022    Procedure: EGD (ESOPHAGOGASTRODUODENOSCOPY);  Surgeon: Pedro Hall Jr., MD;  Location: [facility];   Service: Endoscopy;  Laterality: N/A;    ESOPHAGOGASTRODUODENOSCOPY N/A 8/31/2023    Procedure: EGD (ESOPHAGOGASTRODUODENOSCOPY);  Surgeon: Lino Devi MD;  Location: [facility];  Service: Endoscopy;  Laterality: N/A;    ESOPHAGOGASTRODUODENOSCOPY N/A 10/26/2023    Procedure: EGD (ESOPHAGOGASTRODUODENOSCOPY);  Surgeon: Lino Devi MD;  Location: [facility] ENDO;  Service: Endoscopy;  Laterality: N/A;    ESOPHAGOGASTRODUODENOSCOPY N/A 12/14/2023    Procedure: EGD (ESOPHAGOGASTRODUODENOSCOPY);  Surgeon: Lino Devi MD;  Location: [facility] ENDO;  Service: Endoscopy;  Laterality: N/A;    HYSTERECTOMY      IMPLANTATION OF SPINAL CORD STIMULATOR IN CERVICAL SPINE      UPPER GASTROINTESTINAL ENDOSCOPY  11/20/2022    w/ antrum biopsy     Family History   Problem Relation Age of Onset    Melanoma Neg Hx     Psoriasis Neg Hx     Eczema Neg Hx     Lupus Neg Hx         Social History:   Marital Status:   Occupation: Data Unavailable  Alcohol History:  reports no history of alcohol use.  Tobacco History:  reports that she quit smoking about 20 months ago. Her smoking use included cigarettes. She started smoking about 58 years ago. She has a 56.8 pack-year smoking history. She has never used smokeless tobacco.  Drug History:  reports that she does not currently use drugs.    Review of patient's allergies indicates:   Allergen Reactions    Sulfa (sulfonamide antibiotics) Nausea Only       Current Outpatient Medications   Medication Sig Dispense Refill    aspirin (ECOTRIN) 81 MG EC tablet Take 81 mg by mouth once daily.      clopidogreL (PLAVIX) 75 mg tablet Take 1 tablet (75 mg total) by mouth once daily. 30 tablet 3    ferrous sulfate 325 (65 FE) MG EC tablet Take 325 mg by mouth once daily.      krill-om-3-dha-epa-phospho-ast (MEGARED OMEGA-3 KRILL OIL) 414-24-02-50 mg Cap Take 1 tablet by mouth once daily.      L. gasseri-B. bifidum-B longum 1.5 billion cell Cap Take 1 tablet by mouth once daily.       megestroL  (MEGACE) 20 MG Tab Take 40 mg by mouth 2 (two) times daily.      metoprolol succinate (TOPROL-XL) 50 MG 24 hr tablet Take 50 mg by mouth 2 (two) times daily.      oxyCODONE-acetaminophen (PERCOCET)  mg per tablet Take 1 tablet by mouth every 4 (four) hours as needed for Pain. 5 times daily      pantoprazole (PROTONIX) 40 MG tablet Take 1 tablet (40 mg total) by mouth 2 (two) times daily. 180 tablet 1    polycarbophil (FIBERCON) 625 mg tablet Take 625 mg by mouth once daily.      simvastatin (ZOCOR) 10 MG tablet Take 20 mg by mouth every evening.      vit C,E-Wv-jxzlj-lutein-zeaxan (PRESERVISION AREDS-2) 250-90-40-1 mg Cap Take 1 tablet by mouth 2 (two) times a day.      albuterol (PROAIR HFA) 90 mcg/actuation inhaler Inhale 2 puffs into the lungs every 4 (four) hours as needed for Wheezing. Rescue 18 g 11    fluticasone-umeclidin-vilanter (TRELEGY ELLIPTA) 100-62.5-25 mcg DsDv Inhale 1 puff into the lungs once daily. 1 each 6    UNABLE TO FIND Take 1 tablet by mouth once daily. medication name: Ronak Mohan RAYNA       No current facility-administered medications for this visit.       Last PFT:  05/03/2022 moderate obstruction, minimal restriction, and a very severe diffusion defect with no response to bronchodilators  Last CT:  04/01/2022  .  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  2.  Patent stent in the proximal celiac artery.  3. 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  3.  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  4.  Moderate centrilobular emphysema.     Review of Systems  General:fatigued   Eyes: Vision is has macular degneration  ENT:  No sinusitis or pharyngitis.   Heart:: No chest pain or palpitations.  Lungs: shortness of breath with any exertion, no cough  GI: occasional diarrhea  : No dysuria, hesitancy, or nocturia.  Musculoskeletal:back pain is ongoing  Skin: No lesions or rashes. Bruises from  plavix  Neuro: No headaches or neuropathy.  Lymph: No edema or adenopathy.  Psych: No anxiety or depression.  Endo:  She is up 4 lb from her last visit.  She states from her rhoda she is up 14 lb.    OBJECTIVE:      /68 (BP Location: Left arm, Patient Position: Sitting, BP Method: Medium (Manual))   Pulse 107   Wt 37.8 kg (83 lb 6.4 oz)   LMP  (LMP Unknown)   SpO2 96%   BMI 15.25 kg/m²     Physical Exam  GENERAL: Older, cachectic patient in no distress.  HEENT: Pupils equal and reactive. Extraocular movements intact. Nose intact. Pharynx moist.  NECK: Supple.   HEART: Regular rate and rhythm. No murmur or gallop auscultated.  LUNGS: quiet   ABDOMEN: Bowel sounds present. Non-tender, no masses palpated.  EXTREMITIES: Normal muscle tone and joint movement, no cyanosis or clubbing.  Markedly decreased muscle mass  LYMPHATICS: No adenopathy palpated, no edema.  SKIN: Dry, intact, no lesions.   NEURO: Cranial nerves II-XII intact. Motor strength 5/5 bilaterally, upper and lower extremities.  PSYCH: Appropriate affect.    Assessment:       1. Chronic obstructive pulmonary disease, unspecified COPD type    2. Chronic hypoxemic respiratory failure          The patient is not wearing her oxygen because she can not walk with her walker and pull her oxygen concentrator.  Discussed buying an [employer] on her own.  Her  says they will do this.  He states the concentrator that they have the battery only last 2 hours.  I advised him to call [facility] about this.  Advised the patient to be on her oxygen 247.  If she is sitting quietly and her sats are adequate on room air she may take it off for awhile but she obviously needs to move with it and sleep with it and shower with it.          Plan:       Chronic obstructive pulmonary disease, unspecified COPD type  -     albuterol (PROAIR HFA) 90 mcg/actuation inhaler; Inhale 2 puffs into the lungs every 4 (four) hours as needed for Wheezing. Rescue  Dispense: 18 g;  Refill: 11  -     fluticasone-umeclidin-vilanter (TRELEGY ELLIPTA) 100-62.5-25 mcg DsDv; Inhale 1 puff into the lungs once daily.  Dispense: 1 each; Refill: 6    Chronic hypoxemic respiratory failure            ContinueTrelegy 1 puff daily, rinse after you use it  Wear oxygen all the time  Notify [facility] that the battery is only lasting 2 hours  Continue gaining weight    Follow up in about 6 months (around 6/28/2024).

## 2023-12-29 ENCOUNTER — HOSPITAL ENCOUNTER (EMERGENCY)
Facility: HOSPITAL | Age: 83
Discharge: HOME | End: 2023-12-30
Attending: EMERGENCY MEDICINE
Payer: MEDICARE

## 2023-12-29 ENCOUNTER — TELEPHONE (OUTPATIENT)
Dept: PULMONOLOGY | Facility: CLINIC | Age: 83
End: 2023-12-29

## 2023-12-29 VITALS
HEART RATE: 94 BPM | DIASTOLIC BLOOD PRESSURE: 56 MMHG | RESPIRATION RATE: 19 BRPM | WEIGHT: 83 LBS | HEIGHT: 62 IN | BODY MASS INDEX: 15.27 KG/M2 | SYSTOLIC BLOOD PRESSURE: 124 MMHG | TEMPERATURE: 98 F | OXYGEN SATURATION: 96 %

## 2023-12-29 DIAGNOSIS — J44.1 COPD EXACERBATION: ICD-10-CM

## 2023-12-29 DIAGNOSIS — R06.02 SHORTNESS OF BREATH: Primary | ICD-10-CM

## 2023-12-29 LAB
ALBUMIN SERPL BCP-MCNC: 3.8 G/DL (ref 3.5–5.2)
ALP SERPL-CCNC: 77 U/L (ref 55–135)
ALT SERPL W/O P-5'-P-CCNC: 11 U/L (ref 10–44)
ANION GAP SERPL CALC-SCNC: 14 MMOL/L (ref 8–16)
AST SERPL-CCNC: 17 U/L (ref 10–40)
BASOPHILS # BLD AUTO: 0.06 K/UL (ref 0–0.2)
BASOPHILS NFR BLD: 0.4 % (ref 0–1.9)
BILIRUB SERPL-MCNC: 0.4 MG/DL (ref 0.1–1)
BNP SERPL-MCNC: 219 PG/ML (ref 0–99)
BUN SERPL-MCNC: 26 MG/DL (ref 8–23)
CALCIUM SERPL-MCNC: 9.7 MG/DL (ref 8.7–10.5)
CHLORIDE SERPL-SCNC: 108 MMOL/L (ref 95–110)
CO2 SERPL-SCNC: 16 MMOL/L (ref 23–29)
CREAT SERPL-MCNC: 0.9 MG/DL (ref 0.5–1.4)
DIFFERENTIAL METHOD BLD: ABNORMAL
EOSINOPHIL # BLD AUTO: 0 K/UL (ref 0–0.5)
EOSINOPHIL NFR BLD: 0.1 % (ref 0–8)
ERYTHROCYTE [DISTWIDTH] IN BLOOD BY AUTOMATED COUNT: 12.2 % (ref 11.5–14.5)
EST. GFR  (NO RACE VARIABLE): >60 ML/MIN/1.73 M^2
GLUCOSE SERPL-MCNC: 197 MG/DL (ref 70–110)
HCT VFR BLD AUTO: 35.4 % (ref 37–48.5)
HGB BLD-MCNC: 11.6 G/DL (ref 12–16)
IMM GRANULOCYTES # BLD AUTO: 0.06 K/UL (ref 0–0.04)
IMM GRANULOCYTES NFR BLD AUTO: 0.4 % (ref 0–0.5)
LYMPHOCYTES # BLD AUTO: 0.7 K/UL (ref 1–4.8)
LYMPHOCYTES NFR BLD: 4.5 % (ref 18–48)
MAGNESIUM SERPL-MCNC: 1.8 MG/DL (ref 1.6–2.6)
MCH RBC QN AUTO: 33 PG (ref 27–31)
MCHC RBC AUTO-ENTMCNC: 32.8 G/DL (ref 32–36)
MCV RBC AUTO: 101 FL (ref 82–98)
MONOCYTES # BLD AUTO: 1.2 K/UL (ref 0.3–1)
MONOCYTES NFR BLD: 7.6 % (ref 4–15)
NEUTROPHILS # BLD AUTO: 14 K/UL (ref 1.8–7.7)
NEUTROPHILS NFR BLD: 87 % (ref 38–73)
NRBC BLD-RTO: 0 /100 WBC
PLATELET # BLD AUTO: 326 K/UL (ref 150–450)
PMV BLD AUTO: 8.9 FL (ref 9.2–12.9)
POTASSIUM SERPL-SCNC: 4.1 MMOL/L (ref 3.5–5.1)
PROT SERPL-MCNC: 7.5 G/DL (ref 6–8.4)
RBC # BLD AUTO: 3.52 M/UL (ref 4–5.4)
SODIUM SERPL-SCNC: 138 MMOL/L (ref 136–145)
TROPONIN I SERPL HS-MCNC: 4.7 PG/ML (ref 0–14.9)
TROPONIN I SERPL HS-MCNC: 5.4 PG/ML (ref 0–14.9)
WBC # BLD AUTO: 16.08 K/UL (ref 3.9–12.7)

## 2023-12-29 PROCEDURE — 94761 N-INVAS EAR/PLS OXIMETRY MLT: CPT

## 2023-12-29 PROCEDURE — 25000242 PHARM REV CODE 250 ALT 637 W/ HCPCS: Performed by: EMERGENCY MEDICINE

## 2023-12-29 PROCEDURE — 87798 DETECT AGENT NOS DNA AMP: CPT | Performed by: EMERGENCY MEDICINE

## 2023-12-29 PROCEDURE — 93010 ELECTROCARDIOGRAM REPORT: CPT | Mod: ,,, | Performed by: INTERNAL MEDICINE

## 2023-12-29 PROCEDURE — 80053 COMPREHEN METABOLIC PANEL: CPT | Performed by: EMERGENCY MEDICINE

## 2023-12-29 PROCEDURE — 93005 ELECTROCARDIOGRAM TRACING: CPT | Performed by: INTERNAL MEDICINE

## 2023-12-29 PROCEDURE — 85025 COMPLETE CBC W/AUTO DIFF WBC: CPT | Performed by: EMERGENCY MEDICINE

## 2023-12-29 PROCEDURE — 99900031 HC PATIENT EDUCATION (STAT)

## 2023-12-29 PROCEDURE — 63600175 PHARM REV CODE 636 W HCPCS: Performed by: EMERGENCY MEDICINE

## 2023-12-29 PROCEDURE — 83735 ASSAY OF MAGNESIUM: CPT | Performed by: EMERGENCY MEDICINE

## 2023-12-29 PROCEDURE — 84484 ASSAY OF TROPONIN QUANT: CPT | Mod: 91 | Performed by: EMERGENCY MEDICINE

## 2023-12-29 PROCEDURE — 94640 AIRWAY INHALATION TREATMENT: CPT

## 2023-12-29 PROCEDURE — 96374 THER/PROPH/DIAG INJ IV PUSH: CPT

## 2023-12-29 PROCEDURE — 83880 ASSAY OF NATRIURETIC PEPTIDE: CPT | Performed by: EMERGENCY MEDICINE

## 2023-12-29 PROCEDURE — 94799 UNLISTED PULMONARY SVC/PX: CPT

## 2023-12-29 PROCEDURE — 27000221 HC OXYGEN, UP TO 24 HOURS

## 2023-12-29 PROCEDURE — 99900035 HC TECH TIME PER 15 MIN (STAT)

## 2023-12-29 PROCEDURE — 99284 EMERGENCY DEPT VISIT MOD MDM: CPT | Mod: 25

## 2023-12-29 RX ORDER — METHYLPREDNISOLONE SOD SUCC 125 MG
125 VIAL (EA) INJECTION
Status: COMPLETED | OUTPATIENT
Start: 2023-12-29 | End: 2023-12-29

## 2023-12-29 RX ORDER — IPRATROPIUM BROMIDE AND ALBUTEROL SULFATE 2.5; .5 MG/3ML; MG/3ML
3 SOLUTION RESPIRATORY (INHALATION)
Status: COMPLETED | OUTPATIENT
Start: 2023-12-29 | End: 2023-12-29

## 2023-12-29 RX ORDER — PROMETHAZINE HYDROCHLORIDE AND DEXTROMETHORPHAN HYDROBROMIDE 6.25; 15 MG/5ML; MG/5ML
5 SYRUP ORAL EVERY 4 HOURS PRN
Qty: 180 ML | Refills: 0 | Status: SHIPPED | OUTPATIENT
Start: 2023-12-29 | End: 2023-12-31 | Stop reason: SDUPTHER

## 2023-12-29 RX ADMIN — IPRATROPIUM BROMIDE AND ALBUTEROL SULFATE 3 ML: 2.5; .5 SOLUTION RESPIRATORY (INHALATION) at 08:12

## 2023-12-29 RX ADMIN — METHYLPREDNISOLONE SODIUM SUCCINATE 125 MG: 125 INJECTION, POWDER, FOR SOLUTION INTRAMUSCULAR; INTRAVENOUS at 08:12

## 2023-12-29 NOTE — TELEPHONE ENCOUNTER
Received a POC order form from [employer] for pt to purchase a poc.  Order was signed and faxed back to [employer].

## 2023-12-30 LAB

## 2023-12-30 NOTE — ED PROVIDER NOTES
Encounter Date: 12/29/2023       History     Chief Complaint   Patient presents with    Shortness of Breath     Hx COPD worsened last night      [age] year female past medical history of emphysema, hypertension, hyperlipidemia and carotid stenosis presents secondary to shortness of breath.  Patient states his symptoms have progressively gotten worse on tonight to where she could not breathe even with oxygen being used.  She denies any chest pain, nausea, vomiting, fever, chills, sweats associated.  Patient is otherwise stable and has no other complaints.      Review of patient's allergies indicates:   Allergen Reactions    Sulfa (sulfonamide antibiotics) Nausea Only     Past Medical History:   Diagnosis Date    Back pain     Brain aneurysm 2018    Carotid stenosis     Cataract     Diverticulitis     Emphysema lung     Feeling anxious     Hyperlipidemia     Hypertension     Macular degeneration     PVD (peripheral vascular disease)     Squamous cell carcinoma 08/29/2017    right medical cheek, SSIS, efudex tx     Past Surgical History:   Procedure Laterality Date    ABDOMINAL SURGERY      ANGIOGRAPHY OF MESENTERIC VESSELS Left 09/07/2022    Procedure: ANGIOGRAM, MESENTERIC VESSELS;  Surgeon: Krish Machado MD;  Location: [facility] CATH/EP LAB;  Service: General;  Laterality: Left;    ARTERIOGRAM, MESENTERIC N/A 06/30/2021    Procedure: ARTERIOGRAM, MESENTERIC;  Surgeon: Krish Machado MD;  Location: [facility] CATH/EP LAB;  Service: General;  Laterality: N/A;    BACK SURGERY  2008    CATARACT EXTRACTION, BILATERAL      COLONOSCOPY N/A 04/04/2022    Procedure: COLONOSCOPY;  Surgeon: Lino Devi MD;  Location: [facility] ENDO;  Service: Endoscopy;  Laterality: N/A;    CREATION, BYPASS, ARTERIAL, AORTA TO FEMORAL N/A 01/13/2021    Procedure: ARTERIOGRAM, MESENTERIC;  Surgeon: Krish Machado MD;  Location: [facility] CATH/EP LAB;  Service: General;  Laterality: N/A;    ENDOSCOPY OF PROXIMAL SMALL INTESTINE N/A 04/04/2022     Procedure: ENTEROSCOPY, PROXIMAL;  Surgeon: Lino Devi MD;  Location: [facility] ENDO;  Service: Endoscopy;  Laterality: N/A;    ESOPHAGOGASTRODUODENOSCOPY N/A 2022    Procedure: EGD (ESOPHAGOGASTRODUODENOSCOPY);  Surgeon: Pedro Hall Jr., MD;  Location: [facility] ENDO;  Service: Endoscopy;  Laterality: N/A;    ESOPHAGOGASTRODUODENOSCOPY N/A 2023    Procedure: EGD (ESOPHAGOGASTRODUODENOSCOPY);  Surgeon: Lino Devi MD;  Location: [facility] ENDO;  Service: Endoscopy;  Laterality: N/A;    ESOPHAGOGASTRODUODENOSCOPY N/A 10/26/2023    Procedure: EGD (ESOPHAGOGASTRODUODENOSCOPY);  Surgeon: Lino Devi MD;  Location: [facility] ENDO;  Service: Endoscopy;  Laterality: N/A;    ESOPHAGOGASTRODUODENOSCOPY N/A 2023    Procedure: EGD (ESOPHAGOGASTRODUODENOSCOPY);  Surgeon: Lino Devi MD;  Location: [facility];  Service: Endoscopy;  Laterality: N/A;    HYSTERECTOMY      IMPLANTATION OF SPINAL CORD STIMULATOR IN CERVICAL SPINE      UPPER GASTROINTESTINAL ENDOSCOPY  2022    w/ antrum biopsy     Family History   Problem Relation Age of Onset    Melanoma Neg Hx     Psoriasis Neg Hx     Eczema Neg Hx     Lupus Neg Hx      Social History     Tobacco Use    Smoking status: Former     Current packs/day: 0.00     Average packs/day: 1 pack/day for 56.8 years (56.8 ttl pk-yrs)     Types: Cigarettes     Start date: 1965     Quit date: 2022     Years since quittin.7    Smokeless tobacco: Never   Substance Use Topics    Alcohol use: No    Drug use: Not Currently     Review of Systems   Respiratory:  Positive for shortness of breath.    All other systems reviewed and are negative.      Physical Exam     Initial Vitals [23]   BP Pulse Resp Temp SpO2   135/70 (!) 115 (!) 22 97.8 °F (36.6 °C) (!) 92 %      MAP       --         Physical Exam    Nursing note and vitals reviewed.  Constitutional: She appears well-developed and well-nourished. She appears distressed.   HENT:   Head: Normocephalic and  atraumatic.   Mouth/Throat: Oropharynx is clear and moist.   Eyes: Conjunctivae and EOM are normal. Pupils are equal, round, and reactive to light.   Neck: No tracheal deviation present. No JVD present.   Normal range of motion.  Cardiovascular:  Normal rate, regular rhythm, normal heart sounds and intact distal pulses.     Exam reveals no gallop and no friction rub.       No murmur heard.  Pulmonary/Chest: Breath sounds normal. She is in respiratory distress. She has no wheezes. She exhibits no tenderness.   Abdominal: Abdomen is soft. Bowel sounds are normal. She exhibits no distension. There is no abdominal tenderness. There is no rebound and no guarding.   Musculoskeletal:         General: No tenderness or edema. Normal range of motion.      Cervical back: Normal range of motion.     Neurological: She is alert and oriented to person, place, and time. She has normal strength. No cranial nerve deficit or sensory deficit.   Skin: Skin is warm and dry. Capillary refill takes less than 2 seconds. No erythema.   Psychiatric: She has a normal mood and affect.         ED Course   Procedures  Labs Reviewed   CBC W/ AUTO DIFFERENTIAL - Abnormal; Notable for the following components:       Result Value    WBC 16.08 (*)     RBC 3.52 (*)     Hemoglobin 11.6 (*)     Hematocrit 35.4 (*)      (*)     MCH 33.0 (*)     MPV 8.9 (*)     Gran # (ANC) 14.0 (*)     Immature Grans (Abs) 0.06 (*)     Lymph # 0.7 (*)     Mono # 1.2 (*)     Gran % 87.0 (*)     Lymph % 4.5 (*)     All other components within normal limits   COMPREHENSIVE METABOLIC PANEL - Abnormal; Notable for the following components:    CO2 16 (*)     Glucose 197 (*)     BUN 26 (*)     All other components within normal limits   B-TYPE NATRIURETIC PEPTIDE - Abnormal; Notable for the following components:     (*)     All other components within normal limits   RESPIRATORY VIRAL PANEL PCR, PEDS UNDER 7 MTHS   TROPONIN I HIGH SENSITIVITY   MAGNESIUM   TROPONIN  I HIGH SENSITIVITY          Imaging Results              X-Ray Chest AP Portable (In process)                      Medications   albuterol-ipratropium 2.5 mg-0.5 mg/3 mL nebulizer solution 3 mL (3 mLs Nebulization Given 12/29/23 2010)   methylPREDNISolone sodium succinate injection 125 mg (125 mg Intravenous Given 12/29/23 2014)     Medical Decision Making  [age] year female initial assessment in mild to moderate distress secondary shortness of breath.  Patient is alert and oriented x3, neurologically and neurovascular intact with no focal deficits.  She is nontoxic-appearing and vitals stable at this time.    Differential diagnosis: COVID, influenza, pneumonia, COPD versus CHF exacerbation    Amount and/or Complexity of Data Reviewed  Labs: ordered.  Radiology: ordered.    Risk  Prescription drug management.  Risk Details: Patient has been reassessed noted to have no acute changes in her condition.  Labs images unremarkable for any acute pathology requiring further hospital admission or treatment this time.  Patient reports symptomatic improvement and will with primary care physician outpatient basis as needed.  Patient has remained stable in the ED and discharged home stable condition with follow-up as discussed.  Ms. Wilkinson and  are aware of the plan and in agreement with discharge.    Patient's plan and diagnosis was discussed. All questions were answered and patient was comfortable with the plan. This patient was personally seen and personally examined by me and I personally performed the services described in this documentation.   Complexity of the visit is established by the note or I have spent at least the amount of time discussing findings, exam and/or radiographs or imaging studies.     MD uses EPIC and voice recognition software prone to occasional and minor errors that may persist in the medical record.                                        Clinical Impression:  Final diagnoses:  [R06.02]  Shortness of breath (Primary)  [J44.1] COPD exacerbation          ED Disposition Condition    Discharge Stable          ED Prescriptions       Medication Sig Dispense Start Date End Date Auth. Provider    promethazine-dextromethorphan (PROMETHAZINE-DM) 6.25-15 mg/5 mL Syrp Take 5 mLs by mouth every 4 (four) hours as needed. 180 mL 12/29/2023 1/8/2024 Branden Reyes MD          Follow-up Information       Follow up With Specialties Details Why Contact Info Additional Information    [facility] - Emergency Dept Emergency Medicine  As needed, If symptoms worsen [address] [facility] [address]  [phone] 1st floor    Abiodun Will MD Family Medicine Schedule an appointment as soon as possible for a visit  As needed, [facility] symptoms worsen [address] [facility] [address]  [phone]                Branden Reyes MD  12/29/23 0210

## 2023-12-31 ENCOUNTER — HOSPITAL ENCOUNTER (OUTPATIENT)
Facility: HOSPITAL | Age: 83
Discharge: HOME | End: 2024-01-02
Attending: EMERGENCY MEDICINE | Admitting: STUDENT IN AN ORGANIZED HEALTH CARE EDUCATION/TRAINING PROGRAM
Payer: MEDICARE

## 2023-12-31 DIAGNOSIS — J44.1 COPD EXACERBATION: Primary | ICD-10-CM

## 2023-12-31 DIAGNOSIS — R06.02 SHORTNESS OF BREATH: ICD-10-CM

## 2023-12-31 PROBLEM — I10 HYPERTENSION: Status: ACTIVE | Noted: 2023-12-31

## 2023-12-31 LAB
ADENOVIRUS: NOT DETECTED
ALBUMIN SERPL BCP-MCNC: 3.9 G/DL (ref 3.5–5.2)
ALP SERPL-CCNC: 79 U/L (ref 55–135)
ALT SERPL W/O P-5'-P-CCNC: 15 U/L (ref 10–44)
ANION GAP SERPL CALC-SCNC: 11 MMOL/L (ref 8–16)
AST SERPL-CCNC: 18 U/L (ref 10–40)
BASOPHILS # BLD AUTO: 0.01 K/UL (ref 0–0.2)
BASOPHILS NFR BLD: 0.1 % (ref 0–1.9)
BILIRUB SERPL-MCNC: 0.4 MG/DL (ref 0.1–1)
BNP SERPL-MCNC: 383 PG/ML (ref 0–99)
BORDETELLA PARAPERTUSSIS (IS1001): NOT DETECTED
BORDETELLA PERTUSSIS (PTXP): NOT DETECTED
BUN SERPL-MCNC: 45 MG/DL (ref 8–23)
CALCIUM SERPL-MCNC: 10.2 MG/DL (ref 8.7–10.5)
CHLAMYDIA PNEUMONIAE: NOT DETECTED
CHLORIDE SERPL-SCNC: 110 MMOL/L (ref 95–110)
CO2 SERPL-SCNC: 21 MMOL/L (ref 23–29)
CORONAVIRUS 229E, COMMON COLD VIRUS: NOT DETECTED
CORONAVIRUS HKU1, COMMON COLD VIRUS: NOT DETECTED
CORONAVIRUS NL63, COMMON COLD VIRUS: NOT DETECTED
CORONAVIRUS OC43, COMMON COLD VIRUS: NOT DETECTED
CREAT SERPL-MCNC: 0.9 MG/DL (ref 0.5–1.4)
DIFFERENTIAL METHOD BLD: ABNORMAL
EOSINOPHIL # BLD AUTO: 0 K/UL (ref 0–0.5)
EOSINOPHIL NFR BLD: 0 % (ref 0–8)
ERYTHROCYTE [DISTWIDTH] IN BLOOD BY AUTOMATED COUNT: 12.2 % (ref 11.5–14.5)
EST. GFR  (NO RACE VARIABLE): >60 ML/MIN/1.73 M^2
FLUBV RNA NPH QL NAA+NON-PROBE: NOT DETECTED
GLUCOSE SERPL-MCNC: 251 MG/DL (ref 70–110)
GLUCOSE SERPL-MCNC: 94 MG/DL (ref 70–110)
GROUP A STREP, MOLECULAR: NEGATIVE
HCT VFR BLD AUTO: 36.6 % (ref 37–48.5)
HGB BLD-MCNC: 11.9 G/DL (ref 12–16)
HPIV1 RNA NPH QL NAA+NON-PROBE: NOT DETECTED
HPIV2 RNA NPH QL NAA+NON-PROBE: NOT DETECTED
HPIV3 RNA NPH QL NAA+NON-PROBE: NOT DETECTED
HPIV4 RNA NPH QL NAA+NON-PROBE: NOT DETECTED
HUMAN METAPNEUMOVIRUS: NOT DETECTED
IMM GRANULOCYTES # BLD AUTO: 0.03 K/UL (ref 0–0.04)
IMM GRANULOCYTES NFR BLD AUTO: 0.2 % (ref 0–0.5)
INFLUENZA A (SUBTYPES H1,H1-2009,H3): NOT DETECTED
LYMPHOCYTES # BLD AUTO: 0.6 K/UL (ref 1–4.8)
LYMPHOCYTES NFR BLD: 5 % (ref 18–48)
MAGNESIUM SERPL-MCNC: 1.9 MG/DL (ref 1.6–2.6)
MCH RBC QN AUTO: 32.4 PG (ref 27–31)
MCHC RBC AUTO-ENTMCNC: 32.5 G/DL (ref 32–36)
MCV RBC AUTO: 100 FL (ref 82–98)
MONOCYTES # BLD AUTO: 1.1 K/UL (ref 0.3–1)
MONOCYTES NFR BLD: 9.2 % (ref 4–15)
MYCOPLASMA PNEUMONIAE: NOT DETECTED
NEUTROPHILS # BLD AUTO: 10.4 K/UL (ref 1.8–7.7)
NEUTROPHILS NFR BLD: 85.5 % (ref 38–73)
NRBC BLD-RTO: 0 /100 WBC
PLATELET # BLD AUTO: 365 K/UL (ref 150–450)
PMV BLD AUTO: 8.8 FL (ref 9.2–12.9)
POTASSIUM SERPL-SCNC: 4.2 MMOL/L (ref 3.5–5.1)
PROT SERPL-MCNC: 7.6 G/DL (ref 6–8.4)
RBC # BLD AUTO: 3.67 M/UL (ref 4–5.4)
RESPIRATORY INFECTION PANEL SOURCE: NORMAL
RSV RNA NPH QL NAA+NON-PROBE: NOT DETECTED
RV+EV RNA NPH QL NAA+NON-PROBE: NOT DETECTED
SARS-COV-2 RNA RESP QL NAA+PROBE: NOT DETECTED
SODIUM SERPL-SCNC: 142 MMOL/L (ref 136–145)
T4 FREE SERPL-MCNC: 1.17 NG/DL (ref 0.71–1.51)
TROPONIN I SERPL HS-MCNC: 8 PG/ML (ref 0–14.9)
TROPONIN I SERPL HS-MCNC: 9.6 PG/ML (ref 0–14.9)
TSH SERPL DL<=0.005 MIU/L-ACNC: 6.59 UIU/ML (ref 0.34–5.6)
WBC # BLD AUTO: 12.16 K/UL (ref 3.9–12.7)

## 2023-12-31 PROCEDURE — 94799 UNLISTED PULMONARY SVC/PX: CPT

## 2023-12-31 PROCEDURE — 87070 CULTURE OTHR SPECIMN AEROBIC: CPT | Performed by: STUDENT IN AN ORGANIZED HEALTH CARE EDUCATION/TRAINING PROGRAM

## 2023-12-31 PROCEDURE — 87185 SC STD ENZYME DETCJ PER NZM: CPT | Performed by: STUDENT IN AN ORGANIZED HEALTH CARE EDUCATION/TRAINING PROGRAM

## 2023-12-31 PROCEDURE — 25000003 PHARM REV CODE 250: Performed by: STUDENT IN AN ORGANIZED HEALTH CARE EDUCATION/TRAINING PROGRAM

## 2023-12-31 PROCEDURE — 94761 N-INVAS EAR/PLS OXIMETRY MLT: CPT

## 2023-12-31 PROCEDURE — 63600175 PHARM REV CODE 636 W HCPCS

## 2023-12-31 PROCEDURE — 84443 ASSAY THYROID STIM HORMONE: CPT

## 2023-12-31 PROCEDURE — 25000242 PHARM REV CODE 250 ALT 637 W/ HCPCS

## 2023-12-31 PROCEDURE — 85025 COMPLETE CBC W/AUTO DIFF WBC: CPT

## 2023-12-31 PROCEDURE — G0378 HOSPITAL OBSERVATION PER HR: HCPCS

## 2023-12-31 PROCEDURE — 83880 ASSAY OF NATRIURETIC PEPTIDE: CPT

## 2023-12-31 PROCEDURE — 99900031 HC PATIENT EDUCATION (STAT)

## 2023-12-31 PROCEDURE — 94640 AIRWAY INHALATION TREATMENT: CPT

## 2023-12-31 PROCEDURE — 83735 ASSAY OF MAGNESIUM: CPT

## 2023-12-31 PROCEDURE — 99900035 HC TECH TIME PER 15 MIN (STAT)

## 2023-12-31 PROCEDURE — 96361 HYDRATE IV INFUSION ADD-ON: CPT

## 2023-12-31 PROCEDURE — 87077 CULTURE AEROBIC IDENTIFY: CPT | Performed by: STUDENT IN AN ORGANIZED HEALTH CARE EDUCATION/TRAINING PROGRAM

## 2023-12-31 PROCEDURE — 84484 ASSAY OF TROPONIN QUANT: CPT

## 2023-12-31 PROCEDURE — 84439 ASSAY OF FREE THYROXINE: CPT

## 2023-12-31 PROCEDURE — 99285 EMERGENCY DEPT VISIT HI MDM: CPT | Mod: 25

## 2023-12-31 PROCEDURE — 27000221 HC OXYGEN, UP TO 24 HOURS

## 2023-12-31 PROCEDURE — 96375 TX/PRO/DX INJ NEW DRUG ADDON: CPT

## 2023-12-31 PROCEDURE — 63600175 PHARM REV CODE 636 W HCPCS: Performed by: STUDENT IN AN ORGANIZED HEALTH CARE EDUCATION/TRAINING PROGRAM

## 2023-12-31 PROCEDURE — A4216 STERILE WATER/SALINE, 10 ML: HCPCS | Performed by: STUDENT IN AN ORGANIZED HEALTH CARE EDUCATION/TRAINING PROGRAM

## 2023-12-31 PROCEDURE — 82962 GLUCOSE BLOOD TEST: CPT

## 2023-12-31 PROCEDURE — 80053 COMPREHEN METABOLIC PANEL: CPT

## 2023-12-31 PROCEDURE — 25000242 PHARM REV CODE 250 ALT 637 W/ HCPCS: Performed by: STUDENT IN AN ORGANIZED HEALTH CARE EDUCATION/TRAINING PROGRAM

## 2023-12-31 PROCEDURE — 87651 STREP A DNA AMP PROBE: CPT

## 2023-12-31 PROCEDURE — 93010 ELECTROCARDIOGRAM REPORT: CPT | Mod: ,,, | Performed by: INTERNAL MEDICINE

## 2023-12-31 PROCEDURE — 87633 RESP VIRUS 12-25 TARGETS: CPT | Performed by: STUDENT IN AN ORGANIZED HEALTH CARE EDUCATION/TRAINING PROGRAM

## 2023-12-31 PROCEDURE — 96365 THER/PROPH/DIAG IV INF INIT: CPT

## 2023-12-31 PROCEDURE — 93005 ELECTROCARDIOGRAM TRACING: CPT | Performed by: INTERNAL MEDICINE

## 2023-12-31 PROCEDURE — 96372 THER/PROPH/DIAG INJ SC/IM: CPT | Performed by: STUDENT IN AN ORGANIZED HEALTH CARE EDUCATION/TRAINING PROGRAM

## 2023-12-31 PROCEDURE — 87205 SMEAR GRAM STAIN: CPT | Performed by: STUDENT IN AN ORGANIZED HEALTH CARE EDUCATION/TRAINING PROGRAM

## 2023-12-31 PROCEDURE — 96366 THER/PROPH/DIAG IV INF ADDON: CPT

## 2023-12-31 PROCEDURE — 36415 COLL VENOUS BLD VENIPUNCTURE: CPT

## 2023-12-31 PROCEDURE — 94640 AIRWAY INHALATION TREATMENT: CPT | Mod: XB

## 2023-12-31 RX ORDER — OXYCODONE AND ACETAMINOPHEN 10; 325 MG/1; MG/1
1 TABLET ORAL EVERY 4 HOURS PRN
Status: DISCONTINUED | OUTPATIENT
Start: 2023-12-31 | End: 2024-01-02 | Stop reason: HOSPADM

## 2023-12-31 RX ORDER — BENZONATATE 100 MG/1
100 CAPSULE ORAL 3 TIMES DAILY PRN
Status: DISCONTINUED | OUTPATIENT
Start: 2023-12-31 | End: 2024-01-02 | Stop reason: HOSPADM

## 2023-12-31 RX ORDER — IPRATROPIUM BROMIDE AND ALBUTEROL SULFATE 2.5; .5 MG/3ML; MG/3ML
3 SOLUTION RESPIRATORY (INHALATION)
Status: DISCONTINUED | OUTPATIENT
Start: 2023-12-31 | End: 2024-01-02 | Stop reason: HOSPADM

## 2023-12-31 RX ORDER — GABAPENTIN 100 MG/1
100 CAPSULE ORAL 3 TIMES DAILY
Status: DISCONTINUED | OUTPATIENT
Start: 2023-12-31 | End: 2024-01-02 | Stop reason: HOSPADM

## 2023-12-31 RX ORDER — ENOXAPARIN SODIUM 100 MG/ML
30 INJECTION SUBCUTANEOUS EVERY 24 HOURS
Status: DISCONTINUED | OUTPATIENT
Start: 2023-12-31 | End: 2024-01-02 | Stop reason: HOSPADM

## 2023-12-31 RX ORDER — SODIUM CHLORIDE 0.9 % (FLUSH) 0.9 %
10 SYRINGE (ML) INJECTION EVERY 12 HOURS
Status: DISCONTINUED | OUTPATIENT
Start: 2023-12-31 | End: 2024-01-02 | Stop reason: HOSPADM

## 2023-12-31 RX ORDER — IBUPROFEN 200 MG
24 TABLET ORAL
Status: DISCONTINUED | OUTPATIENT
Start: 2023-12-31 | End: 2024-01-02 | Stop reason: HOSPADM

## 2023-12-31 RX ORDER — ACETAMINOPHEN 325 MG/1
650 TABLET ORAL EVERY 8 HOURS PRN
Status: DISCONTINUED | OUTPATIENT
Start: 2023-12-31 | End: 2024-01-02 | Stop reason: HOSPADM

## 2023-12-31 RX ORDER — ARFORMOTEROL TARTRATE 15 UG/2ML
15 SOLUTION RESPIRATORY (INHALATION) 2 TIMES DAILY
Status: DISCONTINUED | OUTPATIENT
Start: 2023-12-31 | End: 2024-01-02 | Stop reason: HOSPADM

## 2023-12-31 RX ORDER — PROMETHAZINE HYDROCHLORIDE 6.25 MG/5ML
6.25 SYRUP ORAL EVERY 4 HOURS PRN
COMMUNITY
Start: 2023-12-30

## 2023-12-31 RX ORDER — IPRATROPIUM BROMIDE 0.5 MG/2.5ML
0.5 SOLUTION RESPIRATORY (INHALATION) EVERY 6 HOURS
Status: DISCONTINUED | OUTPATIENT
Start: 2023-12-31 | End: 2024-01-01

## 2023-12-31 RX ORDER — METOPROLOL SUCCINATE 50 MG/1
50 TABLET, EXTENDED RELEASE ORAL 2 TIMES DAILY
Status: DISCONTINUED | OUTPATIENT
Start: 2024-01-01 | End: 2024-01-02 | Stop reason: HOSPADM

## 2023-12-31 RX ORDER — ALBUTEROL SULFATE 0.83 MG/ML
2.5 SOLUTION RESPIRATORY (INHALATION)
Status: DISCONTINUED | OUTPATIENT
Start: 2023-12-31 | End: 2023-12-31

## 2023-12-31 RX ORDER — INSULIN ASPART 100 [IU]/ML
0-5 INJECTION, SOLUTION INTRAVENOUS; SUBCUTANEOUS
Status: DISCONTINUED | OUTPATIENT
Start: 2023-12-31 | End: 2024-01-02 | Stop reason: HOSPADM

## 2023-12-31 RX ORDER — METHYLPREDNISOLONE SOD SUCC 125 MG
125 VIAL (EA) INJECTION
Status: COMPLETED | OUTPATIENT
Start: 2023-12-31 | End: 2023-12-31

## 2023-12-31 RX ORDER — IPRATROPIUM BROMIDE AND ALBUTEROL SULFATE 2.5; .5 MG/3ML; MG/3ML
3 SOLUTION RESPIRATORY (INHALATION)
Status: COMPLETED | OUTPATIENT
Start: 2023-12-31 | End: 2023-12-31

## 2023-12-31 RX ORDER — PANTOPRAZOLE SODIUM 40 MG/1
40 TABLET, DELAYED RELEASE ORAL DAILY
Status: DISCONTINUED | OUTPATIENT
Start: 2023-12-31 | End: 2024-01-02 | Stop reason: HOSPADM

## 2023-12-31 RX ORDER — LEVOFLOXACIN 5 MG/ML
750 INJECTION, SOLUTION INTRAVENOUS ONCE
Status: COMPLETED | OUTPATIENT
Start: 2023-12-31 | End: 2023-12-31

## 2023-12-31 RX ORDER — ATORVASTATIN CALCIUM 20 MG/1
20 TABLET, FILM COATED ORAL NIGHTLY
Status: DISCONTINUED | OUTPATIENT
Start: 2023-12-31 | End: 2024-01-02 | Stop reason: HOSPADM

## 2023-12-31 RX ORDER — AMLODIPINE BESYLATE 2.5 MG/1
2.5 TABLET ORAL NIGHTLY
COMMUNITY

## 2023-12-31 RX ORDER — ONDANSETRON 2 MG/ML
4 INJECTION INTRAMUSCULAR; INTRAVENOUS EVERY 8 HOURS PRN
Status: DISCONTINUED | OUTPATIENT
Start: 2023-12-31 | End: 2024-01-02 | Stop reason: HOSPADM

## 2023-12-31 RX ORDER — AMLODIPINE BESYLATE 2.5 MG/1
2.5 TABLET ORAL NIGHTLY
Status: DISCONTINUED | OUTPATIENT
Start: 2023-12-31 | End: 2024-01-02 | Stop reason: HOSPADM

## 2023-12-31 RX ORDER — GABAPENTIN 100 MG/1
1 CAPSULE ORAL 3 TIMES DAILY
COMMUNITY
Start: 2023-09-11

## 2023-12-31 RX ORDER — PROMETHAZINE HYDROCHLORIDE 6.25 MG/5ML
12.5 SYRUP ORAL EVERY 4 HOURS PRN
Status: DISCONTINUED | OUTPATIENT
Start: 2023-12-31 | End: 2024-01-02 | Stop reason: HOSPADM

## 2023-12-31 RX ORDER — IPRATROPIUM BROMIDE AND ALBUTEROL SULFATE 2.5; .5 MG/3ML; MG/3ML
3 SOLUTION RESPIRATORY (INHALATION) EVERY 4 HOURS PRN
Status: DISCONTINUED | OUTPATIENT
Start: 2023-12-31 | End: 2024-01-01

## 2023-12-31 RX ORDER — MEGESTROL ACETATE 20 MG/1
40 TABLET ORAL 2 TIMES DAILY
Status: DISCONTINUED | OUTPATIENT
Start: 2023-12-31 | End: 2024-01-02 | Stop reason: HOSPADM

## 2023-12-31 RX ORDER — ASPIRIN 81 MG/1
81 TABLET ORAL DAILY
Status: DISCONTINUED | OUTPATIENT
Start: 2024-01-01 | End: 2024-01-02 | Stop reason: HOSPADM

## 2023-12-31 RX ORDER — GLUCAGON 1 MG
1 KIT INJECTION
Status: DISCONTINUED | OUTPATIENT
Start: 2023-12-31 | End: 2024-01-02 | Stop reason: HOSPADM

## 2023-12-31 RX ORDER — BUDESONIDE 0.5 MG/2ML
0.5 INHALANT ORAL EVERY 12 HOURS
Status: DISCONTINUED | OUTPATIENT
Start: 2023-12-31 | End: 2024-01-02 | Stop reason: HOSPADM

## 2023-12-31 RX ORDER — ALBUTEROL SULFATE 0.83 MG/ML
2.5 SOLUTION RESPIRATORY (INHALATION)
Status: COMPLETED | OUTPATIENT
Start: 2023-12-31 | End: 2023-12-31

## 2023-12-31 RX ORDER — TALC
6 POWDER (GRAM) TOPICAL NIGHTLY PRN
Status: DISCONTINUED | OUTPATIENT
Start: 2023-12-31 | End: 2024-01-02 | Stop reason: HOSPADM

## 2023-12-31 RX ORDER — HYDRALAZINE HYDROCHLORIDE 20 MG/ML
10 INJECTION INTRAMUSCULAR; INTRAVENOUS EVERY 4 HOURS PRN
Status: DISCONTINUED | OUTPATIENT
Start: 2023-12-31 | End: 2024-01-02 | Stop reason: HOSPADM

## 2023-12-31 RX ORDER — IBUPROFEN 200 MG
16 TABLET ORAL
Status: DISCONTINUED | OUTPATIENT
Start: 2023-12-31 | End: 2024-01-02 | Stop reason: HOSPADM

## 2023-12-31 RX ORDER — CLOPIDOGREL BISULFATE 75 MG/1
75 TABLET ORAL DAILY
Status: DISCONTINUED | OUTPATIENT
Start: 2024-01-01 | End: 2024-01-02 | Stop reason: HOSPADM

## 2023-12-31 RX ADMIN — SODIUM CHLORIDE 10 ML: 9 INJECTION INTRAMUSCULAR; INTRAVENOUS; SUBCUTANEOUS at 09:12

## 2023-12-31 RX ADMIN — BUDESONIDE INHALATION 0.5 MG: 0.5 SUSPENSION RESPIRATORY (INHALATION) at 02:12

## 2023-12-31 RX ADMIN — IPRATROPIUM BROMIDE 0.5 MG: 0.5 SOLUTION RESPIRATORY (INHALATION) at 07:12

## 2023-12-31 RX ADMIN — ATORVASTATIN CALCIUM 20 MG: 20 TABLET, FILM COATED ORAL at 09:12

## 2023-12-31 RX ADMIN — METHYLPREDNISOLONE SODIUM SUCCINATE 125 MG: 125 INJECTION, POWDER, FOR SOLUTION INTRAMUSCULAR; INTRAVENOUS at 11:12

## 2023-12-31 RX ADMIN — IPRATROPIUM BROMIDE AND ALBUTEROL SULFATE 3 ML: 2.5; .5 SOLUTION RESPIRATORY (INHALATION) at 10:12

## 2023-12-31 RX ADMIN — MEGESTROL ACETATE 40 MG: 20 TABLET ORAL at 09:12

## 2023-12-31 RX ADMIN — IPRATROPIUM BROMIDE AND ALBUTEROL SULFATE 3 ML: 2.5; .5 SOLUTION RESPIRATORY (INHALATION) at 01:12

## 2023-12-31 RX ADMIN — LEVOFLOXACIN 750 MG: 5 INJECTION, SOLUTION INTRAVENOUS at 02:12

## 2023-12-31 RX ADMIN — IPRATROPIUM BROMIDE AND ALBUTEROL SULFATE 3 ML: 2.5; .5 SOLUTION RESPIRATORY (INHALATION) at 09:12

## 2023-12-31 RX ADMIN — ARFORMOTEROL TARTRATE 15 MCG: 15 SOLUTION RESPIRATORY (INHALATION) at 02:12

## 2023-12-31 RX ADMIN — ALBUTEROL SULFATE 2.5 MG: 2.5 SOLUTION RESPIRATORY (INHALATION) at 10:12

## 2023-12-31 RX ADMIN — ENOXAPARIN SODIUM 30 MG: 30 INJECTION SUBCUTANEOUS at 07:12

## 2023-12-31 NOTE — H&P
[facility] - Emergency Dept  Hospital Medicine  History & Physical    Patient Name: Carmen Wilkinson  MRN: [id number]  Patient Class: OP- Observation  Admission Date: 12/31/2023  Attending Physician: Krish Henley MD   Primary Care Provider: Abiodun Will MD         Patient information was obtained from patient, past medical records, and ER records.     Subjective:     Principal Problem:COPD exacerbation    Chief Complaint:   Chief Complaint   Patient presents with    Shortness of Breath     COPD exacerbation seen her Friday for same complaint         HPI: Patient 83-year-old female with history of COPD on home oxygen, hypertension, and hyperlipidemia who presents with shortness of breath.  Patient was seen here in the ED 2 days ago for shortness for breath and then discharged home.  Patient says she has been feeling more short of breath since then.  Patient is now having a cough with sputum production.  Patient does use 2-1/2 L home oxygen at home.  Patient says she was recently started on home oxygen.  Patient was 89% room air in his now 97% on 2-1/2 L nasal cannula.  WBC 12.1 and hemoglobin 11.9.  Creatinine 0.9.  Chest x-ray with hyperinflated lungs otherwise no acute findings.    Past Medical History:   Diagnosis Date    Back pain     Brain aneurysm 2018    Carotid stenosis     Cataract     Diverticulitis     Emphysema lung     Feeling anxious     Hyperlipidemia     Hypertension     Macular degeneration     PVD (peripheral vascular disease)     Squamous cell carcinoma 08/29/2017    right medical cheek, SSIS, efudex tx       Past Surgical History:   Procedure Laterality Date    ABDOMINAL SURGERY      ANGIOGRAPHY OF MESENTERIC VESSELS Left 09/07/2022    Procedure: ANGIOGRAM, MESENTERIC VESSELS;  Surgeon: Krish Machado MD;  Location: [facility] CATH/EP LAB;  Service: General;  Laterality: Left;    ARTERIOGRAM, MESENTERIC N/A 06/30/2021    Procedure: ARTERIOGRAM, MESENTERIC;  Surgeon: Krish  HARRIETT Machado MD;  Location: [facility] CATH/EP LAB;  Service: General;  Laterality: N/A;    BACK SURGERY  2008    CATARACT EXTRACTION, BILATERAL      COLONOSCOPY N/A 04/04/2022    Procedure: COLONOSCOPY;  Surgeon: Lino Devi MD;  Location: [facility] ENDO;  Service: Endoscopy;  Laterality: N/A;    CREATION, BYPASS, ARTERIAL, AORTA TO FEMORAL N/A 01/13/2021    Procedure: ARTERIOGRAM, MESENTERIC;  Surgeon: Krish Machado MD;  Location: [facility] CATH/EP LAB;  Service: General;  Laterality: N/A;    ENDOSCOPY OF PROXIMAL SMALL INTESTINE N/A 04/04/2022    Procedure: ENTEROSCOPY, PROXIMAL;  Surgeon: Lino Devi MD;  [facility]: [facility];  Service: Endoscopy;  Laterality: N/A;    ESOPHAGOGASTRODUODENOSCOPY N/A 11/20/2022    Procedure: EGD (ESOPHAGOGASTRODUODENOSCOPY);  Surgeon: Pedro Hall Jr., MD;  [facility]: [facility];  Service: Endoscopy;  Laterality: N/A;    ESOPHAGOGASTRODUODENOSCOPY N/A 8/31/2023    Procedure: EGD (ESOPHAGOGASTRODUODENOSCOPY);  Surgeon: Lino Devi MD;  [facility];  Service: Endoscopy;  Laterality: N/A;    ESOPHAGOGASTRODUODENOSCOPY N/A 10/26/2023    Procedure: EGD (ESOPHAGOGASTRODUODENOSCOPY);  Surgeon: Lino Devi MD;  Location: [facility] ENDO;  Service: Endoscopy;  Laterality: N/A;    ESOPHAGOGASTRODUODENOSCOPY N/A 12/14/2023    Procedure: EGD (ESOPHAGOGASTRODUODENOSCOPY);  Surgeon: Lino Devi MD;  [facility];  Service: Endoscopy;  Laterality: N/A;    HYSTERECTOMY      IMPLANTATION OF SPINAL CORD STIMULATOR IN CERVICAL SPINE      UPPER GASTROINTESTINAL ENDOSCOPY  11/20/2022    w/ antrum biopsy       Review of patient's allergies indicates:   Allergen Reactions    Sulfa (sulfonamide antibiotics) Nausea Only       No current facility-administered medications on file prior to encounter.     Current Outpatient Medications on File Prior to Encounter   Medication Sig    albuterol (PROAIR HFA) 90 mcg/actuation inhaler Inhale 2 puffs into the lungs every 4 (four) hours as needed for  Wheezing. Rescue    aspirin (ECOTRIN) 81 MG EC tablet Take 81 mg by mouth once daily.    clopidogreL (PLAVIX) 75 mg tablet Take 1 tablet (75 mg total) by mouth once daily.    ferrous sulfate 325 (65 FE) MG EC tablet Take 325 mg by mouth once daily.    fluticasone-umeclidin-vilanter (TRELEGY ELLIPTA) 100-62.5-25 mcg DsDv Inhale 1 puff into the lungs once daily.    gabapentin (NEURONTIN) 100 MG capsule Take 1 capsule by mouth 3 (three) times daily.    gyclf-ko-7-dha-epa-phospho-ast (MEGARED OMEGA-3 KRILL OIL) 490-42-22-50 mg Cap Take 1 tablet by mouth once daily.    L. gasseri-B. bifidum-B longum 1.5 billion cell Cap Take 1 tablet by mouth once daily.     megestroL (MEGACE) 20 MG Tab Take 40 mg by mouth 2 (two) times daily.    metoprolol succinate (TOPROL-XL) 50 MG 24 hr tablet Take 50 mg by mouth 2 (two) times daily.    oxyCODONE-acetaminophen (PERCOCET)  mg per tablet Take 1 tablet by mouth every 4 (four) hours as needed for Pain. 5 times daily    pantoprazole (PROTONIX) 40 MG tablet Take 1 tablet (40 mg total) by mouth 2 (two) times daily.    polycarbophil (FIBERCON) 625 mg tablet Take 625 mg by mouth once daily.    simvastatin (ZOCOR) 10 MG tablet Take 20 mg by mouth every evening.    UNABLE TO FIND Take 1 tablet by mouth once daily. medication name: Ronak GRULLONI    vit C,W-Pu-vtozu-lutein-zeaxan (PRESERVISION AREDS-2) 250-90-40-1 mg Cap Take 1 tablet by mouth 2 (two) times a day.    amLODIPine (NORVASC) 2.5 MG tablet Take 2.5 mg by mouth every evening.    promethazine (PHENERGAN) 6.25 mg/5 mL syrup Take 6.25 mg by mouth every 4 (four) hours as needed for Nausea.    [DISCONTINUED] promethazine-dextromethorphan (PROMETHAZINE-DM) 6.25-15 mg/5 mL Syrp Take 5 mLs by mouth every 4 (four) hours as needed.     Family History    None       Tobacco Use    Smoking status: Former     Current packs/day: 0.00     Average packs/day: 1 pack/day for 56.8 years (56.8 ttl pk-yrs)     Types: Cigarettes     Start date:  1965     Quit date: 2022     Years since quittin.7    Smokeless tobacco: Never   Substance and Sexual Activity    Alcohol use: No    Drug use: Not Currently    Sexual activity: Not on file     Review of Systems   Constitutional: Negative.    HENT: Negative.     Eyes: Negative.    Respiratory:  Positive for cough and shortness of breath. Negative for wheezing and stridor.    Cardiovascular: Negative.    Gastrointestinal: Negative.    Endocrine: Negative.    Genitourinary: Negative.    Musculoskeletal: Negative.    Skin: Negative.    Neurological: Negative.    Hematological: Negative.    Psychiatric/Behavioral: Negative.       Objective:     Vital Signs (Most Recent):  Temp: 97.5 °F (36.4 °C) (23 0828)  Pulse: 101 (23 1310)  Resp: 20 (23 1310)  BP: (!) 158/70 (23 1003)  SpO2: 97 % (23 1310) Vital Signs (24h Range):  Temp:  [97.5 °F (36.4 °C)] 97.5 °F (36.4 °C)  Pulse:  [] 101  Resp:  [18-25] 20  SpO2:  [89 %-97 %] 97 %  BP: (135-158)/(70-72) 158/70     Weight: 38.1 kg (84 lb)  Body mass index is 15.36 kg/m².     Physical Exam  Vitals and nursing note reviewed.   Constitutional:       General: She is not in acute distress.     Appearance: Normal appearance.   HENT:      Head: Normocephalic and atraumatic.      Nose: Nose normal.   Eyes:      Extraocular Movements: Extraocular movements intact.      Conjunctiva/sclera: Conjunctivae normal.   Cardiovascular:      Rate and Rhythm: Normal rate and regular rhythm.   Pulmonary:      Effort: Pulmonary effort is normal. No respiratory distress.      Breath sounds: Normal breath sounds. No stridor. No wheezing or rales.      Comments: On nasal cannula  Abdominal:      General: Bowel sounds are normal. There is no distension.      Palpations: Abdomen is soft.      Tenderness: There is no abdominal tenderness.   Musculoskeletal:         General: No swelling or tenderness. Normal range of motion.      Cervical back: Normal range of  motion and neck supple.      Right lower leg: No edema.      Left lower leg: No edema.   Skin:     General: Skin is warm and dry.   Neurological:      General: No focal deficit present.      Mental Status: She is alert and oriented to person, place, and time.                Significant Labs: All pertinent labs within the past 24 hours have been reviewed.  Recent Lab Results         12/31/23  1255   12/31/23  1120   12/31/23  1013        Group A Strep, Molecular   Negative  Comment: Arcanobacterium haemolyticum and Beta Streptococcus group C   and G will not be detected by this test method.  Please order   Throat Culture ([id number]) if suspected.           Respiratory Infection Panel Source NP swab           Albumin     3.9       ALP     79       ALT     15       Anion Gap     11       AST     18       Baso #     0.01       Basophil %     0.1       BILIRUBIN TOTAL     0.4  Comment: For infants and newborns, interpretation of results should be based  on gestational age, weight and in agreement with clinical  observations.    Premature Infant recommended reference ranges:  Up to 24 hours.............<8.0 mg/dL  Up to 48 hours............<12.0 mg/dL  3-5 days..................<15.0 mg/dL  6-29 days.................<15.0 mg/dL         BNP     383  Comment: Values of less than 100 pg/ml are consistent with non-CHF populations.       BUN     45       Calcium     10.2       Chloride     110       CO2     21       Creatinine     0.9       Differential Method     Automated       eGFR     >60.0       Eos #     0.0       Eosinophil %     0.0       Glucose     94       Gran # (ANC)     10.4       Gran %     85.5       Hematocrit     36.6       Hemoglobin     11.9       Immature Grans (Abs)     0.03  Comment: Mild elevation in immature granulocytes is non specific and   can be seen in a variety of conditions including stress response,   acute inflammation, trauma and pregnancy. Correlation with other   laboratory and clinical  findings is essential.         Immature Granulocytes     0.2       Lymph #     0.6       Lymph %     5.0       Magnesium      1.9       MCH     32.4       MCHC     32.5       MCV     100       Mono #     1.1       Mono %     9.2       MPV     8.8       nRBC     0       Platelet Count     365       Potassium     4.2       PROTEIN TOTAL     7.6       RBC     3.67       RDW     12.2       Sodium     142       Troponin I High Sensitivity     8.0  Comment: Troponin results differ between methods. Do not use   results between Troponin methods interchangeably.    Alkaline Phospatase levels above 400 U/L may   cause false positive results.    Access hsTnI should not be used for patients taking   Asfotase johana (Strensiq).         TSH     6.594       WBC     12.16               Significant Imaging: I have reviewed all pertinent imaging results/findings within the past 24 hours.  Assessment/Plan:     * COPD exacerbation  Continue DuoNebs, steroids, and Levaquin.  Check respiratory infection panel and sputum culture.  Does use 2.5 L nasal cannula at baseline.    Hypertension  Continue home medications      VTE Risk Mitigation (From admission, onward)           Ordered     enoxaparin injection 40 mg  Daily         12/31/23 1145     IP VTE HIGH RISK PATIENT  Once         12/31/23 1145     Place sequential compression device  Until discontinued         12/31/23 1145                       On 12/31/2023, patient should be placed in hospital observation services under my care.             Krish Henley MD  Department of Hospital Medicine  [facility] - Emergency Dept

## 2023-12-31 NOTE — PROGRESS NOTES
Automatic Inhaler to Nebulizer Interchange    fluticasone/umeclidinium/vilanterol (Trelegy) 100 mcg/ 62.5 mcg/ 25 mcg changed to budesonide 0.5 mg twice daily AND ipratropium 0.5 mg every 6 hour scheduled AND arformoterol 15 mcg twice daily per [facility] Automatic Therapeutic Substitutions Protocol.    Please contact pharmacy at extension [phone] with any questions.     Thank you,   Barbara Ignacio

## 2023-12-31 NOTE — PHARMACY MED REC
"              .        Admission Medication History     The home medication history was taken by Griselda Tucker.    You may go to "Admission" then "Reconcile Home Medications" tabs to review and/or act upon these items.     The home medication list has been updated by the Pharmacy department.   Please read ALL comments highlighted in yellow.   Please address this information as you see fit.    Feel free to contact us if you have any questions or require assistance.        Medications listed below were obtained from: Patient/family and Analytic software- [employer]  No current facility-administered medications on file prior to encounter.     Current Outpatient Medications on File Prior to Encounter   Medication Sig Dispense Refill    albuterol (PROAIR HFA) 90 mcg/actuation inhaler Inhale 2 puffs into the lungs every 4 (four) hours as needed for Wheezing. Rescue 18 g 11    aspirin (ECOTRIN) 81 MG EC tablet Take 81 mg by mouth once daily.      clopidogreL (PLAVIX) 75 mg tablet Take 1 tablet (75 mg total) by mouth once daily. 30 tablet 3    ferrous sulfate 325 (65 FE) MG EC tablet Take 325 mg by mouth once daily.      fluticasone-umeclidin-vilanter (TRELEGY ELLIPTA) 100-62.5-25 mcg DsDv Inhale 1 puff into the lungs once daily. 1 each 6    gabapentin (NEURONTIN) 100 MG capsule Take 1 capsule by mouth 3 (three) times daily.      exanp-oe-8-dha-epa-phospho-ast (MEGARED OMEGA-3 KRILL OIL) 814-52-89-50 mg Cap Take 1 tablet by mouth once daily.      L. gasseri-B. bifidum-B longum 1.5 billion cell Cap Take 1 tablet by mouth once daily.       megestroL (MEGACE) 20 MG Tab Take 40 mg by mouth 2 (two) times daily.      metoprolol succinate (TOPROL-XL) 50 MG 24 hr tablet Take 50 mg by mouth 2 (two) times daily.      oxyCODONE-acetaminophen (PERCOCET)  mg per tablet Take 1 tablet by mouth every 4 (four) hours as needed for Pain. 5 times daily      pantoprazole (PROTONIX) 40 MG tablet Take 1 tablet (40 mg total) by mouth 2 (two) " times daily. 180 tablet 1    polycarbophil (FIBERCON) 625 mg tablet Take 625 mg by mouth once daily.      simvastatin (ZOCOR) 10 MG tablet Take 20 mg by mouth every evening.      UNABLE TO FIND Take 1 tablet by mouth once daily. medication name: Ronak Mohan MVI      vit C,S-Jk-fmwiw-lutein-zeaxan (PRESERVISION AREDS-2) 250-90-40-1 mg Cap Take 1 tablet by mouth 2 (two) times a day.      amLODIPine (NORVASC) 2.5 MG tablet Take 2.5 mg by mouth every evening.      promethazine (PHENERGAN) 6.25 mg/5 mL syrup Take 6.25 mg by mouth every 4 (four) hours as needed for Nausea.      [DISCONTINUED] promethazine-dextromethorphan (PROMETHAZINE-DM) 6.25-15 mg/5 mL Syrp Take 5 mLs by mouth every 4 (four) hours as needed. 180 mL 0       Potential issues to be addressed PRIOR TO DISCHARGE  Patient reported not taking the following medications: (Amlodipine). These medications remain on the home medication list. Please address accordingly.     Griselda Tucker  EXT [phone]

## 2023-12-31 NOTE — ASSESSMENT & PLAN NOTE
Continue DuoNebs, steroids, and Levaquin.  Check respiratory infection panel and sputum culture.  Does use 2.5 L nasal cannula at baseline.

## 2023-12-31 NOTE — ED PROVIDER NOTES
Encounter Date: 12/31/2023       History     Chief Complaint   Patient presents with    Shortness of Breath     COPD exacerbation seen her Friday for same complaint      Patient is a 83 y.o. female with past medical history of COPD, hypertension, hyperlipidemia who presents to ED via self for concern for shortness of breath which began this morning.  Patient reports she was in the ER on Friday for similar symptoms.  Patient reports she was given medications he was feeling better.  Patient reports he was morning she woke up and was gasping for breath.  Patient reports her cough came back this morning.  Patient reports her pulmonologist recently told her that she should be wearing her oxygen all the time.  Patient denies fever.  Patient reports sore throat for a couple of days.  Patient denies vomiting or diarrhea. Patient is awake and alert, patient moderately short of breath on exam.      Review of patient's allergies indicates:   Allergen Reactions    Sulfa (sulfonamide antibiotics) Nausea Only     Past Medical History:   Diagnosis Date    Back pain     Brain aneurysm 2018    Carotid stenosis     Cataract     Diverticulitis     Emphysema lung     Feeling anxious     Hyperlipidemia     Hypertension     Macular degeneration     PVD (peripheral vascular disease)     Squamous cell carcinoma 08/29/2017    right medical cheek, SSIS, efudex tx     Past Surgical History:   Procedure Laterality Date    ABDOMINAL SURGERY      ANGIOGRAPHY OF MESENTERIC VESSELS Left 09/07/2022    Procedure: ANGIOGRAM, MESENTERIC VESSELS;  Surgeon: Krish Machado MD;  Location: [facility] CATH/EP LAB;  Service: General;  Laterality: Left;    ARTERIOGRAM, MESENTERIC N/A 06/30/2021    Procedure: ARTERIOGRAM, MESENTERIC;  Surgeon: Krish Machado MD;  Location: [facility] CATH/EP LAB;  Service: General;  Laterality: N/A;    BACK SURGERY  2008    CATARACT EXTRACTION, BILATERAL      COLONOSCOPY N/A 04/04/2022    Procedure: COLONOSCOPY;  Surgeon: Lino ANTONIO  MD Marito;  Location: [facility] ENDO;  Service: Endoscopy;  Laterality: N/A;    CREATION, BYPASS, ARTERIAL, AORTA TO FEMORAL N/A 2021    Procedure: ARTERIOGRAM, MESENTERIC;  Surgeon: Krish Machado MD;  Location: [facility] CATH/EP LAB;  Service: General;  Laterality: N/A;    ENDOSCOPY OF PROXIMAL SMALL INTESTINE N/A 2022    Procedure: ENTEROSCOPY, PROXIMAL;  Surgeon: Lino Devi MD;  Location: [facility] ENDO;  Service: Endoscopy;  Laterality: N/A;    ESOPHAGOGASTRODUODENOSCOPY N/A 2022    Procedure: EGD (ESOPHAGOGASTRODUODENOSCOPY);  Surgeon: Pedro Hall Jr., MD;  Location: [facility] ENDO;  Service: Endoscopy;  Laterality: N/A;    ESOPHAGOGASTRODUODENOSCOPY N/A 2023    Procedure: EGD (ESOPHAGOGASTRODUODENOSCOPY);  Surgeon: Lino Devi MD;  Location: [facility] ENDO;  Service: Endoscopy;  Laterality: N/A;    ESOPHAGOGASTRODUODENOSCOPY N/A 10/26/2023    Procedure: EGD (ESOPHAGOGASTRODUODENOSCOPY);  Surgeon: Lino Devi MD;  Location: [facility] ENDO;  Service: Endoscopy;  Laterality: N/A;    ESOPHAGOGASTRODUODENOSCOPY N/A 2023    Procedure: EGD (ESOPHAGOGASTRODUODENOSCOPY);  Surgeon: Lino Devi MD;  Location: [facility];  Service: Endoscopy;  Laterality: N/A;    HYSTERECTOMY      IMPLANTATION OF SPINAL CORD STIMULATOR IN CERVICAL SPINE      UPPER GASTROINTESTINAL ENDOSCOPY  2022    w/ antrum biopsy     Family History   Problem Relation Age of Onset    Melanoma Neg Hx     Psoriasis Neg Hx     Eczema Neg Hx     Lupus Neg Hx      Social History     Tobacco Use    Smoking status: Former     Current packs/day: 0.00     Average packs/day: 1 pack/day for 56.8 years (56.8 ttl pk-yrs)     Types: Cigarettes     Start date: 1965     Quit date: 2022     Years since quittin.7    Smokeless tobacco: Never   Substance Use Topics    Alcohol use: No    Drug use: Not Currently     Review of Systems   Constitutional:  Negative for fever.   HENT:  Positive for rhinorrhea. Negative for ear  discharge, ear pain, sinus pressure, sinus pain, sore throat and trouble swallowing.    Respiratory:  Positive for cough and shortness of breath. Negative for apnea, chest tightness, wheezing and stridor.    Cardiovascular: Negative.  Negative for chest pain.   Gastrointestinal:  Negative for anal bleeding, nausea and vomiting.   Genitourinary: Negative.  Negative for decreased urine volume and dysuria.   Musculoskeletal:  Negative for back pain, neck pain and neck stiffness.   Skin:  Negative for color change, pallor and rash.   Neurological: Negative.  Negative for weakness.   Hematological:  Does not bruise/bleed easily.   Psychiatric/Behavioral: Negative.         Physical Exam     Initial Vitals [12/31/23 0828]   BP Pulse Resp Temp SpO2   135/72 (!) 111 (!) 25 97.5 °F (36.4 °C) (!) 89 %      MAP       --         Physical Exam    Nursing note and vitals reviewed.  Constitutional: She appears well-developed and well-nourished. She is not diaphoretic.  Non-toxic appearance. She appears distressed.   HENT:   Head: Normocephalic and atraumatic.   Right Ear: Tympanic membrane, external ear and ear canal normal.   Left Ear: Tympanic membrane, external ear and ear canal normal.   Nose: Nose normal.   Mouth/Throat: Uvula is midline and mucous membranes are normal. Posterior oropharyngeal erythema present. No oropharyngeal exudate, posterior oropharyngeal edema or tonsillar abscesses.   Eyes: Conjunctivae and EOM are normal. Right eye exhibits no discharge. Left eye exhibits no discharge.   Neck:   Normal range of motion.  Cardiovascular:  Normal rate, regular rhythm, normal heart sounds and intact distal pulses.     Exam reveals no gallop and no friction rub.       No murmur heard.  Pulmonary/Chest: Tachypnea noted. No respiratory distress. She has no decreased breath sounds. She has no wheezes. She has rhonchi. She has no rales. She exhibits no tenderness.   Musculoskeletal:      Cervical back: Normal range of motion.      Neurological: She is alert and oriented to person, place, and time. She has normal strength. GCS score is 15. GCS eye subscore is 4. GCS verbal subscore is 5. GCS motor subscore is 6.   Skin: Skin is warm and dry. Capillary refill takes less than 2 seconds.   Psychiatric: She has a normal mood and affect. Her behavior is normal. Judgment and thought content normal.         ED Course   Procedures  Labs Reviewed   CBC W/ AUTO DIFFERENTIAL - Abnormal; Notable for the following components:       Result Value    RBC 3.67 (*)     Hemoglobin 11.9 (*)     Hematocrit 36.6 (*)      (*)     MCH 32.4 (*)     MPV 8.8 (*)     Gran # (ANC) 10.4 (*)     Lymph # 0.6 (*)     Mono # 1.1 (*)     Gran % 85.5 (*)     Lymph % 5.0 (*)     All other components within normal limits   COMPREHENSIVE METABOLIC PANEL - Abnormal; Notable for the following components:    CO2 21 (*)     BUN 45 (*)     All other components within normal limits   B-TYPE NATRIURETIC PEPTIDE - Abnormal; Notable for the following components:     (*)     All other components within normal limits   TSH - Abnormal; Notable for the following components:    TSH 6.594 (*)     All other components within normal limits   GROUP A STREP, MOLECULAR   RESPIRATORY INFECTION PANEL (PCR), NASOPHARYNGEAL   CULTURE, RESPIRATORY   MAGNESIUM   TROPONIN I HIGH SENSITIVITY   TROPONIN I HIGH SENSITIVITY   TSH   T4, FREE   POCT GLUCOSE MONITORING CONTINUOUS          Imaging Results              X-Ray Chest AP Portable (Final result)  Result time 12/31/23 12:23:11      Final result by Henry Chen MD (12/31/23 12:23:11)                   Narrative:    CLINICAL HISTORY:  83 years (1940) Female shortness of breath SOB    TECHNIQUE:  Portable AP radiograph the chest. One view.    COMPARISON:  Radiograph from December 29, 2023.    FINDINGS:  The lungs are hyperinflated with flattening of the bilateral hemidiaphragms consistent with underlying chronic obstructive lung  disease. Thin calcified pleural plaques are seen at the lung bases suggestive of old infection, trauma or prior asbestos exposure. There is a faint interstitial reticular opacity in the juxtapleural right upper lobe, similar to the previous exam. No pneumothorax is identified. The heart is normal in size. Atheromatous calcifications are seen at the aortic arch. Osseous structures appear unchanged, noting neural stimulator wires along the mid thoracic spine. The visualized upper abdomen is unremarkable.    IMPRESSION:  Unchanged radiograph of the chest when accounting for differences in imaging technique.                  .            Electronically signed by:  Henry Chen MD  12/31/2023 12:23 PM [facility] Workstation: [id number]                                     Medications   albuterol-ipratropium 2.5 mg-0.5 mg/3 mL nebulizer solution 3 mL (3 mLs Nebulization Given 12/31/23 1310)   albuterol-ipratropium 2.5 mg-0.5 mg/3 mL nebulizer solution 3 mL (has no administration in time range)   predniSONE tablet 50 mg (has no administration in time range)   pantoprazole EC tablet 40 mg (40 mg Oral Not Given 12/31/23 1230)   amLODIPine tablet 2.5 mg (has no administration in time range)   aspirin EC tablet 81 mg (has no administration in time range)   clopidogreL tablet 75 mg (has no administration in time range)   gabapentin capsule 100 mg (100 mg Oral Not Given 12/31/23 1437)   megestroL tablet 40 mg (40 mg Oral Not Given 12/31/23 1400)   oxyCODONE-acetaminophen  mg per tablet 1 tablet (has no administration in time range)   metoprolol succinate (TOPROL-XL) 24 hr tablet 50 mg (has no administration in time range)   promethazine 6.25 mg/5 mL syrup 12.5 mg (has no administration in time range)   atorvastatin tablet 20 mg (has no administration in time range)   benzonatate capsule 100 mg (has no administration in time range)   sodium chloride 0.9% flush 10 mL (has no administration in time range)   melatonin tablet 6 mg  (has no administration in time range)   enoxaparin injection 30 mg (has no administration in time range)   acetaminophen tablet 650 mg (has no administration in time range)   ondansetron injection 4 mg (has no administration in time range)   glucose chewable tablet 16 g (has no administration in time range)   glucose chewable tablet 24 g (has no administration in time range)   dextrose 50% injection 12.5 g (has no administration in time range)   dextrose 50% injection 25 g (has no administration in time range)   glucagon (human recombinant) injection 1 mg (has no administration in time range)   insulin aspart U-100 pen 0-5 Units (has no administration in time range)   hydrALAZINE injection 10 mg (has no administration in time range)   budesonide nebulizer solution 0.5 mg (0.5 mg Nebulization Given 12/31/23 1442)     And   ipratropium 0.02 % nebulizer solution 0.5 mg (has no administration in time range)     And   arformoteroL nebulizer solution 15 mcg (15 mcg Nebulization Given 12/31/23 1443)   methylPREDNISolone sodium succinate injection 125 mg (125 mg Intravenous Given 12/31/23 1120)   albuterol nebulizer solution 2.5 mg (2.5 mg Nebulization Given 12/31/23 1003)   albuterol-ipratropium 2.5 mg-0.5 mg/3 mL nebulizer solution 3 mL (3 mLs Nebulization Given 12/31/23 1003)   levoFLOXacin 750 mg/150 mL IVPB 750 mg (750 mg Intravenous New Bag 12/31/23 1439)     Medical Decision Making  MDM    Patient presents for emergent evaluation of acute shortness of breath poses a possible threat to life and/or bodily function.    Differential diagnosis included but not limited to COPD exacerbation, COVID, influenza, strep, pneumonia, upper viral respiratory illness, MI, cardiac equivalent, fluid overload, congestive heart failure.  In the ED patient found to have acute rhonchi lung sounds bilaterally with moderate increased work of breathing and tachypnea on initial exam.  Patient has full range of motion of neck without  difficulty or pain.  Patient has a erythematous posterior pharynx with no significant swelling or pustular exudate.  Patient has normal TMs bilaterally.  Patient is awake and alert and oriented x3 in no acute distress.      Patient had mild improvement after 2 breathing treatments and Solu-Medrol.  Patient continues to have rhonchi were lung sounds with less tachypnea.  Due to patient returning to the ED for continue shortness of breath and COPD exacerbation will admit patient to the ED for further evaluation and workup.    Admit MDM  I discussed the patient presentation labs, ekg, X-rays findings with my attending Dr. Ross.    I discussed the patient presentation labs, ekg, X-rays, CT findings with the consultant Dr. Henley (hospital medicine) who agreed to admit the patient.    Patient was managed in the ED with IV Solu-Medrol and albuterol nebulizer and DuoNeb nebulizer.    The response to treatment was slight improvement of symptoms.    Patient was admitted in stable condition.     NP uses Epic and voice recognition software prone to occasional and minor errors that may persist in the medical record.     Amount and/or Complexity of Data Reviewed  Labs: ordered. Decision-making details documented in ED Course.  Radiology: ordered.     Details: Chest x-ray significant for IMPRESSION: Unchanged radiograph of the chest     ECG/medicine tests: ordered and independent interpretation performed. Decision-making details documented in ED Course.    Risk  Prescription drug management.  Decision regarding hospitalization.               ED Course as of 12/31/23 1642   Sun Dec 31, 2023   1135 BNP(!): 383 [MP]   1136 CO2(!): 21 [MP]   1136 BUN(!): 45 [MP]   1136 RBC(!): 3.67 [MP]   1136 CBC auto differential(!)  CBC significant for RBC 3.67, hemoglobin 11.9, hematocrit 36.6, , MCH 32.4, MPV 8.8 [MP]   1136 Troponin I High Sensitivity: 8.0 [MP]   1137 EKG 12-lead  EKG reviewed my attending.  EKG significant sinus  tachycardia, no signs of occlusive MI [MP]      ED Course User Index  [MP] Camilla Rivera NP                           Clinical Impression:  Final diagnoses:  [R06.02] Shortness of breath  [J44.1] COPD exacerbation (Primary)          ED Disposition Condition    Observation                 Camilla Rivera NP  12/31/23 4504

## 2023-12-31 NOTE — PROGRESS NOTES
Pharmacist Renal Dose Adjustment Note    Carmen Wilkinson is a 83 y.o. female being treated with the medication ENOXAPARIN    Patient Data:    Vital Signs (Most Recent):  Temp: 97.5 °F (36.4 °C) (12/31/23 0828)  Pulse: 101 (12/31/23 1310)  Resp: 20 (12/31/23 1310)  BP: (!) 158/70 (12/31/23 1003)  SpO2: 97 % (12/31/23 1310) Vital Signs (72h Range):  Temp:  [97.5 °F (36.4 °C)-97.8 °F (36.6 °C)]   Pulse:  []   Resp:  [18-25]   BP: (124-158)/(56-72)   SpO2:  [89 %-97 %]      Recent Labs   Lab 12/29/23 2014 12/31/23  1013   CREATININE 0.9 0.9     Serum creatinine: 0.9 mg/dL 12/31/23 1013  Estimated creatinine clearance: 28.5 mL/min    ENOXAPARIN 40 MG EVERY 24 HR will be changed TO 30 MG  EVERY 24 HOURS    Pharmacist's Name: Barbara Ignacio  Pharmacist's Extension: [phone]

## 2023-12-31 NOTE — SUBJECTIVE & OBJECTIVE
Past Medical History:   Diagnosis Date    Back pain     Brain aneurysm 2018    Carotid stenosis     Cataract     Diverticulitis     Emphysema lung     Feeling anxious     Hyperlipidemia     Hypertension     Macular degeneration     PVD (peripheral vascular disease)     Squamous cell carcinoma 08/29/2017    right medical cheek, SSIS, efudex tx       Past Surgical History:   Procedure Laterality Date    ABDOMINAL SURGERY      ANGIOGRAPHY OF MESENTERIC VESSELS Left 09/07/2022    Procedure: ANGIOGRAM, MESENTERIC VESSELS;  Surgeon: Krish Machado MD;  Location: [facility] CATH/EP LAB;  Service: General;  Laterality: Left;    ARTERIOGRAM, MESENTERIC N/A 06/30/2021    Procedure: ARTERIOGRAM, MESENTERIC;  Surgeon: Krish Machado MD;  Location: [facility] CATH/EP LAB;  Service: General;  Laterality: N/A;    BACK SURGERY  2008    CATARACT EXTRACTION, BILATERAL      COLONOSCOPY N/A 04/04/2022    Procedure: COLONOSCOPY;  Surgeon: Lino Devi MD;  Location: [facility];  Service: Endoscopy;  Laterality: N/A;    CREATION, BYPASS, ARTERIAL, AORTA TO FEMORAL N/A 01/13/2021    Procedure: ARTERIOGRAM, MESENTERIC;  Surgeon: Krish Machado MD;  Location: [facility] CATH/EP LAB;  Service: General;  Laterality: N/A;    ENDOSCOPY OF PROXIMAL SMALL INTESTINE N/A 04/04/2022    Procedure: ENTEROSCOPY, PROXIMAL;  Surgeon: Lino Devi MD;  Location: [facility];  Service: Endoscopy;  Laterality: N/A;    ESOPHAGOGASTRODUODENOSCOPY N/A 11/20/2022    Procedure: EGD (ESOPHAGOGASTRODUODENOSCOPY);  Surgeon: Pedro Hall Jr., MD;  Location: [facility];  Service: Endoscopy;  Laterality: N/A;    ESOPHAGOGASTRODUODENOSCOPY N/A 8/31/2023    Procedure: EGD (ESOPHAGOGASTRODUODENOSCOPY);  Surgeon: Lino Devi MD;  Location: [facility] ENDO;  Service: Endoscopy;  Laterality: N/A;    ESOPHAGOGASTRODUODENOSCOPY N/A 10/26/2023    Procedure: EGD (ESOPHAGOGASTRODUODENOSCOPY);  Surgeon: Lino Devi MD;  Location: [facility];  Service: Endoscopy;  Laterality: N/A;     ESOPHAGOGASTRODUODENOSCOPY N/A 12/14/2023    Procedure: EGD (ESOPHAGOGASTRODUODENOSCOPY);  Surgeon: Lino Devi MD;  Location: [facility];  Service: Endoscopy;  Laterality: N/A;    HYSTERECTOMY      IMPLANTATION OF SPINAL CORD STIMULATOR IN CERVICAL SPINE      UPPER GASTROINTESTINAL ENDOSCOPY  11/20/2022    w/ antrum biopsy       Review of patient's allergies indicates:   Allergen Reactions    Sulfa (sulfonamide antibiotics) Nausea Only       No current facility-administered medications on file prior to encounter.     Current Outpatient Medications on File Prior to Encounter   Medication Sig    albuterol (PROAIR HFA) 90 mcg/actuation inhaler Inhale 2 puffs into the lungs every 4 (four) hours as needed for Wheezing. Rescue    aspirin (ECOTRIN) 81 MG EC tablet Take 81 mg by mouth once daily.    clopidogreL (PLAVIX) 75 mg tablet Take 1 tablet (75 mg total) by mouth once daily.    ferrous sulfate 325 (65 FE) MG EC tablet Take 325 mg by mouth once daily.    fluticasone-umeclidin-vilanter (TRELEGY ELLIPTA) 100-62.5-25 mcg DsDv Inhale 1 puff into the lungs once daily.    gabapentin (NEURONTIN) 100 MG capsule Take 1 capsule by mouth 3 (three) times daily.    smpxl-kb-5-dha-epa-phospho-ast (MEGARED OMEGA-3 KRILL OIL) 643-67-64-50 mg Cap Take 1 tablet by mouth once daily.    L. gasseri-B. bifidum-B longum 1.5 billion cell Cap Take 1 tablet by mouth once daily.     megestroL (MEGACE) 20 MG Tab Take 40 mg by mouth 2 (two) times daily.    metoprolol succinate (TOPROL-XL) 50 MG 24 hr tablet Take 50 mg by mouth 2 (two) times daily.    oxyCODONE-acetaminophen (PERCOCET)  mg per tablet Take 1 tablet by mouth every 4 (four) hours as needed for Pain. 5 times daily    pantoprazole (PROTONIX) 40 MG tablet Take 1 tablet (40 mg total) by mouth 2 (two) times daily.    polycarbophil (FIBERCON) 625 mg tablet Take 625 mg by mouth once daily.    simvastatin (ZOCOR) 10 MG tablet Take 20 mg by mouth every evening.    UNABLE TO FIND  Take 1 tablet by mouth once daily. medication name: Rnoak Lessman MVI    vit C,R-Ru-gdhkr-lutein-zeaxan (PRESERVISION AREDS-2) 250-90-40-1 mg Cap Take 1 tablet by mouth 2 (two) times a day.    amLODIPine (NORVASC) 2.5 MG tablet Take 2.5 mg by mouth every evening.    promethazine (PHENERGAN) 6.25 mg/5 mL syrup Take 6.25 mg by mouth every 4 (four) hours as needed for Nausea.    [DISCONTINUED] promethazine-dextromethorphan (PROMETHAZINE-DM) 6.25-15 mg/5 mL Syrp Take 5 mLs by mouth every 4 (four) hours as needed.     Family History    None       Tobacco Use    Smoking status: Former     Current packs/day: 0.00     Average packs/day: 1 pack/day for 56.8 years (56.8 ttl pk-yrs)     Types: Cigarettes     Start date: 1965     Quit date: 2022     Years since quittin.7    Smokeless tobacco: Never   Substance and Sexual Activity    Alcohol use: No    Drug use: Not Currently    Sexual activity: Not on file     Review of Systems   Constitutional: Negative.    HENT: Negative.     Eyes: Negative.    Respiratory:  Positive for cough and shortness of breath. Negative for wheezing and stridor.    Cardiovascular: Negative.    Gastrointestinal: Negative.    Endocrine: Negative.    Genitourinary: Negative.    Musculoskeletal: Negative.    Skin: Negative.    Neurological: Negative.    Hematological: Negative.    Psychiatric/Behavioral: Negative.       Objective:     Vital Signs (Most Recent):  Temp: 97.5 °F (36.4 °C) (23 0828)  Pulse: 101 (23 1310)  Resp: 20 (23 1310)  BP: (!) 158/70 (23 1003)  SpO2: 97 % (23 1310) Vital Signs (24h Range):  Temp:  [97.5 °F (36.4 °C)] 97.5 °F (36.4 °C)  Pulse:  [] 101  Resp:  [18-25] 20  SpO2:  [89 %-97 %] 97 %  BP: (135-158)/(70-72) 158/70     Weight: 38.1 kg (84 lb)  Body mass index is 15.36 kg/m².     Physical Exam  Vitals and nursing note reviewed.   Constitutional:       General: She is not in acute distress.     Appearance: Normal appearance.   HENT:       Head: Normocephalic and atraumatic.      Nose: Nose normal.   Eyes:      Extraocular Movements: Extraocular movements intact.      Conjunctiva/sclera: Conjunctivae normal.   Cardiovascular:      Rate and Rhythm: Normal rate and regular rhythm.   Pulmonary:      Effort: Pulmonary effort is normal. No respiratory distress.      Breath sounds: Normal breath sounds. No stridor. No wheezing or rales.      Comments: On nasal cannula  Abdominal:      General: Bowel sounds are normal. There is no distension.      Palpations: Abdomen is soft.      Tenderness: There is no abdominal tenderness.   Musculoskeletal:         General: No swelling or tenderness. Normal range of motion.      Cervical back: Normal range of motion and neck supple.      Right lower leg: No edema.      Left lower leg: No edema.   Skin:     General: Skin is warm and dry.   Neurological:      General: No focal deficit present.      Mental Status: She is alert and oriented to person, place, and time.                Significant Labs: All pertinent labs within the past 24 hours have been reviewed.  Recent Lab Results         12/31/23  1255   12/31/23  1120   12/31/23  1013        Group A Strep, Molecular   Negative  Comment: Arcanobacterium haemolyticum and Beta Streptococcus group C   and G will not be detected by this test method.  Please order   Throat Culture ([id number]) if suspected.           Respiratory Infection Panel Source NP swab           Albumin     3.9       ALP     79       ALT     15       Anion Gap     11       AST     18       Baso #     0.01       Basophil %     0.1       BILIRUBIN TOTAL     0.4  Comment: For infants and newborns, interpretation of results should be based  on gestational age, weight and in agreement with clinical  observations.    Premature Infant recommended reference ranges:  Up to 24 hours.............<8.0 mg/dL  Up to 48 hours............<12.0 mg/dL  3-5 days..................<15.0 mg/dL  6-29  days.................<15.0 mg/dL         BNP     383  Comment: Values of less than 100 pg/ml are consistent with non-CHF populations.       BUN     45       Calcium     10.2       Chloride     110       CO2     21       Creatinine     0.9       Differential Method     Automated       eGFR     >60.0       Eos #     0.0       Eosinophil %     0.0       Glucose     94       Gran # (ANC)     10.4       Gran %     85.5       Hematocrit     36.6       Hemoglobin     11.9       Immature Grans (Abs)     0.03  Comment: Mild elevation in immature granulocytes is non specific and   can be seen in a variety of conditions including stress response,   acute inflammation, trauma and pregnancy. Correlation with other   laboratory and clinical findings is essential.         Immature Granulocytes     0.2       Lymph #     0.6       Lymph %     5.0       Magnesium      1.9       MCH     32.4       MCHC     32.5       MCV     100       Mono #     1.1       Mono %     9.2       MPV     8.8       nRBC     0       Platelet Count     365       Potassium     4.2       PROTEIN TOTAL     7.6       RBC     3.67       RDW     12.2       Sodium     142       Troponin I High Sensitivity     8.0  Comment: Troponin results differ between methods. Do not use   results between Troponin methods interchangeably.    Alkaline Phospatase levels above 400 U/L may   cause false positive results.    Access hsTnI should not be used for patients taking   Asfotase johana (Strensiq).         TSH     6.594       WBC     12.16               Significant Imaging: I have reviewed all pertinent imaging results/findings within the past 24 hours.

## 2023-12-31 NOTE — HPI
Patient 83-year-old female with history of COPD on home oxygen, hypertension, and hyperlipidemia who presents with shortness of breath.  Patient was seen here in the ED 2 days ago for shortness for breath and then discharged home.  Patient says she has been feeling more short of breath since then.  Patient is now having a cough with sputum production.  Patient does use 2-1/2 L home oxygen at home.  Patient says she was recently started on home oxygen.  Patient was 89% room air in his now 97% on 2-1/2 L nasal cannula.  WBC 12.1 and hemoglobin 11.9.  Creatinine 0.9.  Chest x-ray with hyperinflated lungs otherwise no acute findings.

## 2024-01-01 LAB
ALBUMIN SERPL BCP-MCNC: 3.6 G/DL (ref 3.5–5.2)
ALP SERPL-CCNC: 70 U/L (ref 55–135)
ALT SERPL W/O P-5'-P-CCNC: 14 U/L (ref 10–44)
ANION GAP SERPL CALC-SCNC: 9 MMOL/L (ref 8–16)
AST SERPL-CCNC: 14 U/L (ref 10–40)
BILIRUB SERPL-MCNC: 0.3 MG/DL (ref 0.1–1)
BUN SERPL-MCNC: 32 MG/DL (ref 8–23)
CALCIUM SERPL-MCNC: 9.8 MG/DL (ref 8.7–10.5)
CHLORIDE SERPL-SCNC: 111 MMOL/L (ref 95–110)
CO2 SERPL-SCNC: 22 MMOL/L (ref 23–29)
CREAT SERPL-MCNC: 0.9 MG/DL (ref 0.5–1.4)
ERYTHROCYTE [DISTWIDTH] IN BLOOD BY AUTOMATED COUNT: 12.3 % (ref 11.5–14.5)
EST. GFR  (NO RACE VARIABLE): >60 ML/MIN/1.73 M^2
GLUCOSE SERPL-MCNC: 130 MG/DL (ref 70–110)
GLUCOSE SERPL-MCNC: 134 MG/DL (ref 70–110)
GLUCOSE SERPL-MCNC: 135 MG/DL (ref 70–110)
GLUCOSE SERPL-MCNC: 139 MG/DL (ref 70–110)
GLUCOSE SERPL-MCNC: 147 MG/DL (ref 70–110)
HCT VFR BLD AUTO: 30.7 % (ref 37–48.5)
HGB BLD-MCNC: 10.2 G/DL (ref 12–16)
MCH RBC QN AUTO: 32.2 PG (ref 27–31)
MCHC RBC AUTO-ENTMCNC: 33.2 G/DL (ref 32–36)
MCV RBC AUTO: 97 FL (ref 82–98)
PLATELET # BLD AUTO: 374 K/UL (ref 150–450)
PMV BLD AUTO: 9.1 FL (ref 9.2–12.9)
POTASSIUM SERPL-SCNC: 4.3 MMOL/L (ref 3.5–5.1)
PROT SERPL-MCNC: 7 G/DL (ref 6–8.4)
RBC # BLD AUTO: 3.17 M/UL (ref 4–5.4)
SODIUM SERPL-SCNC: 142 MMOL/L (ref 136–145)
WBC # BLD AUTO: 11.97 K/UL (ref 3.9–12.7)

## 2024-01-01 PROCEDURE — 63600175 PHARM REV CODE 636 W HCPCS: Performed by: STUDENT IN AN ORGANIZED HEALTH CARE EDUCATION/TRAINING PROGRAM

## 2024-01-01 PROCEDURE — A4216 STERILE WATER/SALINE, 10 ML: HCPCS | Performed by: STUDENT IN AN ORGANIZED HEALTH CARE EDUCATION/TRAINING PROGRAM

## 2024-01-01 PROCEDURE — 96372 THER/PROPH/DIAG INJ SC/IM: CPT | Performed by: STUDENT IN AN ORGANIZED HEALTH CARE EDUCATION/TRAINING PROGRAM

## 2024-01-01 PROCEDURE — 25000242 PHARM REV CODE 250 ALT 637 W/ HCPCS: Performed by: STUDENT IN AN ORGANIZED HEALTH CARE EDUCATION/TRAINING PROGRAM

## 2024-01-01 PROCEDURE — 36415 COLL VENOUS BLD VENIPUNCTURE: CPT | Performed by: STUDENT IN AN ORGANIZED HEALTH CARE EDUCATION/TRAINING PROGRAM

## 2024-01-01 PROCEDURE — 94761 N-INVAS EAR/PLS OXIMETRY MLT: CPT

## 2024-01-01 PROCEDURE — 25000003 PHARM REV CODE 250: Performed by: STUDENT IN AN ORGANIZED HEALTH CARE EDUCATION/TRAINING PROGRAM

## 2024-01-01 PROCEDURE — 94799 UNLISTED PULMONARY SVC/PX: CPT

## 2024-01-01 PROCEDURE — 99900035 HC TECH TIME PER 15 MIN (STAT)

## 2024-01-01 PROCEDURE — 99900031 HC PATIENT EDUCATION (STAT)

## 2024-01-01 PROCEDURE — 94760 N-INVAS EAR/PLS OXIMETRY 1: CPT

## 2024-01-01 PROCEDURE — 85027 COMPLETE CBC AUTOMATED: CPT | Performed by: STUDENT IN AN ORGANIZED HEALTH CARE EDUCATION/TRAINING PROGRAM

## 2024-01-01 PROCEDURE — 80053 COMPREHEN METABOLIC PANEL: CPT | Performed by: STUDENT IN AN ORGANIZED HEALTH CARE EDUCATION/TRAINING PROGRAM

## 2024-01-01 PROCEDURE — 27000221 HC OXYGEN, UP TO 24 HOURS

## 2024-01-01 PROCEDURE — G0378 HOSPITAL OBSERVATION PER HR: HCPCS

## 2024-01-01 PROCEDURE — 94640 AIRWAY INHALATION TREATMENT: CPT | Mod: XB

## 2024-01-01 RX ADMIN — BUDESONIDE INHALATION 0.5 MG: 0.5 SUSPENSION RESPIRATORY (INHALATION) at 07:01

## 2024-01-01 RX ADMIN — ASPIRIN 81 MG: 81 TABLET, COATED ORAL at 08:01

## 2024-01-01 RX ADMIN — SODIUM CHLORIDE 10 ML: 9 INJECTION INTRAMUSCULAR; INTRAVENOUS; SUBCUTANEOUS at 09:01

## 2024-01-01 RX ADMIN — GABAPENTIN 100 MG: 100 CAPSULE ORAL at 08:01

## 2024-01-01 RX ADMIN — ARFORMOTEROL TARTRATE 15 MCG: 15 SOLUTION RESPIRATORY (INHALATION) at 07:01

## 2024-01-01 RX ADMIN — CLOPIDOGREL BISULFATE 75 MG: 75 TABLET, FILM COATED ORAL at 08:01

## 2024-01-01 RX ADMIN — MEGESTROL ACETATE 40 MG: 20 TABLET ORAL at 08:01

## 2024-01-01 RX ADMIN — PANTOPRAZOLE SODIUM 40 MG: 40 TABLET, DELAYED RELEASE ORAL at 05:01

## 2024-01-01 RX ADMIN — IPRATROPIUM BROMIDE AND ALBUTEROL SULFATE 3 ML: 2.5; .5 SOLUTION RESPIRATORY (INHALATION) at 01:01

## 2024-01-01 RX ADMIN — IPRATROPIUM BROMIDE AND ALBUTEROL SULFATE 3 ML: 2.5; .5 SOLUTION RESPIRATORY (INHALATION) at 07:01

## 2024-01-01 RX ADMIN — ENOXAPARIN SODIUM 30 MG: 30 INJECTION SUBCUTANEOUS at 04:01

## 2024-01-01 RX ADMIN — METOPROLOL SUCCINATE 50 MG: 50 TABLET, EXTENDED RELEASE ORAL at 08:01

## 2024-01-01 RX ADMIN — PREDNISONE 50 MG: 20 TABLET ORAL at 08:01

## 2024-01-01 RX ADMIN — ATORVASTATIN CALCIUM 20 MG: 20 TABLET, FILM COATED ORAL at 08:01

## 2024-01-01 NOTE — PLAN OF CARE
[facility]  Initial Discharge Assessment       Primary Care Provider: Abiodun Will MD    Admission Diagnosis: COPD exacerbation [J44.1]    Admission Date: 12/31/2023  Expected Discharge Date:     Assessment completed with pt at bedside. Pt AAOx4s. Demographics, PCP, Pharmacy  and insurance verified. No home health. No dialysis. Pt reports ability to complete ADLs with assistance of oxygen and rolling walker at this time . Pt verbalized plan to discharge home via spouse transport. Pt has no other needs to be addressed at this time.     Transition of Care Barriers: None    Payor: [employer] / Plan: [employer] / Product Type: Medicare Advantage /     Extended Emergency Contact Information  Primary Emergency Contact: Bautista Jhaveri  Address: [address] [place] [address] [place]  Mobile Phone: [phone]  Relation: Spouse  Preferred language: English   needed? No  Secondary Emergency Contact: Mariajose Lockwood  Mobile Phone: [phone]  Relation: Daughter  Preferred language: English   needed? No    Discharge Plan A: Home with family  Discharge Plan B: Home with family      [id number] - [place], [place] - [address] AT [facility] [address]  [address]  [facility] [address]  Phone: [phone] Fax: [phone]      Initial Assessment (most recent)       Adult Discharge Assessment - 01/01/24 1115          Discharge Assessment    Assessment Type Discharge Planning Assessment     Confirmed/corrected address, phone number and insurance Yes     Confirmed Demographics Correct on Facesheet     Source of Information patient;health record     Does patient/caregiver understand observation status Yes     Reason For Admission COPD exacerbation     People in Home spouse     Facility Arrived From: Home     Do you expect to return to your current living situation? Yes     Do you have  help at home or someone to help you manage your care at home? Yes     Who are your caregiver(s) and their phone number(s)? Bautista Jhaveri  Spouse  [phone]    2  Mariajose Lockowod  Daughter  [phone]     Prior to hospitilization cognitive status: Alert/Oriented     Current cognitive status: Alert/Oriented     Walking or Climbing Stairs Difficulty no     Dressing/Bathing Difficulty no     Home Accessibility not wheelchair accessible     Equipment Currently Used at Home walker, rolling;oxygen     Readmission within 30 days? No     Patient currently being followed by outpatient case management? No     Do you currently have service(s) that help you manage your care at home? No     Do you take prescription medications? Yes     Do you have prescription coverage? Yes     Coverage [employer] - [employer]     Do you have any problems affording any of your prescribed medications? No     Is the patient taking medications as prescribed? yes     Who is going to help you get home at discharge? Bautista Jhaveri Spouse [phone] 2 Mariajose Lockwood Daughter [phone]     How do you get to doctors appointments? family or friend will provide     Are you on dialysis? No     Do you take coumadin? No     Discharge Plan A Home with family     Discharge Plan B Home with family     DME Needed Upon Discharge  none     Discharge Plan discussed with: Patient     Transition of Care Barriers None

## 2024-01-01 NOTE — RESPIRATORY THERAPY
12/31/23 2118   Patient Assessment/Suction   Level of Consciousness (AVPU) alert   Respiratory Effort Short of breath   Expansion/Accessory Muscles/Retractions expansion symmetric;no retractions   All Lung Fields Breath Sounds Anterior:;diminished   Rhythm/Pattern, Respiratory shortness of breath   Cough Frequency infrequent   Cough Type dry   PRE-TX-O2   Device (Oxygen Therapy) nasal cannula   $ Is the patient on Low Flow Oxygen? Yes   Flow (L/min) 2   SpO2 96 %   Pulse Oximetry Type Intermittent   $ Pulse Oximetry - Multiple Charge Pulse Oximetry - Multiple   Pulse 101   Resp 17   Positioning HOB elevated 30 degrees   Aerosol Therapy   $ Aerosol Therapy Charges Aerosol Treatment   Daily Review of Necessity (SVN) completed   Respiratory Treatment Status (SVN) given   Treatment Route (SVN) mask;oxygen   Patient Position (SVN) HOB elevated   Post Treatment Assessment (SVN) increased aeration   Signs of Intolerance (SVN) none   Education   $ Education Bronchodilator;15 min   Respiratory Evaluation   $ Care Plan Tech Time 15 min   $ Eval/Re-eval Charges Evaluation   Evaluation For New Orders

## 2024-01-01 NOTE — SUBJECTIVE & OBJECTIVE
Interval History: Still short of breath otherwise no complaints. Resp. Infection panel negative.     Review of Systems   Constitutional: Negative.    HENT: Negative.     Eyes: Negative.    Respiratory:  Positive for cough and shortness of breath. Negative for wheezing and stridor.    Cardiovascular: Negative.    Gastrointestinal: Negative.    Endocrine: Negative.    Genitourinary: Negative.    Musculoskeletal: Negative.    Skin: Negative.    Neurological: Negative.    Hematological: Negative.    Psychiatric/Behavioral: Negative.       Objective:     Vital Signs (Most Recent):  Temp: 98.6 °F (37 °C) (01/01/24 1116)  Pulse: 97 (01/01/24 1116)  Resp: 18 (01/01/24 1116)  BP: (!) 146/68 (01/01/24 1116)  SpO2: 96 % (01/01/24 1116) Vital Signs (24h Range):  Temp:  [97.7 °F (36.5 °C)-98.8 °F (37.1 °C)] 98.6 °F (37 °C)  Pulse:  [] 97  Resp:  [14-28] 18  SpO2:  [93 %-100 %] 96 %  BP: (109-181)/(57-92) 146/68     Weight: 36.9 kg (81 lb 5.6 oz)  Body mass index is 14.88 kg/m².    Intake/Output Summary (Last 24 hours) at 1/1/2024 1157  Last data filed at 1/1/2024 0906  Gross per 24 hour   Intake 390 ml   Output --   Net 390 ml         Physical Exam  Vitals and nursing note reviewed.   Constitutional:       General: She is not in acute distress.     Appearance: Normal appearance.   HENT:      Head: Normocephalic and atraumatic.      Nose: Nose normal.   Eyes:      Extraocular Movements: Extraocular movements intact.      Conjunctiva/sclera: Conjunctivae normal.      Pupils: Pupils are equal, round, and reactive to light.   Cardiovascular:      Rate and Rhythm: Normal rate and regular rhythm.   Pulmonary:      Effort: Pulmonary effort is normal. No respiratory distress.      Breath sounds: Normal breath sounds. No stridor. No wheezing or rales.      Comments: On nasal cannula  Abdominal:      General: Bowel sounds are normal. There is no distension.      Palpations: Abdomen is soft.      Tenderness: There is no abdominal  tenderness.   Musculoskeletal:         General: No swelling or tenderness. Normal range of motion.      Cervical back: Normal range of motion and neck supple.      Right lower leg: No edema.      Left lower leg: No edema.   Skin:     General: Skin is warm and dry.   Neurological:      General: No focal deficit present.      Mental Status: She is alert and oriented to person, place, and time.   Psychiatric:         Mood and Affect: Mood normal.         Behavior: Behavior normal.         Thought Content: Thought content normal.         Judgment: Judgment normal.             Significant Labs: All pertinent labs within the past 24 hours have been reviewed.  Recent Lab Results  (Last 5 results in the past 24 hours)        01/01/24  1101   01/01/24  0727   01/01/24  0724   12/31/23 1958   12/31/23  1923        Albumin     3.6           ALP     70           ALT     14           Anion Gap     9           AST     14           BILIRUBIN TOTAL     0.3  Comment: For infants and newborns, interpretation of results should be based  on gestational age, weight and in agreement with clinical  observations.    Premature Infant recommended reference ranges:  Up to 24 hours.............<8.0 mg/dL  Up to 48 hours............<12.0 mg/dL  3-5 days..................<15.0 mg/dL  6-29 days.................<15.0 mg/dL             BUN     32           Calcium     9.8           Chloride     111           CO2     22           Creatinine     0.9           eGFR     >60.0           Glucose     130           Gram Stain (Respiratory)         <10 epithelial cells per low power field.  [P]                Many WBC's  [P]                Moderate Gram negative coccobacilli  [P]                Few Gram negative rods  [P]                Rare Gram positive cocci  [P]       Hematocrit     30.7           Hemoglobin     10.2           MCH     32.2           MCHC     33.2           MCV     97           MPV     9.1           Platelet Count     374           POC  Glucose 135   139     251         Potassium     4.3           PROTEIN TOTAL     7.0           RBC     3.17           RDW     12.3           Respiratory Culture         No significant growth  [P]       Sodium     142           WBC     11.97                                   [P] - Preliminary Result               Significant Imaging: I have reviewed all pertinent imaging results/findings within the past 24 hours.

## 2024-01-01 NOTE — PROGRESS NOTES
[facility]  Progress Note    Patient Name: Carmen Sparrowg  MRN: [id number]  Patient Class: OP- Observation   Admission Date: 12/31/2023  Length of Stay: 0 days  Attending Physician: Krish Henley MD  Primary Care Provider: Abiodun Will MD        Subjective:     Principal Problem:COPD exacerbation        HPI:  Patient 83-year-old female with history of COPD on home oxygen, hypertension, and hyperlipidemia who presents with shortness of breath.  Patient was seen here in the ED 2 days ago for shortness for breath and then discharged home.  Patient says she has been feeling more short of breath since then.  Patient is now having a cough with sputum production.  Patient does use 2-1/2 L home oxygen at home.  Patient says she was recently started on home oxygen.  Patient was 89% room air in his now 97% on 2-1/2 L nasal cannula.  WBC 12.1 and hemoglobin 11.9.  Creatinine 0.9.  Chest x-ray with hyperinflated lungs otherwise no acute findings.    Overview/Hospital Course:  No notes on file    Interval History: Still short of breath otherwise no complaints. Resp. Infection panel negative.     Review of Systems   Constitutional: Negative.    HENT: Negative.     Eyes: Negative.    Respiratory:  Positive for cough and shortness of breath. Negative for wheezing and stridor.    Cardiovascular: Negative.    Gastrointestinal: Negative.    Endocrine: Negative.    Genitourinary: Negative.    Musculoskeletal: Negative.    Skin: Negative.    Neurological: Negative.    Hematological: Negative.    Psychiatric/Behavioral: Negative.       Objective:     Vital Signs (Most Recent):  Temp: 98.6 °F (37 °C) (01/01/24 1116)  Pulse: 97 (01/01/24 1116)  Resp: 18 (01/01/24 1116)  BP: (!) 146/68 (01/01/24 1116)  SpO2: 96 % (01/01/24 1116) Vital Signs (24h Range):  Temp:  [97.7 °F (36.5 °C)-98.8 °F (37.1 °C)] 98.6 °F (37 °C)  Pulse:  [] 97  Resp:  [14-28] 18  SpO2:  [93 %-100 %] 96 %  BP:  (109-181)/(57-92) 146/68     Weight: 36.9 kg (81 lb 5.6 oz)  Body mass index is 14.88 kg/m².    Intake/Output Summary (Last 24 hours) at 1/1/2024 1157  Last data filed at 1/1/2024 0906  Gross per 24 hour   Intake 390 ml   Output --   Net 390 ml         Physical Exam  Vitals and nursing note reviewed.   Constitutional:       General: She is not in acute distress.     Appearance: Normal appearance.   HENT:      Head: Normocephalic and atraumatic.      Nose: Nose normal.   Eyes:      Extraocular Movements: Extraocular movements intact.      Conjunctiva/sclera: Conjunctivae normal.      Pupils: Pupils are equal, round, and reactive to light.   Cardiovascular:      Rate and Rhythm: Normal rate and regular rhythm.   Pulmonary:      Effort: Pulmonary effort is normal. No respiratory distress.      Breath sounds: Normal breath sounds. No stridor. No wheezing or rales.      Comments: On nasal cannula  Abdominal:      General: Bowel sounds are normal. There is no distension.      Palpations: Abdomen is soft.      Tenderness: There is no abdominal tenderness.   Musculoskeletal:         General: No swelling or tenderness. Normal range of motion.      Cervical back: Normal range of motion and neck supple.      Right lower leg: No edema.      Left lower leg: No edema.   Skin:     General: Skin is warm and dry.   Neurological:      General: No focal deficit present.      Mental Status: She is alert and oriented to person, place, and time.   Psychiatric:         Mood and Affect: Mood normal.         Behavior: Behavior normal.         Thought Content: Thought content normal.         Judgment: Judgment normal.             Significant Labs: All pertinent labs within the past 24 hours have been reviewed.  Recent Lab Results  (Last 5 results in the past 24 hours)        01/01/24  1101   01/01/24  0727   01/01/24  0724   12/31/23  1958   12/31/23  1923        Albumin     3.6           ALP     70           ALT     14           Anion Gap      9           AST     14           BILIRUBIN TOTAL     0.3  Comment: For infants and newborns, interpretation of results should be based  on gestational age, weight and in agreement with clinical  observations.    Premature Infant recommended reference ranges:  Up to 24 hours.............<8.0 mg/dL  Up to 48 hours............<12.0 mg/dL  3-5 days..................<15.0 mg/dL  6-29 days.................<15.0 mg/dL             BUN     32           Calcium     9.8           Chloride     111           CO2     22           Creatinine     0.9           eGFR     >60.0           Glucose     130           Gram Stain (Respiratory)         <10 epithelial cells per low power field.  [P]                Many WBC's  [P]                Moderate Gram negative coccobacilli  [P]                Few Gram negative rods  [P]                Rare Gram positive cocci  [P]       Hematocrit     30.7           Hemoglobin     10.2           MCH     32.2           MCHC     33.2           MCV     97           MPV     9.1           Platelet Count     374           POC Glucose 135   139     251         Potassium     4.3           PROTEIN TOTAL     7.0           RBC     3.17           RDW     12.3           Respiratory Culture         No significant growth  [P]       Sodium     142           WBC     11.97                                   [P] - Preliminary Result               Significant Imaging: I have reviewed all pertinent imaging results/findings within the past 24 hours.    Assessment/Plan:      * COPD exacerbation  Continue DuoNebs, steroids, and Levaquin.  Respiratory infection panel negative. sputum culture.  Does use 2.5 L nasal cannula at baseline.    Hypertension  Continue home medications      VTE Risk Mitigation (From admission, onward)           Ordered     enoxaparin injection 30 mg  Daily         12/31/23 1145     IP VTE HIGH RISK PATIENT  Once         12/31/23 1145     Place sequential compression device  Until discontinued          12/31/23 1145                    Discharge Planning   RAY:      Code Status: Full Code   Is the patient medically ready for discharge?:     Reason for patient still in hospital (select all that apply): Treatment  Discharge Plan A: Home with family                  Krish Henley MD  Department of Hospital Medicine   [facility]

## 2024-01-01 NOTE — NURSING
Nurses Note -- 4 Eyes      12/31/2023   8:05 PM      Skin assessed during: Admit      [x] No Altered Skin Integrity Present    []Prevention Measures Documented  Blanchable redness to sacrum    [] Yes- Altered Skin Integrity Present or Discovered   [] LDA Added if Not in Epic (Describe Wound)   [] New Altered Skin Integrity was Present on Admit and Documented in LDA   [] Wound Image Taken    Wound Care Consulted? No    Attending Nurse:   Arielle Serna RN/Staff Member:   [id number]

## 2024-01-01 NOTE — ASSESSMENT & PLAN NOTE
Continue DuoNebs, steroids, and Levaquin.  Respiratory infection panel negative. sputum culture.  Does use 2.5 L nasal cannula at baseline.

## 2024-01-01 NOTE — PLAN OF CARE
Pt AAOx 4. Rested well  this shift. No acute events overnight. No signs, symptoms, or complaints of distress at this time. Care ongoing.     Problem: Adult Inpatient Plan of Care  Goal: Plan of Care Review  Outcome: Ongoing, Progressing  Goal: Patient-Specific Goal (Individualized)  Outcome: Ongoing, Progressing  Goal: Absence of Hospital-Acquired Illness or Injury  Outcome: Ongoing, Progressing  Goal: Optimal Comfort and Wellbeing  Outcome: Ongoing, Progressing  Goal: Readiness for Transition of Care  Outcome: Ongoing, Progressing     Problem: Fall Injury Risk  Goal: Absence of Fall and Fall-Related Injury  Outcome: Ongoing, Progressing     Problem: Skin Injury Risk Increased  Goal: Skin Health and Integrity  Outcome: Ongoing, Progressing

## 2024-01-01 NOTE — PLAN OF CARE
01/01/24 1100   GASTON Message   Medicare Outpatient and Observation Notification regarding financial responsibility Given to patient/caregiver;Explained to patient/caregiver;Signed/date by patient/caregiver   Date GASTON was signed 01/01/24   Time GASTON was signed 1100

## 2024-01-02 VITALS
OXYGEN SATURATION: 96 % | DIASTOLIC BLOOD PRESSURE: 70 MMHG | RESPIRATION RATE: 18 BRPM | HEIGHT: 62 IN | TEMPERATURE: 98 F | BODY MASS INDEX: 14.97 KG/M2 | HEART RATE: 93 BPM | WEIGHT: 81.38 LBS | SYSTOLIC BLOOD PRESSURE: 151 MMHG

## 2024-01-02 LAB
ALBUMIN SERPL BCP-MCNC: 3.5 G/DL (ref 3.5–5.2)
ALP SERPL-CCNC: 70 U/L (ref 55–135)
ALT SERPL W/O P-5'-P-CCNC: 18 U/L (ref 10–44)
ANION GAP SERPL CALC-SCNC: 9 MMOL/L (ref 8–16)
AST SERPL-CCNC: 14 U/L (ref 10–40)
BACTERIA SPEC AEROBE CULT: ABNORMAL
BACTERIA SPEC AEROBE CULT: ABNORMAL
BILIRUB SERPL-MCNC: 0.4 MG/DL (ref 0.1–1)
BUN SERPL-MCNC: 30 MG/DL (ref 8–23)
CALCIUM SERPL-MCNC: 9.7 MG/DL (ref 8.7–10.5)
CHLORIDE SERPL-SCNC: 111 MMOL/L (ref 95–110)
CO2 SERPL-SCNC: 24 MMOL/L (ref 23–29)
CREAT SERPL-MCNC: 0.8 MG/DL (ref 0.5–1.4)
ERYTHROCYTE [DISTWIDTH] IN BLOOD BY AUTOMATED COUNT: 12.6 % (ref 11.5–14.5)
EST. GFR  (NO RACE VARIABLE): >60 ML/MIN/1.73 M^2
GLUCOSE SERPL-MCNC: 101 MG/DL (ref 70–110)
GLUCOSE SERPL-MCNC: 118 MG/DL (ref 70–110)
GLUCOSE SERPL-MCNC: 132 MG/DL (ref 70–110)
GRAM STN SPEC: ABNORMAL
HCT VFR BLD AUTO: 33.6 % (ref 37–48.5)
HGB BLD-MCNC: 10.9 G/DL (ref 12–16)
MCH RBC QN AUTO: 32.2 PG (ref 27–31)
MCHC RBC AUTO-ENTMCNC: 32.4 G/DL (ref 32–36)
MCV RBC AUTO: 99 FL (ref 82–98)
PLATELET # BLD AUTO: 412 K/UL (ref 150–450)
PMV BLD AUTO: 9.2 FL (ref 9.2–12.9)
POTASSIUM SERPL-SCNC: 3.9 MMOL/L (ref 3.5–5.1)
PROT SERPL-MCNC: 7 G/DL (ref 6–8.4)
RBC # BLD AUTO: 3.39 M/UL (ref 4–5.4)
SODIUM SERPL-SCNC: 144 MMOL/L (ref 136–145)
WBC # BLD AUTO: 13.45 K/UL (ref 3.9–12.7)

## 2024-01-02 PROCEDURE — 25000003 PHARM REV CODE 250: Performed by: STUDENT IN AN ORGANIZED HEALTH CARE EDUCATION/TRAINING PROGRAM

## 2024-01-02 PROCEDURE — 80053 COMPREHEN METABOLIC PANEL: CPT | Performed by: STUDENT IN AN ORGANIZED HEALTH CARE EDUCATION/TRAINING PROGRAM

## 2024-01-02 PROCEDURE — 27000221 HC OXYGEN, UP TO 24 HOURS

## 2024-01-02 PROCEDURE — 94640 AIRWAY INHALATION TREATMENT: CPT | Mod: XB

## 2024-01-02 PROCEDURE — 63600175 PHARM REV CODE 636 W HCPCS: Performed by: STUDENT IN AN ORGANIZED HEALTH CARE EDUCATION/TRAINING PROGRAM

## 2024-01-02 PROCEDURE — G0378 HOSPITAL OBSERVATION PER HR: HCPCS

## 2024-01-02 PROCEDURE — 94761 N-INVAS EAR/PLS OXIMETRY MLT: CPT

## 2024-01-02 PROCEDURE — 25000242 PHARM REV CODE 250 ALT 637 W/ HCPCS: Performed by: STUDENT IN AN ORGANIZED HEALTH CARE EDUCATION/TRAINING PROGRAM

## 2024-01-02 PROCEDURE — 85027 COMPLETE CBC AUTOMATED: CPT | Performed by: STUDENT IN AN ORGANIZED HEALTH CARE EDUCATION/TRAINING PROGRAM

## 2024-01-02 PROCEDURE — A4216 STERILE WATER/SALINE, 10 ML: HCPCS | Performed by: STUDENT IN AN ORGANIZED HEALTH CARE EDUCATION/TRAINING PROGRAM

## 2024-01-02 PROCEDURE — 36415 COLL VENOUS BLD VENIPUNCTURE: CPT | Performed by: STUDENT IN AN ORGANIZED HEALTH CARE EDUCATION/TRAINING PROGRAM

## 2024-01-02 RX ORDER — PREDNISONE 20 MG/1
40 TABLET ORAL DAILY
Qty: 6 TABLET | Refills: 0 | Status: SHIPPED | OUTPATIENT
Start: 2024-01-02 | End: 2024-01-03

## 2024-01-02 RX ORDER — LEVOFLOXACIN 750 MG/1
750 TABLET ORAL EVERY OTHER DAY
Qty: 2 TABLET | Refills: 0 | Status: SHIPPED | OUTPATIENT
Start: 2024-01-02 | End: 2024-01-06

## 2024-01-02 RX ADMIN — IPRATROPIUM BROMIDE AND ALBUTEROL SULFATE 3 ML: 2.5; .5 SOLUTION RESPIRATORY (INHALATION) at 07:01

## 2024-01-02 RX ADMIN — PREDNISONE 50 MG: 20 TABLET ORAL at 08:01

## 2024-01-02 RX ADMIN — CLOPIDOGREL BISULFATE 75 MG: 75 TABLET, FILM COATED ORAL at 08:01

## 2024-01-02 RX ADMIN — SODIUM CHLORIDE 10 ML: 9 INJECTION INTRAMUSCULAR; INTRAVENOUS; SUBCUTANEOUS at 09:01

## 2024-01-02 RX ADMIN — GABAPENTIN 100 MG: 100 CAPSULE ORAL at 08:01

## 2024-01-02 RX ADMIN — BUDESONIDE INHALATION 0.5 MG: 0.5 SUSPENSION RESPIRATORY (INHALATION) at 07:01

## 2024-01-02 RX ADMIN — ARFORMOTEROL TARTRATE 15 MCG: 15 SOLUTION RESPIRATORY (INHALATION) at 07:01

## 2024-01-02 RX ADMIN — METOPROLOL SUCCINATE 50 MG: 50 TABLET, EXTENDED RELEASE ORAL at 08:01

## 2024-01-02 RX ADMIN — ASPIRIN 81 MG: 81 TABLET, COATED ORAL at 08:01

## 2024-01-02 RX ADMIN — MEGESTROL ACETATE 40 MG: 20 TABLET ORAL at 08:01

## 2024-01-02 RX ADMIN — PANTOPRAZOLE SODIUM 40 MG: 40 TABLET, DELAYED RELEASE ORAL at 05:01

## 2024-01-02 NOTE — PLAN OF CARE
Problem: Adult Inpatient Plan of Care  Goal: Patient-Specific Goal (Individualized)    Problem: Adult Inpatient Plan of Care  Goal: Absence of Hospital-Acquired Illness or Injury    Problem: Fall Injury Risk  Goal: Absence of Fall and Fall-Related Injury

## 2024-01-02 NOTE — HOSPITAL COURSE
Patient admitted with COPD exacerbation and treated with Levaquin, prednisone, and DuoNebs.  Patient remained on 2 L nasal cannula which she uses at home.  Respiratory status now at baseline.  Respiratory infection panel was negative.  Patient discharged home on January 2nd with Levaquin and prednisone.

## 2024-01-02 NOTE — PLAN OF CARE
01/02/24 0959   Final Note   Assessment Type Final Discharge Note   Anticipated Discharge Disposition Home   Hospital Resources/Appts/Education Provided Appointments scheduled and added to AVS   Post-Acute Status   Discharge Delays None known at this time     Patient cleared for discharge from case management standpoint.    Follow up appointments scheduled and added to AVS.    Chart and discharge orders reviewed.  Patient discharged home with no further case management needs.

## 2024-01-02 NOTE — NURSING
Discharge instructions reviewed with pt.  IV removed without difficulty, catheter intact.  Pt left unit in stable condition via wheelchair.

## 2024-01-02 NOTE — RESPIRATORY THERAPY
01/01/24 1932   Patient Assessment/Suction   Level of Consciousness (AVPU) alert   Respiratory Effort Normal;Unlabored   Expansion/Accessory Muscles/Retractions no use of accessory muscles;no retractions;expansion symmetric   All Lung Fields Breath Sounds Anterior:;clear;diminished   Rhythm/Pattern, Respiratory unlabored;pattern regular;depth regular;no shortness of breath reported   Cough Frequency infrequent   Cough Type dry   Skin Integrity   $ Wound Care Tech Time 15 min   Area Observed Left;Right;Nares;Behind ear   Skin Appearance without discoloration   PRE-TX-O2   Device (Oxygen Therapy) nasal cannula   $ Is the patient on Low Flow Oxygen? Yes   Flow (L/min) 2   SpO2 97 %   Pulse Oximetry Type Intermittent   $ Pulse Oximetry - Single Charge Pulse Oximetry - Single   $ Pulse Oximetry - Multiple Charge Pulse Oximetry - Multiple   Pulse 97   Resp 17   Positioning HOB elevated 30 degrees   Aerosol Therapy   $ Aerosol Therapy Charges Aerosol Treatment   Daily Review of Necessity (SVN) completed   Respiratory Treatment Status (SVN) given   Treatment Route (SVN) mask;oxygen   Patient Position (SVN) HOB elevated   Post Treatment Assessment (SVN) increased aeration   Signs of Intolerance (SVN) none   Education   $ Education Bronchodilator;15 min   Respiratory Evaluation   $ Care Plan Tech Time 15 min   $ Eval/Re-eval Charges Re-evaluation

## 2024-01-02 NOTE — DISCHARGE SUMMARY
[facility]  Discharge Summary      Patient Name: Carmen Sparrowg  MRN: [id number]  VALERIE: [id number]  Patient Class: OP- Observation  Admission Date: 12/31/2023  Hospital Length of Stay: 0 days  Discharge Date and Time:  01/02/2024 11:05 AM  Attending Physician: Krish Henley MD   Discharging Provider: Krish Henley MD  Primary Care Provider: Abiodun Will MD    Primary Care Team: Networked reference to record PCT     HPI:   Patient 83-year-old female with history of COPD on home oxygen, hypertension, and hyperlipidemia who presents with shortness of breath.  Patient was seen here in the ED 2 days ago for shortness for breath and then discharged home.  Patient says she has been feeling more short of breath since then.  Patient is now having a cough with sputum production.  Patient does use 2-1/2 L home oxygen at home.  Patient says she was recently started on home oxygen.  Patient was 89% room air in his now 97% on 2-1/2 L nasal cannula.  WBC 12.1 and hemoglobin 11.9.  Creatinine 0.9.  Chest x-ray with hyperinflated lungs otherwise no acute findings.    * No surgery found *      Hospital Course:   Patient admitted with COPD exacerbation and treated with Levaquin, prednisone, and DuoNebs.  Patient remained on 2 L nasal cannula which she uses at home.  Respiratory status now at baseline.  Respiratory infection panel was negative.  Patient discharged home on January 2nd with Levaquin and prednisone.     Goals of Care Treatment Preferences:  Code Status: Full Code      Consults:     No new Assessment & Plan notes have been filed under this hospital service since the last note was generated.  Service: Hospital Medicine    Final Active Diagnoses:    Diagnosis Date Noted POA    PRINCIPAL PROBLEM:  COPD exacerbation [J44.1] 04/01/2022 Yes    Hypertension [I10] 12/31/2023 Yes      Problems Resolved During this Admission:       Discharged Condition: good    Disposition: Home or Self  Care    Follow Up:   Follow-up Information       Abiodun Will MD. Go on 1/4/2024.    Specialty: Family Medicine  Why: Hospital follow up, 1:00pm  Contact information:  Jazmine MCDANIEL [id number] [phone]                           Patient Instructions:   No discharge procedures on file.    Significant Diagnostic Studies: N/A    Pending Diagnostic Studies:       None           Medications:  Reconciled Home Medications:      Medication List        START taking these medications      levoFLOXacin 750 MG tablet  Commonly known as: LEVAQUIN  Take 1 tablet (750 mg total) by mouth every other day. for 4 days     predniSONE 20 MG tablet  Commonly known as: DELTASONE  Take 2 tablets (40 mg total) by mouth once daily. for 3 days            CONTINUE taking these medications      albuterol 90 mcg/actuation inhaler  Commonly known as: PROAIR HFA  Inhale 2 puffs into the lungs every 4 (four) hours as needed for Wheezing. Rescue     amLODIPine 2.5 MG tablet  Commonly known as: NORVASC  Take 2.5 mg by mouth every evening.     aspirin 81 MG EC tablet  Commonly known as: ECOTRIN  Take 81 mg by mouth once daily.     clopidogreL 75 mg tablet  Commonly known as: PLAVIX  Take 1 tablet (75 mg total) by mouth once daily.     ferrous sulfate 325 (65 FE) MG EC tablet  Take 325 mg by mouth once daily.     FIBERCON 625 mg tablet  Generic drug: polycarbophil  Take 625 mg by mouth once daily.     fluticasone-umeclidin-vilanter 100-62.5-25 mcg Dsdv  Commonly known as: TRELEGY ELLIPTA  Inhale 1 puff into the lungs once daily.     gabapentin 100 MG capsule  Commonly known as: NEURONTIN  Take 1 capsule by mouth 3 (three) times daily.     L. gasseri-B. bifidum-B longum 1.5 billion cell Cap  Take 1 tablet by mouth once daily.     MEGARED OMEGA-3 KRILL -99-84-50 mg Cap  Generic drug: krill-om-3-dha-epa-phospho-ast  Take 1 tablet by mouth once daily.     megestroL 20 MG Tab  Commonly known as: MEGACE  Take 40 mg by mouth 2 (two)  times daily.     metoprolol succinate 50 MG 24 hr tablet  Commonly known as: TOPROL-XL  Take 50 mg by mouth 2 (two) times daily.     oxyCODONE-acetaminophen  mg per tablet  Commonly known as: PERCOCET  Take 1 tablet by mouth every 4 (four) hours as needed for Pain. 5 times daily     pantoprazole 40 MG tablet  Commonly known as: PROTONIX  Take 1 tablet (40 mg total) by mouth 2 (two) times daily.     PRESERVISION AREDS-2 250-90-40-1 mg Cap  Generic drug: vit C,M-Rp-tuain-lutein-zeaxan  Take 1 tablet by mouth 2 (two) times a day.     promethazine 6.25 mg/5 mL syrup  Commonly known as: PHENERGAN  Take 6.25 mg by mouth every 4 (four) hours as needed for Nausea.     simvastatin 10 MG tablet  Commonly known as: ZOCOR  Take 20 mg by mouth every evening.     UNABLE TO FIND  Take 1 tablet by mouth once daily. medication name: Ronak Mohan MVI              Indwelling Lines/Drains at time of discharge:   Lines/Drains/Airways       Airway  Duration                  Airway - Non-Surgical 12/14/23 0785 Other (Comment) 19 days                    Time spent on the discharge of patient: 35 minutes         Krish Henley MD  Department of Hospital Medicine  [facility]

## 2024-01-03 ENCOUNTER — TELEPHONE (OUTPATIENT)
Dept: PULMONOLOGY | Facility: CLINIC | Age: 84
End: 2024-01-03

## 2024-01-03 DIAGNOSIS — J44.9 CHRONIC OBSTRUCTIVE PULMONARY DISEASE, UNSPECIFIED COPD TYPE: Primary | ICD-10-CM

## 2024-01-03 RX ORDER — PREDNISONE 10 MG/1
TABLET ORAL
Qty: 20 TABLET | Refills: 0 | Status: SHIPPED | OUTPATIENT
Start: 2024-01-03

## 2024-01-03 NOTE — TELEPHONE ENCOUNTER
Spoke with patient who is feeling better.  She states her prescriptions were not called in.  I have called in a short prednisone taper although I can see the 3 day course that was called in by the hospitalist.

## 2024-01-03 NOTE — TELEPHONE ENCOUNTER
----- Message from Lázaro Jordan sent at 1/3/2024 10:01 AM CST -----  MS. Morales called she was in the ED/Hospital and discharged 1/2. She wants to know should she come and see Dr. Molina. Please give her a call [phone].\Thanks

## 2024-01-12 NOTE — DISCHARGE SUMMARY
Discharged      Short Stay Discharge Note:                      -Outcome of hospitalization, treatment, procedures & services   -tolerated procedure well                -Disposition of the case  -discharge to home                -Provisions for follow-up care, unless patient   -follow up as planned                -Final diagnosis-  -see the other notes

## 2024-02-27 ENCOUNTER — HOSPITAL ENCOUNTER (OUTPATIENT)
Dept: PREADMISSION TESTING | Facility: HOSPITAL | Age: 84
Discharge: HOME | End: 2024-02-27
Attending: INTERNAL MEDICINE
Payer: MEDICARE

## 2024-02-27 VITALS
WEIGHT: 90 LBS | TEMPERATURE: 98 F | HEIGHT: 62 IN | DIASTOLIC BLOOD PRESSURE: 77 MMHG | HEART RATE: 90 BPM | OXYGEN SATURATION: 92 % | SYSTOLIC BLOOD PRESSURE: 170 MMHG | RESPIRATION RATE: 20 BRPM | BODY MASS INDEX: 16.56 KG/M2

## 2024-02-27 DIAGNOSIS — Z01.818 PREOP TESTING: Primary | ICD-10-CM

## 2024-02-27 LAB
ANION GAP SERPL CALC-SCNC: 11 MMOL/L (ref 8–16)
BASOPHILS # BLD AUTO: 0.06 K/UL (ref 0–0.2)
BASOPHILS NFR BLD: 0.6 % (ref 0–1.9)
BUN SERPL-MCNC: 30 MG/DL (ref 8–23)
CALCIUM SERPL-MCNC: 9.6 MG/DL (ref 8.7–10.5)
CHLORIDE SERPL-SCNC: 112 MMOL/L (ref 95–110)
CO2 SERPL-SCNC: 20 MMOL/L (ref 23–29)
CREAT SERPL-MCNC: 0.9 MG/DL (ref 0.5–1.4)
DIFFERENTIAL METHOD BLD: ABNORMAL
EOSINOPHIL # BLD AUTO: 0.1 K/UL (ref 0–0.5)
EOSINOPHIL NFR BLD: 1.1 % (ref 0–8)
ERYTHROCYTE [DISTWIDTH] IN BLOOD BY AUTOMATED COUNT: 12.6 % (ref 11.5–14.5)
EST. GFR  (NO RACE VARIABLE): >60 ML/MIN/1.73 M^2
GLUCOSE SERPL-MCNC: 102 MG/DL (ref 70–110)
HCT VFR BLD AUTO: 34.9 % (ref 37–48.5)
HGB BLD-MCNC: 11.3 G/DL (ref 12–16)
IMM GRANULOCYTES # BLD AUTO: 0.03 K/UL (ref 0–0.04)
IMM GRANULOCYTES NFR BLD AUTO: 0.3 % (ref 0–0.5)
LYMPHOCYTES # BLD AUTO: 2.2 K/UL (ref 1–4.8)
LYMPHOCYTES NFR BLD: 22.1 % (ref 18–48)
MCH RBC QN AUTO: 32.3 PG (ref 27–31)
MCHC RBC AUTO-ENTMCNC: 32.4 G/DL (ref 32–36)
MCV RBC AUTO: 100 FL (ref 82–98)
MONOCYTES # BLD AUTO: 0.9 K/UL (ref 0.3–1)
MONOCYTES NFR BLD: 9.3 % (ref 4–15)
NEUTROPHILS # BLD AUTO: 6.8 K/UL (ref 1.8–7.7)
NEUTROPHILS NFR BLD: 66.6 % (ref 38–73)
NRBC BLD-RTO: 0 /100 WBC
PLATELET # BLD AUTO: 337 K/UL (ref 150–450)
PMV BLD AUTO: 9.1 FL (ref 9.2–12.9)
POTASSIUM SERPL-SCNC: 4 MMOL/L (ref 3.5–5.1)
RBC # BLD AUTO: 3.5 M/UL (ref 4–5.4)
SODIUM SERPL-SCNC: 143 MMOL/L (ref 136–145)
WBC # BLD AUTO: 10.14 K/UL (ref 3.9–12.7)

## 2024-02-27 PROCEDURE — 85025 COMPLETE CBC W/AUTO DIFF WBC: CPT | Performed by: ANESTHESIOLOGY

## 2024-02-27 PROCEDURE — 80048 BASIC METABOLIC PNL TOTAL CA: CPT | Performed by: ANESTHESIOLOGY

## 2024-02-27 PROCEDURE — 36415 COLL VENOUS BLD VENIPUNCTURE: CPT | Performed by: ANESTHESIOLOGY

## 2024-02-27 NOTE — DISCHARGE INSTRUCTIONS
INSTRUCTIONS  To confirm your doctor has scheduled your surgery for:2/29/24    Morning of surgery please check in with registration near [address] Entrance then proceed to Registration then to endoscopy department    ENDOSCOPY NURSES will call the afternoon prior to procedure with your final arrival time.    TAKE ONLY THESE MEDICATIONS WITH A SMALL SIP OF WATER THE MORNING OF SURGERY:    DO NOT TAKE ANY INSULIN OR ORAL DIABETIC MEDICATIONS THE MORNING OF SURGERY UNLESS DIRECTED BY YOUR PHYSICIAN OR PRE ADMIT NURSE.    DO NOT TAKE THESE MEDICATIONS 5-7 DAYS PRIOR to your procedure per your surgeon's request: ASPIRIN, ALEVE, BC powder, LAKEISHA SELTZER, IBUPROFEN, FISH OIL, VITAMIN E, OR HERBALS   (May take Tylenol)    If you are prescribed any types of blood thinners (Aspirin, Coumadin, Plavix, Pradaxa, Xarelto, Aggrenox, Effient, Eliquis, Savasya, Brilinta or any other), please ask your surgeon how many days before scheduled procedure should you stop taking them. You may also need to verify with prescribing physician if it is OK to stop your blood thinners.      INSTRUCTIONS IMPORTANT!!  Do not eat or drink anything between midnight and the time of your procedure- this includes gum, mints, and candy. You may brush your teeth but do not swallow.  ONLY if you are diabetic, check your sugar in the morning before your procedure.  Do not smoke, vape or drink alcoholic beverages 24 hours prior to your procedure.  If you wear contact lenses, dentures, hearing aids or glasses, bring a container to put them in during surgery and give to a family member for safe keeping.    Please leave all jewelry, piercing's and valuables at home.   Wear comfortable loose clothing and rubber soled shoes.  If your condition changes such as fever, cough, etc, please notify your surgeon.   ONLY if you have been diagnosed with sleep apnea please bring your C-PAP machine.  ONLY if you wear home oxygen please bring your portable oxygen tank the  day of your procedure.   ONLY for patients requiring bowel prep, written instructions will be given by your doctor's office.  ONLY if you have a neuro stimulator, please bring the controller with you the morning of surgery  Make arrangements in advance for transportation home by a responsible adult.  You must make arrangements for transportation, TAXI'S, UBER'S OR LYFTS ARE NOT ALLOWED.        If you have any questions about these instructions, call Endoscopy Nurse at [phone]

## 2024-02-29 ENCOUNTER — ANESTHESIA EVENT (OUTPATIENT)
Dept: SURGERY | Facility: HOSPITAL | Age: 84
End: 2024-02-29
Payer: MEDICARE

## 2024-02-29 ENCOUNTER — HOSPITAL ENCOUNTER (OUTPATIENT)
Facility: HOSPITAL | Age: 84
Discharge: HOME | End: 2024-02-29
Attending: INTERNAL MEDICINE | Admitting: INTERNAL MEDICINE
Payer: MEDICARE

## 2024-02-29 ENCOUNTER — ANESTHESIA (OUTPATIENT)
Dept: SURGERY | Facility: HOSPITAL | Age: 84
End: 2024-02-29
Payer: MEDICARE

## 2024-02-29 VITALS
SYSTOLIC BLOOD PRESSURE: 113 MMHG | HEART RATE: 81 BPM | OXYGEN SATURATION: 97 % | TEMPERATURE: 98 F | DIASTOLIC BLOOD PRESSURE: 53 MMHG | RESPIRATION RATE: 16 BRPM

## 2024-02-29 VITALS
RESPIRATION RATE: 12 BRPM | SYSTOLIC BLOOD PRESSURE: 107 MMHG | DIASTOLIC BLOOD PRESSURE: 54 MMHG | HEART RATE: 76 BPM | OXYGEN SATURATION: 100 %

## 2024-02-29 DIAGNOSIS — R13.10 DYSPHAGIA: ICD-10-CM

## 2024-02-29 PROCEDURE — D9220A PRA ANESTHESIA: Mod: ANES,,, | Performed by: ANESTHESIOLOGY

## 2024-02-29 PROCEDURE — 37000008 HC ANESTHESIA 1ST 15 MINUTES: Performed by: INTERNAL MEDICINE

## 2024-02-29 PROCEDURE — 63600175 PHARM REV CODE 636 W HCPCS: Performed by: NURSE ANESTHETIST, CERTIFIED REGISTERED

## 2024-02-29 PROCEDURE — 43450 DILATE ESOPHAGUS 1/MULT PASS: CPT | Performed by: INTERNAL MEDICINE

## 2024-02-29 PROCEDURE — 25000003 PHARM REV CODE 250: Performed by: NURSE ANESTHETIST, CERTIFIED REGISTERED

## 2024-02-29 PROCEDURE — 43235 EGD DIAGNOSTIC BRUSH WASH: CPT | Performed by: INTERNAL MEDICINE

## 2024-02-29 PROCEDURE — D9220A PRA ANESTHESIA: Mod: CRNA,,, | Performed by: NURSE ANESTHETIST, CERTIFIED REGISTERED

## 2024-02-29 RX ORDER — PROPOFOL 10 MG/ML
VIAL (ML) INTRAVENOUS
Status: DISCONTINUED | OUTPATIENT
Start: 2024-02-29 | End: 2024-02-29

## 2024-02-29 RX ADMIN — PROPOFOL 80 MG: 10 INJECTION, EMULSION INTRAVENOUS at 08:02

## 2024-02-29 RX ADMIN — PROPOFOL 30 MG: 10 INJECTION, EMULSION INTRAVENOUS at 08:02

## 2024-02-29 RX ADMIN — SODIUM CHLORIDE: 900 INJECTION, SOLUTION INTRAVENOUS at 08:02

## 2024-02-29 RX ADMIN — PROPOFOL 50 MG: 10 INJECTION, EMULSION INTRAVENOUS at 08:02

## 2024-02-29 NOTE — TRANSFER OF CARE
Anesthesia Transfer of Care Note    Patient: Carmen Fletcher Pfluvivienne    Procedure(s) Performed: Procedure(s) (LRB):  EGD (ESOPHAGOGASTRODUODENOSCOPY) (N/A)    Patient location: GI    Anesthesia Type: general    Transport from OR: Transported from OR on room air with adequate spontaneous ventilation    Post pain: adequate analgesia    Post assessment: no apparent anesthetic complications and tolerated procedure well    Post vital signs: stable    Level of consciousness: awake    Nausea/Vomiting: no nausea/vomiting    Complications: none    Transfer of care protocol was followed      Last vitals: Visit Vitals  BP (!) 197/80 (BP Location: Left arm, Patient Position: Lying)   Pulse 83   Temp 36.9 °C (98.4 °F) (Oral)   Resp 16   LMP  (LMP Unknown)   SpO2 99%   Breastfeeding No

## 2024-02-29 NOTE — [employer] PATIENT INSTRUCTIONS
Discharge Summary/Instructions after an Endoscopic Procedure  Patient Name: Carmen Wilkinson  Patient MRN: [id number]  Patient YOB: 1940 Thursday, February 29, 2024  Lino Devi MD  RESTRICTIONS:  During your procedure today, you received medications for sedation.  These   medications may affect your judgment, balance and coordination.  Therefore,   for 24 hours, you have the following restrictions:   - DO NOT drive a car, operate machinery, make legal/financial decisions,   sign important papers or drink alcohol.    ACTIVITY:  Today: no heavy lifting, straining or running due to procedural   sedation/anesthesia.  The following day: return to full activity including work.  DIET:  Eat and drink normally unless instructed otherwise.     TREATMENT FOR COMMON SIDE EFFECTS:  - Mild abdominal pain, nausea, belching, bloating or excessive gas:  rest,   eat lightly and use a heating pad.  - Sore Throat: treat with throat lozenges and/or gargle with warm salt   water.  - Because air was used during the procedure, expelling large amounts of air   from your rectum or belching is normal.  - If a bowel prep was taken, you may not have a bowel movement for 1-3 days.    This is normal.  SYMPTOMS TO WATCH FOR AND REPORT TO YOUR PHYSICIAN:  1. Abdominal pain or bloating, other than gas cramps.  2. Chest pain.  3. Back pain.  4. Signs of infection such as: chills or fever occurring within 24 hours   after the procedure.  5. Rectal bleeding, which would show as bright red, maroon, or black stools.   (A tablespoon of blood from the rectum is not serious, especially if   hemorrhoids are present.)  6. Vomiting.  7. Weakness or dizziness.  GO DIRECTLY TO THE NEAREST EMERGENCY ROOM IF YOU HAVE ANY OF THE FOLLOWING:      Difficulty breathing              Chills and/or fever over 101 F   Persistent vomiting and/or vomiting blood   Severe abdominal pain   Severe chest pain   Black, tarry stools   Bleeding- more than one  tablespoon   Any other symptom or condition that you feel may need urgent attention  Your doctor recommends these additional instructions:  If any biopsies were taken, your doctors clinic will contact you in 1 to 2   weeks with any results.  - Patient has a contact number available for emergencies.  The signs and   symptoms of potential delayed complications were discussed with the   patient.  Return to normal activities tomorrow.  Written discharge   instructions were provided to the patient.   - Resume previous diet.   - Continue present medications.   - Repeat upper endoscopy in 6 months for retreatment.   - Return to GI clinic PRN.   - Continue pantoprazole twice daily.  - Resume plavix tomorrow  - Discharge patient to home (with escort).  For questions, problems or results please call your physician - Lino Devi MD at Work:  [phone].  [facility], EMERGENCY ROOM PHONE NUMBER: [phone]  IF A COMPLICATION OR EMERGENCY SITUATION ARISES AND YOU ARE UNABLE TO REACH   YOUR PHYSICIAN - GO DIRECTLY TO THE EMERGENCY ROOM.  MD Lino Allen MD  2/29/2024 8:30:01 AM  This report has been verified and signed electronically.  Dear patient,  As a result of recent federal legislation (The Federal Cures Act), you may   receive lab or pathology results from your procedure in your [facility]   account before your physician is able to contact you. Your physician or   their representative will relay the results to you with their   recommendations at their soonest availability.  Thank you,  [employer]

## 2024-02-29 NOTE — ANESTHESIA POSTPROCEDURE EVALUATION
Anesthesia Post Evaluation    Patient: Carmen Fletcher Pflug    Procedure(s) Performed: Procedure(s) (LRB):  EGD (ESOPHAGOGASTRODUODENOSCOPY) (N/A)    Final Anesthesia Type: general      Patient location during evaluation: GI PACU  Patient participation: Yes- Able to Participate  Level of consciousness: awake and alert and oriented  Post-procedure vital signs: reviewed and stable  Pain management: adequate  Airway patency: patent    PONV status at discharge: No PONV  Anesthetic complications: no      Cardiovascular status: blood pressure returned to baseline  Respiratory status: unassisted, spontaneous ventilation and nasal cannula  Hydration status: euvolemic  Follow-up not needed.          On baseline O2, VSS, NAAC    Vitals Value Taken Time   /61 02/29/24 0900   Temp 36.9 °C (98.4 °F) 02/29/24 0830   Pulse 69 02/29/24 0903   Resp 16 02/29/24 0855   SpO2 93 % 02/29/24 0904   Vitals shown include unvalidated device data.      No case tracking events are documented in the log.      Pain/Wili Score: No data recorded

## 2024-02-29 NOTE — H&P
GASTROENTEROLOGY PRE-PROCEDURE H&P NOTE  Patient Name: Carmen Fletcher Pflug  Patient MRN: [id number]  Patient : 1940    Service date: 2024    PCP: Abiodun Will MD    No chief complaint on file.      HPI: Patient is a 84 y.o. female with PMHx as below here for evaluation of h/o refractory esophageal stricture requiring sequential dilations.     EGD          One benign-appearing, intrinsic mild stenosis was found 37 to 38 cm        from the incisors. The stenosis was traversed. The scope was        withdrawn. Dilation was performed with a Landa dilator with no        resistance at 40 Fr and 42 Fr and mild resistance at 44 Fr. The        dilation site was examined following endoscope reinsertion and        showed mild mucosal disruption.        Diffuse mild inflammation was found in the gastric body and in the        gastric antrum. Biopsies were taken with a cold forceps for        histology.        The examined duodenum was normal.        The cardia and gastric fundus were normal on retroflexion.     Past Medical History:  Past Medical History:   Diagnosis Date    Back pain     Brain aneurysm 2018    Carotid stenosis     Cataract     Diverticulitis     Emphysema lung     Feeling anxious     Hyperlipidemia     Hypertension     Macular degeneration     PVD (peripheral vascular disease)     Squamous cell carcinoma 2017    right medical cheek, SSIS, efudex tx        Past Surgical History:  Past Surgical History:   Procedure Laterality Date    ABDOMINAL SURGERY      ANGIOGRAPHY OF MESENTERIC VESSELS Left 2022    Procedure: ANGIOGRAM, MESENTERIC VESSELS;  Surgeon: Krish Machado MD;  Location: [facility] CATH/EP LAB;  Service: General;  Laterality: Left;    ARTERIOGRAM, MESENTERIC N/A 2021    Procedure: ARTERIOGRAM, MESENTERIC;  Surgeon: Krish Machado MD;  Location: [facility] CATH/EP LAB;  Service: General;  Laterality: N/A;    BACK SURGERY      CATARACT EXTRACTION, BILATERAL       COLONOSCOPY N/A 04/04/2022    Procedure: COLONOSCOPY;  Surgeon: Lino Devi MD;  Location: [facility] ENDO;  Service: Endoscopy;  Laterality: N/A;    CREATION, BYPASS, ARTERIAL, AORTA TO FEMORAL N/A 01/13/2021    Procedure: ARTERIOGRAM, MESENTERIC;  Surgeon: Krish Machado MD;  Location: [facility] CATH/EP LAB;  Service: General;  Laterality: N/A;    ENDOSCOPY OF PROXIMAL SMALL INTESTINE N/A 04/04/2022    Procedure: ENTEROSCOPY, PROXIMAL;  Surgeon: Lino Devi MD;  Location: [facility] ENDO;  Service: Endoscopy;  Laterality: N/A;    ESOPHAGOGASTRODUODENOSCOPY N/A 11/20/2022    Procedure: EGD (ESOPHAGOGASTRODUODENOSCOPY);  Surgeon: Pedro Hall Jr., MD;  Location: [facility] ENDO;  Service: Endoscopy;  Laterality: N/A;    ESOPHAGOGASTRODUODENOSCOPY N/A 8/31/2023    Procedure: EGD (ESOPHAGOGASTRODUODENOSCOPY);  Surgeon: Lino Devi MD;  Location: [facility];  Service: Endoscopy;  Laterality: N/A;    ESOPHAGOGASTRODUODENOSCOPY N/A 10/26/2023    Procedure: EGD (ESOPHAGOGASTRODUODENOSCOPY);  Surgeon: Lino Devi MD;  Location: [facility];  Service: Endoscopy;  Laterality: N/A;    ESOPHAGOGASTRODUODENOSCOPY N/A 12/14/2023    Procedure: EGD (ESOPHAGOGASTRODUODENOSCOPY);  Surgeon: Lino Devi MD;  Location: [facility];  Service: Endoscopy;  Laterality: N/A;    HYSTERECTOMY      IMPLANTATION OF SPINAL CORD STIMULATOR IN CERVICAL SPINE      UPPER GASTROINTESTINAL ENDOSCOPY  11/20/2022    w/ antrum biopsy        Home Medications:  Medications Prior to Admission   Medication Sig Dispense Refill Last Dose    aspirin (ECOTRIN) 81 MG EC tablet Take 81 mg by mouth once daily.   Past Week    clopidogreL (PLAVIX) 75 mg tablet Take 1 tablet (75 mg total) by mouth once daily. 30 tablet 3 Past Week    metoprolol succinate (TOPROL-XL) 50 MG 24 hr tablet Take 50 mg by mouth 2 (two) times daily.   2/29/2024    oxyCODONE-acetaminophen (PERCOCET)  mg per tablet Take 1 tablet by mouth every 4 (four) hours as needed for Pain. 5 times  daily   2024    albuterol (PROAIR HFA) 90 mcg/actuation inhaler Inhale 2 puffs into the lungs every 4 (four) hours as needed for Wheezing. Rescue 18 g 11     ferrous sulfate 325 (65 FE) MG EC tablet Take 325 mg by mouth once daily.       fluticasone-umeclidin-vilanter (TRELEGY ELLIPTA) 100-62.5-25 mcg DsDv Inhale 1 puff into the lungs once daily. 1 each 6     krill-om-3-dha-epa-phospho-ast (MEGARED OMEGA-3 KRILL OIL) 311-27-84-50 mg Cap Take 1 tablet by mouth once daily.       L. gasseri-B. bifidum-B longum 1.5 billion cell Cap Take 1 tablet by mouth once daily.        megestroL (MEGACE) 20 MG Tab Take 40 mg by mouth 2 (two) times daily.       pantoprazole (PROTONIX) 40 MG tablet Take 1 tablet (40 mg total) by mouth 2 (two) times daily. 180 tablet 1     polycarbophil (FIBERCON) 625 mg tablet Take 625 mg by mouth once daily.       promethazine (PHENERGAN) 6.25 mg/5 mL syrup Take 6.25 mg by mouth every 4 (four) hours as needed for Nausea.       simvastatin (ZOCOR) 10 MG tablet Take 20 mg by mouth every evening.       UNABLE TO FIND Take 1 tablet by mouth once daily. medication name: Ronak GRULLONI       vit C,W-It-dpjne-lutein-zeaxan (PRESERVISION AREDS-2) 250-90-40-1 mg Cap Take 1 tablet by mouth 2 (two) times a day.                  Review of patient's allergies indicates:   Allergen Reactions    Sulfa (sulfonamide antibiotics) Nausea Only       Social History:   Social History     Occupational History    Not on file   Tobacco Use    Smoking status: Former     Current packs/day: 0.00     Average packs/day: 1 pack/day for 56.8 years (56.8 ttl pk-yrs)     Types: Cigarettes     Start date: 1965     Quit date: 2022     Years since quittin.9    Smokeless tobacco: Never   Substance and Sexual Activity    Alcohol use: No    Drug use: Not Currently    Sexual activity: Not on file       Family History:   Family History   Problem Relation Age of Onset    Melanoma Neg Hx     Psoriasis Neg Hx     Eczema  "Neg Hx     Lupus Neg Hx        Review of Systems:  A 10 point review of systems was performed and was normal, except as mentioned in the HPI, including constitutional, HEENT, heme, lymph, cardiovascular, respiratory, gastrointestinal, genitourinary, neurologic, endocrine, psychiatric and musculoskeletal.      OBJECTIVE:    Physical Exam:  24 Hour Vital Sign Ranges: Temp:  [98.4 °F (36.9 °C)] 98.4 °F (36.9 °C)  Pulse:  [83] 83  Resp:  [16] 16  SpO2:  [99 %] 99 %  BP: (197)/(80) 197/80  Most recent vitals: BP (!) 197/80 (BP Location: Left arm, Patient Position: Lying)   Pulse 83   Temp 98.4 °F (36.9 °C) (Oral)   Resp 16   LMP  (LMP Unknown)   SpO2 99% Comment: 2L  Breastfeeding No    GEN: well-developed, well-nourished, awake and alert, non-toxic appearing adult  HEENT: PERRL, sclera anicteric, oral mucosa pink and moist without lesion  NECK: trachea midline; Good ROM  CV: regular rate and rhythm, no murmurs or gallops  RESP: clear to auscultation bilaterally, no wheezes, rhonci or rales  ABD: soft, non-tender, non-distended, normal bowel sounds  EXT: no swelling or edema, 2+ pulses distally  SKIN: no rashes or jaundice  PSYCH: normal affect    Labs:   Recent Labs     02/27/24  1131   WBC 10.14   *        Recent Labs     02/27/24  1131      K 4.0   *   CO2 20*   BUN 30*        No results for input(s): "ALB" in the last 72 hours.    Invalid input(s): "ALKP", "SGOT", "SGPT", "TBIL", "DBIL", "TPRO"  No results for input(s): "PT", "INR", "PTT" in the last 72 hours.      IMPRESSION / RECOMMENDATIONS:  Esophageal stricture  with interventions as warranted.   RIsks, benefits, alternatives discussed in detail regarding upcoming procedures and sedation. Some of the more common endoscopic complications include but not limited to immediate or delayed perforation, bleeding, infections, pain, inadvertent injury to surrounding tissue / organs and possible need for surgical evaluation. Patient " expressed understanding, all questions answered and will proceed with procedure as planned.     Lino Devi  2/29/2024  8:00 AM

## 2024-02-29 NOTE — ANESTHESIA PREPROCEDURE EVALUATION
02/29/2024  Carmen Wilkinson is a 84 y.o., female.      Tobacco Use:  The patient  reports that she quit smoking about 22 months ago. Her smoking use included cigarettes. She started smoking about 58 years ago. She has a 56.8 pack-year smoking history. She has never used smokeless tobacco.     Results for orders placed or performed during the hospital encounter of 12/31/23   EKG 12-lead    Collection Time: 12/31/23  8:43 AM    Narrative    Test Reason : R06.02,    Vent. Rate : 102 BPM     Atrial Rate : 102 BPM     P-R Int : 148 ms          QRS Dur : 062 ms      QT Int : 328 ms       P-R-T Axes : 068 043 075 degrees     QTc Int : 427 ms    Sinus tachycardia with Premature supraventricular complexes and with  occasional Premature ventricular complexes  Otherwise normal ECG  When compared with ECG of 29-DEC-2023 20:05,  Premature ventricular complexes are now Present  Confirmed by Abhishek Edwards MD ([id number]) on 1/2/2024 1:03:09 PM    Referred By: [email or web address]   SELF           Confirmed By:Abhishek Edwards MD             Lab Results   Component Value Date    WBC 10.14 02/27/2024    HGB 11.3 (L) 02/27/2024    HCT 34.9 (L) 02/27/2024     (H) 02/27/2024     02/27/2024     BMP  Lab Results   Component Value Date     02/27/2024    K 4.0 02/27/2024     (H) 02/27/2024    CO2 20 (L) 02/27/2024    BUN 30 (H) 02/27/2024    CREATININE 0.9 02/27/2024    CALCIUM 9.6 02/27/2024    ANIONGAP 11 02/27/2024     02/27/2024     01/02/2024     (H) 01/01/2024       Results for orders placed during the hospital encounter of 04/01/22    Echo Saline Bubble? No    Interpretation Summary  · The left ventricle is small with concentric remodeling and hyperdynamic systolic function.  · The estimated ejection fraction is 75%.  · Indeterminate left ventricular diastolic function.  · Mild right  ventricular enlargement with normal right ventricular systolic function.  · Mild to moderate tricuspid regurgitation.  · Mild-to-moderate aortic regurgitation.        Pre-op Assessment    I have reviewed the Patient Summary Reports.     I have reviewed the Nursing Notes. I have reviewed the NPO Status.   I have reviewed the Medications.     Review of Systems  Anesthesia Hx:  No problems with previous Anesthesia             Denies Family Hx of Anesthesia complications.    Denies Personal Hx of Anesthesia complications.                    Social:  Former Smoker, No Alcohol Use       Hematology/Oncology:       -- Anemia:                    --  Cancer in past history:              surgery   Oncology Comments: Squamous cell carcinoma     EENT/Dental:   Dysphonia    Macular degeneration Eyes: Visual Impairment   Has Bilateral and S/P Extraction - Bilateral Catarract                  Cardiovascular:     Hypertension, poorly controlled          PVD hyperlipidemia   ECG has been reviewed. Hx of Mesenteric artery stenosis    Cardiac workup negative per patient     2022 echo shows 75% EF, mild-to-moderate AR and TR    Valvular Heart Disease:   Aortic Regurgitation (AR), mild, moderate, Tricuspid Regurgitation (TR), mild, moderate           Carotoid Artery Disease               Pulmonary:   COPD Asthma  Shortness of breath   Daily inhaler - Trelegy Ellipta    Recently started on home O2 nasal cannula  Patient followed by Dr. Molina  Last hospitalization 2 months ago for COPD exacerbation   Asthma:   Chronic Obstructive Pulmonary Disease (COPD):   Emphysema                   Hepatic/GI:   PUD,     Hx Acute pancreatitis    Hx of Mesenteric artery stenosis          Musculoskeletal:     Hx cervical spine stimulator - not active       Spine Disorders: lumbar and cervical Chronic Pain, Degenerative disease and Disc disease           Neurological:           Brain aneurysm present but not followed per patient                              Psych:  Psychiatric History anxiety                 Physical Exam  General: Cooperative, Alert, Oriented and Cachexia    Airway:  Mallampati: III   Mouth Opening: Normal  TM Distance: > 6 cm  Tongue: Normal  Neck ROM: Extension Decreased    Dental:  Dentures, Caps / Implants    Chest/Lungs:  Clear to auscultation, Normal Respiratory Rate    Heart:  Rate: Normal  Rhythm: Regular Rhythm        Anesthesia Plan  Type of Anesthesia, risks & benefits discussed:    Anesthesia Type: Gen Natural Airway  Intra-op Monitoring Plan: Standard ASA Monitors  Induction:  IV  Informed Consent: Informed consent signed with the Patient and all parties understand the risks and agree with anesthesia plan.  All questions answered.   ASA Score: 3  Anesthesia Plan Notes:       GNA    POM    Propofol     Ready For Surgery From Anesthesia Perspective.     .

## 2024-03-06 ENCOUNTER — DOCUMENTATION ONLY (OUTPATIENT)
Dept: REHABILITATION | Facility: HOSPITAL | Age: 84
End: 2024-03-06
Payer: MEDICARE

## 2024-03-06 NOTE — PROGRESS NOTES
Outpatient Therapy Discharge Summary   Discharge Date: 3/6/2024   Name: Carmen Fletcher [facility] [place]  Clinic Number: [id number]  Therapy Diagnosis:        Encounter Diagnoses   Name Primary?    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 Patient with continued education and reinforcement of PhoRTE and vocal hygiene today. Patient noted with minimal improvement of vocal quality during PhoRTE in session today. Patient encouraged to continue exercises 2x daily for 6 days in effort to determine improvement. Current goals remain appropriate. Goals to be updated as necessary.      Goals:   Short Term Goals: (4 weeks) Current Progress:   1. Pt will complete neck relaxation exercises 10x each per session/at home ind'ly.       Progressing/ Not Met 3/30/2023   Ongoing at time of last speech therapy session      2. Patient will complete SOVT exercises and/or resonant-focused exercises with min A.      Progressing/ Not Met 3/30/2023   Ongoing at time of last speech therapy session      3. Patient will improve the quality, efficiency, and ease of phonation through resonant and/or airflow exercises from the syllable to conversation level with 80% accuracy.      Progressing/ Not Met 3/30/2023   Ongoing at time of last speech therapy session      4. Patient will increase awareness for voice conservations behaviors by following the 50/10 rule and reduce voice use in competing noise environments.       Progressing/ Not Met 3/30/2023   Education completed   5. Patient will demonstrate the ability to increase awareness of  voicing behavior through self-monitoring to facilitate generalization in functional speaking situations with 80% accuracy.           Progressing/ Not Met 3/30/2023   Education completed   6. Pt will reduce/eliminate/substitute throat clearing ind'ly.       Progressing/ Not Met 3/30/2023   Education completed   7. GOAL ADDED 3/30/2023 patient will complete PhoRTE protocol with 100% accuracy independently with noted improvement in vocal quality.  Ongoing at time of last speech therapy session             Long Term Goals (6 weeks): Ongoing at time of last speech therapy session     Patient will implement and adhere to vocal hygiene protocols on a daily basis, including the elimination of phonotraumatic behaviors.  Patient and clinician will facilitate changes in vocal function in order to restore functional use of voice for daily occupational, social, and emotional demands.  Pt will demonstrate improved vocal quality/loudness/intonantion for sustained vocalization/speech at the word/phrase/sentence/conversational level to communicate basic medical and social needs in functional living environment.     Discharge reason: Patient has not attended therapy since 3/30/2023    Plan   This patient is discharged from Speech Therapy    Norma Figueroa CCC-SLP   3/6/2024

## 2024-07-03 ENCOUNTER — OFFICE VISIT (OUTPATIENT)
Dept: PULMONOLOGY | Facility: CLINIC | Age: 84
End: 2024-07-03
Payer: MEDICARE

## 2024-07-03 VITALS
DIASTOLIC BLOOD PRESSURE: 80 MMHG | HEART RATE: 88 BPM | BODY MASS INDEX: 16.93 KG/M2 | HEIGHT: 62 IN | SYSTOLIC BLOOD PRESSURE: 140 MMHG | WEIGHT: 92 LBS | OXYGEN SATURATION: 98 %

## 2024-07-03 DIAGNOSIS — K55.1 SUPERIOR MESENTERIC ARTERY STENOSIS: ICD-10-CM

## 2024-07-03 DIAGNOSIS — J96.11 CHRONIC HYPOXEMIC RESPIRATORY FAILURE: ICD-10-CM

## 2024-07-03 DIAGNOSIS — E43 SEVERE PROTEIN-CALORIE MALNUTRITION: ICD-10-CM

## 2024-07-03 DIAGNOSIS — R06.00 DYSPNEA, UNSPECIFIED TYPE: ICD-10-CM

## 2024-07-03 DIAGNOSIS — J44.9 CHRONIC OBSTRUCTIVE PULMONARY DISEASE, UNSPECIFIED COPD TYPE: Primary | ICD-10-CM

## 2024-07-03 PROCEDURE — 99999 PR PBB SHADOW E&M-EST. PATIENT-LVL IV: CPT | Mod: PBBFAC,,, | Performed by: INTERNAL MEDICINE

## 2024-07-03 RX ORDER — BUDESONIDE 0.5 MG/2ML
0.5 INHALANT ORAL 2 TIMES DAILY
Qty: 120 ML | Refills: 5 | Status: SHIPPED | OUTPATIENT
Start: 2024-07-03 | End: 2025-07-03

## 2024-07-03 RX ORDER — SIMVASTATIN 20 MG/1
20 TABLET, FILM COATED ORAL
COMMUNITY

## 2024-07-03 RX ORDER — ARFORMOTEROL TARTRATE 15 UG/2ML
15 SOLUTION RESPIRATORY (INHALATION) 2 TIMES DAILY
Qty: 120 ML | Refills: 11 | Status: SHIPPED | OUTPATIENT
Start: 2024-07-03 | End: 2025-07-03

## 2024-07-03 NOTE — PROGRESS NOTES
SUBJECTIVE:    Patient ID: Carmen Wilkinson is a 84 y.o. female.    Chief Complaint: COPD and Shortness of Breath    COPD    Shortness of Breath  Her past medical history is significant for COPD.   The patient wonders when her lungs are going to do better.  Explained that this is probably as good as they are going to get.  She does not feel her Trelegy is helping at all.  It is likely that she is not getting much medicine down in her lungs.  Will shift her to nebulized medications.  She has in a wheelchair.  She is using her Trelegy daily.  She is using her albuterol but does not find much benefit.  Past Medical History:   Diagnosis Date    Back pain     Brain aneurysm 2018    Carotid stenosis     Cataract     Diverticulitis     Emphysema lung     Feeling anxious     Hyperlipidemia     Hypertension     Macular degeneration     PVD (peripheral vascular disease)     Squamous cell carcinoma 08/29/2017    right medical cheek, SSIS, efudex tx     Past Surgical History:   Procedure Laterality Date    ABDOMINAL SURGERY      ANGIOGRAPHY OF MESENTERIC VESSELS Left 09/07/2022    Procedure: ANGIOGRAM, MESENTERIC VESSELS;  Surgeon: Krish Machado MD;  Location: [facility] CATH/EP LAB;  Service: General;  Laterality: Left;    ARTERIOGRAM, MESENTERIC N/A 06/30/2021    Procedure: ARTERIOGRAM, MESENTERIC;  Surgeon: Krish Machado MD;  Location: [facility] CATH/EP LAB;  Service: General;  Laterality: N/A;    BACK SURGERY  2008    CATARACT EXTRACTION, BILATERAL      COLONOSCOPY N/A 04/04/2022    Procedure: COLONOSCOPY;  Surgeon: Lino Devi MD;  Location: [facility] ENDO;  Service: Endoscopy;  Laterality: N/A;    CREATION, BYPASS, ARTERIAL, AORTA TO FEMORAL N/A 01/13/2021    Procedure: ARTERIOGRAM, MESENTERIC;  Surgeon: Krish Machado MD;  Location: [facility] CATH/EP LAB;  Service: General;  Laterality: N/A;    ENDOSCOPY OF PROXIMAL SMALL INTESTINE N/A 04/04/2022    Procedure: ENTEROSCOPY, PROXIMAL;  Surgeon: Lino Devi MD;   Location: [facility];  Service: Endoscopy;  Laterality: N/A;    ESOPHAGOGASTRODUODENOSCOPY N/A 11/20/2022    Procedure: EGD (ESOPHAGOGASTRODUODENOSCOPY);  Surgeon: Pedro Hall Jr., MD;  Location: [facility];  Service: Endoscopy;  Laterality: N/A;    ESOPHAGOGASTRODUODENOSCOPY N/A 8/31/2023    Procedure: EGD (ESOPHAGOGASTRODUODENOSCOPY);  Surgeon: Lino Devi MD;  Location: [facility];  Service: Endoscopy;  Laterality: N/A;    ESOPHAGOGASTRODUODENOSCOPY N/A 10/26/2023    Procedure: EGD (ESOPHAGOGASTRODUODENOSCOPY);  Surgeon: Lino Devi MD;  Location: [facility];  Service: Endoscopy;  Laterality: N/A;    ESOPHAGOGASTRODUODENOSCOPY N/A 12/14/2023    Procedure: EGD (ESOPHAGOGASTRODUODENOSCOPY);  Surgeon: Lino Devi MD;  Location: [facility];  Service: Endoscopy;  Laterality: N/A;    ESOPHAGOGASTRODUODENOSCOPY N/A 2/29/2024    Procedure: EGD (ESOPHAGOGASTRODUODENOSCOPY);  Surgeon: Lino Devi MD;  Location: [facility];  Service: Endoscopy;  Laterality: N/A;    HYSTERECTOMY      IMPLANTATION OF SPINAL CORD STIMULATOR IN CERVICAL SPINE      UPPER GASTROINTESTINAL ENDOSCOPY  11/20/2022    w/ antrum biopsy     Family History   Problem Relation Name Age of Onset    Melanoma Neg Hx      Psoriasis Neg Hx      Eczema Neg Hx      Lupus Neg Hx          Social History:   Marital Status:   Occupation: Data Unavailable  Alcohol History:  reports no history of alcohol use.  Tobacco History:  reports that she quit smoking about 2 years ago. Her smoking use included cigarettes. She started smoking about 59 years ago. She has a 56.8 pack-year smoking history. She has never used smokeless tobacco.  Drug History:  reports that she does not currently use drugs.    Review of patient's allergies indicates:   Allergen Reactions    Sulfa (sulfonamide antibiotics) Nausea Only       Current Outpatient Medications   Medication Sig Dispense Refill    aspirin (ECOTRIN) 81 MG EC tablet Take 81 mg by mouth once daily.       clopidogreL (PLAVIX) 75 mg tablet Take 1 tablet (75 mg total) by mouth once daily. 30 tablet 3    ferrous sulfate 325 (65 FE) MG EC tablet Take 325 mg by mouth once daily.      krill-om-3-dha-epa-phospho-ast (MEGARED OMEGA-3 KRILL OIL) 032-72-43-50 mg Cap Take 1 tablet by mouth once daily.      L. gasseri-B. bifidum-B longum 1.5 billion cell Cap Take 1 tablet by mouth once daily.       megestroL (MEGACE) 20 MG Tab Take 40 mg by mouth 2 (two) times daily.      metoprolol succinate (TOPROL-XL) 50 MG 24 hr tablet Take 50 mg by mouth 2 (two) times daily.      oxyCODONE-acetaminophen (PERCOCET)  mg per tablet Take 1 tablet by mouth every 4 (four) hours as needed for Pain. 5 times daily      polycarbophil (FIBERCON) 625 mg tablet Take 625 mg by mouth once daily.      simvastatin (ZOCOR) 20 MG tablet Take 20 mg by mouth.      vit C,Y-Sg-jmhjv-lutein-zeaxan (PRESERVISION AREDS-2) 250-90-40-1 mg Cap Take 1 tablet by mouth 2 (two) times a day.      albuterol (PROAIR HFA) 90 mcg/actuation inhaler Inhale 2 puffs into the lungs every 4 (four) hours as needed for Wheezing. Rescue (Patient not taking: Reported on 7/3/2024) 18 g 11    arformoteroL (BROVANA) 15 mcg/2 mL Nebu Take 2 mLs (15 mcg total) by nebulization 2 (two) times a day. Controller 120 mL 11    budesonide (PULMICORT) 0.5 mg/2 mL nebulizer solution Take 2 mLs (0.5 mg total) by nebulization 2 (two) times daily. Controller 120 mL 5    pantoprazole (PROTONIX) 40 MG tablet Take 1 tablet (40 mg total) by mouth 2 (two) times daily. 180 tablet 1    promethazine (PHENERGAN) 6.25 mg/5 mL syrup Take 6.25 mg by mouth every 4 (four) hours as needed for Nausea.      revefenacin 175 mcg/3 mL Nebu Inhale 175 mcg into the lungs once daily. 30 each 11    simvastatin (ZOCOR) 10 MG tablet Take 20 mg by mouth every evening.      UNABLE TO FIND Take 1 tablet by mouth once daily. medication name: Ronak WAY       No current facility-administered medications for this visit.  "      Last PFT:  05/03/2022 moderate obstruction, minimal restriction, and a very severe diffusion defect with no response to bronchodilators  Last CT:  04/01/2022  .  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  2.  Patent stent in the proximal celiac artery.  3. 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  3.  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  4.  Moderate centrilobular emphysema.   Last chest x-ray 12/31/23  Hyperinflated lung fields thin calcified pleural plaques faint interstitial reticular opacities in the right upper lobe unchanged, neural stimulator in place  Review of Systems   Respiratory:  Positive for shortness of breath.      General:If she could breathe she would be fine  Eyes: Vision is has macular degneration  ENT:  No sinusitis or pharyngitis.   Heart:: No chest pain or palpitations.  Lungs: shortness of breath with any exertion  GI: occasional diarrhea  : No dysuria, hesitancy, or nocturia.  Musculoskeletal:back pain is ongoing, legs are weak  Skin: No lesions or rashes. Bruises from plavix  Neuro: No headaches or neuropathy.  Lymph: No edema or adenopathy.  Psych: No anxiety or depression.  Endo:  She is up 9 lb from her last visit.      OBJECTIVE:      BP (!) 140/80 (BP Location: Right arm, Patient Position: Sitting, BP Method: Small (Manual))   Pulse 88   Ht 5' 2" (1.575 m)   Wt 41.7 kg (92 lb)   LMP  (LMP Unknown)   SpO2 98% Comment: 3LPM  BMI 16.83 kg/m²     Physical Exam  GENERAL: Older, thin patient in no distress.  HEENT: Pupils equal and reactive. Extraocular movements intact. Nose intact. Pharynx moist.  NECK: Supple.   HEART: Regular rate and rhythm. No murmur or gallop auscultated.  LUNGS: quiet   ABDOMEN: Bowel sounds present. Non-tender, no masses palpated.  EXTREMITIES: Normal muscle tone and joint movement, no cyanosis or clubbing.  Markedly decreased muscle mass  LYMPHATICS: No " adenopathy palpated, no edema.  SKIN: Dry, intact, no lesions.   NEURO: Cranial nerves II-XII intact. Motor strength 5/5 bilaterally, upper and lower extremities.  PSYCH: Appropriate affect.    Assessment:       1. Chronic obstructive pulmonary disease, unspecified COPD type    2. Chronic hypoxemic respiratory failure    3. Dyspnea, unspecified type    4. Superior mesenteric artery stenosis    5. Severe protein-calorie malnutrition          The patient is finding no benefit from the Trelegy.  It is likely she is not able to get it into her lungs.  Will try shifting to inhaled meds with budesonide and Brovana and Yupleri.  Explained that some of her dyspnea on exertion is her age and her malnutrition.  She has gained another 9 lb.  Will also get her into pulmonary rehab and see if we can help on this front.          Plan:       Chronic obstructive pulmonary disease, unspecified COPD type  -     budesonide (PULMICORT) 0.5 mg/2 mL nebulizer solution; Take 2 mLs (0.5 mg total) by nebulization 2 (two) times daily. Controller  Dispense: 120 mL; Refill: 5  -     arformoteroL (BROVANA) 15 mcg/2 mL Nebu; Take 2 mLs (15 mcg total) by nebulization 2 (two) times a day. Controller  Dispense: 120 mL; Refill: 11  -     revefenacin 175 mcg/3 mL Nebu; Inhale 175 mcg into the lungs once daily.  Dispense: 30 each; Refill: 11  -     HME - OTHER  -     Ambulatory referral/consult to Pulmonary Rehab; Future; Expected date: 07/10/2024  -     Complete PFT with bronchodilator; Future    Chronic hypoxemic respiratory failure    Dyspnea, unspecified type    Superior mesenteric artery stenosis    Severe protein-calorie malnutrition              Discontinue Trelegy and start Brovana twice a day, budesonide twice a day, and yupleri daily  Wear oxygen all the time  Check PFT's  Pulm rehab  Continue gaining weight    Follow up in about 3 months (around 10/3/2024).

## 2024-07-10 ENCOUNTER — TELEPHONE (OUTPATIENT)
Dept: PULMONOLOGY | Facility: CLINIC | Age: 84
End: 2024-07-10
Payer: MEDICARE

## 2024-07-10 NOTE — TELEPHONE ENCOUNTER
----- Message from Lázaro Jordan sent at 7/10/2024  3:49 PM CDT -----  Ivette Pharmacist from [facility] [phone]. She has 3 script called in and needs neb itself. Called in please call her Ivette at [facility].  Thank you

## 2024-08-26 ENCOUNTER — HOSPITAL ENCOUNTER (OUTPATIENT)
Dept: PREADMISSION TESTING | Facility: HOSPITAL | Age: 84
Discharge: HOME | End: 2024-08-26
Attending: INTERNAL MEDICINE
Payer: MEDICARE

## 2024-08-26 DIAGNOSIS — Z01.818 PRE-OP TESTING: ICD-10-CM

## 2024-08-26 DIAGNOSIS — Z01.810 PRE-PROCEDURAL CARDIOVASCULAR EXAMINATION: Primary | ICD-10-CM

## 2024-08-26 LAB
BASOPHILS # BLD AUTO: 0.04 K/UL (ref 0–0.2)
BASOPHILS NFR BLD: 0.4 % (ref 0–1.9)
DIFFERENTIAL METHOD BLD: ABNORMAL
EOSINOPHIL # BLD AUTO: 0.1 K/UL (ref 0–0.5)
EOSINOPHIL NFR BLD: 1.1 % (ref 0–8)
ERYTHROCYTE [DISTWIDTH] IN BLOOD BY AUTOMATED COUNT: 11.9 % (ref 11.5–14.5)
HCT VFR BLD AUTO: 35.3 % (ref 37–48.5)
HGB BLD-MCNC: 11.5 G/DL (ref 12–16)
IMM GRANULOCYTES # BLD AUTO: 0.03 K/UL (ref 0–0.04)
IMM GRANULOCYTES NFR BLD AUTO: 0.3 % (ref 0–0.5)
LYMPHOCYTES # BLD AUTO: 1.9 K/UL (ref 1–4.8)
LYMPHOCYTES NFR BLD: 21 % (ref 18–48)
MCH RBC QN AUTO: 31.6 PG (ref 27–31)
MCHC RBC AUTO-ENTMCNC: 32.6 G/DL (ref 32–36)
MCV RBC AUTO: 97 FL (ref 82–98)
MONOCYTES # BLD AUTO: 0.9 K/UL (ref 0.3–1)
MONOCYTES NFR BLD: 9.3 % (ref 4–15)
NEUTROPHILS # BLD AUTO: 6.2 K/UL (ref 1.8–7.7)
NEUTROPHILS NFR BLD: 67.9 % (ref 38–73)
NRBC BLD-RTO: 0 /100 WBC
PLATELET # BLD AUTO: 298 K/UL (ref 150–450)
PMV BLD AUTO: 8.9 FL (ref 9.2–12.9)
RBC # BLD AUTO: 3.64 M/UL (ref 4–5.4)
WBC # BLD AUTO: 9.14 K/UL (ref 3.9–12.7)

## 2024-08-26 PROCEDURE — 93005 ELECTROCARDIOGRAM TRACING: CPT | Performed by: INTERNAL MEDICINE

## 2024-08-26 PROCEDURE — 85025 COMPLETE CBC W/AUTO DIFF WBC: CPT | Performed by: INTERNAL MEDICINE

## 2024-08-26 PROCEDURE — 93010 ELECTROCARDIOGRAM REPORT: CPT | Mod: ,,, | Performed by: INTERNAL MEDICINE

## 2024-08-29 ENCOUNTER — ANESTHESIA EVENT (OUTPATIENT)
Dept: SURGERY | Facility: HOSPITAL | Age: 84
End: 2024-08-29
Payer: MEDICARE

## 2024-08-29 ENCOUNTER — HOSPITAL ENCOUNTER (OUTPATIENT)
Facility: HOSPITAL | Age: 84
Discharge: HOME | End: 2024-08-29
Attending: INTERNAL MEDICINE | Admitting: INTERNAL MEDICINE
Payer: MEDICARE

## 2024-08-29 ENCOUNTER — ANESTHESIA (OUTPATIENT)
Dept: SURGERY | Facility: HOSPITAL | Age: 84
End: 2024-08-29
Payer: MEDICARE

## 2024-08-29 VITALS
OXYGEN SATURATION: 97 % | RESPIRATION RATE: 18 BRPM | SYSTOLIC BLOOD PRESSURE: 120 MMHG | HEART RATE: 70 BPM | TEMPERATURE: 99 F | DIASTOLIC BLOOD PRESSURE: 55 MMHG

## 2024-08-29 DIAGNOSIS — K22.2 ESOPHAGEAL STENOSIS: ICD-10-CM

## 2024-08-29 PROCEDURE — 25000003 PHARM REV CODE 250

## 2024-08-29 PROCEDURE — 43450 DILATE ESOPHAGUS 1/MULT PASS: CPT | Performed by: INTERNAL MEDICINE

## 2024-08-29 PROCEDURE — 37000008 HC ANESTHESIA 1ST 15 MINUTES: Performed by: INTERNAL MEDICINE

## 2024-08-29 RX ORDER — PROPOFOL 10 MG/ML
VIAL (ML) INTRAVENOUS
Status: DISCONTINUED | OUTPATIENT
Start: 2024-08-29 | End: 2024-08-29

## 2024-08-29 RX ADMIN — Medication 30 MG: at 07:08

## 2024-08-29 RX ADMIN — SODIUM CHLORIDE: 0.9 INJECTION, SOLUTION INTRAVENOUS at 07:08

## 2024-08-29 RX ADMIN — Medication 70 MG: at 07:08

## 2024-08-29 NOTE — [employer]ATION PATIENT INSTRUCTIONS
Discharge Summary/Instructions after an Endoscopic Procedure  Patient Name: Carmen Wilkinson  Patient MRN: [id number]  Patient YOB: 1940 Thursday, August 29, 2024  Lino Devi MD  RESTRICTIONS:  During your procedure today, you received medications for sedation.  These   medications may affect your judgment, balance and coordination.  Therefore,   for 24 hours, you have the following restrictions:   - DO NOT drive a car, operate machinery, make legal/financial decisions,   sign important papers or drink alcohol.    ACTIVITY:  Today: no heavy lifting, straining or running due to procedural   sedation/anesthesia.  The following day: return to full activity including work.  DIET:  Eat and drink normally unless instructed otherwise.     TREATMENT FOR COMMON SIDE EFFECTS:  - Mild abdominal pain, nausea, belching, bloating or excessive gas:  rest,   eat lightly and use a heating pad.  - Sore Throat: treat with throat lozenges and/or gargle with warm salt   water.  - Because air was used during the procedure, expelling large amounts of air   from your rectum or belching is normal.  - If a bowel prep was taken, you may not have a bowel movement for 1-3 days.    This is normal.  SYMPTOMS TO WATCH FOR AND REPORT TO YOUR PHYSICIAN:  1. Abdominal pain or bloating, other than gas cramps.  2. Chest pain.  3. Back pain.  4. Signs of infection such as: chills or fever occurring within 24 hours   after the procedure.  5. Rectal bleeding, which would show as bright red, maroon, or black stools.   (A tablespoon of blood from the rectum is not serious, especially if   hemorrhoids are present.)  6. Vomiting.  7. Weakness or dizziness.  GO DIRECTLY TO THE NEAREST EMERGENCY ROOM IF YOU HAVE ANY OF THE FOLLOWING:      Difficulty breathing              Chills and/or fever over 101 F   Persistent vomiting and/or vomiting blood   Severe abdominal pain   Severe chest pain   Black, tarry stools   Bleeding- more than one  tablespoon   Any other symptom or condition that you feel may need urgent attention  Your doctor recommends these additional instructions:  If any biopsies were taken, your doctors clinic will contact you in 1 to 2   weeks with any results.  - Patient has a contact number available for emergencies.  The signs and   symptoms of potential delayed complications were discussed with the   patient.  Return to normal activities tomorrow.  Written discharge   instructions were provided to the patient.   - Resume previous diet.   - Continue present medications.   - Repeat upper endoscopy in 6 months for retreatment.   - Return to GI clinic PRN.   - Discharge patient to home (with escort).  For questions, problems or results please call your physician - Lino Devi MD at Work:  [phone].  [facility], EMERGENCY ROOM PHONE NUMBER: [phone]  IF A COMPLICATION OR EMERGENCY SITUATION ARISES AND YOU ARE UNABLE TO REACH   YOUR PHYSICIAN - GO DIRECTLY TO THE EMERGENCY ROOM.  MD Lino Allen MD  8/29/2024 7:36:01 AM  This report has been verified and signed electronically.  Dear patient,  As a result of recent federal legislation (The Federal Cures Act), you may   receive lab or pathology results from your procedure in your [facility]   account before your physician is able to contact you. Your physician or   their representative will relay the results to you with their   recommendations at their soonest availability.  Thank you,  [employer]ATION

## 2024-08-29 NOTE — ANESTHESIA PREPROCEDURE EVALUATION
08/29/2024  Carmen Wilkinson is a 84 y.o., female.         Results for orders placed or performed during the hospital encounter of 08/26/24   EKG 12-lead    Collection Time: 08/26/24  9:23 AM   Result Value Ref Range    QRS Duration 72 ms    OHS QTC Calculation 413 ms    Narrative    Test Reason : Z01.810,Z01.818,    Vent. Rate : 075 BPM     Atrial Rate : 075 BPM     P-R Int : 160 ms          QRS Dur : 072 ms      QT Int : 370 ms       P-R-T Axes : 090 061 076 degrees     QTc Int : 413 ms    Normal sinus rhythm with sinus arrhythmia  Normal ECG  When compared with ECG of 31-DEC-2023 08:43,  Premature ventricular complexes are no longer Present  Premature supraventricular complexes are no longer Present    Referred By:             Confirmed By:              Lab Results   Component Value Date    WBC 9.14 08/26/2024    HGB 11.5 (L) 08/26/2024    HCT 35.3 (L) 08/26/2024    MCV 97 08/26/2024     08/26/2024     BMP  Lab Results   Component Value Date     08/26/2024    K 4.3 08/26/2024     (H) 08/26/2024    CO2 22 (L) 08/26/2024    BUN 25 (H) 08/26/2024    CREATININE 0.9 08/26/2024    CALCIUM 9.7 08/26/2024    ANIONGAP 10 08/26/2024    GLU 91 08/26/2024     02/27/2024     01/02/2024       Results for orders placed during the hospital encounter of 04/01/22    Echo Saline Bubble? No    Interpretation Summary  · The left ventricle is small with concentric remodeling and hyperdynamic systolic function.  · The estimated ejection fraction is 75%.  · Indeterminate left ventricular diastolic function.  · Mild right ventricular enlargement with normal right ventricular systolic function.  · Mild to moderate tricuspid regurgitation.  · Mild-to-moderate aortic regurgitation.        Pre-op Assessment    I have reviewed the Patient Summary Reports.     I have reviewed the Nursing Notes. I  have reviewed the NPO Status.   I have reviewed the Medications.     Review of Systems  Anesthesia Hx:  No problems with previous Anesthesia             Denies Family Hx of Anesthesia complications.    Denies Personal Hx of Anesthesia complications.                    Social:  Former Smoker, No Alcohol Use       Hematology/Oncology:       -- Anemia:                    --  Cancer in past history:              surgery   Oncology Comments: Squamous cell carcinoma     EENT/Dental:   Dysphonia  Macular degeneration Eyes: Visual Impairment   Has Bilateral and S/P Extraction - Bilateral Catarract                  Cardiovascular:     Hypertension, poorly controlled          PVD hyperlipidemia   ECG has been reviewed. Hx of Mesenteric artery stenosis  Patient followed by Dr. Bowers seen 3 months ago   Cardiac workup negative per patient     Valvular Heart Disease:   Aortic Regurgitation (AR), mild, moderate, Tricuspid Regurgitation (TR), mild, moderate           Carotoid Artery Disease               Pulmonary:   COPD Asthma  Shortness of breath   Daily inhaler - Trelegy Ellipta    Rescue inhaler last used 3 weeks ago     Home Oxygen ordered but patient has not received yet     No Hospitalizations for exacerbations   Patient followed by Dr. Molina - seen 2 weeks ago   Asthma:   Chronic Obstructive Pulmonary Disease (COPD):   Emphysema                   Hepatic/GI:   PUD,     Hx Acute pancreatitis  Hx of Mesenteric artery stenosis          Musculoskeletal:     Hx cervical spine stimulator - not active       Spine Disorders: lumbar and cervical Chronic Pain, Degenerative disease and Disc disease           Neurological:           Brain aneurysm present but not followed per patient                             Psych:  Psychiatric History anxiety                 Physical Exam  General: Well nourished, Cooperative, Alert and Oriented    Airway:  Mallampati: III   Mouth Opening: Normal  TM Distance: > 6 cm  Tongue: Normal  Neck ROM:  Extension Decreased    Dental:  Dentures, Caps / Implants    Chest/Lungs:  Clear to auscultation, Normal Respiratory Rate    Heart:  Rate: Normal  Rhythm: Regular Rhythm        Anesthesia Plan  Type of Anesthesia, risks & benefits discussed:    Anesthesia Type: Gen Natural Airway  Intra-op Monitoring Plan: Standard ASA Monitors  Induction:  IV  Informed Consent: Informed consent signed with the Patient and all parties understand the risks and agree with anesthesia plan.  All questions answered.   ASA Score: 3  Anesthesia Plan Notes:       GNA  POM  Propofol     Ready For Surgery From Anesthesia Perspective.     .

## 2024-08-29 NOTE — TRANSFER OF CARE
Anesthesia Transfer of Care Note    Patient: Carmen Fletcher Pfluvivienne    Procedure(s) Performed: Procedure(s) (LRB):  EGD (ESOPHAGOGASTRODUODENOSCOPY) (N/A)    Patient location: GI    Anesthesia Type: general    Transport from OR: Transported from OR on room air with adequate spontaneous ventilation    Post pain: adequate analgesia    Post assessment: no apparent anesthetic complications and tolerated procedure well    Post vital signs: stable    Level of consciousness: sedated    Nausea/Vomiting: no nausea/vomiting    Complications: none    Transfer of care protocol was followed      Last vitals: Visit Vitals  BP (!) 175/76 (BP Location: Left arm, Patient Position: Lying)   Pulse 92   Temp 37 °C (98.6 °F) (Oral)   Resp 16   LMP  (LMP Unknown)   SpO2 99%   Breastfeeding No

## 2024-08-29 NOTE — ANESTHESIA POSTPROCEDURE EVALUATION
Anesthesia Post Evaluation    Patient: Carmen Fletcher Pflug    Procedure(s) Performed: Procedure(s) (LRB):  EGD (ESOPHAGOGASTRODUODENOSCOPY) (N/A)    Final Anesthesia Type: general      Patient location during evaluation: GI PACU  Patient participation: Yes- Able to Participate  Level of consciousness: awake and alert, oriented and awake  Post-procedure vital signs: reviewed and stable  Pain management: adequate  Airway patency: patent    PONV status at discharge: No PONV  Anesthetic complications: no      Cardiovascular status: blood pressure returned to baseline, hemodynamically stable and stable  Respiratory status: unassisted, spontaneous ventilation and nasal cannula (patienton home oxygen per [place] prior to procedure / discharged on same)  Hydration status: euvolemic  Follow-up not needed.              Vitals Value Taken Time   /53 08/29/24 0738   Temp 37.3 °C (99.1 °F) 08/29/24 0735   Pulse 68 08/29/24 0740   Resp 16 08/29/24 0735   SpO2 100 % 08/29/24 0740   Vitals shown include unfiled device data.      No case tracking events are documented in the log.      Pain/Wili Score: No data recorded

## 2024-08-29 NOTE — H&P
GASTROENTEROLOGY PRE-PROCEDURE H&P NOTE  Patient Name: Carmen Fletcher Pflug  Patient MRN: [id number]  Patient : 1940    Service date: 2024    PCP: Abiodun Will MD    No chief complaint on file.      HPI: Patient is a 84 y.o. female with PMHx as below here for evaluation of h/o esophageal stricture for dilation today.     Past Medical History:  Past Medical History:   Diagnosis Date    Anemia, unspecified     Back pain     Brain aneurysm 2018    Carotid stenosis     Cataract     Diverticulitis     Emphysema lung     Feeling anxious     Hyperlipidemia     Hypertension     Macular degeneration     PVD (peripheral vascular disease)     Squamous cell carcinoma 2017    right medical cheek, SSIS, efudex tx        Past Surgical History:  Past Surgical History:   Procedure Laterality Date    ABDOMINAL SURGERY      ANGIOGRAPHY OF MESENTERIC VESSELS Left 2022    Procedure: ANGIOGRAM, MESENTERIC VESSELS;  Surgeon: Krish Machado MD;  Location: [facility] CATH/EP LAB;  Service: General;  Laterality: Left;    ARTERIOGRAM, MESENTERIC N/A 2021    Procedure: ARTERIOGRAM, MESENTERIC;  Surgeon: Krish Machado MD;  Location: [facility] CATH/EP LAB;  Service: General;  Laterality: N/A;    BACK SURGERY      CATARACT EXTRACTION, BILATERAL      COLONOSCOPY N/A 2022    Procedure: COLONOSCOPY;  Surgeon: Lino Devi MD;  Location: [facility] ENDO;  Service: Endoscopy;  Laterality: N/A;    CREATION, BYPASS, ARTERIAL, AORTA TO FEMORAL N/A 2021    Procedure: ARTERIOGRAM, MESENTERIC;  Surgeon: Krish Machado MD;  Location: [facility] CATH/EP LAB;  Service: General;  Laterality: N/A;    ENDOSCOPY OF PROXIMAL SMALL INTESTINE N/A 2022    Procedure: ENTEROSCOPY, PROXIMAL;  Surgeon: Lino Devi MD;  Location: [facility] ENDO;  Service: Endoscopy;  Laterality: N/A;    ESOPHAGOGASTRODUODENOSCOPY N/A 2022    Procedure: EGD (ESOPHAGOGASTRODUODENOSCOPY);  Surgeon: Pedro Hall Jr., MD;  Location: [facility]  ENDO;  Service: Endoscopy;  Laterality: N/A;    ESOPHAGOGASTRODUODENOSCOPY N/A 8/31/2023    Procedure: EGD (ESOPHAGOGASTRODUODENOSCOPY);  Surgeon: Lino Devi MD;  Location: [facility] ENDO;  Service: Endoscopy;  Laterality: N/A;    ESOPHAGOGASTRODUODENOSCOPY N/A 10/26/2023    Procedure: EGD (ESOPHAGOGASTRODUODENOSCOPY);  Surgeon: Lino Devi MD;  Location: [facility] ENDO;  Service: Endoscopy;  Laterality: N/A;    ESOPHAGOGASTRODUODENOSCOPY N/A 12/14/2023    Procedure: EGD (ESOPHAGOGASTRODUODENOSCOPY);  Surgeon: Lino Devi MD;  Location: [facility] ENDO;  Service: Endoscopy;  Laterality: N/A;    ESOPHAGOGASTRODUODENOSCOPY N/A 2/29/2024    Procedure: EGD (ESOPHAGOGASTRODUODENOSCOPY);  Surgeon: Lino Devi MD;  Location: [facility] ENDO;  Service: Endoscopy;  Laterality: N/A;    HYSTERECTOMY      IMPLANTATION OF SPINAL CORD STIMULATOR IN CERVICAL SPINE      UPPER GASTROINTESTINAL ENDOSCOPY  11/20/2022    w/ antrum biopsy        Home Medications:  Medications Prior to Admission   Medication Sig Dispense Refill Last Dose    arformoteroL (BROVANA) 15 mcg/2 mL Nebu Take 2 mLs (15 mcg total) by nebulization 2 (two) times a day. Controller 120 mL 11     aspirin (ECOTRIN) 81 MG EC tablet Take 81 mg by mouth once daily.       budesonide (PULMICORT) 0.5 mg/2 mL nebulizer solution Take 2 mLs (0.5 mg total) by nebulization 2 (two) times daily. Controller 120 mL 5     clopidogreL (PLAVIX) 75 mg tablet Take 1 tablet (75 mg total) by mouth once daily. 30 tablet 3     ferrous sulfate 325 (65 FE) MG EC tablet Take 325 mg by mouth once daily.       krill-om-3-dha-epa-phospho-ast (MEGARED OMEGA-3 KRILL OIL) 661-60-87-50 mg Cap Take 1 tablet by mouth once daily.       L. gasseri-B. bifidum-B longum 1.5 billion cell Cap Take 1 tablet by mouth once daily.        megestroL (MEGACE) 20 MG Tab Take 40 mg by mouth 2 (two) times daily.       metoprolol succinate (TOPROL-XL) 50 MG 24 hr tablet Take 50 mg by mouth 2 (two) times daily.        oxyCODONE-acetaminophen (PERCOCET)  mg per tablet Take 1 tablet by mouth every 4 (four) hours as needed for Pain. 5 times daily       pantoprazole (PROTONIX) 40 MG tablet Take 1 tablet (40 mg total) by mouth 2 (two) times daily. 180 tablet 1     polycarbophil (FIBERCON) 625 mg tablet Take 625 mg by mouth once daily.       promethazine (PHENERGAN) 6.25 mg/5 mL syrup Take 6.25 mg by mouth every 4 (four) hours as needed for Nausea.       revefenacin 175 mcg/3 mL Nebu Inhale 175 mcg into the lungs once daily. 30 each 11     simvastatin (ZOCOR) 10 MG tablet Take 20 mg by mouth every evening.       simvastatin (ZOCOR) 20 MG tablet Take 20 mg by mouth.       UNABLE TO FIND Take 1 tablet by mouth once daily. medication name: Ronak GRULLONI       vit C,T-Vy-zmkeb-lutein-zeaxan (PRESERVISION AREDS-2) 250-90-40-1 mg Cap Take 1 tablet by mouth 2 (two) times a day.                  Review of patient's allergies indicates:   Allergen Reactions    Sulfa (sulfonamide antibiotics) Nausea Only       Social History:   Social History     Occupational History    Not on file   Tobacco Use    Smoking status: Former     Current packs/day: 0.00     Average packs/day: 1 pack/day for 56.8 years (56.8 ttl pk-yrs)     Types: Cigarettes     Start date: 1965     Quit date: 2022     Years since quittin.4    Smokeless tobacco: Never   Substance and Sexual Activity    Alcohol use: No    Drug use: Not Currently    Sexual activity: Not on file       Family History:   Family History   Problem Relation Name Age of Onset    Melanoma Neg Hx      Psoriasis Neg Hx      Eczema Neg Hx      Lupus Neg Hx         Review of Systems:  A 10 point review of systems was performed and was normal, except as mentioned in the HPI, including constitutional, HEENT, heme, lymph, cardiovascular, respiratory, gastrointestinal, genitourinary, neurologic, endocrine, psychiatric and musculoskeletal.      OBJECTIVE:    Physical Exam:  24 Hour Vital Sign  "Ranges: Temp:  [98.6 °F (37 °C)] 98.6 °F (37 °C)  Pulse:  [92] 92  Resp:  [16] 16  SpO2:  [99 %] 99 %  BP: (175)/(76) 175/76  Most recent vitals: BP (!) 175/76 (BP Location: Left arm, Patient Position: Lying)   Pulse 92   Temp 98.6 °F (37 °C) (Oral)   Resp 16   LMP  (LMP Unknown)   SpO2 99%   Breastfeeding No    GEN: well-developed, well-nourished, awake and alert, non-toxic appearing adult  HEENT: PERRL, sclera anicteric, oral mucosa pink and moist without lesion  NECK: trachea midline; Good ROM  CV: regular rate and rhythm, no murmurs or gallops  RESP: clear to auscultation bilaterally, no wheezes, rhonci or rales  ABD: soft, non-tender, non-distended, normal bowel sounds  EXT: no swelling or edema, 2+ pulses distally  SKIN: no rashes or jaundice  PSYCH: normal affect    Labs:   Recent Labs     08/26/24  0956   WBC 9.14   MCV 97        Recent Labs     08/26/24  0956      K 4.3   *   CO2 22*   BUN 25*   GLU 91     No results for input(s): "ALB" in the last 72 hours.    Invalid input(s): "ALKP", "SGOT", "SGPT", "TBIL", "DBIL", "TPRO"  No results for input(s): "PT", "INR", "PTT" in the last 72 hours.      IMPRESSION / RECOMMENDATIONS:  Esophageal stricture  -egd dilation  with interventions as warranted.   RIsks, benefits, alternatives discussed in detail regarding upcoming procedures and sedation. Some of the more common endoscopic complications include but not limited to immediate or delayed perforation, bleeding, infections, pain, inadvertent injury to surrounding tissue / organs and possible need for surgical evaluation. Patient expressed understanding, all questions answered and will proceed with procedure as planned.     Lnio Devi  8/29/2024  7:14 AM      "

## 2024-08-31 LAB
OHS QRS DURATION: 72 MS
OHS QTC CALCULATION: 413 MS

## 2024-10-09 ENCOUNTER — OFFICE VISIT (OUTPATIENT)
Dept: PULMONOLOGY | Facility: CLINIC | Age: 84
End: 2024-10-09
Payer: MEDICARE

## 2024-10-09 VITALS
HEIGHT: 62 IN | BODY MASS INDEX: 16.78 KG/M2 | TEMPERATURE: 98 F | DIASTOLIC BLOOD PRESSURE: 70 MMHG | SYSTOLIC BLOOD PRESSURE: 136 MMHG | HEART RATE: 79 BPM | OXYGEN SATURATION: 95 % | WEIGHT: 91.19 LBS

## 2024-10-09 DIAGNOSIS — R94.2 DIFFUSION CAPACITY OF LUNG (DL), DECREASED: ICD-10-CM

## 2024-10-09 DIAGNOSIS — J96.11 CHRONIC RESPIRATORY FAILURE WITH HYPOXIA: ICD-10-CM

## 2024-10-09 DIAGNOSIS — J43.2 CENTRILOBULAR EMPHYSEMA: ICD-10-CM

## 2024-10-09 DIAGNOSIS — J44.9 CHRONIC OBSTRUCTIVE PULMONARY DISEASE, UNSPECIFIED COPD TYPE: Primary | ICD-10-CM

## 2024-10-09 PROBLEM — J44.1 COPD EXACERBATION: Status: RESOLVED | Noted: 2022-04-01 | Resolved: 2024-10-09

## 2024-10-09 PROCEDURE — 1159F MED LIST DOCD IN RCRD: CPT | Mod: CPTII,S$GLB,, | Performed by: INTERNAL MEDICINE

## 2024-10-09 PROCEDURE — 99214 OFFICE O/P EST MOD 30 MIN: CPT | Mod: S$GLB,,, | Performed by: INTERNAL MEDICINE

## 2024-10-09 PROCEDURE — 1101F PT FALLS ASSESS-DOCD LE1/YR: CPT | Mod: CPTII,S$GLB,, | Performed by: INTERNAL MEDICINE

## 2024-10-09 PROCEDURE — 1125F AMNT PAIN NOTED PAIN PRSNT: CPT | Mod: CPTII,S$GLB,, | Performed by: INTERNAL MEDICINE

## 2024-10-09 PROCEDURE — 3078F DIAST BP <80 MM HG: CPT | Mod: CPTII,S$GLB,, | Performed by: INTERNAL MEDICINE

## 2024-10-09 PROCEDURE — 3288F FALL RISK ASSESSMENT DOCD: CPT | Mod: CPTII,S$GLB,, | Performed by: INTERNAL MEDICINE

## 2024-10-09 PROCEDURE — 99999 PR PBB SHADOW E&M-EST. PATIENT-LVL V: CPT | Mod: PBBFAC,,, | Performed by: INTERNAL MEDICINE

## 2024-10-09 PROCEDURE — 3075F SYST BP GE 130 - 139MM HG: CPT | Mod: CPTII,S$GLB,, | Performed by: INTERNAL MEDICINE

## 2024-10-09 RX ORDER — IPRATROPIUM BROMIDE AND ALBUTEROL SULFATE 2.5; .5 MG/3ML; MG/3ML
3 SOLUTION RESPIRATORY (INHALATION) EVERY 6 HOURS PRN
Qty: 120 EACH | Refills: 11 | Status: SHIPPED | OUTPATIENT
Start: 2024-10-09 | End: 2025-10-09

## 2024-10-09 NOTE — PROGRESS NOTES
SUBJECTIVE:    Patient ID: Carmen Wilkinson is a 84 y.o. female.    Chief Complaint: Follow-up (3 month follow up COPD)    COPD    Shortness of Breath  Her past medical history is significant for COPD.   Follow-up    The patient is here not using anything for her breathing.  She did not get the Yupelri because it cost 3-400 dollars. She is out of the arformoterol and the budesonide.  She got a bad case of thrush and rinses well but still got it.  She did not hear from pulmonary rehab. Will order again.  Her shortness of breath is bad.  Reviewing her PFTs from 2023 shows moderate obstruction but a very severe diffusion defect.  She states she did not find benefit from the budesonide or arformoterol or the Yupelri.  Past Medical History:   Diagnosis Date    Anemia, unspecified     Back pain     Brain aneurysm 2018    Carotid stenosis     Cataract     Diverticulitis     Emphysema lung     Feeling anxious     Hyperlipidemia     Hypertension     Macular degeneration     PVD (peripheral vascular disease)     Squamous cell carcinoma 08/29/2017    right medical cheek, SSIS, efudex tx     Past Surgical History:   Procedure Laterality Date    ABDOMINAL SURGERY      ANGIOGRAPHY OF MESENTERIC VESSELS Left 09/07/2022    Procedure: ANGIOGRAM, MESENTERIC VESSELS;  Surgeon: Krish Machado MD;  Location: [facility] CATH/EP LAB;  Service: General;  Laterality: Left;    ARTERIOGRAM, MESENTERIC N/A 06/30/2021    Procedure: ARTERIOGRAM, MESENTERIC;  Surgeon: Krish Machado MD;  Location: [facility] CATH/EP LAB;  Service: General;  Laterality: N/A;    BACK SURGERY  2008    CATARACT EXTRACTION, BILATERAL      COLONOSCOPY N/A 04/04/2022    Procedure: COLONOSCOPY;  Surgeon: Lino Devi MD;  Location: [facility] ENDO;  Service: Endoscopy;  Laterality: N/A;    CREATION, BYPASS, ARTERIAL, AORTA TO FEMORAL N/A 01/13/2021    Procedure: ARTERIOGRAM, MESENTERIC;  Surgeon: Krish Machado MD;  Location: [facility] CATH/EP LAB;  Service: General;   Laterality: N/A;    ENDOSCOPY OF PROXIMAL SMALL INTESTINE N/A 04/04/2022    Procedure: ENTEROSCOPY, PROXIMAL;  Surgeon: Lino Devi MD;  Location: [facility];  Service: Endoscopy;  Laterality: N/A;    ESOPHAGOGASTRODUODENOSCOPY N/A 11/20/2022    Procedure: EGD (ESOPHAGOGASTRODUODENOSCOPY);  Surgeon: Pedro Hall Jr., MD;  Location: [facility];  Service: Endoscopy;  Laterality: N/A;    ESOPHAGOGASTRODUODENOSCOPY N/A 8/31/2023    Procedure: EGD (ESOPHAGOGASTRODUODENOSCOPY);  Surgeon: Lino Devi MD;  Location: [facility];  Service: Endoscopy;  Laterality: N/A;    ESOPHAGOGASTRODUODENOSCOPY N/A 10/26/2023    Procedure: EGD (ESOPHAGOGASTRODUODENOSCOPY);  Surgeon: Lino Devi MD;  Location: [facility];  Service: Endoscopy;  Laterality: N/A;    ESOPHAGOGASTRODUODENOSCOPY N/A 12/14/2023    Procedure: EGD (ESOPHAGOGASTRODUODENOSCOPY);  Surgeon: Lino Devi MD;  Location: [facility];  Service: Endoscopy;  Laterality: N/A;    ESOPHAGOGASTRODUODENOSCOPY N/A 2/29/2024    Procedure: EGD (ESOPHAGOGASTRODUODENOSCOPY);  Surgeon: Lino Devi MD;  Location: [facility];  Service: Endoscopy;  Laterality: N/A;    ESOPHAGOGASTRODUODENOSCOPY N/A 8/29/2024    Procedure: EGD (ESOPHAGOGASTRODUODENOSCOPY);  Surgeon: Lino Devi MD;  Location: [facility];  Service: Endoscopy;  Laterality: N/A;    HYSTERECTOMY      IMPLANTATION OF SPINAL CORD STIMULATOR IN CERVICAL SPINE      UPPER GASTROINTESTINAL ENDOSCOPY  11/20/2022    w/ antrum biopsy     Family History   Problem Relation Name Age of Onset    Melanoma Neg Hx      Psoriasis Neg Hx      Eczema Neg Hx      Lupus Neg Hx          Social History:   Marital Status:   Occupation: Data Unavailable  Alcohol History:  reports no history of alcohol use.  Tobacco History:  reports that she quit smoking about 2 years ago. Her smoking use included cigarettes. She started smoking about 59 years ago. She has a 56.8 pack-year smoking history. She has never used smokeless  tobacco.  Drug History:  reports that she does not currently use drugs.    Review of patient's allergies indicates:   Allergen Reactions    Sulfa (sulfonamide antibiotics) Nausea Only       Current Outpatient Medications   Medication Sig Dispense Refill    aspirin (ECOTRIN) 81 MG EC tablet Take 81 mg by mouth once daily.      clopidogreL (PLAVIX) 75 mg tablet Take 1 tablet (75 mg total) by mouth once daily. 30 tablet 3    ferrous sulfate 325 (65 FE) MG EC tablet Take 325 mg by mouth once daily.      megestroL (MEGACE) 20 MG Tab Take 40 mg by mouth 2 (two) times daily.      metoprolol succinate (TOPROL-XL) 50 MG 24 hr tablet Take 50 mg by mouth 2 (two) times daily.      oxyCODONE-acetaminophen (PERCOCET)  mg per tablet Take 1 tablet by mouth every 4 (four) hours as needed for Pain. 5 times daily      pantoprazole (PROTONIX) 40 MG tablet Take 1 tablet (40 mg total) by mouth 2 (two) times daily. 180 tablet 1    polycarbophil (FIBERCON) 625 mg tablet Take 625 mg by mouth once daily.      simvastatin (ZOCOR) 20 MG tablet Take 20 mg by mouth.      UNABLE TO FIND Take 1 tablet by mouth once daily. medication name: Ronak Mohan MVI      vit C,V-Jx-rqkvj-lutein-zeaxan (PRESERVISION AREDS-2) 250-90-40-1 mg Cap Take 1 tablet by mouth 2 (two) times a day.      albuterol-ipratropium (DUO-NEB) 2.5 mg-0.5 mg/3 mL nebulizer solution Take 3 mLs by nebulization every 6 (six) hours as needed for Wheezing. Rescue 120 each 11    arformoteroL (BROVANA) 15 mcg/2 mL Nebu Take 2 mLs (15 mcg total) by nebulization 2 (two) times a day. Controller (Patient not taking: Reported on 10/9/2024) 120 mL 11    budesonide (PULMICORT) 0.5 mg/2 mL nebulizer solution Take 2 mLs (0.5 mg total) by nebulization 2 (two) times daily. Controller (Patient not taking: Reported on 10/9/2024) 120 mL 5    krill-om-3-dha-epa-phospho-ast (MEGARED OMEGA-3 KRILL OIL) 519-54-91-50 mg Cap Take 1 tablet by mouth once daily. (Patient not taking: Reported on  10/9/2024)      L. gasseri-B. bifidum-B longum 1.5 billion cell Cap Take 1 tablet by mouth once daily.  (Patient not taking: Reported on 10/9/2024)      promethazine (PHENERGAN) 6.25 mg/5 mL syrup Take 6.25 mg by mouth every 4 (four) hours as needed for Nausea. (Patient not taking: Reported on 10/9/2024)      revefenacin 175 mcg/3 mL Nebu Inhale 175 mcg into the lungs once daily. (Patient not taking: Reported on 10/9/2024) 30 each 11    simvastatin (ZOCOR) 10 MG tablet Take 20 mg by mouth every evening. (Patient not taking: Reported on 10/9/2024)       No current facility-administered medications for this visit.       Last PFT:  10/11/23 moderate obstruction, normal lung volumes, and a very severe diffusion defect with no response to bronchodilators  Last CT:  04/01/2022  .  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  2.  Patent stent in the proximal celiac artery.  3. 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  3.  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  4.  Moderate centrilobular emphysema.   Last chest x-ray 12/31/23  Hyperinflated lung fields thin calcified pleural plaques faint interstitial reticular opacities in the right upper lobe unchanged, neural stimulator in place    Review of Systems   Respiratory:  Positive for shortness of breath.      General: She has a lot of medical problems  Eyes: Vision has macular degneration  ENT:  No sinusitis or pharyngitis.   Heart:: No chest pain or palpitations.  Lungs: shortness of breath with any exertion  GI: occasional diarrhea  : No dysuria, hesitancy, or nocturia.  Musculoskeletal:back pain is ongoing, legs are weak  Skin: No lesions or rashes. Bruises from plavix  Neuro: No headaches or neuropathy.  Lymph: No edema or adenopathy.  Psych: No anxiety or depression.  Endo:  She is down 1 pound     OBJECTIVE:      /70 (BP Location: Right arm, Patient Position: Sitting)    "Pulse 79   Temp 98.2 °F (36.8 °C)   Ht 5' 2" (1.575 m)   Wt 41.4 kg (91 lb 3.2 oz)   LMP  (LMP Unknown)   SpO2 95%   BMI 16.68 kg/m²     Physical Exam  GENERAL: Older, thin patient in no distress.  HEENT: Pupils equal and reactive. Extraocular movements intact. Nose intact. Pharynx moist.  NECK: Supple.   HEART: Regular rate and rhythm. No murmur or gallop auscultated.  LUNGS: quiet   ABDOMEN: Bowel sounds present. Non-tender, no masses palpated.  EXTREMITIES: Normal muscle tone and joint movement, no cyanosis or clubbing.  Markedly decreased muscle mass  LYMPHATICS: No adenopathy palpated, no edema.  SKIN: Dry, intact, no lesions.   NEURO: Cranial nerves II-XII intact. Motor strength 5/5 bilaterally, upper and lower extremities.  PSYCH: Appropriate affect.    Assessment:       1. Chronic obstructive pulmonary disease, unspecified COPD type    2. Chronic respiratory failure with hypoxia    3. Centrilobular emphysema    4. Diffusion capacity of lung (dl), decreased        The patient when last assess had moderate obstruction and a severe diffusion defect.  She states she found no benefit from the budesonide Brovana or you pulmonary and could not afford the you how re.  She got a bad case of thrush with the budesonide despite the fact that she states she rinses well.  We had moved to this because she stated the Trelegy was not helping.  Nothing has ever helped her.  She did not hear from pulmonary rehab and did not call.  She is wearing her oxygen.  She is very dyspneic with any activity.  Will try moving back down to [facility] every 4 hours, withhold steroids, and reorder her pulmonary rehab.  She did not do her PFTs.  Will reorder the PFTs and a 6 minute walk.  Tried to explain to the patient if she does not call me I do not know she is having issues.          Plan:       Chronic obstructive pulmonary disease, unspecified COPD type  -     Ambulatory referral/consult to Pulmonary Rehab  -     Complete PFT with " bronchodilator  -     Ambulatory referral/consult to Pulmonary Rehab; Future; Expected date: 10/16/2024  -     albuterol-ipratropium (DUO-NEB) 2.5 mg-0.5 mg/3 mL nebulizer solution; Take 3 mLs by nebulization every 6 (six) hours as needed for Wheezing. Rescue  Dispense: 120 each; Refill: 11  -     Stress test, pulmonary; Future; Expected date: 10/09/2024    Chronic respiratory failure with hypoxia    Centrilobular emphysema    Diffusion capacity of lung (dl), decreased                  Follow up in about 3 months (around 1/9/2025).  Call scheduling for your PFT and 6 minute walk  Call me if you don't hear from pulmonary rehab in 3 working days  Use Duonebs every 4-6 hours for breathing  Wear oxygen all the time  Go slowly with oxygen to go further

## 2024-10-09 NOTE — PATIENT INSTRUCTIONS
Follow up in about 3 months (around 1/9/2025).  Call scheduling for your PFTand 6 minute walk, [phone]  Call me if you don't hear from pulmonary rehab in 3 working days  Use Duonebs every 4-6 hours for breathing  Wear oxygen all the time  Go slowly with oxygen to go further

## 2024-10-14 DIAGNOSIS — J44.9 CHRONIC OBSTRUCTIVE PULMONARY DISEASE, UNSPECIFIED COPD TYPE: Primary | ICD-10-CM

## 2024-10-17 ENCOUNTER — TELEPHONE (OUTPATIENT)
Dept: PULMONOLOGY | Facility: HOSPITAL | Age: 84
End: 2024-10-17

## 2024-10-17 ENCOUNTER — HOSPITAL ENCOUNTER (OUTPATIENT)
Dept: PULMONOLOGY | Facility: HOSPITAL | Age: 84
Discharge: HOME | End: 2024-10-17
Attending: INTERNAL MEDICINE
Payer: MEDICARE

## 2024-10-17 DIAGNOSIS — J44.9 CHRONIC OBSTRUCTIVE PULMONARY DISEASE, UNSPECIFIED COPD TYPE: ICD-10-CM

## 2024-10-17 PROCEDURE — 94618 PULMONARY STRESS TESTING: CPT | Mod: 26,,, | Performed by: INTERNAL MEDICINE

## 2024-10-17 PROCEDURE — 94618 PULMONARY STRESS TESTING: CPT

## 2024-10-17 NOTE — TELEPHONE ENCOUNTER
The patient's 6 minute walk shows that she starts with a sat of 99% but by 3 minutes her sats are 86% on 2 L of oxygen her sats rise to 93%.  
Past 24 hrs

## 2024-10-21 ENCOUNTER — TELEPHONE (OUTPATIENT)
Dept: PULMONOLOGY | Facility: HOSPITAL | Age: 84
End: 2024-10-21

## 2024-10-21 NOTE — TELEPHONE ENCOUNTER
Spoke with the patient about her need to continue her 2 L of oxygen.  She states she is starting pulmonary rehab on Wednesday.

## 2024-10-23 ENCOUNTER — CLINICAL SUPPORT (OUTPATIENT)
Dept: CARDIAC REHAB | Facility: HOSPITAL | Age: 84
End: 2024-10-23
Payer: MEDICARE

## 2024-10-23 DIAGNOSIS — J44.9 CHRONIC OBSTRUCTIVE PULMONARY DISEASE, UNSPECIFIED COPD TYPE: ICD-10-CM

## 2024-10-23 PROCEDURE — 93797 PHYS/QHP OP CAR RHAB WO ECG: CPT

## 2024-10-28 ENCOUNTER — CLINICAL SUPPORT (OUTPATIENT)
Dept: CARDIAC REHAB | Facility: HOSPITAL | Age: 84
End: 2024-10-28
Payer: MEDICARE

## 2024-10-28 DIAGNOSIS — J44.9 CHRONIC OBSTRUCTIVE PULMONARY DISEASE, UNSPECIFIED COPD TYPE: Primary | ICD-10-CM

## 2024-10-28 PROCEDURE — 93798 PHYS/QHP OP CAR RHAB W/ECG: CPT

## 2024-11-01 ENCOUNTER — CLINICAL SUPPORT (OUTPATIENT)
Dept: CARDIAC REHAB | Facility: HOSPITAL | Age: 84
End: 2024-11-01
Payer: MEDICARE

## 2024-11-01 DIAGNOSIS — J44.9 CHRONIC OBSTRUCTIVE PULMONARY DISEASE, UNSPECIFIED COPD TYPE: Primary | ICD-10-CM

## 2024-11-01 PROCEDURE — 94626 PHY/QHP OP PULM RHB W/MNTR: CPT

## 2024-11-04 ENCOUNTER — CLINICAL SUPPORT (OUTPATIENT)
Dept: CARDIAC REHAB | Facility: HOSPITAL | Age: 84
End: 2024-11-04
Payer: MEDICARE

## 2024-11-04 ENCOUNTER — HOSPITAL ENCOUNTER (OUTPATIENT)
Dept: RADIOLOGY | Facility: HOSPITAL | Age: 84
Discharge: HOME | End: 2024-11-04
Attending: NURSE PRACTITIONER
Payer: MEDICARE

## 2024-11-04 DIAGNOSIS — M79.672 PAIN OF LEFT HEEL: Primary | ICD-10-CM

## 2024-11-04 DIAGNOSIS — M79.672 PAIN OF LEFT HEEL: ICD-10-CM

## 2024-11-04 DIAGNOSIS — J44.9 CHRONIC OBSTRUCTIVE PULMONARY DISEASE, UNSPECIFIED COPD TYPE: Primary | ICD-10-CM

## 2024-11-04 PROCEDURE — 73620 X-RAY EXAM OF FOOT: CPT | Mod: 26,LT,, | Performed by: RADIOLOGY

## 2024-11-04 PROCEDURE — 94626 PHY/QHP OP PULM RHB W/MNTR: CPT

## 2024-11-04 PROCEDURE — 73620 X-RAY EXAM OF FOOT: CPT | Mod: TC,LT

## 2024-11-08 ENCOUNTER — CLINICAL SUPPORT (OUTPATIENT)
Dept: CARDIAC REHAB | Facility: HOSPITAL | Age: 84
End: 2024-11-08
Payer: MEDICARE

## 2024-11-08 DIAGNOSIS — J44.9 CHRONIC OBSTRUCTIVE PULMONARY DISEASE, UNSPECIFIED COPD TYPE: Primary | ICD-10-CM

## 2024-11-08 PROCEDURE — 94626 PHY/QHP OP PULM RHB W/MNTR: CPT

## 2024-11-11 ENCOUNTER — CLINICAL SUPPORT (OUTPATIENT)
Dept: CARDIAC REHAB | Facility: HOSPITAL | Age: 84
End: 2024-11-11
Payer: MEDICARE

## 2024-11-11 DIAGNOSIS — J44.9 CHRONIC OBSTRUCTIVE PULMONARY DISEASE, UNSPECIFIED COPD TYPE: Primary | ICD-10-CM

## 2024-11-11 PROCEDURE — 93798 PHYS/QHP OP CAR RHAB W/ECG: CPT

## 2024-11-15 ENCOUNTER — CLINICAL SUPPORT (OUTPATIENT)
Dept: CARDIAC REHAB | Facility: HOSPITAL | Age: 84
End: 2024-11-15
Payer: MEDICARE

## 2024-11-15 DIAGNOSIS — J44.9 CHRONIC OBSTRUCTIVE PULMONARY DISEASE, UNSPECIFIED COPD TYPE: Primary | ICD-10-CM

## 2024-11-15 PROCEDURE — 94626 PHY/QHP OP PULM RHB W/MNTR: CPT

## 2024-11-18 ENCOUNTER — CLINICAL SUPPORT (OUTPATIENT)
Dept: CARDIAC REHAB | Facility: HOSPITAL | Age: 84
End: 2024-11-18
Payer: MEDICARE

## 2024-11-18 DIAGNOSIS — J44.9 CHRONIC OBSTRUCTIVE PULMONARY DISEASE, UNSPECIFIED COPD TYPE: Primary | ICD-10-CM

## 2024-11-18 PROCEDURE — 94626 PHY/QHP OP PULM RHB W/MNTR: CPT

## 2024-11-22 ENCOUNTER — CLINICAL SUPPORT (OUTPATIENT)
Dept: CARDIAC REHAB | Facility: HOSPITAL | Age: 84
End: 2024-11-22
Payer: MEDICARE

## 2024-11-22 DIAGNOSIS — J44.9 CHRONIC OBSTRUCTIVE PULMONARY DISEASE, UNSPECIFIED COPD TYPE: Primary | ICD-10-CM

## 2024-11-22 PROCEDURE — 94626 PHY/QHP OP PULM RHB W/MNTR: CPT

## 2024-12-06 ENCOUNTER — HOSPITAL ENCOUNTER (OUTPATIENT)
Dept: PREADMISSION TESTING | Facility: HOSPITAL | Age: 84
Discharge: HOME | End: 2024-12-06
Attending: INTERNAL MEDICINE
Payer: MEDICARE

## 2024-12-06 VITALS
SYSTOLIC BLOOD PRESSURE: 156 MMHG | HEIGHT: 62 IN | BODY MASS INDEX: 16.68 KG/M2 | DIASTOLIC BLOOD PRESSURE: 68 MMHG | RESPIRATION RATE: 16 BRPM | HEART RATE: 80 BPM | OXYGEN SATURATION: 97 %

## 2024-12-06 DIAGNOSIS — Z01.818 PREOP TESTING: ICD-10-CM

## 2024-12-06 DIAGNOSIS — Z01.811 PRE-OP CHEST EXAM: Primary | ICD-10-CM

## 2024-12-06 LAB
ANION GAP SERPL CALC-SCNC: 6 MMOL/L (ref 8–16)
BUN SERPL-MCNC: 26 MG/DL (ref 8–23)
CALCIUM SERPL-MCNC: 9.8 MG/DL (ref 8.7–10.5)
CHLORIDE SERPL-SCNC: 107 MMOL/L (ref 95–110)
CO2 SERPL-SCNC: 29 MMOL/L (ref 23–29)
CREAT SERPL-MCNC: 0.8 MG/DL (ref 0.5–1.4)
ERYTHROCYTE [DISTWIDTH] IN BLOOD BY AUTOMATED COUNT: 11.3 % (ref 11.5–14.5)
EST. GFR  (NO RACE VARIABLE): >60 ML/MIN/1.73 M^2
GLUCOSE SERPL-MCNC: 113 MG/DL (ref 70–110)
HCT VFR BLD AUTO: 32.7 % (ref 37–48.5)
HGB BLD-MCNC: 10.6 G/DL (ref 12–16)
MCH RBC QN AUTO: 32.2 PG (ref 27–31)
MCHC RBC AUTO-ENTMCNC: 32.4 G/DL (ref 32–36)
MCV RBC AUTO: 99 FL (ref 82–98)
PLATELET # BLD AUTO: 311 K/UL (ref 150–450)
PMV BLD AUTO: 8.9 FL (ref 9.2–12.9)
POTASSIUM SERPL-SCNC: 4.1 MMOL/L (ref 3.5–5.1)
RBC # BLD AUTO: 3.29 M/UL (ref 4–5.4)
SODIUM SERPL-SCNC: 142 MMOL/L (ref 136–145)
WBC # BLD AUTO: 8.37 K/UL (ref 3.9–12.7)

## 2024-12-06 PROCEDURE — 85027 COMPLETE CBC AUTOMATED: CPT | Performed by: ANESTHESIOLOGY

## 2024-12-06 PROCEDURE — 80048 BASIC METABOLIC PNL TOTAL CA: CPT | Performed by: ANESTHESIOLOGY

## 2024-12-06 NOTE — DISCHARGE INSTRUCTIONS
A NURSE FROM THE ENDOSCOPY DEPARTMENT WILL CALL YOU THE DAY BEFORE YOUR PROCEDURE AND LET YOUR KNOW YOUR ARRIVAL TIME.     PARK IN THE PARKING GARAGE AND CHECK IN AT REGISTRATION THEN PROCEED TO THE SAME DAY SURGERY DEPARTMENT ON THE FIRST FLOOR.    DO NOT EAT ANYTHING AFTER MIDNIGHT.      HOLD ALL ASPIRIN CONTAINING PRODUCTS 7 DAYS PRIOR TO YOUR PROCEDURE UNLESS YOUR DOCTOR INSTRUCTS YOU OTHERWISE.     FOLLOW YOUR DOCTORS INSTRUCTIONS REGARDING PRESCRIPTION BLOOD THINNERS.     YOU MAY TAKE ACETAMINOPHEN IF NEEDED FOR PAIN.      DO NOT WEAR ANY JEWELRY, LEAVE ALL VALUABLES AT HOME.     IF YOU WEAR GLASSES, HEARING AIDES OR DENTURES PLEASE BRING A CASE FOR THEM.    DO NOT WEAR ANY CONTACT LENSES    WEAR LOOSE COMFORTABLE CLOTHES TO GO HOME IN.    BE SURE TO HAVE SOMEONE HERE TO DRIVE YOU HOME AFTER YOUR PROCEDURE.     IF YOU HAVE ANY QUESTIONS REGARDING YOUR INSTRUCTIONS YOU CAN CALL OUR ENDOSCOPY DEPARTMENT@ [phone]

## 2024-12-08 ENCOUNTER — ANESTHESIA EVENT (OUTPATIENT)
Dept: SURGERY | Facility: HOSPITAL | Age: 84
End: 2024-12-08
Payer: MEDICARE

## 2024-12-08 NOTE — ANESTHESIA PREPROCEDURE EVALUATION
12/08/2024  Carmen Wilkinson is a 84 y.o., female.         Results for orders placed or performed during the hospital encounter of 08/26/24   EKG 12-lead    Collection Time: 08/26/24  9:23 AM   Result Value Ref Range    QRS Duration 72 ms    [facility] QTC Calculation 413 ms    Narrative    Test Reason : Z01.810,Z01.818,    Vent. Rate : 075 BPM     Atrial Rate : 075 BPM     P-R Int : 160 ms          QRS Dur : 072 ms      QT Int : 370 ms       P-R-T Axes : 090 061 076 degrees     QTc Int : 413 ms    Normal sinus rhythm with sinus arrhythmia  Normal ECG  When compared with ECG of 31-DEC-2023 08:43,  Premature ventricular complexes are no longer Present  Premature supraventricular complexes are no longer Present  Confirmed by Hamzah Edwards MD ([id number]) on 8/31/2024 5:55:13 PM    Referred By:             Confirmed By:Hamzah Edwards MD             Lab Results   Component Value Date    WBC 8.37 12/06/2024    HGB 10.6 (L) 12/06/2024    HCT 32.7 (L) 12/06/2024    MCV 99 (H) 12/06/2024     12/06/2024     BMP  Lab Results   Component Value Date     12/06/2024    K 4.1 12/06/2024     12/06/2024    CO2 29 12/06/2024    BUN 26 (H) 12/06/2024    CREATININE 0.8 12/06/2024    CALCIUM 9.8 12/06/2024    ANIONGAP 6 (L) 12/06/2024     (H) 12/06/2024    GLU 91 08/26/2024     02/27/2024       Results for orders placed during the hospital encounter of 04/01/22    Echo Saline Bubble? No    Interpretation Summary  · The left ventricle is small with concentric remodeling and hyperdynamic systolic function.  · The estimated ejection fraction is 75%.  · Indeterminate left ventricular diastolic function.  · Mild right ventricular enlargement with normal right ventricular systolic function.  · Mild to moderate tricuspid regurgitation.  · Mild-to-moderate aortic regurgitation.        Pre-op Assessment    I  have reviewed the Patient Summary Reports.     I have reviewed the Nursing Notes. I have reviewed the NPO Status.   I have reviewed the Medications.     Review of Systems  Anesthesia Hx:  No problems with previous Anesthesia             Denies Family Hx of Anesthesia complications.    Denies Personal Hx of Anesthesia complications.                    Social:  Former Smoker, No Alcohol Use       Hematology/Oncology:       -- Anemia:                    --  Cancer in past history:              surgery   Oncology Comments: Squamous cell carcinoma     EENT/Dental:   Dysphonia  Macular degeneration Eyes: Visual Impairment   Has Bilateral and S/P Extraction - Bilateral Catarract                  Cardiovascular:     Hypertension, poorly controlled          PVD hyperlipidemia   ECG has been reviewed. Hx of Mesenteric artery stenosis  Patient followed by Dr. Bowers seen May 2024  Cardiac workup negative per patient  Patient on beta blockers Beta blocker taken in last 24 hours    Valvular Heart Disease:   Aortic Regurgitation (AR), mild, moderate, Tricuspid Regurgitation (TR), mild, moderate           Carotoid Artery Disease               Pulmonary:   COPD Asthma  Shortness of breath   Daily inhaler - Trelegy Ellipta  Rescue inhaler   Home Oxygen Dependent   No Hospitalizations for exacerbations   Patient followed by Dr. Molina   Asthma:   Chronic Obstructive Pulmonary Disease (COPD):   Emphysema                   Renal/:  Renal/ Normal                 Hepatic/GI:  Bowel Prep. PUD,     Hx Acute pancreatitis  Hx of Mesenteric artery stenosis             Musculoskeletal:     Hx cervical spine stimulator - not active       Spine Disorders: lumbar and cervical Chronic Pain, Degenerative disease and Disc disease           Neurological:           Brain aneurysm present but not followed per patient                             Endocrine:  Endocrine Normal            Psych:  Psychiatric History anxiety                 Physical  Exam  General: Well nourished, Cooperative, Alert and Oriented    Airway:  Mallampati: III   Mouth Opening: Normal  TM Distance: > 6 cm  Tongue: Normal  Neck ROM: Extension Decreased    Dental:  Dentures, Caps / Implants    Chest/Lungs:  Clear to auscultation, Normal Respiratory Rate    Heart:  Rate: Normal  Rhythm: Regular Rhythm        Anesthesia Plan  Type of Anesthesia, risks & benefits discussed:    Anesthesia Type: Gen Natural Airway  Intra-op Monitoring Plan: Standard ASA Monitors  Post Op Pain Control Plan:   (medical reason for not using multimodal pain management)  Induction:  IV  Informed Consent: Informed consent signed with the Patient and all parties understand the risks and agree with anesthesia plan.  All questions answered.   ASA Score: 3  Anesthesia Plan Notes:       GNA  POM  Propofol     Ready For Surgery From Anesthesia Perspective.     .

## 2024-12-09 ENCOUNTER — HOSPITAL ENCOUNTER (OUTPATIENT)
Facility: HOSPITAL | Age: 84
Discharge: HOME | End: 2024-12-09
Attending: INTERNAL MEDICINE | Admitting: INTERNAL MEDICINE
Payer: MEDICARE

## 2024-12-09 ENCOUNTER — ANESTHESIA (OUTPATIENT)
Dept: SURGERY | Facility: HOSPITAL | Age: 84
End: 2024-12-09
Payer: MEDICARE

## 2024-12-09 VITALS
HEART RATE: 66 BPM | OXYGEN SATURATION: 100 % | TEMPERATURE: 96 F | SYSTOLIC BLOOD PRESSURE: 139 MMHG | DIASTOLIC BLOOD PRESSURE: 49 MMHG | OXYGEN SATURATION: 98 % | DIASTOLIC BLOOD PRESSURE: 70 MMHG | RESPIRATION RATE: 24 BRPM | HEART RATE: 102 BPM | SYSTOLIC BLOOD PRESSURE: 87 MMHG | RESPIRATION RATE: 17 BRPM

## 2024-12-09 DIAGNOSIS — Z51.81 ANTICOAGULATION MANAGEMENT ENCOUNTER: ICD-10-CM

## 2024-12-09 DIAGNOSIS — Z79.01 ANTICOAGULATION MANAGEMENT ENCOUNTER: ICD-10-CM

## 2024-12-09 PROCEDURE — 37000009 HC ANESTHESIA EA ADD 15 MINS: Performed by: INTERNAL MEDICINE

## 2024-12-09 PROCEDURE — D9220A PRA ANESTHESIA: Mod: ANES,,, | Performed by: ANESTHESIOLOGY

## 2024-12-09 PROCEDURE — D9220A PRA ANESTHESIA: Mod: CRNA,,,

## 2024-12-09 PROCEDURE — 37000008 HC ANESTHESIA 1ST 15 MINUTES: Performed by: INTERNAL MEDICINE

## 2024-12-09 PROCEDURE — 43450 DILATE ESOPHAGUS 1/MULT PASS: CPT | Performed by: INTERNAL MEDICINE

## 2024-12-09 PROCEDURE — 63600175 PHARM REV CODE 636 W HCPCS

## 2024-12-09 RX ORDER — LIDOCAINE HYDROCHLORIDE 10 MG/ML
INJECTION, SOLUTION INFILTRATION; PERINEURAL
Status: DISCONTINUED | OUTPATIENT
Start: 2024-12-09 | End: 2024-12-09

## 2024-12-09 RX ORDER — PROPOFOL 10 MG/ML
VIAL (ML) INTRAVENOUS
Status: DISCONTINUED | OUTPATIENT
Start: 2024-12-09 | End: 2024-12-09

## 2024-12-09 RX ORDER — PHENYLEPHRINE HYDROCHLORIDE 10 MG/ML
INJECTION INTRAVENOUS
Status: DISCONTINUED | OUTPATIENT
Start: 2024-12-09 | End: 2024-12-09

## 2024-12-09 RX ADMIN — PROPOFOL 100 MG: 10 INJECTION, EMULSION INTRAVENOUS at 07:12

## 2024-12-09 RX ADMIN — PHENYLEPHRINE HYDROCHLORIDE 100 MCG: 10 INJECTION INTRAVENOUS at 07:12

## 2024-12-09 RX ADMIN — PROPOFOL 30 MG: 10 INJECTION, EMULSION INTRAVENOUS at 07:12

## 2024-12-09 RX ADMIN — LIDOCAINE HYDROCHLORIDE 30 MG: 10 INJECTION, SOLUTION INFILTRATION; PERINEURAL at 07:12

## 2024-12-09 RX ADMIN — PROPOFOL 20 MG: 10 INJECTION, EMULSION INTRAVENOUS at 07:12

## 2024-12-09 NOTE — ANESTHESIA POSTPROCEDURE EVALUATION
Anesthesia Post Evaluation    Patient: Carmen Fletcher Pflug    Procedure(s) Performed: Procedure(s) (LRB):  EGD (ESOPHAGOGASTRODUODENOSCOPY) (N/A)    Final Anesthesia Type: general      Patient location during evaluation: GI PACU  Patient participation: Yes- Able to Participate  Level of consciousness: awake and alert, oriented and awake  Post-procedure vital signs: reviewed and stable  Pain management: adequate  Airway patency: patent    PONV status at discharge: No PONV  Anesthetic complications: no      Cardiovascular status: blood pressure returned to baseline, hemodynamically stable and stable  Respiratory status: unassisted, spontaneous ventilation and nasal cannula (Patient with Home Oxygen via NC)  Hydration status: euvolemic  Follow-up not needed.              Vitals Value Taken Time   /51 12/09/24 0730   Temp 35.6 °C (96 °F) 12/09/24 0727   Pulse 67 12/09/24 0731   Resp 18 12/09/24 0733   SpO2 99 % 12/09/24 0731   Vitals shown include unfiled device data.      No case tracking events are documented in the log.      Pain/Wili Score: Wili Score: 10 (12/9/2024  7:31 AM)

## 2024-12-09 NOTE — TRANSFER OF CARE
Anesthesia Transfer of Care Note    Patient: Carmen Fletcher Pfluvivienne    Procedure(s) Performed: Procedure(s) (LRB):  EGD (ESOPHAGOGASTRODUODENOSCOPY) (N/A)    Patient location: GI    Anesthesia Type: general    Transport from OR: Transported from OR on room air with adequate spontaneous ventilation    Post pain: adequate analgesia    Post assessment: no apparent anesthetic complications and tolerated procedure well    Post vital signs: stable    Level of consciousness: awake and responds to stimulation    Nausea/Vomiting: no nausea/vomiting    Complications: none    Transfer of care protocol was followed      Last vitals: Visit Vitals  /70 (BP Location: Left arm, Patient Position: Lying)   Pulse 102   Temp 36.4 °C (97.5 °F) (Tympanic)   Resp (!) 24   LMP  (LMP Unknown)   SpO2 98%   Breastfeeding No

## 2024-12-09 NOTE — [employer] PATIENT INSTRUCTIONS
Discharge Summary/Instructions after an Endoscopic Procedure  Patient Name: Carmen Wilkinson  Patient MRN: [id number]  Patient YOB: 1940 Monday, December 9, 2024  Lino Devi MD  RESTRICTIONS:  During your procedure today, you received medications for sedation.  These   medications may affect your judgment, balance and coordination.  Therefore,   for 24 hours, you have the following restrictions:   - DO NOT drive a car, operate machinery, make legal/financial decisions,   sign important papers or drink alcohol.    ACTIVITY:  Today: no heavy lifting, straining or running due to procedural   sedation/anesthesia.  The following day: return to full activity including work.  DIET:  Eat and drink normally unless instructed otherwise.     TREATMENT FOR COMMON SIDE EFFECTS:  - Mild abdominal pain, nausea, belching, bloating or excessive gas:  rest,   eat lightly and use a heating pad.  - Sore Throat: treat with throat lozenges and/or gargle with warm salt   water.  - Because air was used during the procedure, expelling large amounts of air   from your rectum or belching is normal.  - If a bowel prep was taken, you may not have a bowel movement for 1-3 days.    This is normal.  SYMPTOMS TO WATCH FOR AND REPORT TO YOUR PHYSICIAN:  1. Abdominal pain or bloating, other than gas cramps.  2. Chest pain.  3. Back pain.  4. Signs of infection such as: chills or fever occurring within 24 hours   after the procedure.  5. Rectal bleeding, which would show as bright red, maroon, or black stools.   (A tablespoon of blood from the rectum is not serious, especially if   hemorrhoids are present.)  6. Vomiting.  7. Weakness or dizziness.  GO DIRECTLY TO THE NEAREST EMERGENCY ROOM IF YOU HAVE ANY OF THE FOLLOWING:      Difficulty breathing              Chills and/or fever over 101 F   Persistent vomiting and/or vomiting blood   Severe abdominal pain   Severe chest pain   Black, tarry stools   Bleeding- more than one  tablespoon   Any other symptom or condition that you feel may need urgent attention  Your doctor recommends these additional instructions:  If any biopsies were taken, your doctors clinic will contact you in 1 to 2   weeks with any results.  - Patient has a contact number available for emergencies.  The signs and   symptoms of potential delayed complications were discussed with the   patient.  Return to normal activities tomorrow.  Written discharge   instructions were provided to the patient.   - Resume previous diet.   - Continue present medications.   - Await pathology results.   - Repeat upper endoscopy in 6 weeks for retreatment.   - Return to GI clinic PRN.   - Resume plavix tomorrow  - Discharge patient to home (with escort).  For questions, problems or results please call your physician - Lino Devi MD at Work:  [phone].  [facility], EMERGENCY ROOM PHONE NUMBER: [phone]  IF A COMPLICATION OR EMERGENCY SITUATION ARISES AND YOU ARE UNABLE TO REACH   YOUR PHYSICIAN - GO DIRECTLY TO THE EMERGENCY ROOM.  MD Lino Allen MD  12/9/2024 7:17:49 AM  This report has been verified and signed electronically.  Dear patient,  As a result of recent federal legislation (The Federal Cures Act), you may   receive lab or pathology results from your procedure in your [facility]   account before your physician is able to contact you. Your physician or   their representative will relay the results to you with their   recommendations at their soonest availability.  Thank you,  [employer]

## 2024-12-09 NOTE — H&P
GASTROENTEROLOGY PRE-PROCEDURE H&P NOTE  Patient Name: Carmen Fletcher Pflug  Patient MRN: [id number]  Patient : 1940    Service date: 2024    PCP: Abiodun Will MD    No chief complaint on file.      HPI: Patient is a 84 y.o. female with PMHx as below here for evaluation of established stricture..     Past Medical History:  Past Medical History:   Diagnosis Date    Anemia, unspecified     Back pain     Brain aneurysm 2018    Carotid stenosis     Cataract     Diverticulitis     Emphysema lung     Feeling anxious     Hyperlipidemia     Hypertension     Macular degeneration     PVD (peripheral vascular disease)     Squamous cell carcinoma 2017    right medical cheek, SSIS, efudex tx        Past Surgical History:  Past Surgical History:   Procedure Laterality Date    ABDOMINAL SURGERY      ANGIOGRAPHY OF MESENTERIC VESSELS Left 2022    Procedure: ANGIOGRAM, MESENTERIC VESSELS;  Surgeon: Krish Machado MD;  Location: [facility] CATH/EP LAB;  Service: General;  Laterality: Left;    ARTERIOGRAM, MESENTERIC N/A 2021    Procedure: ARTERIOGRAM, MESENTERIC;  Surgeon: Krish Machado MD;  Location: [facility] CATH/EP LAB;  Service: General;  Laterality: N/A;    BACK SURGERY      CATARACT EXTRACTION, BILATERAL      COLONOSCOPY N/A 2022    Procedure: COLONOSCOPY;  Surgeon: Lino Devi MD;  Location: [facility];  Service: Endoscopy;  Laterality: N/A;    CREATION, BYPASS, ARTERIAL, AORTA TO FEMORAL N/A 2021    Procedure: ARTERIOGRAM, MESENTERIC;  Surgeon: Krish Machado MD;  Location: [facility] CATH/EP LAB;  Service: General;  Laterality: N/A;    ENDOSCOPY OF PROXIMAL SMALL INTESTINE N/A 2022    Procedure: ENTEROSCOPY, PROXIMAL;  Surgeon: Lino Devi MD;  Location: [facility];  Service: Endoscopy;  Laterality: N/A;    ESOPHAGOGASTRODUODENOSCOPY N/A 2022    Procedure: EGD (ESOPHAGOGASTRODUODENOSCOPY);  Surgeon: Pedro Hall Jr., MD;  Location: [facility] ENDO;  Service:  Endoscopy;  Laterality: N/A;    ESOPHAGOGASTRODUODENOSCOPY N/A 8/31/2023    Procedure: EGD (ESOPHAGOGASTRODUODENOSCOPY);  Surgeon: Lino Devi MD;  Location: [facility] ENDO;  Service: Endoscopy;  Laterality: N/A;    ESOPHAGOGASTRODUODENOSCOPY N/A 10/26/2023    Procedure: EGD (ESOPHAGOGASTRODUODENOSCOPY);  Surgeon: Lino Devi MD;  Location: [facility] ENDO;  Service: Endoscopy;  Laterality: N/A;    ESOPHAGOGASTRODUODENOSCOPY N/A 12/14/2023    Procedure: EGD (ESOPHAGOGASTRODUODENOSCOPY);  Surgeon: Lino Devi MD;  Location: [facility] ENDO;  Service: Endoscopy;  Laterality: N/A;    ESOPHAGOGASTRODUODENOSCOPY N/A 2/29/2024    Procedure: EGD (ESOPHAGOGASTRODUODENOSCOPY);  Surgeon: Lino Devi MD;  Location: [facility] ENDO;  Service: Endoscopy;  Laterality: N/A;    ESOPHAGOGASTRODUODENOSCOPY N/A 8/29/2024    Procedure: EGD (ESOPHAGOGASTRODUODENOSCOPY);  Surgeon: Lino Devi MD;  Location: [facility];  Service: Endoscopy;  Laterality: N/A;    HYSTERECTOMY      IMPLANTATION OF SPINAL CORD STIMULATOR IN CERVICAL SPINE      UPPER GASTROINTESTINAL ENDOSCOPY  11/20/2022    w/ antrum biopsy        Home Medications:  Medications Prior to Admission   Medication Sig Dispense Refill Last Dose/Taking    albuterol-ipratropium (DUO-NEB) 2.5 mg-0.5 mg/3 mL nebulizer solution Take 3 mLs by nebulization every 6 (six) hours as needed for Wheezing. Rescue 120 each 11     arformoteroL (BROVANA) 15 mcg/2 mL Nebu Take 2 mLs (15 mcg total) by nebulization 2 (two) times a day. Controller (Patient not taking: Reported on 10/9/2024) 120 mL 11     aspirin (ECOTRIN) 81 MG EC tablet Take 81 mg by mouth once daily.       budesonide (PULMICORT) 0.5 mg/2 mL nebulizer solution Take 2 mLs (0.5 mg total) by nebulization 2 (two) times daily. Controller (Patient not taking: Reported on 10/9/2024) 120 mL 5     clopidogreL (PLAVIX) 75 mg tablet Take 1 tablet (75 mg total) by mouth once daily. 30 tablet 3     ferrous sulfate 325 (65 FE) MG EC tablet Take  325 mg by mouth once daily.       L. gasseri-B. bifidum-B longum 1.5 billion cell Cap Take 1 tablet by mouth once daily.  (Patient not taking: Reported on 10/9/2024)       megestroL (MEGACE) 20 MG Tab Take 40 mg by mouth 2 (two) times daily.       metoprolol succinate (TOPROL-XL) 50 MG 24 hr tablet Take 50 mg by mouth 2 (two) times daily.       oxyCODONE-acetaminophen (PERCOCET)  mg per tablet Take 1 tablet by mouth every 4 (four) hours as needed for Pain. 5 times daily       pantoprazole (PROTONIX) 40 MG tablet Take 1 tablet (40 mg total) by mouth 2 (two) times daily. 180 tablet 1     polycarbophil (FIBERCON) 625 mg tablet Take 625 mg by mouth once daily.       promethazine (PHENERGAN) 6.25 mg/5 mL syrup Take 6.25 mg by mouth every 4 (four) hours as needed for Nausea. (Patient not taking: Reported on 10/9/2024)       revefenacin 175 mcg/3 mL Nebu Inhale 175 mcg into the lungs once daily. (Patient not taking: Reported on 10/9/2024) 30 each 11     simvastatin (ZOCOR) 20 MG tablet Take 20 mg by mouth every evening.       UNABLE TO FIND Take 1 tablet by mouth once daily. medication name: Ronak Mohan MVI       vit C,S-Zw-fkjqa-lutein-zeaxan (PRESERVISION AREDS-2) 250-90-40-1 mg Cap Take 1 tablet by mouth 2 (two) times a day.                  Review of patient's allergies indicates:   Allergen Reactions    Sulfa (sulfonamide antibiotics) Nausea Only       Social History:   Social History     Occupational History    Not on file   Tobacco Use    Smoking status: Former     Current packs/day: 0.00     Average packs/day: 1 pack/day for 56.8 years (56.8 ttl pk-yrs)     Types: Cigarettes     Start date: 1965     Quit date: 2022     Years since quittin.6    Smokeless tobacco: Never   Substance and Sexual Activity    Alcohol use: No    Drug use: Not Currently    Sexual activity: Not on file       Family History:   Family History   Problem Relation Name Age of Onset    Melanoma Neg Hx      Psoriasis Neg Hx    "   Eczema Neg Hx      Lupus Neg Hx         Review of Systems:  A 10 point review of systems was performed and was normal, except as mentioned in the HPI, including constitutional, HEENT, heme, lymph, cardiovascular, respiratory, gastrointestinal, genitourinary, neurologic, endocrine, psychiatric and musculoskeletal.      OBJECTIVE:    Physical Exam:  24 Hour Vital Sign Ranges: Temp:  [97.5 °F (36.4 °C)] 97.5 °F (36.4 °C)  Pulse:  [102] 102  Resp:  [24] 24  SpO2:  [98 %] 98 %  BP: (139)/(70) 139/70  Most recent vitals: /70 (BP Location: Left arm, Patient Position: Lying)   Pulse 102   Temp 97.5 °F (36.4 °C) (Tympanic)   Resp (!) 24   LMP  (LMP Unknown)   SpO2 98% Comment: 2L NC  Breastfeeding No    GEN: well-developed, well-nourished, awake and alert, non-toxic appearing adult  HEENT: PERRL, sclera anicteric, oral mucosa pink and moist without lesion  NECK: trachea midline; Good ROM  CV: regular rate and rhythm, no murmurs or gallops  RESP: clear to auscultation bilaterally, no wheezes, rhonci or rales  ABD: soft, non-tender, non-distended, normal bowel sounds  EXT: no swelling or edema, 2+ pulses distally  SKIN: no rashes or jaundice  PSYCH: normal affect    Labs:   Recent Labs     12/06/24  1456   WBC 8.37   MCV 99*        Recent Labs     12/06/24  1456      K 4.1      CO2 29   BUN 26*   *     No results for input(s): "ALB" in the last 72 hours.    Invalid input(s): "ALKP", "SGOT", "SGPT", "TBIL", "DBIL", "TPRO"  No results for input(s): "PT", "INR", "PTT" in the last 72 hours.      IMPRESSION / RECOMMENDATIONS:  Esophageal stricture  -repeat EGD with dilation now  with interventions as warranted.   RIsks, benefits, alternatives discussed in detail regarding upcoming procedures and sedation. Some of the more common endoscopic complications include but not limited to immediate or delayed perforation, bleeding, infections, pain, inadvertent injury to surrounding tissue / organs " and possible need for surgical evaluation. Patient expressed understanding, all questions answered and will proceed with procedure as planned.     Lino Devi  12/9/2024  7:19 AM

## 2024-12-16 ENCOUNTER — CLINICAL SUPPORT (OUTPATIENT)
Dept: CARDIAC REHAB | Facility: HOSPITAL | Age: 84
End: 2024-12-16
Payer: MEDICARE

## 2024-12-16 DIAGNOSIS — J44.9 CHRONIC OBSTRUCTIVE PULMONARY DISEASE, UNSPECIFIED COPD TYPE: Primary | ICD-10-CM

## 2024-12-16 PROCEDURE — 94626 PHY/QHP OP PULM RHB W/MNTR: CPT

## 2024-12-23 DIAGNOSIS — J44.9 CHRONIC OBSTRUCTIVE PULMONARY DISEASE, UNSPECIFIED COPD TYPE: Primary | ICD-10-CM

## 2025-01-09 ENCOUNTER — OFFICE VISIT (OUTPATIENT)
Dept: PULMONOLOGY | Facility: CLINIC | Age: 85
End: 2025-01-09
Payer: MEDICARE

## 2025-01-09 VITALS
WEIGHT: 87 LBS | BODY MASS INDEX: 16.01 KG/M2 | TEMPERATURE: 99 F | SYSTOLIC BLOOD PRESSURE: 118 MMHG | DIASTOLIC BLOOD PRESSURE: 68 MMHG | OXYGEN SATURATION: 95 % | HEART RATE: 87 BPM | HEIGHT: 62 IN

## 2025-01-09 DIAGNOSIS — E43 SEVERE PROTEIN-CALORIE MALNUTRITION: ICD-10-CM

## 2025-01-09 DIAGNOSIS — R06.00 DYSPNEA, UNSPECIFIED TYPE: ICD-10-CM

## 2025-01-09 DIAGNOSIS — R94.2 DIFFUSION CAPACITY OF LUNG (DL), DECREASED: ICD-10-CM

## 2025-01-09 DIAGNOSIS — J44.9 CHRONIC OBSTRUCTIVE PULMONARY DISEASE, UNSPECIFIED COPD TYPE: Primary | ICD-10-CM

## 2025-01-09 DIAGNOSIS — J43.2 CENTRILOBULAR EMPHYSEMA: ICD-10-CM

## 2025-01-09 DIAGNOSIS — J96.11 CHRONIC RESPIRATORY FAILURE WITH HYPOXIA: ICD-10-CM

## 2025-01-09 PROCEDURE — 99999 PR PBB SHADOW E&M-EST. PATIENT-LVL III: CPT | Mod: PBBFAC,,, | Performed by: INTERNAL MEDICINE

## 2025-01-09 RX ORDER — FLUTICASONE FUROATE, UMECLIDINIUM BROMIDE AND VILANTEROL TRIFENATATE 100; 62.5; 25 UG/1; UG/1; UG/1
1 POWDER RESPIRATORY (INHALATION) DAILY
Qty: 60 EACH | Refills: 11 | Status: SHIPPED | OUTPATIENT
Start: 2025-01-09

## 2025-01-09 NOTE — PROGRESS NOTES
SUBJECTIVE:    Patient ID: Carmen Wilkinson is a 84 y.o. female.    Chief Complaint: Follow-up (3 month follow up COPD/Patient cancelled pulmonary rehab after several visits.)    COPD    Shortness of Breath  Her past medical history is significant for COPD.   Follow-up    The patient is here with a new 6 minute walk, needs 2 liters, but no PFT's.  She tried doing pulmonary rehab but found it too wearing to continue.  She does not like the DuoNebs.  She wants to go back to [facility].  Past Medical History:   Diagnosis Date    Anemia, unspecified     Back pain     Brain aneurysm 2018    Carotid stenosis     Cataract     Diverticulitis     Emphysema lung     Feeling anxious     Hyperlipidemia     Hypertension     Macular degeneration     PVD (peripheral vascular disease)     Squamous cell carcinoma 08/29/2017    right medical cheek, SSIS, efudex tx     Past Surgical History:   Procedure Laterality Date    ABDOMINAL SURGERY      ANGIOGRAPHY OF MESENTERIC VESSELS Left 09/07/2022    Procedure: ANGIOGRAM, MESENTERIC VESSELS;  Surgeon: Krish Machado MD;  Location: [facility] CATH/EP LAB;  Service: General;  Laterality: Left;    ARTERIOGRAM, MESENTERIC N/A 06/30/2021    Procedure: ARTERIOGRAM, MESENTERIC;  Surgeon: Krish Machado MD;  Location: [facility] CATH/EP LAB;  Service: General;  Laterality: N/A;    BACK SURGERY  2008    CATARACT EXTRACTION, BILATERAL      COLONOSCOPY N/A 04/04/2022    Procedure: COLONOSCOPY;  Surgeon: Lino Devi MD;  Location: [facility] ENDO;  Service: Endoscopy;  Laterality: N/A;    CREATION, BYPASS, ARTERIAL, AORTA TO FEMORAL N/A 01/13/2021    Procedure: ARTERIOGRAM, MESENTERIC;  Surgeon: Krish Machado MD;  Location: [facility] CATH/EP LAB;  Service: General;  Laterality: N/A;    ENDOSCOPY OF PROXIMAL SMALL INTESTINE N/A 04/04/2022    Procedure: ENTEROSCOPY, PROXIMAL;  Surgeon: Lino Devi MD;  Location: [facility] ENDO;  Service: Endoscopy;  Laterality: N/A;    ESOPHAGOGASTRODUODENOSCOPY N/A 11/20/2022     Procedure: EGD (ESOPHAGOGASTRODUODENOSCOPY);  Surgeon: Pedro Hall Jr., MD;  Location: [facility];  Service: Endoscopy;  Laterality: N/A;    ESOPHAGOGASTRODUODENOSCOPY N/A 8/31/2023    Procedure: EGD (ESOPHAGOGASTRODUODENOSCOPY);  Surgeon: Lino Devi MD;  Location: [facility];  Service: Endoscopy;  Laterality: N/A;    ESOPHAGOGASTRODUODENOSCOPY N/A 10/26/2023    Procedure: EGD (ESOPHAGOGASTRODUODENOSCOPY);  Surgeon: Lino Devi MD;  Location: [facility];  Service: Endoscopy;  Laterality: N/A;    ESOPHAGOGASTRODUODENOSCOPY N/A 12/14/2023    Procedure: EGD (ESOPHAGOGASTRODUODENOSCOPY);  Surgeon: Lino Devi MD;  Location: [facility];  Service: Endoscopy;  Laterality: N/A;    ESOPHAGOGASTRODUODENOSCOPY N/A 2/29/2024    Procedure: EGD (ESOPHAGOGASTRODUODENOSCOPY);  Surgeon: Lino Devi MD;  Location: [facility];  Service: Endoscopy;  Laterality: N/A;    ESOPHAGOGASTRODUODENOSCOPY N/A 8/29/2024    Procedure: EGD (ESOPHAGOGASTRODUODENOSCOPY);  Surgeon: Lino Devi MD;  Location: [facility];  Service: Endoscopy;  Laterality: N/A;    ESOPHAGOGASTRODUODENOSCOPY N/A 12/9/2024    Procedure: EGD (ESOPHAGOGASTRODUODENOSCOPY);  Surgeon: Lino Devi MD;  Location: [facility];  Service: Endoscopy;  Laterality: N/A;    HYSTERECTOMY      IMPLANTATION OF SPINAL CORD STIMULATOR IN CERVICAL SPINE      UPPER GASTROINTESTINAL ENDOSCOPY  11/20/2022    w/ antrum biopsy     Family History   Problem Relation Name Age of Onset    Melanoma Neg Hx      Psoriasis Neg Hx      Eczema Neg Hx      Lupus Neg Hx          Social History:   Marital Status:   Occupation: Data Unavailable  Alcohol History:  reports no history of alcohol use.  Tobacco History:  reports that she quit smoking about 2 years ago. Her smoking use included cigarettes. She started smoking about 59 years ago. She has a 56.8 pack-year smoking history. She has never used smokeless tobacco.  Drug History:  reports that she does not currently use  drugs.    Review of patient's allergies indicates:   Allergen Reactions    Sulfa (sulfonamide antibiotics) Nausea Only       Current Outpatient Medications   Medication Sig Dispense Refill    aspirin (ECOTRIN) 81 MG EC tablet Take 81 mg by mouth once daily.      clopidogreL (PLAVIX) 75 mg tablet Take 1 tablet (75 mg total) by mouth once daily. 30 tablet 3    ferrous sulfate 325 (65 FE) MG EC tablet Take 325 mg by mouth once daily.      metoprolol succinate (TOPROL-XL) 50 MG 24 hr tablet Take 50 mg by mouth 2 (two) times daily.      oxyCODONE-acetaminophen (PERCOCET)  mg per tablet Take 1 tablet by mouth every 4 (four) hours as needed for Pain. 5 times daily      pantoprazole (PROTONIX) 40 MG tablet Take 1 tablet (40 mg total) by mouth 2 (two) times daily. 180 tablet 1    polycarbophil (FIBERCON) 625 mg tablet Take 625 mg by mouth once daily.      simvastatin (ZOCOR) 20 MG tablet Take 20 mg by mouth every evening.      vit C,O-Mj-ylmeo-lutein-zeaxan (PRESERVISION AREDS-2) 250-90-40-1 mg Cap Take 1 tablet by mouth 2 (two) times a day.      albuterol-ipratropium (DUO-NEB) 2.5 mg-0.5 mg/3 mL nebulizer solution Take 3 mLs by nebulization every 6 (six) hours as needed for Wheezing. Rescue (Patient not taking: Reported on 1/9/2025) 120 each 11    fluticasone-umeclidin-vilanter (TRELEGY ELLIPTA) 100-62.5-25 mcg DsDv Inhale 1 puff into the lungs once daily. 60 each 11    L. gasseri-B. bifidum-B longum 1.5 billion cell Cap Take 1 tablet by mouth once daily.  (Patient not taking: Reported on 10/9/2024)      megestroL (MEGACE) 20 MG Tab Take 40 mg by mouth 2 (two) times daily. (Patient not taking: Reported on 1/9/2025.)      promethazine (PHENERGAN) 6.25 mg/5 mL syrup Take 6.25 mg by mouth every 4 (four) hours as needed for Nausea. (Patient not taking: Reported on 10/9/2024)      UNABLE TO FIND Take 1 tablet by mouth once daily. medication name: Ronak WAY       No current facility-administered medications for  "this visit.       Last PFT:  10/11/23 moderate obstruction, normal lung volumes, and a very severe diffusion defect with no response to bronchodilators  Last CT:  04/01/2022  .  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  2.  Patent stent in the proximal celiac artery.  3. 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  3.  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  4.  Moderate centrilobular emphysema.   Last chest x-ray 12/31/23  Hyperinflated lung fields thin calcified pleural plaques faint interstitial reticular opacities in the right upper lobe unchanged, neural stimulator in place    Review of Systems   Respiratory:  Positive for shortness of breath.      General: She has a lot of medical problems  Eyes: Vision has macular degneration  ENT:  No sinusitis or pharyngitis.   Heart:: No chest pain or palpitations.  Lungs: shortness of breath with any exertion  GI: occasional diarrhea  : No dysuria, hesitancy, or nocturia.  Musculoskeletal:back pain is ongoing, legs are weak  Skin: No lesions or rashes. Bruises from plavix  Neuro: No headaches or neuropathy.  Lymph: No edema or adenopathy.  Psych: No anxiety or depression.  Endo:  Her weight is stable    OBJECTIVE:      /68 (BP Location: Left arm, Patient Position: Sitting)   Pulse 87   Temp 98.5 °F (36.9 °C)   Ht 5' 2" (1.575 m)   Wt 39.5 kg (87 lb)   LMP  (LMP Unknown)   SpO2 95%   BMI 15.91 kg/m²     Physical Exam  GENERAL: Older, thin patient in no distress.  She is in a wheelchair; she is wearing her oxygen..  HEENT: Pupils equal and reactive. Extraocular movements intact. Nose intact. Pharynx moist.  NECK: Supple.   HEART: Regular rate and rhythm. No murmur or gallop auscultated.  LUNGS:  Clear to auscultation   ABDOMEN: Bowel sounds present. Non-tender, no masses palpated.  EXTREMITIES: Normal muscle tone and joint movement, no cyanosis or clubbing.  Markedly " decreased muscle mass  LYMPHATICS: No adenopathy palpated, no edema.  SKIN: Dry, intact, no lesions.   NEURO: Cranial nerves II-XII intact. Motor strength 5/5 bilaterally, upper and lower extremities.  PSYCH: Appropriate affect.    Assessment:       1. Chronic obstructive pulmonary disease, unspecified COPD type    2. Chronic respiratory failure with hypoxia    3. Centrilobular emphysema    4. Diffusion capacity of lung (dl), decreased    5. Dyspnea, unspecified type    6. Severe protein-calorie malnutrition            The patient is severely limited by dyspnea.  She finds it a struggle to get dressed.  No bronchodilators or steroids that I have ever given her have made her feel any better.  However, she would like to go back to [facility] because she finds the DuoNebs are too time consuming.  She did not have her pulmonary function test for reasons that no one can explain.  She did have her 6 minute walk and 2 L of oxygen is appropriate.        Plan:       Chronic obstructive pulmonary disease, unspecified COPD type  -     fluticasone-umeclidin-vilanter (TRELEGY ELLIPTA) 100-62.5-25 mcg DsDv; Inhale 1 puff into the lungs once daily.  Dispense: 60 each; Refill: 11    Chronic respiratory failure with hypoxia    Centrilobular emphysema    Diffusion capacity of lung (dl), decreased    Dyspnea, unspecified type    Severe protein-calorie malnutrition                    Follow up in about 4 months (around 5/9/2025).  Go and do your pulmonary function test now  Wear oxygen all the time  Resume Trelegy daily  Go slowly with oxygen to go further

## 2025-01-15 ENCOUNTER — HOSPITAL ENCOUNTER (EMERGENCY)
Facility: HOSPITAL | Age: 85
Discharge: HOME | End: 2025-01-15
Attending: EMERGENCY MEDICINE
Payer: MEDICARE

## 2025-01-15 VITALS
SYSTOLIC BLOOD PRESSURE: 165 MMHG | WEIGHT: 86 LBS | OXYGEN SATURATION: 98 % | DIASTOLIC BLOOD PRESSURE: 67 MMHG | RESPIRATION RATE: 16 BRPM | HEIGHT: 62 IN | TEMPERATURE: 99 F | BODY MASS INDEX: 15.83 KG/M2 | HEART RATE: 84 BPM

## 2025-01-15 DIAGNOSIS — R13.19 ESOPHAGEAL DYSPHAGIA: Primary | ICD-10-CM

## 2025-01-15 LAB
ALBUMIN SERPL BCP-MCNC: 4.4 G/DL (ref 3.5–5.2)
ALP SERPL-CCNC: 67 U/L (ref 55–135)
ALT SERPL W/O P-5'-P-CCNC: 8 U/L (ref 10–44)
ANION GAP SERPL CALC-SCNC: 9 MMOL/L (ref 8–16)
AST SERPL-CCNC: 17 U/L (ref 10–40)
BASOPHILS # BLD AUTO: 0.06 K/UL (ref 0–0.2)
BASOPHILS NFR BLD: 0.8 % (ref 0–1.9)
BILIRUB SERPL-MCNC: 0.4 MG/DL (ref 0.1–1)
BUN SERPL-MCNC: 20 MG/DL (ref 8–23)
CALCIUM SERPL-MCNC: 10 MG/DL (ref 8.7–10.5)
CHLORIDE SERPL-SCNC: 105 MMOL/L (ref 95–110)
CO2 SERPL-SCNC: 25 MMOL/L (ref 23–29)
CREAT SERPL-MCNC: 0.8 MG/DL (ref 0.5–1.4)
DIFFERENTIAL METHOD BLD: ABNORMAL
EOSINOPHIL # BLD AUTO: 0.1 K/UL (ref 0–0.5)
EOSINOPHIL NFR BLD: 1.3 % (ref 0–8)
ERYTHROCYTE [DISTWIDTH] IN BLOOD BY AUTOMATED COUNT: 11.6 % (ref 11.5–14.5)
EST. GFR  (NO RACE VARIABLE): >60 ML/MIN/1.73 M^2
GLUCOSE SERPL-MCNC: 89 MG/DL (ref 70–110)
HCT VFR BLD AUTO: 33.6 % (ref 37–48.5)
HGB BLD-MCNC: 11 G/DL (ref 12–16)
IMM GRANULOCYTES # BLD AUTO: 0.02 K/UL (ref 0–0.04)
IMM GRANULOCYTES NFR BLD AUTO: 0.3 % (ref 0–0.5)
LYMPHOCYTES # BLD AUTO: 1.1 K/UL (ref 1–4.8)
LYMPHOCYTES NFR BLD: 15.8 % (ref 18–48)
MAGNESIUM SERPL-MCNC: 1.8 MG/DL (ref 1.6–2.6)
MCH RBC QN AUTO: 32.1 PG (ref 27–31)
MCHC RBC AUTO-ENTMCNC: 32.7 G/DL (ref 32–36)
MCV RBC AUTO: 98 FL (ref 82–98)
MONOCYTES # BLD AUTO: 0.6 K/UL (ref 0.3–1)
MONOCYTES NFR BLD: 8.8 % (ref 4–15)
NEUTROPHILS # BLD AUTO: 5.2 K/UL (ref 1.8–7.7)
NEUTROPHILS NFR BLD: 73 % (ref 38–73)
NRBC BLD-RTO: 0 /100 WBC
PLATELET # BLD AUTO: 309 K/UL (ref 150–450)
PMV BLD AUTO: 9.2 FL (ref 9.2–12.9)
POTASSIUM SERPL-SCNC: 4.2 MMOL/L (ref 3.5–5.1)
PROT SERPL-MCNC: 7.6 G/DL (ref 6–8.4)
RBC # BLD AUTO: 3.43 M/UL (ref 4–5.4)
SODIUM SERPL-SCNC: 139 MMOL/L (ref 136–145)
WBC # BLD AUTO: 7.08 K/UL (ref 3.9–12.7)

## 2025-01-15 PROCEDURE — 99284 EMERGENCY DEPT VISIT MOD MDM: CPT | Mod: 25

## 2025-01-15 PROCEDURE — 93005 ELECTROCARDIOGRAM TRACING: CPT | Performed by: GENERAL PRACTICE

## 2025-01-15 PROCEDURE — 83735 ASSAY OF MAGNESIUM: CPT | Performed by: EMERGENCY MEDICINE

## 2025-01-15 PROCEDURE — 85025 COMPLETE CBC W/AUTO DIFF WBC: CPT | Performed by: EMERGENCY MEDICINE

## 2025-01-15 PROCEDURE — 93010 ELECTROCARDIOGRAM REPORT: CPT | Mod: ,,, | Performed by: GENERAL PRACTICE

## 2025-01-15 PROCEDURE — 80053 COMPREHEN METABOLIC PANEL: CPT | Performed by: EMERGENCY MEDICINE

## 2025-01-15 RX ORDER — HYOSCYAMINE SULFATE 0.12 MG/1
0.12 TABLET SUBLINGUAL EVERY 4 HOURS PRN
Qty: 15 TABLET | Refills: 0 | Status: SHIPPED | OUTPATIENT
Start: 2025-01-15 | End: 2025-01-30

## 2025-01-15 NOTE — ED PROVIDER NOTES
Chief complaint:  Pt stated she is unable to eat (Pt stated that symptoms started after she had a egd done with esophageal stretch in December, pt stated since then it has been gradually been getting worse and its been getting harder for her to swallow. Pt stated she is unable to swallow and stated that its almost like her throat closes when she tries to swallow/)      HPI:  Carmen Wilkinson is a 85 y.o. female with hx COPD, malnutrition, htn, esophageal stricture s/p prior dilation 8/29/2024 and again 12/9/24 with Dr. Devi GI presenting with two weeks of progressive difficulty with swallowing marked by upper esophageal discomfort with burning and difficulty passing certain medications as well as foods.  She has no difficulty with liquids.  This is similar to preceding symptoms prior to previous esophageal dilatations for esophageal stenosis.  She called GI doubt office several days ago and was advised to come to the emergency department.  She does have repeat EGD scheduled for the twenty-fourth, nine days from now, but is concerned regarding inability to eat effectively.    ROS: As per HPI and below:  No chest pain, abdominal pain, new difficulty breathing, cough, fever, emesis, blood in the stools, dark stools, diarrhea, dysuria.    Review of patient's allergies indicates:   Allergen Reactions    Sulfa (sulfonamide antibiotics) Nausea Only       Discharge Medication List as of 1/15/2025  4:15 PM        START taking these medications    Details   hyoscyamine 0.125 mg Subl Place 1 tablet (0.125 mg total) under the tongue every 4 (four) hours as needed (spasm, difficulty swallowing)., Starting Wed 1/15/2025, Until Thu 1/30/2025 at 2359, Normal           CONTINUE these medications which have NOT CHANGED    Details   albuterol-ipratropium (DUO-NEB) 2.5 mg-0.5 mg/3 mL nebulizer solution Take 3 mLs by nebulization every 6 (six) hours as needed for Wheezing. Rescue, Starting Wed 10/9/2024, Until Thu 10/9/2025 at  2359, Normal      aspirin (ECOTRIN) 81 MG EC tablet Take 81 mg by mouth once daily., Historical Med      clopidogreL (PLAVIX) 75 mg tablet Take 1 tablet (75 mg total) by mouth once daily., Starting Wed 1/13/2021, Phone In      ferrous sulfate 325 (65 FE) MG EC tablet Take 325 mg by mouth once daily., Starting Tue 4/26/2022, Historical Med      fluticasone-umeclidin-vilanter (TRELEGY ELLIPTA) 100-62.5-25 mcg DsDv Inhale 1 puff into the lungs once daily., Starting Thu 1/9/2025, Normal      L. gasseri-B. bifidum-B longum 1.5 billion cell Cap Take 1 tablet by mouth once daily. , Historical Med      megestroL (MEGACE) 20 MG Tab Take 40 mg by mouth 2 (two) times daily., Historical Med      metoprolol succinate (TOPROL-XL) 50 MG 24 hr tablet Take 50 mg by mouth 2 (two) times daily., Starting Mon 8/21/2017, Historical Med      oxyCODONE-acetaminophen (PERCOCET)  mg per tablet Take 1 tablet by mouth every 4 (four) hours as needed for Pain. 5 times daily, Starting Fri 7/8/2022, Historical Med      pantoprazole (PROTONIX) 40 MG tablet Take 1 tablet (40 mg total) by mouth 2 (two) times daily., Starting Mon 11/21/2022, Until Thu 1/9/2025, Normal      polycarbophil (FIBERCON) 625 mg tablet Take 625 mg by mouth once daily., Historical Med      promethazine (PHENERGAN) 6.25 mg/5 mL syrup Take 6.25 mg by mouth every 4 (four) hours as needed for Nausea., Starting Sat 12/30/2023, Historical Med      simvastatin (ZOCOR) 20 MG tablet Take 20 mg by mouth every evening., Historical Med      UNABLE TO FIND Take 1 tablet by mouth once daily. medication name: Ronak GRULLONI, Historical Med      vit C,X-Ap-ekkfd-lutein-zeaxan (PRESERVISION AREDS-2) 250-90-40-1 mg Cap Take 1 tablet by mouth 2 (two) times a day., Historical Med             PMH:  As per HPI and below:  Past Medical History:   Diagnosis Date    Anemia, unspecified     Back pain     Brain aneurysm 2018    Carotid stenosis     Cataract     Diverticulitis     Emphysema  lung     Feeling anxious     Hyperlipidemia     Hypertension     Macular degeneration     PVD (peripheral vascular disease)     Squamous cell carcinoma 08/29/2017    right medical cheek, SSIS, efudex tx     Past Surgical History:   Procedure Laterality Date    ABDOMINAL SURGERY      ANGIOGRAPHY OF MESENTERIC VESSELS Left 09/07/2022    Procedure: ANGIOGRAM, MESENTERIC VESSELS;  Surgeon: Krish Machado MD;  Location: [facility] CATH/EP LAB;  Service: General;  Laterality: Left;    ARTERIOGRAM, MESENTERIC N/A 06/30/2021    Procedure: ARTERIOGRAM, MESENTERIC;  Surgeon: Krish Machado MD;  Location: [facility] CATH/EP LAB;  Service: General;  Laterality: N/A;    BACK SURGERY  2008    CATARACT EXTRACTION, BILATERAL      COLONOSCOPY N/A 04/04/2022    Procedure: COLONOSCOPY;  Surgeon: Lino Devi MD;  Location: [facility];  Service: Endoscopy;  Laterality: N/A;    CREATION, BYPASS, ARTERIAL, AORTA TO FEMORAL N/A 01/13/2021    Procedure: ARTERIOGRAM, MESENTERIC;  Surgeon: Krish Machado MD;  Location: [facility] CATH/EP LAB;  Service: General;  Laterality: N/A;    ENDOSCOPY OF PROXIMAL SMALL INTESTINE N/A 04/04/2022    Procedure: ENTEROSCOPY, PROXIMAL;  Surgeon: Lino Devi MD;  Location: [facility];  Service: Endoscopy;  Laterality: N/A;    ESOPHAGOGASTRODUODENOSCOPY N/A 11/20/2022    Procedure: EGD (ESOPHAGOGASTRODUODENOSCOPY);  Surgeon: Pedro Hall Jr., MD;  Location: [facility];  Service: Endoscopy;  Laterality: N/A;    ESOPHAGOGASTRODUODENOSCOPY N/A 8/31/2023    Procedure: EGD (ESOPHAGOGASTRODUODENOSCOPY);  Surgeon: Lino Devi MD;  Location: [facility];  Service: Endoscopy;  Laterality: N/A;    ESOPHAGOGASTRODUODENOSCOPY N/A 10/26/2023    Procedure: EGD (ESOPHAGOGASTRODUODENOSCOPY);  Surgeon: Lino Devi MD;  Location: [facility];  Service: Endoscopy;  Laterality: N/A;    ESOPHAGOGASTRODUODENOSCOPY N/A 12/14/2023    Procedure: EGD (ESOPHAGOGASTRODUODENOSCOPY);  Surgeon: Lino Devi MD;  Location: [facility];   Service: Endoscopy;  Laterality: N/A;    ESOPHAGOGASTRODUODENOSCOPY N/A 2024    Procedure: EGD (ESOPHAGOGASTRODUODENOSCOPY);  Surgeon: Lino Devi MD;  Location: [facility] ENDO;  Service: Endoscopy;  Laterality: N/A;    ESOPHAGOGASTRODUODENOSCOPY N/A 2024    Procedure: EGD (ESOPHAGOGASTRODUODENOSCOPY);  Surgeon: Lino Devi MD;  Location: [facility] ENDO;  Service: Endoscopy;  Laterality: N/A;    ESOPHAGOGASTRODUODENOSCOPY N/A 2024    Procedure: EGD (ESOPHAGOGASTRODUODENOSCOPY);  Surgeon: Lino Devi MD;  Location: [facility] ENDO;  Service: Endoscopy;  Laterality: N/A;    HYSTERECTOMY      IMPLANTATION OF SPINAL CORD STIMULATOR IN CERVICAL SPINE      UPPER GASTROINTESTINAL ENDOSCOPY  2022    w/ antrum biopsy       Social History     Socioeconomic History    Marital status:    Tobacco Use    Smoking status: Former     Current packs/day: 0.00     Average packs/day: 1 pack/day for 56.8 years (56.8 ttl pk-yrs)     Types: Cigarettes     Start date: 1965     Quit date: 2022     Years since quittin.7    Smokeless tobacco: Never   Substance and Sexual Activity    Alcohol use: No    Drug use: Not Currently       Family History   Problem Relation Name Age of Onset    Melanoma Neg Hx      Psoriasis Neg Hx      Eczema Neg Hx      Lupus Neg Hx         Physical Exam:    Vitals:    01/15/25 1634   BP: (!) 165/67   Pulse: 84   Resp: 16   Temp: 98.7 °F (37.1 °C)     GENERAL:  No apparent distress.  Alert.    HEENT:  Moist mucous membranes.  Normocephalic and atraumatic.  Uvula is midline.  Normal phonation.  NECK:  No swelling.  Midline trachea.  No stridor.  CARDIOVASCULAR:  Regular rate and rhythm.  2+ radial pulses.  No murmur.  PULMONARY:  Lungs clear to auscultation bilaterally.  No wheezes, rales, or rhonci.  Distant breath sounds.  ABDOMEN:  Non-tender and non-distended.    EXTREMITIES:  Warm and well perfused.  Brisk capillary refill.    NEUROLOGICAL:  Normal mental status.   Appropriate and conversant.    SKIN:  No rashes or ecchymoses.      Labs Reviewed   CBC W/ AUTO DIFFERENTIAL - Abnormal       Result Value    WBC 7.08      RBC 3.43 (*)     Hemoglobin 11.0 (*)     Hematocrit 33.6 (*)     MCV 98      MCH 32.1 (*)     MCHC 32.7      RDW 11.6      Platelets 309      MPV 9.2      Immature Granulocytes 0.3      Gran # (ANC) 5.2      Immature Grans (Abs) 0.02      Lymph # 1.1      Mono # 0.6      Eos # 0.1      Baso # 0.06      nRBC 0      Gran % 73.0      Lymph % 15.8 (*)     Mono % 8.8      Eosinophil % 1.3      Basophil % 0.8      Differential Method Automated     COMPREHENSIVE METABOLIC PANEL - Abnormal    Sodium 139      Potassium 4.2      Chloride 105      CO2 25      Glucose 89      BUN 20      Creatinine 0.8      Calcium 10.0      Total Protein 7.6      Albumin 4.4      Total Bilirubin 0.4      Alkaline Phosphatase 67      AST 17      ALT 8 (*)     eGFR >60.0      Anion Gap 9     MAGNESIUM    Magnesium 1.8         Discharge Medication List as of 1/15/2025  4:15 PM        START taking these medications    Details   hyoscyamine 0.125 mg Subl Place 1 tablet (0.125 mg total) under the tongue every 4 (four) hours as needed (spasm, difficulty swallowing)., Starting Wed 1/15/2025, Until Thu 1/30/2025 at 2359, Normal           CONTINUE these medications which have NOT CHANGED    Details   albuterol-ipratropium (DUO-NEB) 2.5 mg-0.5 mg/3 mL nebulizer solution Take 3 mLs by nebulization every 6 (six) hours as needed for Wheezing. Rescue, Starting Wed 10/9/2024, Until Thu 10/9/2025 at 2359, Normal      aspirin (ECOTRIN) 81 MG EC tablet Take 81 mg by mouth once daily., Historical Med      clopidogreL (PLAVIX) 75 mg tablet Take 1 tablet (75 mg total) by mouth once daily., Starting Wed 1/13/2021, Phone In      ferrous sulfate 325 (65 FE) MG EC tablet Take 325 mg by mouth once daily., Starting Tue 4/26/2022, Historical Med      fluticasone-umeclidin-vilanter (TRELEGY ELLIPTA) 100-62.5-25 mcg DsDv  Inhale 1 puff into the lungs once daily., Starting Thu 1/9/2025, Normal      L. gasseri-B. bifidum-B longum 1.5 billion cell Cap Take 1 tablet by mouth once daily. , Historical Med      megestroL (MEGACE) 20 MG Tab Take 40 mg by mouth 2 (two) times daily., Historical Med      metoprolol succinate (TOPROL-XL) 50 MG 24 hr tablet Take 50 mg by mouth 2 (two) times daily., Starting Mon 8/21/2017, Historical Med      oxyCODONE-acetaminophen (PERCOCET)  mg per tablet Take 1 tablet by mouth every 4 (four) hours as needed for Pain. 5 times daily, Starting Fri 7/8/2022, Historical Med      pantoprazole (PROTONIX) 40 MG tablet Take 1 tablet (40 mg total) by mouth 2 (two) times daily., Starting Mon 11/21/2022, Until Thu 1/9/2025, Normal      polycarbophil (FIBERCON) 625 mg tablet Take 625 mg by mouth once daily., Historical Med      promethazine (PHENERGAN) 6.25 mg/5 mL syrup Take 6.25 mg by mouth every 4 (four) hours as needed for Nausea., Starting Sat 12/30/2023, Historical Med      simvastatin (ZOCOR) 20 MG tablet Take 20 mg by mouth every evening., Historical Med      UNABLE TO FIND Take 1 tablet by mouth once daily. medication name: Ronak Mohan MVI, Historical Med      vit C,S-Ik-xyfki-lutein-zeaxan (PRESERVISION AREDS-2) 250-90-40-1 mg Cap Take 1 tablet by mouth 2 (two) times a day., Historical Med             Orders Placed This Encounter   Procedures    X-Ray Chest AP Portable    CBC auto differential    Comprehensive metabolic panel    Magnesium    Saline lock IV       Imaging Results              X-Ray Chest AP Portable (Final result)  Result time 01/15/25 14:25:20      Final result by Ten Andrade DO (01/15/25 14:25:20)                   Impression:      Hyperinflation of the lungs with prominence of the interstitium could reflect emphysematous lung disease.  The lungs are otherwise clear.      Electronically signed by: Ten Andrade  Date:    01/15/2025  Time:    14:25               Narrative:     EXAMINATION:  XR CHEST AP PORTABLE    CLINICAL HISTORY:  trouble swallowing;    FINDINGS:  Portable chest with comparison chest x-ray 12/31/2023.  Normal cardiomediastinal silhouette.There is hyperinflation of the lungs with prominence of the interstitium.  No confluent airspace opacities observed.  Pulmonary vasculature is normal. No acute osseous abnormality.  Spinal stimulator leads noted.                                  (radiology reading, visualized by me)    ED Course as of 01/15/25 2143   Wed Melvin 15, 2025   9528 Discussed presentation with Dr. Mc along with recent history and upcoming workup planned.  She will schedule esophagram for additional workup with office to call patient and recommends addition of PPI as well as PRN hyoscyamine to assist with symptoms. [MR]   1619 EKG:  Normal sinus rhythm at a rate of 77.  Normal intervals.  Normal axis.  No significant ST or T wave changes suggesting acute ischemia or infarction.  No significant change compared to last prior.  (Independently interpreted by me)   [MR]      ED Course User Index  [MR] Pierce Husain MD       MDM:    85 y.o. female with difficulty swallowing solids and some medications with prior history of esophageal stenosis requiring recent dilations.  She appears well-hydrated with laboratories sent to exclude end-organ dysfunction such as electrolyte derangement, dehydration, renal insult.  There is no sign of upper airway obstruction and I believe this is likely some degree of anatomic esophageal obstructive pathology, possibly recurrent stenosis.  She has had issues with malnutrition.  Labs without sign of significant dehydration and I have discussed with GI who will coordinate further workup including addition of esophagram.  She is on PPI as they recommended and I did add hyoscyamine as necessary to assist with symptoms pending close follow-up.  She also currently has endoscopy scheduled.  I feel this is reasonable as she is  tolerating fluids with modify diet recommended pending close follow-up.  Return precautions reviewed.    Diagnoses:    1. Esophageal dysphagia       Pierce Husain MD  01/15/25 3295

## 2025-01-16 DIAGNOSIS — K22.2 ESOPHAGEAL STENOSIS: ICD-10-CM

## 2025-01-16 DIAGNOSIS — R13.10 DYSPHAGIA: Primary | ICD-10-CM

## 2025-01-24 ENCOUNTER — ANESTHESIA EVENT (OUTPATIENT)
Dept: SURGERY | Facility: HOSPITAL | Age: 85
End: 2025-01-24
Payer: MEDICARE

## 2025-01-24 ENCOUNTER — ANESTHESIA (OUTPATIENT)
Dept: SURGERY | Facility: HOSPITAL | Age: 85
End: 2025-01-24
Payer: MEDICARE

## 2025-01-24 ENCOUNTER — HOSPITAL ENCOUNTER (OUTPATIENT)
Facility: HOSPITAL | Age: 85
Discharge: HOME | End: 2025-01-24
Attending: INTERNAL MEDICINE | Admitting: INTERNAL MEDICINE
Payer: MEDICARE

## 2025-01-24 VITALS
SYSTOLIC BLOOD PRESSURE: 105 MMHG | HEART RATE: 67 BPM | DIASTOLIC BLOOD PRESSURE: 55 MMHG | OXYGEN SATURATION: 100 % | TEMPERATURE: 99 F | RESPIRATION RATE: 15 BRPM

## 2025-01-24 DIAGNOSIS — R13.10 DYSPHAGIA: ICD-10-CM

## 2025-01-24 PROCEDURE — 88305 TISSUE EXAM BY PATHOLOGIST: CPT | Mod: TC | Performed by: PATHOLOGY

## 2025-01-24 PROCEDURE — D9220A PRA ANESTHESIA: Mod: CRNA,,,

## 2025-01-24 PROCEDURE — 27200043 HC FORCEPS, BIOPSY: Performed by: INTERNAL MEDICINE

## 2025-01-24 PROCEDURE — 43239 EGD BIOPSY SINGLE/MULTIPLE: CPT | Performed by: INTERNAL MEDICINE

## 2025-01-24 PROCEDURE — 87496 CYTOMEG DNA AMP PROBE: CPT | Performed by: INTERNAL MEDICINE

## 2025-01-24 PROCEDURE — 43450 DILATE ESOPHAGUS 1/MULT PASS: CPT | Performed by: INTERNAL MEDICINE

## 2025-01-24 PROCEDURE — D9220A PRA ANESTHESIA: Mod: ANES,,, | Performed by: ANESTHESIOLOGY

## 2025-01-24 PROCEDURE — 63600175 PHARM REV CODE 636 W HCPCS

## 2025-01-24 PROCEDURE — 37000008 HC ANESTHESIA 1ST 15 MINUTES: Performed by: INTERNAL MEDICINE

## 2025-01-24 PROCEDURE — 36415 COLL VENOUS BLD VENIPUNCTURE: CPT | Performed by: INTERNAL MEDICINE

## 2025-01-24 PROCEDURE — 37000009 HC ANESTHESIA EA ADD 15 MINS: Performed by: INTERNAL MEDICINE

## 2025-01-24 RX ORDER — LIDOCAINE HYDROCHLORIDE 20 MG/ML
INJECTION, SOLUTION EPIDURAL; INFILTRATION; INTRACAUDAL; PERINEURAL
Status: DISCONTINUED | OUTPATIENT
Start: 2025-01-24 | End: 2025-01-24

## 2025-01-24 RX ORDER — PHENYLEPHRINE HYDROCHLORIDE 10 MG/ML
INJECTION INTRAVENOUS
Status: DISCONTINUED | OUTPATIENT
Start: 2025-01-24 | End: 2025-01-24

## 2025-01-24 RX ORDER — PROPOFOL 10 MG/ML
VIAL (ML) INTRAVENOUS
Status: DISCONTINUED | OUTPATIENT
Start: 2025-01-24 | End: 2025-01-24

## 2025-01-24 RX ADMIN — PROPOFOL 70 MG: 10 INJECTION, EMULSION INTRAVENOUS at 09:01

## 2025-01-24 RX ADMIN — PROPOFOL 20 MG: 10 INJECTION, EMULSION INTRAVENOUS at 10:01

## 2025-01-24 RX ADMIN — PHENYLEPHRINE HYDROCHLORIDE 100 MCG: 10 INJECTION INTRAVENOUS at 10:01

## 2025-01-24 RX ADMIN — PROPOFOL 40 MG: 10 INJECTION, EMULSION INTRAVENOUS at 09:01

## 2025-01-24 RX ADMIN — LIDOCAINE HYDROCHLORIDE 50 MG: 20 INJECTION, SOLUTION INTRAVENOUS at 09:01

## 2025-01-24 NOTE — [employer] PATIENT INSTRUCTIONS
Discharge Summary/Instructions after an Endoscopic Procedure  Patient Name: Carmen Wilkinson  Patient MRN: [id number]  Patient YOB: 1940 Friday, January 24, 2025  Lino Devi MD  RESTRICTIONS:  During your procedure today, you received medications for sedation.  These   medications may affect your judgment, balance and coordination.  Therefore,   for 24 hours, you have the following restrictions:   - DO NOT drive a car, operate machinery, make legal/financial decisions,   sign important papers or drink alcohol.    ACTIVITY:  Today: no heavy lifting, straining or running due to procedural   sedation/anesthesia.  The following day: return to full activity including work.  DIET:  Eat and drink normally unless instructed otherwise.     TREATMENT FOR COMMON SIDE EFFECTS:  - Mild abdominal pain, nausea, belching, bloating or excessive gas:  rest,   eat lightly and use a heating pad.  - Sore Throat: treat with throat lozenges and/or gargle with warm salt   water.  - Because air was used during the procedure, expelling large amounts of air   from your rectum or belching is normal.  - If a bowel prep was taken, you may not have a bowel movement for 1-3 days.    This is normal.  SYMPTOMS TO WATCH FOR AND REPORT TO YOUR PHYSICIAN:  1. Abdominal pain or bloating, other than gas cramps.  2. Chest pain.  3. Back pain.  4. Signs of infection such as: chills or fever occurring within 24 hours   after the procedure.  5. Rectal bleeding, which would show as bright red, maroon, or black stools.   (A tablespoon of blood from the rectum is not serious, especially if   hemorrhoids are present.)  6. Vomiting.  7. Weakness or dizziness.  GO DIRECTLY TO THE NEAREST EMERGENCY ROOM IF YOU HAVE ANY OF THE FOLLOWING:      Difficulty breathing              Chills and/or fever over 101 F   Persistent vomiting and/or vomiting blood   Severe abdominal pain   Severe chest pain   Black, tarry stools   Bleeding- more than one  tablespoon   Any other symptom or condition that you feel may need urgent attention  Your doctor recommends these additional instructions:  If any biopsies were taken, your doctors clinic will contact you in 1 to 2   weeks with any results.  - Patient has a contact number available for emergencies.  The signs and   symptoms of potential delayed complications were discussed with the   patient.  Return to normal activities tomorrow.  Written discharge   instructions were provided to the patient.   - Resume previous diet.   - Continue present medications.   - Await pathology results.   - Repeat upper endoscopy in 3 weeks for retreatment.   - Return to GI clinic PRN.   - Stop multivitamin, fibercon, stop ferrous suflate  - Await pathology and HSV/CMV studies  - Start carafate suspension and use omeprazole (open capsule and mix with   3-4 oz of OJ or applesauce) twice daily  - Discharge patient to home (with escort).  For questions, problems or results please call your physician - Lino Devi MD at Work:  [phone].  [facility] [place], EMERGENCY ROOM PHONE NUMBER: [phone]  IF A COMPLICATION OR EMERGENCY SITUATION ARISES AND YOU ARE UNABLE TO REACH   YOUR PHYSICIAN - GO DIRECTLY TO THE EMERGENCY ROOM.  MD Lino Allen MD  1/24/2025 10:13:25 AM  This report has been verified and signed electronically.  Dear patient,  As a result of recent federal legislation (The Federal Cures Act), you may   receive lab or pathology results from your procedure in your [facility]   account before your physician is able to contact you. Your physician or   their representative will relay the results to you with their   recommendations at their soonest availability.  Thank you,  [employer]ATION

## 2025-01-24 NOTE — ANESTHESIA POSTPROCEDURE EVALUATION
Anesthesia Post Evaluation    Patient: Carmen Fletcher Pflug    Procedure(s) Performed: Procedure(s) (LRB):  EGD (ESOPHAGOGASTRODUODENOSCOPY) (N/A)    Final Anesthesia Type: general      Patient location during evaluation: GI PACU  Patient participation: Yes- Able to Participate  Level of consciousness: awake and alert and oriented  Post-procedure vital signs: reviewed and stable  Pain management: adequate  Airway patency: patent    PONV status at discharge: No PONV  Anesthetic complications: no      Cardiovascular status: blood pressure returned to baseline, hemodynamically stable and stable  Respiratory status: unassisted, spontaneous ventilation and room air  Hydration status: euvolemic  Follow-up not needed.              Vitals Value Taken Time   /90 01/24/25 1124   Temp 36.7 01/24/25 1138   Pulse 90 01/24/25 1136   Resp 21 01/24/25 1136   SpO2 100 % 01/24/25 1136   Vitals shown include unfiled device data.      Event Time   Out of Recovery 10:40:59         Pain/Wili Score: No data recorded

## 2025-01-24 NOTE — ANESTHESIA PREPROCEDURE EVALUATION
01/24/2025  Carmen Wilkinson is a 85 y.o., female.         Results for orders placed or performed during the hospital encounter of 08/26/24   EKG 12-lead    Collection Time: 08/26/24  9:23 AM   Result Value Ref Range    QRS Duration 72 ms    OHS QTC Calculation 413 ms    Narrative    Test Reason : Z01.810,Z01.818,    Vent. Rate : 075 BPM     Atrial Rate : 075 BPM     P-R Int : 160 ms          QRS Dur : 072 ms      QT Int : 370 ms       P-R-T Axes : 090 061 076 degrees     QTc Int : 413 ms    Normal sinus rhythm with sinus arrhythmia  Normal ECG  When compared with ECG of 31-DEC-2023 08:43,  Premature ventricular complexes are no longer Present  Premature supraventricular complexes are no longer Present  Confirmed by Hamzah Edwards MD ([id number]) on 8/31/2024 5:55:13 PM    Referred By:             Confirmed By:Hamzah Edwards MD             Lab Results   Component Value Date    WBC 7.08 01/15/2025    HGB 11.0 (L) 01/15/2025    HCT 33.6 (L) 01/15/2025    MCV 98 01/15/2025     01/15/2025     BMP  Lab Results   Component Value Date     01/15/2025    K 4.2 01/15/2025     01/15/2025    CO2 25 01/15/2025    BUN 20 01/15/2025    CREATININE 0.8 01/15/2025    CALCIUM 10.0 01/15/2025    ANIONGAP 9 01/15/2025    GLU 89 01/15/2025     (H) 12/06/2024    GLU 91 08/26/2024       Results for orders placed during the hospital encounter of 04/01/22    Echo Saline Bubble? No    Interpretation Summary  · The left ventricle is small with concentric remodeling and hyperdynamic systolic function.  · The estimated ejection fraction is 75%.  · Indeterminate left ventricular diastolic function.  · Mild right ventricular enlargement with normal right ventricular systolic function.  · Mild to moderate tricuspid regurgitation.  · Mild-to-moderate aortic regurgitation.        Pre-op Assessment    I have  reviewed the Patient Summary Reports.     I have reviewed the Nursing Notes. I have reviewed the NPO Status.   I have reviewed the Medications.     Review of Systems  Anesthesia Hx:  No problems with previous Anesthesia             Denies Family Hx of Anesthesia complications.    Denies Personal Hx of Anesthesia complications.                    Social:  Former Smoker, No Alcohol Use       Hematology/Oncology:       -- Anemia:                    --  Cancer in past history:              surgery   Oncology Comments: Squamous cell carcinoma     EENT/Dental:   Dysphonia  Macular degeneration Eyes: Visual Impairment   Has Bilateral and S/P Extraction - Bilateral Catarract                  Cardiovascular:     Hypertension, poorly controlled          PVD hyperlipidemia   ECG has been reviewed. Hx of Mesenteric artery stenosis  Patient followed by Dr. Bowers seen May 2024  Cardiac workup negative per patient  Patient on beta blockers Beta blocker taken in last 24 hours    Valvular Heart Disease:   Aortic Regurgitation (AR), mild, moderate, Tricuspid Regurgitation (TR), mild, moderate           Carotoid Artery Disease               Pulmonary:   COPD Asthma  Shortness of breath   Daily inhaler - Trelegy Ellipta  Rescue inhaler   Home Oxygen Dependent   No Hospitalizations for exacerbations   Patient followed by Dr. Molina   Asthma:   Chronic Obstructive Pulmonary Disease (COPD):   Emphysema                   Renal/:  Renal/ Normal                 Hepatic/GI:  Bowel Prep. PUD,     Hx Acute pancreatitis  Hx of Mesenteric artery stenosis             Musculoskeletal:     Hx cervical spine stimulator - not active       Spine Disorders: lumbar and cervical Chronic Pain, Degenerative disease and Disc disease           Neurological:           Brain aneurysm present but not followed per patient                             Endocrine:  Endocrine Normal            Psych:  Psychiatric History anxiety                 Physical  Exam  General: Well nourished, Cooperative, Alert and Oriented    Airway:  Mallampati: III   Mouth Opening: Normal  TM Distance: > 6 cm  Tongue: Normal  Neck ROM: Extension Decreased    Dental:  Dentures, Caps / Implants    Chest/Lungs:  Clear to auscultation, Normal Respiratory Rate    Heart:  Rate: Normal  Rhythm: Regular Rhythm        Anesthesia Plan  Type of Anesthesia, risks & benefits discussed:    Anesthesia Type: Gen Natural Airway  Intra-op Monitoring Plan: Standard ASA Monitors  Post Op Pain Control Plan:   (medical reason for not using multimodal pain management)  Induction:  IV  Informed Consent: Informed consent signed with the Patient and all parties understand the risks and agree with anesthesia plan.  All questions answered.   ASA Score: 3    Ready For Surgery From Anesthesia Perspective.     .

## 2025-01-24 NOTE — H&P
GASTROENTEROLOGY PRE-PROCEDURE H&P NOTE  Patient Name: Carmen Fletcher Pflug  Patient MRN: [id number]  Patient : 1940    Service date: 2025    PCP: Abiodun Will MD    No chief complaint on file.      HPI: Patient is a 85 y.o. female with PMHx as below here for evaluation of chronic esophageal stricture.    One benign-appearing, intrinsic moderate stenosis was found 38 to 39        cm from the incisors. This stenosis measured 1 cm (in length). The        stenosis was traversed. The scope was withdrawn. Dilation was        performed with a Landa dilator with no resistance at 46 Fr and 48        Fr and mild resistance at 50 Fr. The dilation site was examined        following endoscope reinsertion and showed mild mucosal disruption. .     Past Medical History:  Past Medical History:   Diagnosis Date    Anemia, unspecified     Back pain     Brain aneurysm     Carotid stenosis     Cataract     Diverticulitis     Emphysema lung     Feeling anxious     Hyperlipidemia     Hypertension     Macular degeneration     PVD (peripheral vascular disease)     Squamous cell carcinoma 2017    right medical cheek, SSIS, efudex tx        Past Surgical History:  Past Surgical History:   Procedure Laterality Date    ABDOMINAL SURGERY      ANGIOGRAPHY OF MESENTERIC VESSELS Left 2022    Procedure: ANGIOGRAM, MESENTERIC VESSELS;  Surgeon: Krish Machado MD;  Location: [facility] CATH/EP LAB;  Service: General;  Laterality: Left;    ARTERIOGRAM, MESENTERIC N/A 2021    Procedure: ARTERIOGRAM, MESENTERIC;  Surgeon: Krish Machado MD;  Location: [facility] CATH/EP LAB;  Service: General;  Laterality: N/A;    BACK SURGERY      CATARACT EXTRACTION, BILATERAL      COLONOSCOPY N/A 2022    Procedure: COLONOSCOPY;  Surgeon: Lino Devi MD;  Location: [facility] ENDO;  Service: Endoscopy;  Laterality: N/A;    CREATION, BYPASS, ARTERIAL, AORTA TO FEMORAL N/A 2021    Procedure: ARTERIOGRAM, MESENTERIC;   Surgeon: Krish Machado MD;  Location: [facility] CATH/EP LAB;  Service: General;  Laterality: N/A;    ENDOSCOPY OF PROXIMAL SMALL INTESTINE N/A 04/04/2022    Procedure: ENTEROSCOPY, PROXIMAL;  Surgeon: Lino Devi MD;  Location: [facility] ENDO;  Service: Endoscopy;  Laterality: N/A;    ESOPHAGOGASTRODUODENOSCOPY N/A 11/20/2022    Procedure: EGD (ESOPHAGOGASTRODUODENOSCOPY);  Surgeon: Pedro Hall Jr., MD;  Location: [facility] ENDO;  Service: Endoscopy;  Laterality: N/A;    ESOPHAGOGASTRODUODENOSCOPY N/A 8/31/2023    Procedure: EGD (ESOPHAGOGASTRODUODENOSCOPY);  Surgeon: Lino Devi MD;  Location: [facility] ENDO;  Service: Endoscopy;  Laterality: N/A;    ESOPHAGOGASTRODUODENOSCOPY N/A 10/26/2023    Procedure: EGD (ESOPHAGOGASTRODUODENOSCOPY);  Surgeon: Lino Devi MD;  Location: [facility] ENDO;  Service: Endoscopy;  Laterality: N/A;    ESOPHAGOGASTRODUODENOSCOPY N/A 12/14/2023    Procedure: EGD (ESOPHAGOGASTRODUODENOSCOPY);  Surgeon: Lino Devi MD;  Location: [facility] ENDO;  Service: Endoscopy;  Laterality: N/A;    ESOPHAGOGASTRODUODENOSCOPY N/A 2/29/2024    Procedure: EGD (ESOPHAGOGASTRODUODENOSCOPY);  Surgeon: Lino Devi MD;  Location: [facility] ENDO;  Service: Endoscopy;  Laterality: N/A;    ESOPHAGOGASTRODUODENOSCOPY N/A 8/29/2024    Procedure: EGD (ESOPHAGOGASTRODUODENOSCOPY);  Surgeon: Lino Devi MD;  Location: [facility] ENDO;  Service: Endoscopy;  Laterality: N/A;    ESOPHAGOGASTRODUODENOSCOPY N/A 12/9/2024    Procedure: EGD (ESOPHAGOGASTRODUODENOSCOPY);  Surgeon: Lino Devi MD;  Location: [facility] ENDO;  Service: Endoscopy;  Laterality: N/A;    HYSTERECTOMY      IMPLANTATION OF SPINAL CORD STIMULATOR IN CERVICAL SPINE      UPPER GASTROINTESTINAL ENDOSCOPY  11/20/2022    w/ antrum biopsy        Home Medications:  Medications Prior to Admission   Medication Sig Dispense Refill Last Dose/Taking    metoprolol succinate (TOPROL-XL) 50 MG 24 hr tablet Take 50 mg by mouth 2 (two) times daily.   1/24/2025 Morning     oxyCODONE-acetaminophen (PERCOCET)  mg per tablet Take 1 tablet by mouth every 4 (four) hours as needed for Pain. 5 times daily   1/24/2025 Morning    albuterol-ipratropium (DUO-NEB) 2.5 mg-0.5 mg/3 mL nebulizer solution Take 3 mLs by nebulization every 6 (six) hours as needed for Wheezing. Rescue (Patient not taking: Reported on 1/9/2025) 120 each 11     aspirin (ECOTRIN) 81 MG EC tablet Take 81 mg by mouth once daily.       clopidogreL (PLAVIX) 75 mg tablet Take 1 tablet (75 mg total) by mouth once daily. 30 tablet 3     ferrous sulfate 325 (65 FE) MG EC tablet Take 325 mg by mouth once daily.       fluticasone-umeclidin-vilanter (TRELEGY ELLIPTA) 100-62.5-25 mcg DsDv Inhale 1 puff into the lungs once daily. 60 each 11     hyoscyamine 0.125 mg Subl Place 1 tablet (0.125 mg total) under the tongue every 4 (four) hours as needed (spasm, difficulty swallowing). 15 tablet 0     L. gasseri-B. bifidum-B longum 1.5 billion cell Cap Take 1 tablet by mouth once daily.  (Patient not taking: Reported on 10/9/2024)       megestroL (MEGACE) 20 MG Tab Take 40 mg by mouth 2 (two) times daily. (Patient not taking: Reported on 1/9/2025.)       pantoprazole (PROTONIX) 40 MG tablet Take 1 tablet (40 mg total) by mouth 2 (two) times daily. 180 tablet 1     polycarbophil (FIBERCON) 625 mg tablet Take 625 mg by mouth once daily.       promethazine (PHENERGAN) 6.25 mg/5 mL syrup Take 6.25 mg by mouth every 4 (four) hours as needed for Nausea. (Patient not taking: Reported on 10/9/2024)       simvastatin (ZOCOR) 20 MG tablet Take 20 mg by mouth every evening.       UNABLE TO FIND Take 1 tablet by mouth once daily. medication name: Ronak Lessman MVI       vit C,G-Pi-gouaj-lutein-zeaxan (PRESERVISION AREDS-2) 250-90-40-1 mg Cap Take 1 tablet by mouth 2 (two) times a day.                  Review of patient's allergies indicates:   Allergen Reactions    Sulfa (sulfonamide antibiotics) Nausea Only       Social History:   Social History  "    Occupational History    Not on file   Tobacco Use    Smoking status: Former     Current packs/day: 0.00     Average packs/day: 1 pack/day for 56.8 years (56.8 ttl pk-yrs)     Types: Cigarettes     Start date: 1965     Quit date: 2022     Years since quittin.8    Smokeless tobacco: Never   Substance and Sexual Activity    Alcohol use: No    Drug use: Not Currently    Sexual activity: Not on file       Family History:   Family History   Problem Relation Name Age of Onset    Melanoma Neg Hx      Psoriasis Neg Hx      Eczema Neg Hx      Lupus Neg Hx         Review of Systems:  A 10 point review of systems was performed and was normal, except as mentioned in the HPI, including constitutional, HEENT, heme, lymph, cardiovascular, respiratory, gastrointestinal, genitourinary, neurologic, endocrine, psychiatric and musculoskeletal.      OBJECTIVE:    Physical Exam:  24 Hour Vital Sign Ranges: Temp:  [98.6 °F (37 °C)] 98.6 °F (37 °C)  Pulse:  [90] 90  Resp:  [19] 19  SpO2:  [99 %] 99 %  BP: (163)/(66) 163/66  Most recent vitals: BP (!) 163/66   Pulse 90   Temp 98.6 °F (37 °C) (Temporal)   Resp 19   LMP  (LMP Unknown)   SpO2 99%    GEN: well-developed, well-nourished, awake and alert, non-toxic appearing adult  HEENT: PERRL, sclera anicteric, oral mucosa pink and moist without lesion  NECK: trachea midline; Good ROM  CV: regular rate and rhythm, no murmurs or gallops  RESP: clear to auscultation bilaterally, no wheezes, rhonci or rales  ABD: soft, non-tender, non-distended, normal bowel sounds  EXT: no swelling or edema, 2+ pulses distally  SKIN: no rashes or jaundice  PSYCH: normal affect    Labs:   No results for input(s): "WBC", "MCV", "PLT" in the last 72 hours.    Invalid input(s): "HGBAU"  No results for input(s): "NA", "K", "CL", "CO2", "BUN", "GLU" in the last 72 hours.    Invalid input(s): "CREA"  No results for input(s): "ALB" in the last 72 hours.    Invalid input(s): "ALKP", "SGOT", "SGPT", " ""TBIL", "DBIL", "TPRO"  No results for input(s): "PT", "INR", "PTT" in the last 72 hours.      IMPRESSION / RECOMMENDATIONS:  Esophageal stricture  -EGD  with interventions as warranted.   RIsks, benefits, alternatives discussed in detail regarding upcoming procedures and sedation. Some of the more common endoscopic complications include but not limited to immediate or delayed perforation, bleeding, infections, pain, inadvertent injury to surrounding tissue / organs and possible need for surgical evaluation. Patient expressed understanding, all questions answered and will proceed with procedure as planned.     Lino Devi  1/24/2025  9:48 AM      "

## 2025-01-24 NOTE — TRANSFER OF CARE
Anesthesia Transfer of Care Note    Patient: Carmen Fletcher Pfluvivienne    Procedure(s) Performed: Procedure(s) (LRB):  EGD (ESOPHAGOGASTRODUODENOSCOPY) (N/A)    Patient location: GI    Anesthesia Type: general    Transport from OR: Transported from OR on room air with adequate spontaneous ventilation    Post pain: adequate analgesia    Post assessment: no apparent anesthetic complications    Post vital signs: stable    Level of consciousness: awake    Nausea/Vomiting: no nausea/vomiting    Complications: none    Transfer of care protocol was followed      Last vitals: Visit Vitals  BP (!) 163/66   Pulse 90   Temp 37 °C (98.6 °F) (Temporal)   Resp 19   LMP  (LMP Unknown)   SpO2 99%

## 2025-01-28 LAB
LABCORP MISC TEST CODE: 8250
LABCORP MISC TEST NAME: NORMAL
LABCORP MISCELLANEOUS TEST: NORMAL

## 2025-02-03 LAB — CMV DNA SPEC QL NAA+PROBE: NORMAL

## 2025-02-25 ENCOUNTER — HOSPITAL ENCOUNTER (OUTPATIENT)
Dept: PREADMISSION TESTING | Facility: HOSPITAL | Age: 85
Discharge: HOME | End: 2025-02-25
Attending: INTERNAL MEDICINE
Payer: MEDICARE

## 2025-02-25 VITALS — BODY MASS INDEX: 15.46 KG/M2 | WEIGHT: 84 LBS | HEIGHT: 62 IN

## 2025-02-25 DIAGNOSIS — Z01.810 PRE-PROCEDURAL CARDIOVASCULAR EXAMINATION: ICD-10-CM

## 2025-02-25 DIAGNOSIS — Z01.818 PREOP TESTING: Primary | ICD-10-CM

## 2025-02-25 RX ORDER — OMEPRAZOLE 40 MG/1
40 CAPSULE, DELAYED RELEASE ORAL DAILY
COMMUNITY

## 2025-02-25 NOTE — DISCHARGE INSTRUCTIONS
INSTRUCTIONS  To confirm your doctor has scheduled your surgery for:    Morning of surgery please check in with registration near Parking Garage Entrance then proceed to Registration then to endoscopy department    ENDOSCOPY NURSES will call the afternoon prior to procedure with your final arrival time.    TAKE ONLY THESE MEDICATIONS WITH A SMALL SIP OF WATER THE MORNING OF SURGERY:    DO NOT TAKE ANY INSULIN OR ORAL DIABETIC MEDICATIONS THE MORNING OF SURGERY UNLESS DIRECTED BY YOUR PHYSICIAN OR PRE ADMIT NURSE.    DO NOT TAKE THESE MEDICATIONS 5-7 DAYS PRIOR to your procedure per your surgeon's request: ASPIRIN, ALEVE, BC powder, LAKEISHA SELTZER, IBUPROFEN, FISH OIL, VITAMIN E, OR HERBALS   (May take Tylenol)    If you are prescribed any types of blood thinners (Aspirin, Coumadin, Plavix, Pradaxa, Xarelto, Aggrenox, Effient, Eliquis, Savasya, Brilinta or any other), please ask your surgeon how many days before scheduled procedure should you stop taking them. You may also need to verify with prescribing physician if it is OK to stop your blood thinners.      INSTRUCTIONS IMPORTANT!!  Do not eat anything after midnight. OK to drink clear liquids until 2 hours before arrival time.  ONLY if you are diabetic, check your sugar in the morning before your procedure.  Do not smoke, vape or drink alcoholic beverages 24 hours prior to your procedure.  If you wear contact lenses, dentures, hearing aids or glasses, bring a container to put them in during surgery and give to a family member for safe keeping.    Please leave all jewelry, piercing's and valuables at home.   Wear comfortable loose clothing and rubber soled shoes.  If your condition changes such as fever, cough, etc, please notify your surgeon.   ONLY if you have been diagnosed with sleep apnea please bring your C-PAP machine.  ONLY if you wear home oxygen please bring your portable oxygen tank the day of your procedure.   ONLY for patients requiring bowel prep,  written instructions will be given by your doctor's office.  ONLY if you have a neuro stimulator, please bring the controller with you the morning of surgery  Make arrangements in advance for transportation home by a responsible adult.  You must make arrangements for transportation, TAXI'S, UBER'S OR LYFTS ARE NOT ALLOWED.        If you have any questions about these instructions, call Endoscopy Nurse at [phone]

## 2025-02-25 NOTE — PRE-PROCEDURE INSTRUCTIONS
Pre-procedure education provided. Pt instructed to take metoprolol and oxycodone with small sip water morning of procedure. Pt verbalizes understanding

## 2025-02-26 ENCOUNTER — ANESTHESIA EVENT (OUTPATIENT)
Dept: SURGERY | Facility: HOSPITAL | Age: 85
End: 2025-02-26
Payer: MEDICARE

## 2025-02-26 NOTE — ANESTHESIA PREPROCEDURE EVALUATION
02/26/2025  Carmen Wilkinson is a 85 y.o., female.                   Lab Results   Component Value Date    WBC 6.95 02/25/2025    HGB 10.1 (L) 02/25/2025    HCT 32.0 (L) 02/25/2025     (H) 02/25/2025     02/25/2025     BMP  Lab Results   Component Value Date     02/25/2025    K 4.2 02/25/2025     02/25/2025    CO2 26 02/25/2025    BUN 25 (H) 02/25/2025    CREATININE 0.7 02/25/2025    CALCIUM 9.3 02/25/2025    ANIONGAP 7 (L) 02/25/2025     (H) 02/25/2025    GLU 89 01/15/2025     (H) 12/06/2024       Results for orders placed during the hospital encounter of 04/01/22    Echo Saline Bubble? No    Interpretation Summary  · The left ventricle is small with concentric remodeling and hyperdynamic systolic function.  · The estimated ejection fraction is 75%.  · Indeterminate left ventricular diastolic function.  · Mild right ventricular enlargement with normal right ventricular systolic function.  · Mild to moderate tricuspid regurgitation.  · Mild-to-moderate aortic regurgitation.        Pre-op Assessment    I have reviewed the Patient Summary Reports.     I have reviewed the Nursing Notes. I have reviewed the NPO Status.   I have reviewed the Medications.     Review of Systems  Anesthesia Hx:  No problems with previous Anesthesia             Denies Family Hx of Anesthesia complications.    Denies Personal Hx of Anesthesia complications.                    Social:  Former Smoker, No Alcohol Use       Hematology/Oncology:       -- Anemia:                    --  Cancer in past history:              surgery   Oncology Comments: Squamous cell carcinoma     EENT/Dental:   Dysphonia  Macular degeneration Eyes: Visual Impairment   Has Bilateral and S/P Extraction - Bilateral Catarract                  Cardiovascular:     Hypertension, poorly controlled          PVD  hyperlipidemia   ECG has been reviewed. Hx of Mesenteric artery stenosis  Patient followed by Dr. Bowers seen May 2024  Cardiac workup negative per patient  Patient on beta blockers Beta blocker taken in last 24 hours    Valvular Heart Disease:   Aortic Regurgitation (AR), mild, moderate, Tricuspid Regurgitation (TR), mild, moderate           Carotoid Artery Disease               Pulmonary:   COPD Asthma  Shortness of breath   Daily inhaler - Trelegy Ellipta  Rescue inhaler   Home Oxygen Dependent 2 L per nasal cannula  No Hospitalizations for exacerbations   Patient followed by Dr. Molina   Asthma:   Chronic Obstructive Pulmonary Disease (COPD):   Emphysema                   Renal/:  Renal/ Normal                 Hepatic/GI:  Bowel Prep. PUD,     Hx Acute pancreatitis  Hx of Mesenteric artery stenosis             Musculoskeletal:     Hx cervical spine stimulator - removed       Spine Disorders: lumbar and cervical Chronic Pain, Degenerative disease and Disc disease           Neurological:           Brain aneurysm present but not followed per patient                             Endocrine:  Endocrine Normal            Psych:  Psychiatric History anxiety                 Physical Exam  General: Well nourished, Cooperative, Alert and Oriented    Airway:  Mallampati: III   Mouth Opening: Normal  TM Distance: > 6 cm  Tongue: Normal  Neck ROM: Extension Decreased    Dental:  Dentures, Caps / Implants    Chest/Lungs:  Clear to auscultation, Normal Respiratory Rate    Heart:  Rate: Normal  Rhythm: Regular Rhythm        Anesthesia Plan  Type of Anesthesia, risks & benefits discussed:    Anesthesia Type: Gen Natural Airway  Intra-op Monitoring Plan: Standard ASA Monitors  Post Op Pain Control Plan:   (medical reason for not using multimodal pain management)  Induction:  IV  Informed Consent: Informed consent signed with the Patient and all parties understand the risks and agree with anesthesia plan.  All questions  answered.   ASA Score: 3  Anesthesia Plan Notes:       GNA  POM  Propofol     Ready For Surgery From Anesthesia Perspective.     .

## 2025-02-27 ENCOUNTER — ANESTHESIA (OUTPATIENT)
Dept: SURGERY | Facility: HOSPITAL | Age: 85
End: 2025-02-27
Payer: MEDICARE

## 2025-02-27 ENCOUNTER — HOSPITAL ENCOUNTER (OUTPATIENT)
Facility: HOSPITAL | Age: 85
Discharge: HOME | End: 2025-02-27
Attending: INTERNAL MEDICINE | Admitting: INTERNAL MEDICINE
Payer: MEDICARE

## 2025-02-27 VITALS
RESPIRATION RATE: 18 BRPM | DIASTOLIC BLOOD PRESSURE: 56 MMHG | OXYGEN SATURATION: 98 % | SYSTOLIC BLOOD PRESSURE: 126 MMHG | TEMPERATURE: 97 F | HEART RATE: 67 BPM | BODY MASS INDEX: 15.46 KG/M2 | WEIGHT: 84 LBS | HEIGHT: 62 IN

## 2025-02-27 DIAGNOSIS — K22.2 ESOPHAGEAL STENOSIS: ICD-10-CM

## 2025-02-27 PROCEDURE — 36415 COLL VENOUS BLD VENIPUNCTURE: CPT | Performed by: INTERNAL MEDICINE

## 2025-02-27 PROCEDURE — 43450 DILATE ESOPHAGUS 1/MULT PASS: CPT | Performed by: INTERNAL MEDICINE

## 2025-02-27 PROCEDURE — 63600175 PHARM REV CODE 636 W HCPCS

## 2025-02-27 PROCEDURE — 37000008 HC ANESTHESIA 1ST 15 MINUTES: Performed by: INTERNAL MEDICINE

## 2025-02-27 PROCEDURE — 88305 TISSUE EXAM BY PATHOLOGIST: CPT | Mod: TC,59 | Performed by: PATHOLOGY

## 2025-02-27 PROCEDURE — 37000009 HC ANESTHESIA EA ADD 15 MINS: Performed by: INTERNAL MEDICINE

## 2025-02-27 PROCEDURE — 25000003 PHARM REV CODE 250

## 2025-02-27 PROCEDURE — 27200043 HC FORCEPS, BIOPSY: Performed by: INTERNAL MEDICINE

## 2025-02-27 PROCEDURE — 43239 EGD BIOPSY SINGLE/MULTIPLE: CPT | Performed by: INTERNAL MEDICINE

## 2025-02-27 RX ORDER — PHENYLEPHRINE HYDROCHLORIDE 10 MG/ML
INJECTION INTRAVENOUS
Status: DISCONTINUED | OUTPATIENT
Start: 2025-02-27 | End: 2025-02-27

## 2025-02-27 RX ORDER — LIDOCAINE HYDROCHLORIDE 20 MG/ML
INJECTION INTRAVENOUS
Status: DISCONTINUED | OUTPATIENT
Start: 2025-02-27 | End: 2025-02-27

## 2025-02-27 RX ORDER — PROPOFOL 10 MG/ML
VIAL (ML) INTRAVENOUS
Status: DISCONTINUED | OUTPATIENT
Start: 2025-02-27 | End: 2025-02-27

## 2025-02-27 RX ADMIN — PHENYLEPHRINE HYDROCHLORIDE 100 MCG: 10 INJECTION INTRAVENOUS at 10:02

## 2025-02-27 RX ADMIN — SODIUM CHLORIDE: 0.9 INJECTION, SOLUTION INTRAVENOUS at 09:02

## 2025-02-27 RX ADMIN — PROPOFOL 50 MG: 10 INJECTION, EMULSION INTRAVENOUS at 09:02

## 2025-02-27 RX ADMIN — PROPOFOL 20 MG: 10 INJECTION, EMULSION INTRAVENOUS at 09:02

## 2025-02-27 RX ADMIN — LIDOCAINE HYDROCHLORIDE 50 MG: 20 INJECTION, SOLUTION INTRAVENOUS at 09:02

## 2025-02-27 NOTE — H&P
GASTROENTEROLOGY PRE-PROCEDURE H&P NOTE  Patient Name: Carmen Fletcher Pflug  Patient MRN: [id number]  Patient : 1940    Service date: 2025    PCP: Abiodun Will MD    No chief complaint on file.      HPI: Patient is a 85 y.o. 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     Past Medical History:  Past Medical History:   Diagnosis Date    Anemia, unspecified     Back pain     Brain aneurysm 2018    Carotid stenosis     Cataract     Diverticulitis     Emphysema lung     Feeling anxious     Hyperlipidemia     Hypertension     Macular degeneration     PVD (peripheral vascular disease)     Squamous cell carcinoma 2017    right medical cheek, SSIS, efudex tx        Past Surgical History:  Past Surgical History:   Procedure Laterality Date    ABDOMINAL SURGERY      ANGIOGRAPHY OF MESENTERIC VESSELS Left 2022    Procedure: ANGIOGRAM, MESENTERIC VESSELS;  Surgeon: Krish Machado MD;  Location: [facility] CATH/EP LAB;  Service: General;  Laterality: Left;    ARTERIOGRAM, MESENTERIC N/A 2021    Procedure: ARTERIOGRAM, MESENTERIC;  Surgeon: Krish Machado MD;  Location: [facility] CATH/EP LAB;  Service: General;  Laterality: N/A;    BACK SURGERY      CATARACT EXTRACTION, BILATERAL      COLONOSCOPY N/A 2022    Procedure: COLONOSCOPY;  Surgeon: Lino Devi MD;  Location: [facility] ENDO;  Service: Endoscopy;  Laterality: N/A;    CREATION, BYPASS, ARTERIAL, AORTA TO FEMORAL N/A 2021    Procedure: ARTERIOGRAM, MESENTERIC;  Surgeon: Krish Machado MD;  Location: [facility] CATH/EP LAB;  Service: General;  Laterality: N/A;    ENDOSCOPY OF PROXIMAL SMALL INTESTINE N/A 2022    Procedure: ENTEROSCOPY, PROXIMAL;  Surgeon: Lino Devi MD;  Location: [facility] ENDO;  Service: Endoscopy;  Laterality: N/A;    ESOPHAGOGASTRODUODENOSCOPY N/A 2022    Procedure: EGD (ESOPHAGOGASTRODUODENOSCOPY);  Surgeon: Pedro Hall  MD Tani;  Location: [facility];  Service: Endoscopy;  Laterality: N/A;    ESOPHAGOGASTRODUODENOSCOPY N/A 8/31/2023    Procedure: EGD (ESOPHAGOGASTRODUODENOSCOPY);  Surgeon: Lino Devi MD;  Location: [facility];  Service: Endoscopy;  Laterality: N/A;    ESOPHAGOGASTRODUODENOSCOPY N/A 10/26/2023    Procedure: EGD (ESOPHAGOGASTRODUODENOSCOPY);  Surgeon: Lino Devi MD;  Location: [facility];  Service: Endoscopy;  Laterality: N/A;    ESOPHAGOGASTRODUODENOSCOPY N/A 12/14/2023    Procedure: EGD (ESOPHAGOGASTRODUODENOSCOPY);  Surgeon: Lino Devi MD;  Location: [facility];  Service: Endoscopy;  Laterality: N/A;    ESOPHAGOGASTRODUODENOSCOPY N/A 2/29/2024    Procedure: EGD (ESOPHAGOGASTRODUODENOSCOPY);  Surgeon: Lino Devi MD;  Location: [facility];  Service: Endoscopy;  Laterality: N/A;    ESOPHAGOGASTRODUODENOSCOPY N/A 8/29/2024    Procedure: EGD (ESOPHAGOGASTRODUODENOSCOPY);  Surgeon: Lino Devi MD;  Location: [facility];  Service: Endoscopy;  Laterality: N/A;    ESOPHAGOGASTRODUODENOSCOPY N/A 12/9/2024    Procedure: EGD (ESOPHAGOGASTRODUODENOSCOPY);  Surgeon: Lino Devi MD;  Location: [facility];  Service: Endoscopy;  Laterality: N/A;    ESOPHAGOGASTRODUODENOSCOPY N/A 1/24/2025    Procedure: EGD (ESOPHAGOGASTRODUODENOSCOPY);  Surgeon: Lino Devi MD;  Location: [facility];  Service: Endoscopy;  Laterality: N/A;    HYSTERECTOMY      IMPLANTATION OF SPINAL CORD STIMULATOR IN CERVICAL SPINE      UPPER GASTROINTESTINAL ENDOSCOPY  11/20/2022    w/ antrum biopsy        Home Medications:  Prescriptions Prior to Admission[1]            Review of patient's allergies indicates:   Allergen Reactions    Sulfa (sulfonamide antibiotics) Nausea Only       Social History:   Social History     Occupational History    Not on file   Tobacco Use    Smoking status: Former     Current packs/day: 0.00     Average packs/day: 1 pack/day for 56.8 years (56.8 ttl pk-yrs)     Types: Cigarettes     Start date:  "1965     Quit date: 2022     Years since quittin.9    Smokeless tobacco: Never   Substance and Sexual Activity    Alcohol use: No    Drug use: Not Currently    Sexual activity: Not on file       Family History:   Family History   Problem Relation Name Age of Onset    Melanoma Neg Hx      Psoriasis Neg Hx      Eczema Neg Hx      Lupus Neg Hx         Review of Systems:  A 10 point review of systems was performed and was normal, except as mentioned in the HPI, including constitutional, HEENT, heme, lymph, cardiovascular, respiratory, gastrointestinal, genitourinary, neurologic, endocrine, psychiatric and musculoskeletal.      OBJECTIVE:    Physical Exam:  24 Hour Vital Sign Ranges:    Most recent vitals: LMP  (LMP Unknown)    GEN: well-developed, well-nourished, awake and alert, non-toxic appearing adult  HEENT: PERRL, sclera anicteric, oral mucosa pink and moist without lesion  NECK: trachea midline; Good ROM  CV: regular rate and rhythm, no murmurs or gallops  RESP: clear to auscultation bilaterally, no wheezes, rhonci or rales  ABD: soft, non-tender, non-distended, normal bowel sounds  EXT: no swelling or edema, 2+ pulses distally  SKIN: no rashes or jaundice  PSYCH: normal affect    Labs:   Recent Labs     25  1454   WBC 6.95   *        Recent Labs     25  1454      K 4.2      CO2 26   BUN 25*   *     No results for input(s): "ALB" in the last 72 hours.    Invalid input(s): "ALKP", "SGOT", "SGPT", "TBIL", "DBIL", "TPRO"  No results for input(s): "PT", "INR", "PTT" in the last 72 hours.    EGD - dilation to 46 Maltese.    IMPRESSION / RECOMMENDATIONS:  Esophageal stricture  -EGD with dilation and  with interventions as warranted.   RIsks, benefits, alternatives discussed in detail regarding upcoming procedures and sedation. Some of the more common endoscopic complications include but not limited to immediate or delayed perforation, bleeding, infections, " pain, inadvertent injury to surrounding tissue / organs and possible need for surgical evaluation. Patient expressed understanding, all questions answered and will proceed with procedure as planned.     Lino Devi  2/27/2025  7:13 AM           [1]   No medications prior to admission.

## 2025-02-27 NOTE — TRANSFER OF CARE
"Anesthesia Transfer of Care Note    Patient: Carmen Fletcher Pfluvivienne    Procedure(s) Performed: Procedure(s) (LRB):  EGD (ESOPHAGOGASTRODUODENOSCOPY) (N/A)    Patient location: GI    Anesthesia Type: general    Transport from OR: Transported from OR on 2-3 L/min O2 by NC with adequate spontaneous ventilation    Post pain: adequate analgesia    Post assessment: no apparent anesthetic complications    Post vital signs: stable    Level of consciousness: awake    Nausea/Vomiting: no nausea/vomiting    Complications: none    Transfer of care protocol was followed      Last vitals: Visit Vitals  /77 (BP Location: Left arm, Patient Position: Lying)   Pulse 90   Temp 36.7 °C (98.1 °F) (Oral)   Resp 18   Ht 5' 2" (1.575 m)   Wt 38.1 kg (84 lb)   LMP  (LMP Unknown)   SpO2 95%   BMI 15.36 kg/m²     "

## 2025-02-27 NOTE — ANESTHESIA POSTPROCEDURE EVALUATION
Anesthesia Post Evaluation    Patient: Carmen Fletcher Pflug    Procedure(s) Performed: Procedure(s) (LRB):  EGD (ESOPHAGOGASTRODUODENOSCOPY) (N/A)    Final Anesthesia Type: general      Patient location during evaluation: GI PACU  Patient participation: Yes- Able to Participate  Level of consciousness: awake and alert, oriented and awake  Post-procedure vital signs: reviewed and stable  Pain management: adequate  Airway patency: patent    PONV status at discharge: No PONV  Anesthetic complications: no      Cardiovascular status: blood pressure returned to baseline, hemodynamically stable and stable  Respiratory status: unassisted, spontaneous ventilation and nasal cannula  Hydration status: euvolemic  Follow-up not needed.  Comments: Patient arrived prior to procedure using oxygen via nasal cannula 2 L and discharged with same              Vitals Value Taken Time   /56 02/27/25 10:34   Temp 36.1 °C (97 °F) 02/27/25 10:34   Pulse 67 02/27/25 10:34   Resp 18 02/27/25 10:34   SpO2 98 % 02/27/25 10:34         No case tracking events are documented in the log.      Pain/Wili Score: Wili Score: 10 (2/27/2025 10:29 AM)

## 2025-02-27 NOTE — [employer] PATIENT INSTRUCTIONS
Discharge Summary/Instructions after an Endoscopic Procedure  Patient Name: Carmen Wilkinson  Patient MRN: [id number]  Patient YOB: 1940 Thursday, February 27, 2025  Lino Devi MD  RESTRICTIONS:  During your procedure today, you received medications for sedation.  These   medications may affect your judgment, balance and coordination.  Therefore,   for 24 hours, you have the following restrictions:   - DO NOT drive a car, operate machinery, make legal/financial decisions,   sign important papers or drink alcohol.    ACTIVITY:  Today: no heavy lifting, straining or running due to procedural   sedation/anesthesia.  The following day: return to full activity including work.  DIET:  Eat and drink normally unless instructed otherwise.     TREATMENT FOR COMMON SIDE EFFECTS:  - Mild abdominal pain, nausea, belching, bloating or excessive gas:  rest,   eat lightly and use a heating pad.  - Sore Throat: treat with throat lozenges and/or gargle with warm salt   water.  - Because air was used during the procedure, expelling large amounts of air   from your rectum or belching is normal.  - If a bowel prep was taken, you may not have a bowel movement for 1-3 days.    This is normal.  SYMPTOMS TO WATCH FOR AND REPORT TO YOUR PHYSICIAN:  1. Abdominal pain or bloating, other than gas cramps.  2. Chest pain.  3. Back pain.  4. Signs of infection such as: chills or fever occurring within 24 hours   after the procedure.  5. Rectal bleeding, which would show as bright red, maroon, or black stools.   (A tablespoon of blood from the rectum is not serious, especially if   hemorrhoids are present.)  6. Vomiting.  7. Weakness or dizziness.  GO DIRECTLY TO THE NEAREST EMERGENCY ROOM IF YOU HAVE ANY OF THE FOLLOWING:      Difficulty breathing              Chills and/or fever over 101 F   Persistent vomiting and/or vomiting blood   Severe abdominal pain   Severe chest pain   Black, tarry stools   Bleeding- more than one  tablespoon   Any other symptom or condition that you feel may need urgent attention  Your doctor recommends these additional instructions:  If any biopsies were taken, your doctors clinic will contact you in 1 to 2   weeks with any results.  - Patient has a contact number available for emergencies.  The signs and   symptoms of potential delayed complications were discussed with the   patient.  Return to normal activities tomorrow.  Written discharge   instructions were provided to the patient.   - Resume previous diet.   - Continue present medications.   - Await pathology results.   - Repeat upper endoscopy in 3 months for retreatment.   - Return to GI clinic PRN.   - Liquid carafate ordered (bile in stomach)  - Resume plavix in 48 hours.  - Discharge patient to home (with escort).  For questions, problems or results please call your physician - Lino Devi MD at Work:  [phone].  [facility], EMERGENCY ROOM PHONE NUMBER: [phone]  IF A COMPLICATION OR EMERGENCY SITUATION ARISES AND YOU ARE UNABLE TO REACH   YOUR PHYSICIAN - GO DIRECTLY TO THE EMERGENCY ROOM.  MD Lino Allen MD  2/27/2025 10:09:47 AM  This report has been verified and signed electronically.  Dear patient,  As a result of recent federal legislation (The Federal Cures Act), you may   receive lab or pathology results from your procedure in your [facility]   account before your physician is able to contact you. Your physician or   their representative will relay the results to you with their   recommendations at their soonest availability.  Thank you,  [employer]

## 2025-03-01 LAB
LABCORP MISC TEST CODE: 8250
LABCORP MISC TEST CODE: 8250
LABCORP MISC TEST NAME: NORMAL
LABCORP MISC TEST NAME: NORMAL
LABCORP MISCELLANEOUS TEST: NORMAL
LABCORP MISCELLANEOUS TEST: NORMAL

## 2025-03-07 DIAGNOSIS — I65.29 CAROTID ARTERY STENOSIS: ICD-10-CM

## 2025-03-07 DIAGNOSIS — I10 ESSENTIAL (PRIMARY) HYPERTENSION: Primary | ICD-10-CM

## 2025-03-07 LAB
LABCORP MISC TEST CODE: 8201
LABCORP MISC TEST CODE: 8201
LABCORP MISC TEST NAME: NORMAL
LABCORP MISC TEST NAME: NORMAL
LABCORP MISCELLANEOUS TEST: NORMAL
LABCORP MISCELLANEOUS TEST: NORMAL

## 2025-03-10 DIAGNOSIS — I65.23 BILATERAL CAROTID ARTERY OCCLUSION: Primary | ICD-10-CM

## 2025-03-11 DIAGNOSIS — I65.29 CAROTID STENOSIS, ASYMPTOMATIC: ICD-10-CM

## 2025-03-11 DIAGNOSIS — I10 ESSENTIAL (PRIMARY) HYPERTENSION: Primary | ICD-10-CM

## 2025-03-13 ENCOUNTER — OFFICE VISIT (OUTPATIENT)
Dept: PULMONOLOGY | Facility: CLINIC | Age: 85
End: 2025-03-13
Payer: MEDICARE

## 2025-03-13 VITALS
WEIGHT: 82 LBS | OXYGEN SATURATION: 93 % | HEART RATE: 90 BPM | BODY MASS INDEX: 15.09 KG/M2 | DIASTOLIC BLOOD PRESSURE: 70 MMHG | HEIGHT: 62 IN | SYSTOLIC BLOOD PRESSURE: 140 MMHG

## 2025-03-13 DIAGNOSIS — J44.9 CHRONIC OBSTRUCTIVE PULMONARY DISEASE, UNSPECIFIED COPD TYPE: Primary | ICD-10-CM

## 2025-03-13 DIAGNOSIS — E46 MALNUTRITION, UNSPECIFIED TYPE: ICD-10-CM

## 2025-03-13 DIAGNOSIS — J43.2 CENTRILOBULAR EMPHYSEMA: ICD-10-CM

## 2025-03-13 DIAGNOSIS — J96.11 CHRONIC RESPIRATORY FAILURE WITH HYPOXIA: ICD-10-CM

## 2025-03-13 PROCEDURE — 99999 PR PBB SHADOW E&M-EST. PATIENT-LVL III: CPT | Mod: PBBFAC,,, | Performed by: NURSE PRACTITIONER

## 2025-03-13 RX ORDER — ERGOCALCIFEROL 1.25 MG/1
1 CAPSULE ORAL
COMMUNITY
Start: 2025-02-11

## 2025-03-13 NOTE — PROGRESS NOTES
SUBJECTIVE:    Patient ID: Carmen Sparrowg is a 85 y.o. female.    Chief Complaint: Follow-up (Surgical clearance)    The patient is here today for surgical clearance for a carotid surgery with Dr. Machado. She did not have the PFT done after her visit with Dr. Molina in January. She is wearing her oxygen all of the time, and she is using the Trelegy.  She has lost 5 pounds since her last visit.  We discussed that she has to have the PFT before I can consider clearing her.  She is an extremely high risk patient, considering her copd, hypoxemia, age, and malnutrition.   Past Medical History:   Diagnosis Date    Anemia, unspecified     Back pain     Brain aneurysm 2018    Carotid stenosis     Cataract     Diverticulitis     Emphysema lung     Feeling anxious     Hyperlipidemia     Hypertension     Macular degeneration     PVD (peripheral vascular disease)     Squamous cell carcinoma 08/29/2017    right medical cheek, SSIS, efudex tx     Past Surgical History:   Procedure Laterality Date    ABDOMINAL SURGERY      ANGIOGRAPHY OF MESENTERIC VESSELS Left 09/07/2022    Procedure: ANGIOGRAM, MESENTERIC VESSELS;  Surgeon: Krish Machado MD;  Location: [facility] CATH/EP LAB;  Service: General;  Laterality: Left;    ARTERIOGRAM, MESENTERIC N/A 06/30/2021    Procedure: ARTERIOGRAM, MESENTERIC;  Surgeon: Krish Machado MD;  Location: [facility] CATH/EP LAB;  Service: General;  Laterality: N/A;    BACK SURGERY  2008    CATARACT EXTRACTION, BILATERAL      COLONOSCOPY N/A 04/04/2022    Procedure: COLONOSCOPY;  Surgeon: Lino Devi MD;  Location: [facility] ENDO;  Service: Endoscopy;  Laterality: N/A;    CREATION, BYPASS, ARTERIAL, AORTA TO FEMORAL N/A 01/13/2021    Procedure: ARTERIOGRAM, MESENTERIC;  Surgeon: Krish Machado MD;  Location: [facility] CATH/EP LAB;  Service: General;  Laterality: N/A;    ENDOSCOPY OF PROXIMAL SMALL INTESTINE N/A 04/04/2022    Procedure: ENTEROSCOPY, PROXIMAL;  Surgeon: Lino Devi MD;  Location: [facility]  ENDO;  Service: Endoscopy;  Laterality: N/A;    ESOPHAGOGASTRODUODENOSCOPY N/A 11/20/2022    Procedure: EGD (ESOPHAGOGASTRODUODENOSCOPY);  Surgeon: Pedro Hall Jr., MD;  Location: [facility] ENDO;  Service: Endoscopy;  Laterality: N/A;    ESOPHAGOGASTRODUODENOSCOPY N/A 8/31/2023    Procedure: EGD (ESOPHAGOGASTRODUODENOSCOPY);  Surgeon: Lino Devi MD;  Location: [facility] ENDO;  Service: Endoscopy;  Laterality: N/A;    ESOPHAGOGASTRODUODENOSCOPY N/A 10/26/2023    Procedure: EGD (ESOPHAGOGASTRODUODENOSCOPY);  Surgeon: Lino Devi MD;  Location: [facility] ENDO;  Service: Endoscopy;  Laterality: N/A;    ESOPHAGOGASTRODUODENOSCOPY N/A 12/14/2023    Procedure: EGD (ESOPHAGOGASTRODUODENOSCOPY);  Surgeon: Lino Devi MD;  Location: [facility];  Service: Endoscopy;  Laterality: N/A;    ESOPHAGOGASTRODUODENOSCOPY N/A 2/29/2024    Procedure: EGD (ESOPHAGOGASTRODUODENOSCOPY);  Surgeon: Lino Devi MD;  Location: [facility];  Service: Endoscopy;  Laterality: N/A;    ESOPHAGOGASTRODUODENOSCOPY N/A 8/29/2024    Procedure: EGD (ESOPHAGOGASTRODUODENOSCOPY);  Surgeon: Lino Devi MD;  Location: [facility];  Service: Endoscopy;  Laterality: N/A;    ESOPHAGOGASTRODUODENOSCOPY N/A 12/9/2024    Procedure: EGD (ESOPHAGOGASTRODUODENOSCOPY);  Surgeon: Lino Devi MD;  Location: [facility] ENDO;  Service: Endoscopy;  Laterality: N/A;    ESOPHAGOGASTRODUODENOSCOPY N/A 1/24/2025    Procedure: EGD (ESOPHAGOGASTRODUODENOSCOPY);  Surgeon: Lino Devi MD;  Location: [facility] ENDO;  Service: Endoscopy;  Laterality: N/A;    ESOPHAGOGASTRODUODENOSCOPY N/A 2/27/2025    Procedure: EGD (ESOPHAGOGASTRODUODENOSCOPY);  Surgeon: Lino Devi MD;  Location: [facility];  Service: Endoscopy;  Laterality: N/A;    HYSTERECTOMY      IMPLANTATION OF SPINAL CORD STIMULATOR IN CERVICAL SPINE      UPPER GASTROINTESTINAL ENDOSCOPY  11/20/2022    w/ antrum biopsy     Family History   Problem Relation Name Age of Onset    Melanoma Neg Hx      Psoriasis Neg  Hx      Eczema Neg Hx      Lupus Neg Hx          Social History:   Marital Status:   Occupation: Data Unavailable  Alcohol History:  reports no history of alcohol use.  Tobacco History:  reports that she quit smoking about 2 years ago. Her smoking use included cigarettes. She started smoking about 59 years ago. She has a 56.8 pack-year smoking history. She has never used smokeless tobacco.  Drug History:  reports that she does not currently use drugs.    Review of patient's allergies indicates:   Allergen Reactions    Sulfa (sulfonamide antibiotics) Nausea Only       Current Outpatient Medications   Medication Sig Dispense Refill    aspirin (ECOTRIN) 81 MG EC tablet Take 81 mg by mouth once daily.      clopidogreL (PLAVIX) 75 mg tablet Take 1 tablet (75 mg total) by mouth once daily. 30 tablet 3    ergocalciferol (ERGOCALCIFEROL) 50,000 unit Cap Take 1 capsule by mouth every 7 days.      ferrous sulfate 325 (65 FE) MG EC tablet Take 325 mg by mouth once daily.      fluticasone-umeclidin-vilanter (TRELEGY ELLIPTA) 100-62.5-25 mcg DsDv Inhale 1 puff into the lungs once daily. 60 each 11    metoprolol succinate (TOPROL-XL) 50 MG 24 hr tablet Take 50 mg by mouth 2 (two) times daily.      omeprazole (PRILOSEC) 40 MG capsule Take 40 mg by mouth once daily.      oxyCODONE-acetaminophen (PERCOCET)  mg per tablet Take 1 tablet by mouth every 4 (four) hours as needed for Pain. 5 times daily      polycarbophil (FIBERCON) 625 mg tablet Take 625 mg by mouth once daily.      simvastatin (ZOCOR) 20 MG tablet Take 20 mg by mouth every evening.      UNABLE TO FIND Take 1 tablet by mouth once daily. medication name: Ronak Mohan MVI      vit C,K-Mh-heqda-lutein-zeaxan (PRESERVISION AREDS-2) 250-90-40-1 mg Cap Take 1 tablet by mouth 2 (two) times a day.      albuterol-ipratropium (DUO-NEB) 2.5 mg-0.5 mg/3 mL nebulizer solution Take 3 mLs by nebulization every 6 (six) hours as needed for Wheezing. Rescue (Patient not  taking: Reported on 3/13/2025) 120 each 11    hyoscyamine 0.125 mg Subl Place 1 tablet (0.125 mg total) under the tongue every 4 (four) hours as needed (spasm, difficulty swallowing). 15 tablet 0    L. gasseri-B. bifidum-B longum 1.5 billion cell Cap Take 1 tablet by mouth once daily.  (Patient not taking: Reported on 10/9/2024)      megestroL (MEGACE) 20 MG Tab Take 40 mg by mouth 2 (two) times daily. (Patient not taking: Reported on 1/9/2025.)      pantoprazole (PROTONIX) 40 MG tablet Take 1 tablet (40 mg total) by mouth 2 (two) times daily. 180 tablet 1    promethazine (PHENERGAN) 6.25 mg/5 mL syrup Take 6.25 mg by mouth every 4 (four) hours as needed for Nausea. (Patient not taking: Reported on 10/9/2024)       No current facility-administered medications for this visit.       Last PFT:  10/11/23 moderate obstruction, normal lung volumes, and a very severe diffusion defect with no response to bronchodilators  Last CT:  04/01/2022  .  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  2.  Patent stent in the proximal celiac artery.  3. 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  3.  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  4.  Moderate centrilobular emphysema.   Last chest x-ray 12/31/23  Hyperinflated lung fields thin calcified pleural plaques faint interstitial reticular opacities in the right upper lobe unchanged, neural stimulator in place    Review of Systems   Respiratory:  Positive for shortness of breath.      General: good and bad days  Eyes: Vision has macular degneration  ENT:  No sinusitis or pharyngitis.   Heart:: No chest pain or palpitations.  Lungs: shortness of breath with any exertion  GI: occasional diarrhea  : No dysuria, hesitancy, or nocturia.  Musculoskeletal:back pain is ongoing, legs are weak  Skin: No lesions or rashes. Bruises from plavix  Neuro: No headaches or neuropathy.  Lymph: No edema or  "adenopathy.  Psych: No anxiety or depression.  Endo:  down 5 pounds     OBJECTIVE:      BP (!) 140/70 (BP Location: Right arm, Patient Position: Sitting)   Pulse 90   Ht 5' 2" (1.575 m)   Wt 37.2 kg (82 lb)   LMP  (LMP Unknown)   SpO2 (!) 93% Comment: 2LPM PD  BMI 15.00 kg/m²     Physical Exam  GENERAL: Older, thin patient in no distress.  She is in a wheelchair; she is wearing her oxygen.  HEENT: Pupils equal and reactive. Extraocular movements intact. Nose intact. Pharynx moist.bitemporal wasting  NECK: Supple.   HEART: Regular rate and rhythm. No murmur or gallop auscultated.  LUNGS:  Clear to auscultation   ABDOMEN: Bowel sounds present. Non-tender, no masses palpated.  EXTREMITIES: Normal muscle tone and joint movement, no cyanosis or clubbing.  Markedly decreased muscle mass  LYMPHATICS: No adenopathy palpated, no edema.  SKIN: Dry, intact, no lesions.   NEURO: Cranial nerves II-XII intact. Motor strength 5/5 bilaterally, upper and lower extremities.  PSYCH: Appropriate affect.    Assessment:       1. Chronic obstructive pulmonary disease, unspecified COPD type    2. Chronic respiratory failure with hypoxia    3. Centrilobular emphysema    4. Malnutrition, unspecified type                      Plan:       Chronic obstructive pulmonary disease, unspecified COPD type  -     Complete PFT with bronchodilator; Future    Chronic respiratory failure with hypoxia    Centrilobular emphysema    Malnutrition, unspecified type                      Follow up if symptoms worsen or fail to improve.  Need to have PFT  Continue the oxygen all of the time  Continue the Trelegy daily    This patient is extremely high risk for surgery of any kind considering advanced age, copd, hypoxemia, and malnutrition. She will definitely need PFT before decision made.                     "

## 2025-03-17 ENCOUNTER — HOSPITAL ENCOUNTER (OUTPATIENT)
Dept: RADIOLOGY | Facility: HOSPITAL | Age: 85
Discharge: HOME | End: 2025-03-17
Attending: SURGERY
Payer: MEDICARE

## 2025-03-17 DIAGNOSIS — I65.23 BILATERAL CAROTID ARTERY OCCLUSION: ICD-10-CM

## 2025-03-17 DIAGNOSIS — J44.9 CHRONIC OBSTRUCTIVE PULMONARY DISEASE, UNSPECIFIED COPD TYPE: Primary | ICD-10-CM

## 2025-03-17 PROCEDURE — 70498 CT ANGIOGRAPHY NECK: CPT | Mod: TC,PO

## 2025-03-17 PROCEDURE — 25500020 PHARM REV CODE 255: Mod: PO | Performed by: SURGERY

## 2025-03-17 PROCEDURE — 70498 CT ANGIOGRAPHY NECK: CPT | Mod: 26,,, | Performed by: RADIOLOGY

## 2025-03-17 RX ORDER — FLUTICASONE FUROATE, UMECLIDINIUM BROMIDE AND VILANTEROL TRIFENATATE 100; 62.5; 25 UG/1; UG/1; UG/1
1 POWDER RESPIRATORY (INHALATION) DAILY
Qty: 60 EACH | Refills: 11 | Status: SHIPPED | OUTPATIENT
Start: 2025-03-17

## 2025-03-17 RX ADMIN — IOHEXOL 100 ML: 350 INJECTION, SOLUTION INTRAVENOUS at 03:03

## 2025-03-21 ENCOUNTER — TELEPHONE (OUTPATIENT)
Dept: SURGERY | Facility: CLINIC | Age: 85
End: 2025-03-21
Payer: MEDICARE

## 2025-03-21 NOTE — TELEPHONE ENCOUNTER
----- Message from Rehana sent at 3/21/2025 11:09 AM CDT -----  Contact: [facility]  Type: STAT Apoointment Request.Name of Caller:[facility] When is the first available appointment?N/ASymptoms:MASS IN BREAST - PT LOSS A LOT OF WEIGHT CURRENTLY 82.4 LBS / BMI 15.07Would the patient rather a call back or a response via [facility]? CALL Best Call Back Number: [phone] / [phone] Information: REFERRAL FAXED TODAY THANK YOU

## 2025-03-21 NOTE — TELEPHONE ENCOUNTER
I contacted patient to schedule an appointment and she stated she has not had her mammogram yet.  Advised to call me once it is scheduled and I will get her in with one of our surgeons.  Dr Enriquez is still on medical leave at present time.  I will also keep an eye on her appointments to see when it is booked.

## 2025-03-25 ENCOUNTER — TELEPHONE (OUTPATIENT)
Dept: SURGERY | Facility: CLINIC | Age: 85
End: 2025-03-25
Payer: MEDICARE

## 2025-03-25 NOTE — TELEPHONE ENCOUNTER
Pt states that her mammogram has not been scheduled yet by Dr Will's office and she was told they would schedule the ultrasound and a biopsy.  The office called here for us to schedule.  I attempted to reach the nurse to let her know if they would order a mammogram and breast ultrasound once it is done the radiologist will recommend and schedule a biopsy and if positive she will go to the comp clinic and if not we will see her in our office.  I left a message and am waiting for a return call.

## 2025-03-25 NOTE — TELEPHONE ENCOUNTER
----- Message from Heavenly sent at 3/25/2025 10:33 AM CDT -----  Type:  Patient Requesting ReferralWho Called:Cleve from Dr. Tan's officeDoes the patient already have the specialty appointment scheduled?:If yes, what is the date of that appointment?:Referral to What Specialty:SurgeryReason for Referral: lump in breastDoes the patient want the referral with a specific physician?:Is the specialist an [facility] or [facility]?:Patient Requesting a Response?:Would the patient rather a call back or a response via [facility]? Best Call Back Number:Additional Information: Please call Cleve from Dr. Tan's office re; pt. Pt is down to 82 lbs, she has a lump in her breast & needs a Needle Biopsy. They have faxed a referral to your office.  Please call back to advise, thank you!

## 2025-03-27 ENCOUNTER — HOSPITAL ENCOUNTER (OUTPATIENT)
Dept: PULMONOLOGY | Facility: HOSPITAL | Age: 85
Discharge: HOME | End: 2025-03-27
Attending: NURSE PRACTITIONER
Payer: MEDICARE

## 2025-03-27 ENCOUNTER — TELEPHONE (OUTPATIENT)
Dept: SURGERY | Facility: CLINIC | Age: 85
End: 2025-03-27
Payer: MEDICARE

## 2025-03-27 DIAGNOSIS — J44.9 CHRONIC OBSTRUCTIVE PULMONARY DISEASE, UNSPECIFIED COPD TYPE: ICD-10-CM

## 2025-03-27 PROCEDURE — 94060 EVALUATION OF WHEEZING: CPT | Performed by: CLINIC/CENTER

## 2025-03-27 PROCEDURE — 94727 GAS DIL/WSHOT DETER LNG VOL: CPT | Performed by: CLINIC/CENTER

## 2025-03-27 PROCEDURE — 94729 DIFFUSING CAPACITY: CPT | Performed by: CLINIC/CENTER

## 2025-03-27 NOTE — TELEPHONE ENCOUNTER
Advised I am speaking to the mammo tech and she will call her to get her scheduled for the mammogram and ultrasound.

## 2025-03-27 NOTE — TELEPHONE ENCOUNTER
----- Message from Vernell sent at 3/27/2025 10:49 AM CDT -----  Regarding: Sooner Appointment Request  Contact: patient at [phone] or [phone] cell  Type:  Sooner Appointment RequestName of Caller:  patient at [phone] or [phone] cell Additional Information:  Patient is trying to schedule a biopsy. Please call and advise. Thank you.

## 2025-03-28 ENCOUNTER — TELEPHONE (OUTPATIENT)
Dept: PULMONOLOGY | Facility: CLINIC | Age: 85
End: 2025-03-28

## 2025-03-28 LAB
DLCO SINGLE BREATH LLN: 12.11
DLCO SINGLE BREATH PRE REF: 17.6 %
DLCO SINGLE BREATH REF: 17.85
DLCOC SBVA LLN: 2.38
DLCOC SBVA REF: 3.88
DLCOC SINGLE BREATH LLN: 12.11
DLCOC SINGLE BREATH REF: 17.85
DLCOVA LLN: 2.38
DLCOVA PRE REF: 22.6 %
DLCOVA PRE: 0.88 ML/(MIN*MMHG*L) (ref 2.38–5.38)
DLCOVA REF: 3.88
ERVN2 LLN: -16449.6
ERVN2 PRE REF: 176.6 %
ERVN2 PRE: 0.71 L (ref -16449.6–16450.4)
ERVN2 REF: 0.4
FEF 25 75 CHG: 0.9 %
FEF 25 75 LLN: 0.6
FEF 25 75 POST REF: 20 %
FEF 25 75 PRE REF: 19.9 %
FEF 25 75 REF: 2
FET100 CHG: -3.5 %
FEV1 CHG: 10.6 %
FEV1 FVC CHG: -1.4 %
FEV1 FVC LLN: 61
FEV1 FVC POST REF: 64.7 %
FEV1 FVC PRE REF: 65.6 %
FEV1 FVC REF: 77
FEV1 LLN: 1.16
FEV1 POST REF: 54.5 %
FEV1 PRE REF: 49.3 %
FEV1 REF: 1.69
FRCN2 LLN: 1.79
FRCN2 PRE REF: 135.6 %
FRCN2 REF: 2.61
FVC CHG: 12.1 %
FVC LLN: 1.53
FVC POST REF: 83 %
FVC PRE REF: 74 %
FVC REF: 2.24
IVC PRE: 1.79 L (ref 1.53–2.98)
IVC SINGLE BREATH LLN: 1.53
IVC SINGLE BREATH PRE REF: 79.9 %
IVC SINGLE BREATH REF: 2.24
PEF CHG: -2.6 %
PEF LLN: 2.38
PEF POST REF: 56 %
PEF PRE REF: 57.5 %
PEF REF: 3.99
POST FEF 25 75: 0.4 L/S (ref 0.6–3.4)
POST FET 100: 5.28 SEC
POST FEV1 FVC: 49.64 % (ref 61.34–90.2)
POST FEV1: 0.92 L (ref 1.16–2.2)
POST FVC: 1.86 L (ref 1.53–2.98)
POST PEF: 2.23 L/S (ref 2.38–5.59)
PRE DLCO: 3.14 ML/(MIN*MMHG) (ref 12.11–23.58)
PRE FEF 25 75: 0.4 L/S (ref 0.6–3.4)
PRE FET 100: 5.47 SEC
PRE FEV1 FVC: 50.33 % (ref 61.34–90.2)
PRE FEV1: 0.83 L (ref 1.16–2.2)
PRE FRC N2: 3.54 L (ref 1.79–3.44)
PRE FVC: 1.66 L (ref 1.53–2.98)
PRE PEF: 2.29 L/S (ref 2.38–5.59)
RVN2 LLN: 1.63
RVN2 PRE REF: 128.1 %
RVN2 PRE: 2.83 L (ref 1.63–2.79)
RVN2 REF: 2.21
RVN2TLCN2 LLN: 38.27
RVN2TLCN2 PRE REF: 124.9 %
RVN2TLCN2 PRE: 59.78 % (ref 38.27–57.45)
RVN2TLCN2 REF: 47.86
TLCN2 LLN: 3.62
TLCN2 PRE REF: 102.9 %
TLCN2 PRE: 4.74 L (ref 3.62–5.59)
TLCN2 REF: 4.6
VA PRE: 3.59 L (ref 4.45–4.45)
VA SINGLE BREATH LLN: 4.45
VA SINGLE BREATH PRE REF: 80.5 %
VA SINGLE BREATH REF: 4.45
VCMAXN2 LLN: 1.53
VCMAXN2 PRE REF: 85.2 %
VCMAXN2 PRE: 1.91 L (ref 1.53–2.98)
VCMAXN2 REF: 2.24

## 2025-03-28 NOTE — TELEPHONE ENCOUNTER
PFT shows severe obstruction, with a pre FEV1 of 0.83,  air trapping, and a very severe diffusion defect. DLCO is 17  This patient would be extremely high risk for surgery she is oxygen dependent, malnourished, and adavanced age with limitied mobility.

## 2025-04-01 ENCOUNTER — HOSPITAL ENCOUNTER (OUTPATIENT)
Dept: RADIOLOGY | Facility: HOSPITAL | Age: 85
Discharge: HOME | End: 2025-04-01
Attending: SURGERY
Payer: MEDICARE

## 2025-04-01 ENCOUNTER — TELEPHONE (OUTPATIENT)
Dept: PULMONOLOGY | Facility: CLINIC | Age: 85
End: 2025-04-01
Payer: MEDICARE

## 2025-04-01 DIAGNOSIS — N63.10 LUMP OF BREAST, RIGHT: ICD-10-CM

## 2025-04-01 DIAGNOSIS — N64.4 BREAST PAIN: ICD-10-CM

## 2025-04-01 PROCEDURE — 77062 BREAST TOMOSYNTHESIS BI: CPT | Mod: 26,,, | Performed by: RADIOLOGY

## 2025-04-01 PROCEDURE — 77062 BREAST TOMOSYNTHESIS BI: CPT | Mod: TC

## 2025-04-01 PROCEDURE — 76642 ULTRASOUND BREAST LIMITED: CPT | Mod: 26,RT,, | Performed by: RADIOLOGY

## 2025-04-01 PROCEDURE — 76642 ULTRASOUND BREAST LIMITED: CPT | Mod: TC,RT

## 2025-04-01 PROCEDURE — 77066 DX MAMMO INCL CAD BI: CPT | Mod: 26,,, | Performed by: RADIOLOGY

## 2025-04-01 NOTE — TELEPHONE ENCOUNTER
Spoke with Dr. Machado about her very high risk for carotid endartectomy.  Will be extremely high risk. He will consider medical management.

## 2025-04-08 ENCOUNTER — HOSPITAL ENCOUNTER (OUTPATIENT)
Dept: PREADMISSION TESTING | Facility: HOSPITAL | Age: 85
Discharge: HOME | End: 2025-04-08
Attending: INTERNAL MEDICINE
Payer: MEDICARE

## 2025-04-08 VITALS
DIASTOLIC BLOOD PRESSURE: 65 MMHG | BODY MASS INDEX: 15.09 KG/M2 | SYSTOLIC BLOOD PRESSURE: 129 MMHG | TEMPERATURE: 98 F | OXYGEN SATURATION: 98 % | RESPIRATION RATE: 18 BRPM | WEIGHT: 82 LBS | HEIGHT: 62 IN | HEART RATE: 65 BPM

## 2025-04-08 NOTE — DISCHARGE INSTRUCTIONS
INSTRUCTIONS  To confirm your doctor has scheduled your surgery for:4/10/25    Morning of surgery please check in with registration near [address] Entrance then proceed to Registration then to endoscopy department    ENDOSCOPY NURSES will call the afternoon prior to procedure with your final arrival time.    TAKE ONLY THESE MEDICATIONS WITH A SMALL SIP OF WATER THE MORNING OF SURGERY:    DO NOT TAKE ANY INSULIN OR ORAL DIABETIC MEDICATIONS THE MORNING OF SURGERY UNLESS DIRECTED BY YOUR PHYSICIAN OR PRE ADMIT NURSE.    DO NOT TAKE THESE MEDICATIONS 5-7 DAYS PRIOR to your procedure per your surgeon's request: ASPIRIN, ALEVE, BC powder, LAKEISHA SELTZER, IBUPROFEN, FISH OIL, VITAMIN E, OR HERBALS   (May take Tylenol)    If you are prescribed any types of blood thinners (Aspirin, Coumadin, Plavix, Pradaxa, Xarelto, Aggrenox, Effient, Eliquis, Savasya, Brilinta or any other), please ask your surgeon how many days before scheduled procedure should you stop taking them. You may also need to verify with prescribing physician if it is OK to stop your blood thinners.      INSTRUCTIONS IMPORTANT!!  Do not eat anything after midnight. OK to drink clear liquids until 2 hours before arrival time.  ONLY if you are diabetic, check your sugar in the morning before your procedure.  Do not smoke, vape or drink alcoholic beverages 24 hours prior to your procedure.  If you wear contact lenses, dentures, hearing aids or glasses, bring a container to put them in during surgery and give to a family member for safe keeping.    Please leave all jewelry, piercing's and valuables at home.   Wear comfortable loose clothing and rubber soled shoes.  If your condition changes such as fever, cough, etc, please notify your surgeon.   ONLY if you have been diagnosed with sleep apnea please bring your C-PAP machine.  ONLY if you wear home oxygen please bring your portable oxygen tank the day of your procedure.   ONLY for patients requiring bowel  prep, written instructions will be given by your doctor's office.  ONLY if you have a neuro stimulator, please bring the controller with you the morning of surgery  Make arrangements in advance for transportation home by a responsible adult.  You must make arrangements for transportation, TAXI'S, UBER'S OR LYFTS ARE NOT ALLOWED.        If you have any questions about these instructions, call Endoscopy Nurse at [phone]

## 2025-04-10 ENCOUNTER — HOSPITAL ENCOUNTER (OUTPATIENT)
Facility: HOSPITAL | Age: 85
Discharge: HOME | End: 2025-04-10
Attending: INTERNAL MEDICINE | Admitting: INTERNAL MEDICINE
Payer: MEDICARE

## 2025-04-10 ENCOUNTER — ANESTHESIA EVENT (OUTPATIENT)
Dept: SURGERY | Facility: HOSPITAL | Age: 85
End: 2025-04-10
Payer: MEDICARE

## 2025-04-10 ENCOUNTER — ANESTHESIA (OUTPATIENT)
Dept: SURGERY | Facility: HOSPITAL | Age: 85
End: 2025-04-10
Payer: MEDICARE

## 2025-04-10 VITALS
RESPIRATION RATE: 16 BRPM | SYSTOLIC BLOOD PRESSURE: 109 MMHG | OXYGEN SATURATION: 100 % | TEMPERATURE: 98 F | HEART RATE: 67 BPM | DIASTOLIC BLOOD PRESSURE: 55 MMHG

## 2025-04-10 VITALS
OXYGEN SATURATION: 100 % | DIASTOLIC BLOOD PRESSURE: 51 MMHG | HEART RATE: 76 BPM | RESPIRATION RATE: 15 BRPM | SYSTOLIC BLOOD PRESSURE: 103 MMHG

## 2025-04-10 DIAGNOSIS — R13.10 DYSPHAGIA: ICD-10-CM

## 2025-04-10 PROCEDURE — 63600175 PHARM REV CODE 636 W HCPCS: Performed by: INTERNAL MEDICINE

## 2025-04-10 PROCEDURE — 37000008 HC ANESTHESIA 1ST 15 MINUTES: Performed by: INTERNAL MEDICINE

## 2025-04-10 PROCEDURE — 27201028 HC NEEDLE, SCLERO: Performed by: INTERNAL MEDICINE

## 2025-04-10 PROCEDURE — 43236 UPPR GI SCOPE W/SUBMUC INJ: CPT | Performed by: INTERNAL MEDICINE

## 2025-04-10 PROCEDURE — 63600175 PHARM REV CODE 636 W HCPCS: Performed by: STUDENT IN AN ORGANIZED HEALTH CARE EDUCATION/TRAINING PROGRAM

## 2025-04-10 PROCEDURE — 25000003 PHARM REV CODE 250: Performed by: STUDENT IN AN ORGANIZED HEALTH CARE EDUCATION/TRAINING PROGRAM

## 2025-04-10 RX ORDER — PROPOFOL 10 MG/ML
VIAL (ML) INTRAVENOUS
Status: DISCONTINUED | OUTPATIENT
Start: 2025-04-10 | End: 2025-04-10

## 2025-04-10 RX ADMIN — PROPOFOL 30 MG: 10 INJECTION, EMULSION INTRAVENOUS at 07:04

## 2025-04-10 RX ADMIN — PROPOFOL 20 MG: 10 INJECTION, EMULSION INTRAVENOUS at 07:04

## 2025-04-10 RX ADMIN — PROPOFOL 50 MG: 10 INJECTION, EMULSION INTRAVENOUS at 07:04

## 2025-04-10 RX ADMIN — SODIUM CHLORIDE: 0.9 INJECTION, SOLUTION INTRAVENOUS at 07:04

## 2025-04-10 NOTE — H&P
GASTROENTEROLOGY PRE-PROCEDURE H&P NOTE  Patient Name: Carmen Fletcher Pflug  Patient MRN: [id number]  Patient : 1940    Service date: 4/10/2025    PCP: Abiodun Will MD    No chief complaint on file.      HPI: Patient is a 85 y.o. female with PMHx as below here for evaluation of h/o refractory esophageal stricture.     Past Medical History:  Past Medical History:   Diagnosis Date    Anemia, unspecified     Back pain     Brain aneurysm     Carotid stenosis     Cataract     Diverticulitis     Emphysema lung     Feeling anxious     Hyperlipidemia     Hypertension     Macular degeneration     PVD (peripheral vascular disease)     Squamous cell carcinoma 2017    right medical cheek, SSIS, efudex tx        Past Surgical History:  Past Surgical History:   Procedure Laterality Date    ABDOMINAL SURGERY      ANGIOGRAPHY OF MESENTERIC VESSELS Left 2022    Procedure: ANGIOGRAM, MESENTERIC VESSELS;  Surgeon: Krish Machado MD;  Location: [facility] CATH/EP LAB;  Service: General;  Laterality: Left;    ARTERIOGRAM, MESENTERIC N/A 2021    Procedure: ARTERIOGRAM, MESENTERIC;  Surgeon: Krish Machado MD;  Location: [facility] CATH/EP LAB;  Service: General;  Laterality: N/A;    BACK SURGERY      CATARACT EXTRACTION, BILATERAL      COLONOSCOPY N/A 2022    Procedure: COLONOSCOPY;  Surgeon: Lino Devi MD;  Location: [facility];  Service: Endoscopy;  Laterality: N/A;    CREATION, BYPASS, ARTERIAL, AORTA TO FEMORAL N/A 2021    Procedure: ARTERIOGRAM, MESENTERIC;  Surgeon: Krish Machado MD;  Location: [facility] CATH/EP LAB;  Service: General;  Laterality: N/A;    ENDOSCOPY OF PROXIMAL SMALL INTESTINE N/A 2022    Procedure: ENTEROSCOPY, PROXIMAL;  Surgeon: Lino Devi MD;  Location: [facility];  Service: Endoscopy;  Laterality: N/A;    ESOPHAGOGASTRODUODENOSCOPY N/A 2022    Procedure: EGD (ESOPHAGOGASTRODUODENOSCOPY);  Surgeon: Pedro Hall Jr., MD;  Location: [facility];   Service: Endoscopy;  Laterality: N/A;    ESOPHAGOGASTRODUODENOSCOPY N/A 8/31/2023    Procedure: EGD (ESOPHAGOGASTRODUODENOSCOPY);  Surgeon: Lino Devi MD;  Location: [facility] ENDO;  Service: Endoscopy;  Laterality: N/A;    ESOPHAGOGASTRODUODENOSCOPY N/A 10/26/2023    Procedure: EGD (ESOPHAGOGASTRODUODENOSCOPY);  Surgeon: Lino Devi MD;  Location: [facility] ENDO;  Service: Endoscopy;  Laterality: N/A;    ESOPHAGOGASTRODUODENOSCOPY N/A 12/14/2023    Procedure: EGD (ESOPHAGOGASTRODUODENOSCOPY);  Surgeon: Lino Devi MD;  Location: [facility];  Service: Endoscopy;  Laterality: N/A;    ESOPHAGOGASTRODUODENOSCOPY N/A 2/29/2024    Procedure: EGD (ESOPHAGOGASTRODUODENOSCOPY);  Surgeon: Lino Devi MD;  Location: [facility];  Service: Endoscopy;  Laterality: N/A;    ESOPHAGOGASTRODUODENOSCOPY N/A 8/29/2024    Procedure: EGD (ESOPHAGOGASTRODUODENOSCOPY);  Surgeon: Lino Devi MD;  Location: [facility];  Service: Endoscopy;  Laterality: N/A;    ESOPHAGOGASTRODUODENOSCOPY N/A 12/9/2024    Procedure: EGD (ESOPHAGOGASTRODUODENOSCOPY);  Surgeon: Lino Devi MD;  Location: [facility];  Service: Endoscopy;  Laterality: N/A;    ESOPHAGOGASTRODUODENOSCOPY N/A 1/24/2025    Procedure: EGD (ESOPHAGOGASTRODUODENOSCOPY);  Surgeon: Lino Devi MD;  Location: [facility] ENDO;  Service: Endoscopy;  Laterality: N/A;    ESOPHAGOGASTRODUODENOSCOPY N/A 2/27/2025    Procedure: EGD (ESOPHAGOGASTRODUODENOSCOPY);  Surgeon: Lino Devi MD;  Location: [facility];  Service: Endoscopy;  Laterality: N/A;    HYSTERECTOMY      IMPLANTATION OF SPINAL CORD STIMULATOR IN CERVICAL SPINE      UPPER GASTROINTESTINAL ENDOSCOPY  11/20/2022    w/ antrum biopsy        Home Medications:  Prescriptions Prior to Admission[1]            Review of patient's allergies indicates:   Allergen Reactions    Sulfa (sulfonamide antibiotics) Nausea Only       Social History:   Social History     Occupational History    Not on file   Tobacco Use    Smoking  "status: Former     Current packs/day: 0.00     Average packs/day: 1 pack/day for 56.8 years (56.8 ttl pk-yrs)     Types: Cigarettes     Start date: 6/29/1965     Quit date: 4/1/2022     Years since quitting: 3.0    Smokeless tobacco: Never   Substance and Sexual Activity    Alcohol use: No    Drug use: Not Currently    Sexual activity: Not Currently       Family History:   Family History   Problem Relation Name Age of Onset    Melanoma Neg Hx      Psoriasis Neg Hx      Eczema Neg Hx      Lupus Neg Hx         Review of Systems:  A 10 point review of systems was performed and was normal, except as mentioned in the HPI, including constitutional, HEENT, heme, lymph, cardiovascular, respiratory, gastrointestinal, genitourinary, neurologic, endocrine, psychiatric and musculoskeletal.      OBJECTIVE:    Physical Exam:  24 Hour Vital Sign Ranges:    Most recent vitals: LMP  (LMP Unknown)    GEN: well-developed, well-nourished, awake and alert, non-toxic appearing adult  HEENT: PERRL, sclera anicteric, oral mucosa pink and moist without lesion  NECK: trachea midline; Good ROM  CV: regular rate and rhythm, no murmurs or gallops  RESP: clear to auscultation bilaterally, no wheezes, rhonci or rales  ABD: soft, non-tender, non-distended, normal bowel sounds  EXT: no swelling or edema, 2+ pulses distally  SKIN: no rashes or jaundice  PSYCH: normal affect    Labs:   No results for input(s): "WBC", "MCV", "PLT" in the last 72 hours.    Invalid input(s): "HGBAU"  No results for input(s): "NA", "K", "CL", "CO2", "BUN", "GLU" in the last 72 hours.    Invalid input(s): "CREA"  No results for input(s): "ALB" in the last 72 hours.    Invalid input(s): "ALKP", "SGOT", "SGPT", "TBIL", "DBIL", "TPRO"  No results for input(s): "PT", "INR", "PTT" in the last 72 hours.      IMPRESSION / RECOMMENDATIONS:  Refractory esophageal stricture last dilation 2/27/25 dilated to 46 [place].      EGD  with interventions as warranted.   RIsks, benefits, " alternatives discussed in detail regarding upcoming procedures and sedation. Some of the more common endoscopic complications include but not limited to immediate or delayed perforation, bleeding, infections, pain, inadvertent injury to surrounding tissue / organs and possible need for surgical evaluation. Patient expressed understanding, all questions answered and will proceed with procedure as planned.     Lino Devi  4/10/2025  6:38 AM           [1]   No medications prior to admission.

## 2025-04-10 NOTE — ANESTHESIA POSTPROCEDURE EVALUATION
Anesthesia Post Evaluation    Patient: Carmen Fletcher Pflug    Procedure(s) Performed: Procedure(s) (LRB):  EGD (ESOPHAGOGASTRODUODENOSCOPY) WITH KENALOG INJECTION (N/A)    Final Anesthesia Type: general      Patient location during evaluation: GI PACU  Patient participation: Yes- Able to Participate  Level of consciousness: awake and alert  Post-procedure vital signs: reviewed and stable  Pain management: adequate  Airway patency: patent    PONV status at discharge: No PONV  Anesthetic complications: no      Cardiovascular status: hemodynamically stable  Respiratory status: unassisted, spontaneous ventilation and room air  Hydration status: euvolemic  Follow-up not needed.              Vitals Value Taken Time   /55 04/10/25 08:16   Temp 36.7 °C (98 °F) 04/10/25 08:15   Pulse 65 04/10/25 08:24   Resp 16 04/10/25 08:15   SpO2 100 % 04/10/25 08:24   Vitals shown include unfiled device data.      No case tracking events are documented in the log.      Pain/Wili Score: No data recorded

## 2025-04-10 NOTE — [employer] PATIENT INSTRUCTIONS
Discharge Summary/Instructions after an Endoscopic Procedure  Patient Name: Carmen Wilkinson  Patient MRN: [id number]  Patient YOB: 1940  Thursday, April 10, 2025  Lino Devi MD  RESTRICTIONS:  During your procedure today, you received medications for sedation.  These   medications may affect your judgment, balance and coordination.  Therefore,   for 24 hours, you have the following restrictions:   - DO NOT drive a car, operate machinery, make legal/financial decisions,   sign important papers or drink alcohol.    ACTIVITY:  Today: no heavy lifting, straining or running due to procedural   sedation/anesthesia.  The following day: return to full activity including work.  DIET:  Eat and drink normally unless instructed otherwise.     TREATMENT FOR COMMON SIDE EFFECTS:  - Mild abdominal pain, nausea, belching, bloating or excessive gas:  rest,   eat lightly and use a heating pad.  - Sore Throat: treat with throat lozenges and/or gargle with warm salt   water.  - Because air was used during the procedure, expelling large amounts of air   from your rectum or belching is normal.  - If a bowel prep was taken, you may not have a bowel movement for 1-3 days.    This is normal.  SYMPTOMS TO WATCH FOR AND REPORT TO YOUR PHYSICIAN:  1. Abdominal pain or bloating, other than gas cramps.  2. Chest pain.  3. Back pain.  4. Signs of infection such as: chills or fever occurring within 24 hours   after the procedure.  5. Rectal bleeding, which would show as bright red, maroon, or black stools.   (A tablespoon of blood from the rectum is not serious, especially if   hemorrhoids are present.)  6. Vomiting.  7. Weakness or dizziness.  GO DIRECTLY TO THE NEAREST EMERGENCY ROOM IF YOU HAVE ANY OF THE FOLLOWING:      Difficulty breathing              Chills and/or fever over 101 F   Persistent vomiting and/or vomiting blood   Severe abdominal pain   Severe chest pain   Black, tarry stools   Bleeding- more than one  tablespoon   Any other symptom or condition that you feel may need urgent attention  Your doctor recommends these additional instructions:  If any biopsies were taken, your doctors clinic will contact you in 1 to 2   weeks with any results.  - Patient has a contact number available for emergencies.  The signs and   symptoms of potential delayed complications were discussed with the   patient.  Return to normal activities tomorrow.  Written discharge   instructions were provided to the patient.   - Resume previous diet.   - Continue present medications.   - Repeat upper endoscopy in 3 weeks for retreatment.   - Discharge patient to home (with escort).  For questions, problems or results please call your physician - Lino Devi MD at Work:  [phone].  [facility], EMERGENCY ROOM PHONE NUMBER: [phone]  IF A COMPLICATION OR EMERGENCY SITUATION ARISES AND YOU ARE UNABLE TO REACH   YOUR PHYSICIAN - GO DIRECTLY TO THE EMERGENCY ROOM.  MD Lino Allen MD  4/10/2025 8:04:47 AM  This report has been verified and signed electronically.  Dear patient,  As a result of recent federal legislation (The Federal Cures Act), you may   receive lab or pathology results from your procedure in your [facility]   account before your physician is able to contact you. Your physician or   their representative will relay the results to you with their   recommendations at their soonest availability.  Thank you,  [employer]

## 2025-04-10 NOTE — ANESTHESIA PREPROCEDURE EVALUATION
04/10/2025  Carmen Wilkinson is a 85 y.o., female.                   Lab Results   Component Value Date    WBC 6.95 02/25/2025    HGB 10.1 (L) 02/25/2025    HCT 32.0 (L) 02/25/2025     (H) 02/25/2025     02/25/2025     BMP  Lab Results   Component Value Date     02/25/2025    K 4.2 02/25/2025     02/25/2025    CO2 26 02/25/2025    BUN 25 (H) 02/25/2025    CREATININE 0.7 02/25/2025    CALCIUM 9.3 02/25/2025    ANIONGAP 7 (L) 02/25/2025     (H) 02/25/2025    GLU 89 01/15/2025     (H) 12/06/2024       Results for orders placed during the hospital encounter of 04/01/22    Echo Saline Bubble? No    Interpretation Summary  · The left ventricle is small with concentric remodeling and hyperdynamic systolic function.  · The estimated ejection fraction is 75%.  · Indeterminate left ventricular diastolic function.  · Mild right ventricular enlargement with normal right ventricular systolic function.  · Mild to moderate tricuspid regurgitation.  · Mild-to-moderate aortic regurgitation.        Pre-op Assessment    I have reviewed the Patient Summary Reports.     I have reviewed the Nursing Notes. I have reviewed the NPO Status.   I have reviewed the Medications.     Review of Systems  Anesthesia Hx:  No problems with previous Anesthesia             Denies Family Hx of Anesthesia complications.    Denies Personal Hx of Anesthesia complications.                    Social:  Former Smoker, No Alcohol Use       Hematology/Oncology:       -- Anemia:                    --  Cancer in past history:              surgery   Oncology Comments: Squamous cell carcinoma     EENT/Dental:   Dysphonia  Macular degeneration Eyes: Visual Impairment   Has Bilateral and S/P Extraction - Bilateral Catarract                  Cardiovascular:     Hypertension, poorly controlled          PVD  hyperlipidemia   ECG has been reviewed. Hx of Mesenteric artery stenosis  Patient followed by Dr. Bowers seen May 2024  Cardiac workup negative per patient  Patient on beta blockers Beta blocker taken in last 24 hours    Valvular Heart Disease:   Aortic Regurgitation (AR), mild, moderate, Tricuspid Regurgitation (TR), mild, moderate           Carotoid Artery Disease               Pulmonary:   COPD Asthma  Shortness of breath   Daily inhaler - Trelegy Ellipta  Rescue inhaler   Home Oxygen Dependent 2 L per nasal cannula  No Hospitalizations for exacerbations   Patient followed by Dr. Molina   Asthma:   Chronic Obstructive Pulmonary Disease (COPD):   Emphysema                   Renal/:  Renal/ Normal                 Hepatic/GI:  Bowel Prep. PUD,     Hx Acute pancreatitis  Hx of Mesenteric artery stenosis             Musculoskeletal:     Hx cervical spine stimulator - removed       Spine Disorders: lumbar and cervical Chronic Pain, Degenerative disease and Disc disease           Neurological:           Brain aneurysm present but not followed per patient                             Endocrine:  Endocrine Normal            Psych:  Psychiatric History anxiety                 Physical Exam  General: Well nourished, Cooperative, Alert and Oriented    Airway:  Mallampati: III   Mouth Opening: Normal  TM Distance: > 6 cm  Tongue: Normal  Neck ROM: Extension Decreased    Dental:  Dentures, Caps / Implants    Chest/Lungs:  Clear to auscultation, Normal Respiratory Rate  Decreased Breath Sounds: left and right    Heart:  Rate: Normal  Rhythm: Regular Rhythm        Anesthesia Plan  Type of Anesthesia, risks & benefits discussed:    Anesthesia Type: Gen Natural Airway  Intra-op Monitoring Plan: Standard ASA Monitors  Post Op Pain Control Plan:   (medical reason for not using multimodal pain management)  Induction:  IV  Informed Consent: Informed consent signed with the Patient and all parties understand the risks and agree  with anesthesia plan.  All questions answered.   ASA Score: 3  Anesthesia Plan Notes:       GNA  POM  Propofol     Ready For Surgery From Anesthesia Perspective.     .

## 2025-06-17 ENCOUNTER — OFFICE VISIT (OUTPATIENT)
Dept: PULMONOLOGY | Facility: CLINIC | Age: 85
End: 2025-06-17
Payer: MEDICARE

## 2025-06-17 VITALS
SYSTOLIC BLOOD PRESSURE: 118 MMHG | BODY MASS INDEX: 15.09 KG/M2 | HEART RATE: 94 BPM | WEIGHT: 82 LBS | OXYGEN SATURATION: 95 % | DIASTOLIC BLOOD PRESSURE: 64 MMHG | HEIGHT: 62 IN

## 2025-06-17 DIAGNOSIS — R94.2 DIFFUSION CAPACITY OF LUNG (DL), DECREASED: ICD-10-CM

## 2025-06-17 DIAGNOSIS — J31.0: ICD-10-CM

## 2025-06-17 DIAGNOSIS — J43.2 CENTRILOBULAR EMPHYSEMA: ICD-10-CM

## 2025-06-17 DIAGNOSIS — J44.9 CHRONIC OBSTRUCTIVE PULMONARY DISEASE, UNSPECIFIED COPD TYPE: Primary | ICD-10-CM

## 2025-06-17 DIAGNOSIS — E43 SEVERE PROTEIN-CALORIE MALNUTRITION: ICD-10-CM

## 2025-06-17 DIAGNOSIS — E46 MALNUTRITION, UNSPECIFIED TYPE: ICD-10-CM

## 2025-06-17 DIAGNOSIS — J96.11 CHRONIC RESPIRATORY FAILURE WITH HYPOXIA: ICD-10-CM

## 2025-06-17 PROCEDURE — 1101F PT FALLS ASSESS-DOCD LE1/YR: CPT | Mod: CPTII,S$GLB,, | Performed by: INTERNAL MEDICINE

## 2025-06-17 PROCEDURE — 99999 PR PBB SHADOW E&M-EST. PATIENT-LVL III: CPT | Mod: PBBFAC,,, | Performed by: INTERNAL MEDICINE

## 2025-06-17 PROCEDURE — 3078F DIAST BP <80 MM HG: CPT | Mod: CPTII,S$GLB,, | Performed by: INTERNAL MEDICINE

## 2025-06-17 PROCEDURE — 3288F FALL RISK ASSESSMENT DOCD: CPT | Mod: CPTII,S$GLB,, | Performed by: INTERNAL MEDICINE

## 2025-06-17 PROCEDURE — 1159F MED LIST DOCD IN RCRD: CPT | Mod: CPTII,S$GLB,, | Performed by: INTERNAL MEDICINE

## 2025-06-17 PROCEDURE — 99214 OFFICE O/P EST MOD 30 MIN: CPT | Mod: S$GLB,,, | Performed by: INTERNAL MEDICINE

## 2025-06-17 PROCEDURE — 3074F SYST BP LT 130 MM HG: CPT | Mod: CPTII,S$GLB,, | Performed by: INTERNAL MEDICINE

## 2025-06-17 PROCEDURE — 1126F AMNT PAIN NOTED NONE PRSNT: CPT | Mod: CPTII,S$GLB,, | Performed by: INTERNAL MEDICINE

## 2025-06-17 NOTE — PATIENT INSTRUCTIONS
Ayr gel to mosturize your nose  Ask [facility] to send you a humidity chamber for your home Inogen  Use nasal saline as needed  Continue Trelegy  Continue oxygen  Follow up in about 6 months (around 12/17/2025).

## 2025-06-17 NOTE — PROGRESS NOTES
SUBJECTIVE:    Patient ID: Carmen Wilkinson is a 85 y.o. female.    Chief Complaint: Follow-up (3 month follow up COPD)    The patient is here still short of breath but breathing better with the Trelegy.  She is on 2 liters portable concentrator.  She remains very thin.  She did not have a CEA and is being medically managed.  Past Medical History:   Diagnosis Date    Anemia, unspecified     Back pain     Brain aneurysm 2018    Carotid stenosis     Cataract     Diverticulitis     Emphysema lung     Feeling anxious     Hyperlipidemia     Hypertension     Macular degeneration     PVD (peripheral vascular disease)     Squamous cell carcinoma 08/29/2017    right medical cheek, SSIS, efudex tx     Past Surgical History:   Procedure Laterality Date    ABDOMINAL SURGERY      ANGIOGRAPHY OF MESENTERIC VESSELS Left 09/07/2022    Procedure: ANGIOGRAM, MESENTERIC VESSELS;  Surgeon: Krish Machado MD;  Location: [facility] CATH/EP LAB;  Service: General;  Laterality: Left;    ARTERIOGRAM, MESENTERIC N/A 06/30/2021    Procedure: ARTERIOGRAM, MESENTERIC;  Surgeon: Krish Machado MD;  Location: [facility] CATH/EP LAB;  Service: General;  Laterality: N/A;    BACK SURGERY  2008    CATARACT EXTRACTION, BILATERAL      COLONOSCOPY N/A 04/04/2022    Procedure: COLONOSCOPY;  Surgeon: Lino Devi MD;  Location: [facility] ENDO;  Service: Endoscopy;  Laterality: N/A;    CREATION, BYPASS, ARTERIAL, AORTA TO FEMORAL N/A 01/13/2021    Procedure: ARTERIOGRAM, MESENTERIC;  Surgeon: Krish Machado MD;  Location: [facility] CATH/EP LAB;  Service: General;  Laterality: N/A;    ENDOSCOPY OF PROXIMAL SMALL INTESTINE N/A 04/04/2022    Procedure: ENTEROSCOPY, PROXIMAL;  Surgeon: Lino Devi MD;  Location: [facility];  Service: Endoscopy;  Laterality: N/A;    ESOPHAGOGASTRODUODENOSCOPY N/A 11/20/2022    Procedure: EGD (ESOPHAGOGASTRODUODENOSCOPY);  Surgeon: Pedro Hall Jr., MD;  Location: [facility] ENDO;  Service: Endoscopy;  Laterality: N/A;     ESOPHAGOGASTRODUODENOSCOPY N/A 8/31/2023    Procedure: EGD (ESOPHAGOGASTRODUODENOSCOPY);  Surgeon: Lino Devi MD;  Location: [facility];  Service: Endoscopy;  Laterality: N/A;    ESOPHAGOGASTRODUODENOSCOPY N/A 10/26/2023    Procedure: EGD (ESOPHAGOGASTRODUODENOSCOPY);  Surgeon: Lino Devi MD;  Location: [facility];  Service: Endoscopy;  Laterality: N/A;    ESOPHAGOGASTRODUODENOSCOPY N/A 12/14/2023    Procedure: EGD (ESOPHAGOGASTRODUODENOSCOPY);  Surgeon: Lino Devi MD;  Location: [facility];  Service: Endoscopy;  Laterality: N/A;    ESOPHAGOGASTRODUODENOSCOPY N/A 2/29/2024    Procedure: EGD (ESOPHAGOGASTRODUODENOSCOPY);  Surgeon: Lino Devi MD;  Location: [facility];  Service: Endoscopy;  Laterality: N/A;    ESOPHAGOGASTRODUODENOSCOPY N/A 8/29/2024    Procedure: EGD (ESOPHAGOGASTRODUODENOSCOPY);  Surgeon: Lino Devi MD;  Location: [facility];  Service: Endoscopy;  Laterality: N/A;    ESOPHAGOGASTRODUODENOSCOPY N/A 12/9/2024    Procedure: EGD (ESOPHAGOGASTRODUODENOSCOPY);  Surgeon: Lino Devi MD;  Location: [facility];  Service: Endoscopy;  Laterality: N/A;    ESOPHAGOGASTRODUODENOSCOPY N/A 1/24/2025    Procedure: EGD (ESOPHAGOGASTRODUODENOSCOPY);  Surgeon: Lino Devi MD;  Location: [facility];  Service: Endoscopy;  Laterality: N/A;    ESOPHAGOGASTRODUODENOSCOPY N/A 2/27/2025    Procedure: EGD (ESOPHAGOGASTRODUODENOSCOPY);  Surgeon: Lino Devi MD;  Location: [facility];  Service: Endoscopy;  Laterality: N/A;    ESOPHAGOGASTRODUODENOSCOPY N/A 4/10/2025    Procedure: EGD (ESOPHAGOGASTRODUODENOSCOPY) WITH KENALOG INJECTION;  Surgeon: Lino Devi MD;  Location: [facility];  Service: Endoscopy;  Laterality: N/A;    HYSTERECTOMY      IMPLANTATION OF SPINAL CORD STIMULATOR IN CERVICAL SPINE      UPPER GASTROINTESTINAL ENDOSCOPY  11/20/2022    w/ antrum biopsy     Family History   Problem Relation Name Age of Onset    Melanoma Neg Hx      Psoriasis Neg Hx      Eczema Neg Hx       Lupus Neg Hx          Social History:   Marital Status:   Occupation: Data Unavailable  Alcohol History:  reports no history of alcohol use.  Tobacco History:  reports that she quit smoking about 3 years ago. Her smoking use included cigarettes. She started smoking about 60 years ago. She has a 56.8 pack-year smoking history. She has never used smokeless tobacco.  Drug History:  reports that she does not currently use drugs.    Review of patient's allergies indicates:   Allergen Reactions    Iodine     Sulfa (sulfonamide antibiotics) Nausea Only       Current Outpatient Medications   Medication Sig Dispense Refill    aspirin (ECOTRIN) 81 MG EC tablet Take 81 mg by mouth once daily.      clopidogreL (PLAVIX) 75 mg tablet Take 1 tablet (75 mg total) by mouth once daily. 30 tablet 3    ergocalciferol (ERGOCALCIFEROL) 50,000 unit Cap Take 1 capsule by mouth every 7 days.      fluticasone-umeclidin-vilanter (TRELEGY ELLIPTA) 100-62.5-25 mcg DsDv Inhale 1 puff into the lungs once daily. 60 each 11    L. gasseri-B. bifidum-B longum 1.5 billion cell Cap Take 1 tablet by mouth once daily.      metoprolol succinate (TOPROL-XL) 50 MG 24 hr tablet Take 50 mg by mouth 2 (two) times daily.      omeprazole (PRILOSEC) 40 MG capsule Take 40 mg by mouth once daily.      oxyCODONE-acetaminophen (PERCOCET)  mg per tablet Take 1 tablet by mouth every 4 (four) hours as needed for Pain. 5 times daily      polycarbophil (FIBERCON) 625 mg tablet Take 625 mg by mouth once daily.      simvastatin (ZOCOR) 20 MG tablet Take 20 mg by mouth every evening.      vit C,O-Iq-tzdiu-lutein-zeaxan (PRESERVISION AREDS-2) 250-90-40-1 mg Cap Take 1 tablet by mouth 2 (two) times a day.      ferrous sulfate 325 (65 FE) MG EC tablet Take 325 mg by mouth once daily. (Patient not taking: Reported on 6/17/2025)      promethazine (PHENERGAN) 6.25 mg/5 mL syrup Take 6.25 mg by mouth every 4 (four) hours as needed for Nausea. (Patient not taking:  "Reported on 10/9/2024)      UNABLE TO FIND Take 1 tablet by mouth once daily. medication name: Ronak Mohan MVI       No current facility-administered medications for this visit.       Last PFT:  3/28/25 Severe obstruction with FEV1 of 0.83, air trapping and a very severe diffusion defect at 17% of predicted  Last CT:  04/01/2022  .  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  2.  Patent stent in the proximal celiac artery.  3. 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  3.  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  4.  Moderate centrilobular emphysema.   Last chest x-ray 1/15/25  Hyperinflation of lungs with prominence of the interstitium    Review of Systems   Respiratory:  Positive for shortness of breath.      General: good and bad days  Eyes: Vision has macular degneration  ENT:  Her nose is always running with her oxygen.  It is not humidified here or at home.    Heart:: No chest pain or palpitations.  Lungs: shortness of breath with any exertion  GI: occasional diarrhea  : No dysuria, hesitancy, or nocturia.  Musculoskeletal:back pain is ongoing, legs are weak  Skin: No lesions or rashes. Bruises from plavix  Neuro: No headaches or neuropathy.  Lymph: No edema or adenopathy.  Psych: No anxiety or depression.  Endo:  down 5 pounds     OBJECTIVE:      /64   Pulse 94   Ht 5' 2" (1.575 m)   Wt 37.2 kg (82 lb)   LMP  (LMP Unknown)   SpO2 95% Comment: On 2 liters of Oxygen  BMI 15.00 kg/m²     Physical Exam  GENERAL: Older, thin patient in no distress.  She is in a wheelchair; she is wearing her oxygen.  HEENT: Pupils equal and reactive. Extraocular movements intact. Nose intact. Pharynx moist.Bitemporal wasting  NECK: Supple.   HEART: Regular rate and rhythm. No murmur or gallop auscultated.  LUNGS:  Clear to auscultation   ABDOMEN: Bowel sounds present. Non-tender, no masses palpated.  EXTREMITIES: Normal " muscle tone and joint movement, no cyanosis or clubbing.  Markedly decreased muscle mass  LYMPHATICS: No adenopathy palpated, no edema.  SKIN: Dry, intact, no lesions.   NEURO: Cranial nerves II-XII intact. Motor strength 5/5 bilaterally, upper and lower extremities.  PSYCH: Appropriate affect.    Assessment:       1. Chronic obstructive pulmonary disease, unspecified COPD type    2. Chronic respiratory failure with hypoxia    3. Centrilobular emphysema    4. Malnutrition, unspecified type    5. Diffusion capacity of lung (dl), decreased    6. Severe protein-calorie malnutrition    7. Chronic simple rhinitis      The patient is stable.  She is using her Trelegy and wearing her oxygen.  She remains dyspneic with exertion.  Her biggest complaint at this time is constantly runny nose.  She is not humidified either with her portable Inogen or her home concentrator [employer] have instructed them to contact [facility] and ask for a humidifier to connect to the home Inogen.  She can also so use air nasal gel and nasal saline to help with the rhinitis.  Her chest x-ray in January was stable.  Her PFTs are awful.  Her malnutrition and severely underweight condition remains.                  Plan:       Chronic obstructive pulmonary disease, unspecified COPD type    Chronic respiratory failure with hypoxia    Centrilobular emphysema    Malnutrition, unspecified type    Diffusion capacity of lung (dl), decreased    Severe protein-calorie malnutrition    Chronic simple rhinitis          Ayr gel to mosturize your nose  Ask [facility] to send you a humidity chamber for your home Inogen  Use nasal saline as needed  Continue Trelegy  Continue oxygen  Call if you need to be seen sooner      Follow up in about 6 months (around 12/17/2025).

## 2025-06-27 ENCOUNTER — HOSPITAL ENCOUNTER (OUTPATIENT)
Dept: PREADMISSION TESTING | Facility: HOSPITAL | Age: 85
Discharge: HOME | End: 2025-06-27
Attending: NURSE PRACTITIONER
Payer: MEDICARE

## 2025-06-27 ENCOUNTER — HOSPITAL ENCOUNTER (OUTPATIENT)
Dept: RADIOLOGY | Facility: HOSPITAL | Age: 85
Discharge: HOME | End: 2025-06-27
Attending: NURSE PRACTITIONER
Payer: MEDICARE

## 2025-06-27 DIAGNOSIS — R06.02 SHORTNESS OF BREATH: ICD-10-CM

## 2025-06-27 DIAGNOSIS — R07.1 PAINFUL RESPIRATION: ICD-10-CM

## 2025-06-27 DIAGNOSIS — J44.9 COLD (CHRONIC OBSTRUCTIVE LUNG DISEASE): Primary | ICD-10-CM

## 2025-06-27 DIAGNOSIS — J44.9 COLD (CHRONIC OBSTRUCTIVE LUNG DISEASE): ICD-10-CM

## 2025-06-27 DIAGNOSIS — R05.9 COUGH: ICD-10-CM

## 2025-06-27 DIAGNOSIS — J44.9 CHRONIC OBSTRUCTIVE PULMONARY DISEASE (COPD): ICD-10-CM

## 2025-06-27 DIAGNOSIS — J44.9 CHRONIC OBSTRUCTIVE PULMONARY DISEASE (COPD): Primary | ICD-10-CM

## 2025-06-27 LAB
OHS QRS DURATION: 72 MS
OHS QTC CALCULATION: 432 MS

## 2025-06-27 PROCEDURE — 71046 X-RAY EXAM CHEST 2 VIEWS: CPT | Mod: 26,,, | Performed by: RADIOLOGY

## 2025-06-27 PROCEDURE — 71046 X-RAY EXAM CHEST 2 VIEWS: CPT | Mod: TC,PO

## 2025-06-27 PROCEDURE — 93005 ELECTROCARDIOGRAM TRACING: CPT | Performed by: INTERNAL MEDICINE

## 2025-06-27 PROCEDURE — 93010 ELECTROCARDIOGRAM REPORT: CPT | Mod: ,,, | Performed by: INTERNAL MEDICINE

## 2025-07-03 ENCOUNTER — HOSPITAL ENCOUNTER (OUTPATIENT)
Dept: RADIOLOGY | Facility: HOSPITAL | Age: 85
Discharge: HOME | End: 2025-07-03
Attending: NURSE PRACTITIONER
Payer: MEDICARE

## 2025-07-03 PROCEDURE — 71250 CT THORAX DX C-: CPT | Mod: TC

## 2025-07-03 PROCEDURE — 71250 CT THORAX DX C-: CPT | Mod: 26,,, | Performed by: RADIOLOGY

## 2025-07-07 ENCOUNTER — TELEPHONE (OUTPATIENT)
Dept: PULMONOLOGY | Facility: CLINIC | Age: 85
End: 2025-07-07
Payer: MEDICARE

## 2025-07-07 NOTE — TELEPHONE ENCOUNTER
Dr Will office called requesting you take a look at pts recent CT results and if you should see patient sooner than 12/17/25.    CT is in pts chart

## 2025-07-09 ENCOUNTER — OFFICE VISIT (OUTPATIENT)
Dept: PULMONOLOGY | Facility: CLINIC | Age: 85
End: 2025-07-09
Payer: MEDICARE

## 2025-07-09 VITALS
OXYGEN SATURATION: 99 % | SYSTOLIC BLOOD PRESSURE: 118 MMHG | HEIGHT: 62 IN | DIASTOLIC BLOOD PRESSURE: 60 MMHG | HEART RATE: 81 BPM | BODY MASS INDEX: 15.09 KG/M2 | WEIGHT: 82 LBS

## 2025-07-09 DIAGNOSIS — J44.9 CHRONIC OBSTRUCTIVE PULMONARY DISEASE, UNSPECIFIED COPD TYPE: ICD-10-CM

## 2025-07-09 DIAGNOSIS — J90 PLEURAL EFFUSION: ICD-10-CM

## 2025-07-09 DIAGNOSIS — R09.1 PLEURISY: ICD-10-CM

## 2025-07-09 DIAGNOSIS — J43.2 CENTRILOBULAR EMPHYSEMA: ICD-10-CM

## 2025-07-09 DIAGNOSIS — R06.00 DYSPNEA, UNSPECIFIED TYPE: ICD-10-CM

## 2025-07-09 DIAGNOSIS — E46 MALNUTRITION, UNSPECIFIED TYPE: ICD-10-CM

## 2025-07-09 DIAGNOSIS — C34.90: Primary | ICD-10-CM

## 2025-07-09 DIAGNOSIS — J96.11 CHRONIC HYPOXEMIC RESPIRATORY FAILURE: ICD-10-CM

## 2025-07-09 PROCEDURE — 99999 PR PBB SHADOW E&M-EST. PATIENT-LVL III: CPT | Mod: PBBFAC,,, | Performed by: INTERNAL MEDICINE

## 2025-07-09 NOTE — PROGRESS NOTES
SUBJECTIVE:    Patient ID: Carmen Wilkinson is a 85 y.o. female.    Chief Complaint: Follow-up (Follow up to discuss CT results)    The patient is here after having a CT of her chest because of left anterior thoracic and lateral pain.  The CT showed a spiculated nodule in the right upper lobe, confluent adenopathy in the left hilum, sub carinal mediastinal and hilar adenopathy, and a lumpy bumpy left pleural effusion.  The patient's severe emphysema was also demonstrated again.  Reviewed the CT and prior chest x-rays with the patient's daughter.  Dr. Lorenz's office has started palliative care for her.  We discussed what options she has in so far as mitigating the symptoms of this obviously significantly spread tumor that is moving quickly.  The patient's main concern is that [place] pain management [place].  She takes 6 10 mg oxycodone daily.  Assured her that if she was in hospice, this would certainly not be an issue.  If she remains in palliative care, I will fill her prescription.  Discussed treatment of her left pleural effusion which is undoubtedly malignant.  Given the extent of her comorbidities, I do not think aggressive care of this is indicated.  Explained the difference between palliative care and hospice to the patient, her daughter, and .  I think they will move on to hospice care rapidly.  Will be available for anything they need and have reassured them of this.  Past Medical History:   Diagnosis Date    Anemia, unspecified     Back pain     Brain aneurysm 2018    Carotid stenosis     Cataract     Diverticulitis     Emphysema lung     Feeling anxious     Hyperlipidemia     Hypertension     Macular degeneration     PVD (peripheral vascular disease)     Squamous cell carcinoma 08/29/2017    right medical cheek, SSIS, efudex tx     Past Surgical History:   Procedure Laterality Date    ABDOMINAL SURGERY      ANGIOGRAPHY OF MESENTERIC VESSELS  Left 09/07/2022    Procedure: ANGIOGRAM, MESENTERIC VESSELS;  Surgeon: Krish Machado MD;  Location: [facility] CATH/EP LAB;  Service: General;  Laterality: Left;    ARTERIOGRAM, MESENTERIC N/A 06/30/2021    Procedure: ARTERIOGRAM, MESENTERIC;  Surgeon: Krish Machado MD;  Location: [facility] CATH/EP LAB;  Service: General;  Laterality: N/A;    BACK SURGERY  2008    CATARACT EXTRACTION, BILATERAL      COLONOSCOPY N/A 04/04/2022    Procedure: COLONOSCOPY;  Surgeon: Lino Devi MD;  Location: [facility];  Service: Endoscopy;  Laterality: N/A;    CREATION, BYPASS, ARTERIAL, AORTA TO FEMORAL N/A 01/13/2021    Procedure: ARTERIOGRAM, MESENTERIC;  Surgeon: Krish Machado MD;  Location: [facility] CATH/EP LAB;  Service: General;  Laterality: N/A;    ENDOSCOPY OF PROXIMAL SMALL INTESTINE N/A 04/04/2022    Procedure: ENTEROSCOPY, PROXIMAL;  Surgeon: Lino Devi MD;  Location: [facility];  Service: Endoscopy;  Laterality: N/A;    ESOPHAGOGASTRODUODENOSCOPY N/A 11/20/2022    Procedure: EGD (ESOPHAGOGASTRODUODENOSCOPY);  Surgeon: Pedro Hall Jr., MD;  Location: [facility];  Service: Endoscopy;  Laterality: N/A;    ESOPHAGOGASTRODUODENOSCOPY N/A 8/31/2023    Procedure: EGD (ESOPHAGOGASTRODUODENOSCOPY);  Surgeon: Lino Devi MD;  Location: [facility];  Service: Endoscopy;  Laterality: N/A;    ESOPHAGOGASTRODUODENOSCOPY N/A 10/26/2023    Procedure: EGD (ESOPHAGOGASTRODUODENOSCOPY);  Surgeon: Lino Devi MD;  Location: [facility] ENDO;  Service: Endoscopy;  Laterality: N/A;    ESOPHAGOGASTRODUODENOSCOPY N/A 12/14/2023    Procedure: EGD (ESOPHAGOGASTRODUODENOSCOPY);  Surgeon: Lino Devi MD;  Location: [facility];  Service: Endoscopy;  Laterality: N/A;    ESOPHAGOGASTRODUODENOSCOPY N/A 2/29/2024    Procedure: EGD (ESOPHAGOGASTRODUODENOSCOPY);  Surgeon: Lino Devi MD;  Location: [facility];  Service: Endoscopy;  Laterality: N/A;    ESOPHAGOGASTRODUODENOSCOPY N/A 8/29/2024    Procedure: EGD (ESOPHAGOGASTRODUODENOSCOPY);   Surgeon: Lino Devi MD;  Location: [facility];  Service: Endoscopy;  Laterality: N/A;    ESOPHAGOGASTRODUODENOSCOPY N/A 12/9/2024    Procedure: EGD (ESOPHAGOGASTRODUODENOSCOPY);  Surgeon: Lino Devi MD;  Location: [facility] ENDO;  Service: Endoscopy;  Laterality: N/A;    ESOPHAGOGASTRODUODENOSCOPY N/A 1/24/2025    Procedure: EGD (ESOPHAGOGASTRODUODENOSCOPY);  Surgeon: Lino Devi MD;  Location: [facility] ENDO;  Service: Endoscopy;  Laterality: N/A;    ESOPHAGOGASTRODUODENOSCOPY N/A 2/27/2025    Procedure: EGD (ESOPHAGOGASTRODUODENOSCOPY);  Surgeon: Lino Devi MD;  Location: [facility] ENDO;  Service: Endoscopy;  Laterality: N/A;    ESOPHAGOGASTRODUODENOSCOPY N/A 4/10/2025    Procedure: EGD (ESOPHAGOGASTRODUODENOSCOPY) WITH KENALOG INJECTION;  Surgeon: Lino Devi MD;  Location: [facility];  Service: Endoscopy;  Laterality: N/A;    HYSTERECTOMY      IMPLANTATION OF SPINAL CORD STIMULATOR IN CERVICAL SPINE      UPPER GASTROINTESTINAL ENDOSCOPY  11/20/2022    w/ antrum biopsy     Family History   Problem Relation Name Age of Onset    Melanoma Neg Hx      Psoriasis Neg Hx      Eczema Neg Hx      Lupus Neg Hx          Social History:   Marital Status:   Occupation: Data Unavailable  Alcohol History:  reports no history of alcohol use.  Tobacco History:  reports that she quit smoking about 3 years ago. Her smoking use included cigarettes. She started smoking about 60 years ago. She has a 56.8 pack-year smoking history. She has never used smokeless tobacco.  Drug History:  reports that she does not currently use drugs.    Review of patient's allergies indicates:   Allergen Reactions    Iodine     Sulfa (sulfonamide antibiotics) Nausea Only       Current Outpatient Medications   Medication Sig Dispense Refill    aspirin (ECOTRIN) 81 MG EC tablet Take 81 mg by mouth once daily.      clopidogreL (PLAVIX) 75 mg tablet Take 1 tablet (75 mg total) by mouth once daily. 30 tablet 3     fluticasone-umeclidin-vilanter (TRELEGY ELLIPTA) 100-62.5-25 mcg DsDv Inhale 1 puff into the lungs once daily. 60 each 11    L. gasseri-B. bifidum-B longum 1.5 billion cell Cap Take 1 tablet by mouth once daily.      metoprolol succinate (TOPROL-XL) 50 MG 24 hr tablet Take 50 mg by mouth 2 (two) times daily.      omeprazole (PRILOSEC) 40 MG capsule Take 40 mg by mouth once daily.      oxyCODONE-acetaminophen (PERCOCET)  mg per tablet Take 1 tablet by mouth every 4 (four) hours as needed for Pain. 5 times daily      polycarbophil (FIBERCON) 625 mg tablet Take 625 mg by mouth once daily.      simvastatin (ZOCOR) 20 MG tablet Take 20 mg by mouth every evening.      UNABLE TO FIND Take 1 tablet by mouth once daily. medication name: Ronak Mohan MVI      vit C,Q-Hc-hkdxx-lutein-zeaxan (PRESERVISION AREDS-2) 250-90-40-1 mg Cap Take 1 tablet by mouth 2 (two) times a day.      ergocalciferol (ERGOCALCIFEROL) 50,000 unit Cap Take 1 capsule by mouth every 7 days. (Patient not taking: Reported on 7/9/2025)      ferrous sulfate 325 (65 FE) MG EC tablet Take 325 mg by mouth once daily. (Patient not taking: Reported on 6/17/2025)      promethazine (PHENERGAN) 6.25 mg/5 mL syrup Take 6.25 mg by mouth every 4 (four) hours as needed for Nausea. (Patient not taking: Reported on 10/9/2024)       No current facility-administered medications for this visit.       Last PFT:  3/28/25 Severe obstruction with FEV1 of 0.83, air trapping and a very severe diffusion defect at 17% of predicted  Last CT:  04/01/2022  .  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  2.  Patent stent in the proximal celiac artery.  3. 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  3.  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  4.  Moderate centrilobular emphysema.   Last chest x-ray 1/15/25  Hyperinflation of lungs with prominence of the interstitium    Review of Systems  "  Respiratory:  Positive for shortness of breath.      General: good and bad days  Eyes: Vision has macular degneration  ENT:  Her nose is always running with her oxygen.  It is not humidified here or at home.    Heart:: No chest pain or palpitations.  Lungs: shortness of breath with any exertion  GI: occasional diarrhea  : No dysuria, hesitancy, or nocturia.  Musculoskeletal:back pain is ongoing, legs are weak  Skin: No lesions or rashes. Bruises from plavix  Neuro: No headaches or neuropathy.  Lymph: No edema or adenopathy.  Psych: No anxiety or depression.  Endo:  down 5 pounds     OBJECTIVE:      /60   Pulse 81   Ht 5' 2" (1.575 m)   Wt 37.2 kg (82 lb)   LMP  (LMP Unknown)   SpO2 99% Comment: On 2 liters of Oxygen  BMI 15.00 kg/m²     Physical Exam  GENERAL: Older, thin patient in no distress.  She is in a wheelchair; she is wearing her oxygen.  HEENT: Pupils equal and reactive. Extraocular movements intact. Nose intact. Pharynx moist.Bitemporal wasting  NECK: Supple.   HEART: Regular rate and rhythm. No murmur or gallop auscultated.  LUNGS:  Clear to auscultation   ABDOMEN: Bowel sounds present. Non-tender, no masses palpated.  EXTREMITIES: Normal muscle tone and joint movement, no cyanosis or clubbing.  Markedly decreased muscle mass  LYMPHATICS: No adenopathy palpated, no edema.  SKIN: Dry, intact, no lesions.   NEURO: Cranial nerves II-XII intact. Motor strength 5/5 bilaterally, upper and lower extremities.  PSYCH: Appropriate affect.    Assessment:       1. BC (bronchogenic carcinoma)    2. Pleural effusion    3. Pleurisy    4. Chronic obstructive pulmonary disease, unspecified COPD type    5. Centrilobular emphysema    6. Chronic hypoxemic respiratory failure    7. Malnutrition, unspecified type    8. Dyspnea, unspecified type        The patient has undoubtedly stage IV bronchogenic carcinoma.  Beyond this she has severe emphysematous COPD, chronic hypoxemic respiratory failure, chronic pain " syndrome, recurrent esophageal stenosis, and failure to thrive.  Hospice is obviously the most appropriate milieu for her at this time.  If they choose to remain in palliative care, will fill her narcotic prescriptions as needed.                Plan:       BC (bronchogenic carcinoma)    Pleural effusion    Pleurisy    Chronic obstructive pulmonary disease, unspecified COPD type    Centrilobular emphysema    Chronic hypoxemic respiratory failure    Malnutrition, unspecified type    Dyspnea, unspecified type                Call if you need anything  Follow up if symptoms worsen or fail to improve.

## (undated) DEVICE — GUIDEWIRE REGALIA XS 0.014X300CM

## (undated) DEVICE — SHEATH PINNACLE 6FRX10CM W/GUIDEWIRE

## (undated) DEVICE — CATHETER ANGIO OMNI FLUSH 10732201

## (undated) DEVICE — GUIDEWIRE HI-TORQUE 4.5CMX190CM 1000462H

## (undated) DEVICE — VALVE BLEEDBACK CONTROL 1003330

## (undated) DEVICE — SHEATH INTRODUCER SLENDER 6FX10CM

## (undated) DEVICE — KIT INFLATION MERIT (IN8152, HP9200E)

## (undated) DEVICE — SHEATH PINNACLE 5FRX10CM W/GUIDEWIRE

## (undated) DEVICE — 6F LIMA

## (undated) DEVICE — Device

## (undated) DEVICE — GUIDEWIRE DOUBLE ENDED .035 DIA. 150CML

## (undated) DEVICE — CATHETER DIAGNOSTIC DXTERITY 6F MPA 110

## (undated) DEVICE — GUIDEWIRE GRAND SLAM 3CMX300CM 14940-01

## (undated) DEVICE — GUIDEWIRE STIFF ANGLED 035X260 LAUREATE

## (undated) DEVICE — CATHETER DIAGNOSTIC DXTERITY 5F PIGST